# Patient Record
Sex: MALE | Race: WHITE | Employment: FULL TIME | URBAN - METROPOLITAN AREA
[De-identification: names, ages, dates, MRNs, and addresses within clinical notes are randomized per-mention and may not be internally consistent; named-entity substitution may affect disease eponyms.]

---

## 2018-01-07 PROBLEM — E11.10 DKA, TYPE 2, NOT AT GOAL (HCC): Status: ACTIVE | Noted: 2018-01-07

## 2018-01-07 PROBLEM — E11.10 DIABETIC KETOACIDOSIS (HCC): Status: ACTIVE | Noted: 2018-01-07

## 2018-01-11 PROBLEM — M54.2 NECK PAIN: Status: ACTIVE | Noted: 2018-01-11

## 2018-01-12 PROBLEM — E44.0 MODERATE PROTEIN-CALORIE MALNUTRITION (HCC): Status: ACTIVE | Noted: 2018-01-12

## 2018-01-17 PROBLEM — J18.9 PNEUMONIA: Status: ACTIVE | Noted: 2018-01-17

## 2018-01-17 PROBLEM — J18.9 PNEUMONIA DUE TO ORGANISM: Status: ACTIVE | Noted: 2018-01-17

## 2018-11-20 ENCOUNTER — HOSPITAL ENCOUNTER (OUTPATIENT)
Age: 58
Discharge: HOME | End: 2018-11-22
Payer: COMMERCIAL

## 2018-11-20 PROCEDURE — 88305 TISSUE EXAM BY PATHOLOGIST: CPT

## 2020-02-01 RX ORDER — SIMVASTATIN 40 MG
1 TABLET ORAL DAILY
COMMUNITY
Start: 2016-11-23

## 2020-02-01 RX ORDER — HYDROMORPHONE HYDROCHLORIDE 4 MG/1
1 TABLET ORAL 4 TIMES DAILY
COMMUNITY
Start: 2016-11-23

## 2020-02-01 RX ORDER — GLIPIZIDE 10 MG/1
1 TABLET ORAL 2 TIMES DAILY
COMMUNITY
Start: 2016-11-23

## 2020-11-03 PROBLEM — J18.9 PNEUMONIA DUE TO ORGANISM: Status: RESOLVED | Noted: 2018-01-17 | Resolved: 2020-11-03

## 2023-10-04 ENCOUNTER — INITIAL CONSULT (OUTPATIENT)
Age: 63
End: 2023-10-04
Payer: COMMERCIAL

## 2023-10-04 ENCOUNTER — HOSPITAL ENCOUNTER (OUTPATIENT)
Age: 63
Discharge: HOME | End: 2023-10-04
Payer: COMMERCIAL

## 2023-10-04 ENCOUNTER — TELEPHONE (OUTPATIENT)
Age: 63
End: 2023-10-04

## 2023-10-04 ENCOUNTER — PREP FOR PROCEDURE (OUTPATIENT)
Age: 63
End: 2023-10-04

## 2023-10-04 ENCOUNTER — HOSPITAL ENCOUNTER (OUTPATIENT)
Dept: GENERAL RADIOLOGY | Age: 63
Discharge: HOME | End: 2023-10-06
Payer: COMMERCIAL

## 2023-10-04 ENCOUNTER — HOSPITAL ENCOUNTER (OUTPATIENT)
Age: 63
Discharge: HOME | End: 2023-10-06
Payer: COMMERCIAL

## 2023-10-04 VITALS
SYSTOLIC BLOOD PRESSURE: 122 MMHG | HEIGHT: 69 IN | BODY MASS INDEX: 31.4 KG/M2 | TEMPERATURE: 97 F | OXYGEN SATURATION: 100 % | WEIGHT: 212 LBS | DIASTOLIC BLOOD PRESSURE: 74 MMHG | HEART RATE: 69 BPM | RESPIRATION RATE: 16 BRPM

## 2023-10-04 DIAGNOSIS — R19.7 DIARRHEA, UNSPECIFIED TYPE: Primary | ICD-10-CM

## 2023-10-04 DIAGNOSIS — R19.7 DIARRHEA, UNSPECIFIED TYPE: ICD-10-CM

## 2023-10-04 DIAGNOSIS — Z12.11 SCREEN FOR COLON CANCER: ICD-10-CM

## 2023-10-04 LAB — IGA SERPL-MCNC: 249 MG/DL (ref 70–400)

## 2023-10-04 PROCEDURE — 99202 OFFICE O/P NEW SF 15 MIN: CPT | Performed by: NURSE PRACTITIONER

## 2023-10-04 PROCEDURE — 36415 COLL VENOUS BLD VENIPUNCTURE: CPT

## 2023-10-04 PROCEDURE — 83516 IMMUNOASSAY NONANTIBODY: CPT

## 2023-10-04 PROCEDURE — 82784 ASSAY IGA/IGD/IGG/IGM EACH: CPT

## 2023-10-04 PROCEDURE — 74018 RADEX ABDOMEN 1 VIEW: CPT

## 2023-10-04 RX ORDER — MONTELUKAST SODIUM 4 MG/1
1 TABLET, CHEWABLE ORAL 2 TIMES DAILY
Qty: 60 TABLET | Refills: 3 | Status: SHIPPED | OUTPATIENT
Start: 2023-10-04

## 2023-10-04 RX ORDER — TICAGRELOR 90 MG/1
90 TABLET ORAL 2 TIMES DAILY
COMMUNITY
Start: 2023-07-30

## 2023-10-04 RX ORDER — DULAGLUTIDE 4.5 MG/.5ML
4.5 INJECTION, SOLUTION SUBCUTANEOUS
COMMUNITY

## 2023-10-04 RX ORDER — INSULIN DEGLUDEC 200 U/ML
INJECTION, SOLUTION SUBCUTANEOUS
COMMUNITY
Start: 2023-10-02

## 2023-10-04 RX ORDER — ACETAMINOPHEN 160 MG
TABLET,DISINTEGRATING ORAL
COMMUNITY
Start: 2023-09-28

## 2023-10-04 RX ORDER — METHOCARBAMOL 750 MG/1
750 TABLET, FILM COATED ORAL 4 TIMES DAILY PRN
COMMUNITY
Start: 2022-07-26 | End: 2023-10-04

## 2023-10-04 RX ORDER — ROSUVASTATIN CALCIUM 40 MG/1
40 TABLET, COATED ORAL EVERY EVENING
COMMUNITY

## 2023-10-04 RX ORDER — ICOSAPENT ETHYL 1000 MG/1
1 CAPSULE ORAL
COMMUNITY

## 2023-10-04 RX ORDER — ASPIRIN 81 MG/1
TABLET, CHEWABLE ORAL
COMMUNITY
Start: 2022-02-23

## 2023-10-04 RX ORDER — POLYETHYLENE GLYCOL 3350, SODIUM CHLORIDE, POTASSIUM CHLORIDE, SODIUM BICARBONATE, AND SODIUM SULFATE 240; 5.84; 2.98; 6.72; 22.72 G/4L; G/4L; G/4L; G/4L; G/4L
4000 POWDER, FOR SOLUTION ORAL ONCE
Qty: 1 EACH | Refills: 0 | Status: SHIPPED | OUTPATIENT
Start: 2023-10-04 | End: 2023-10-04

## 2023-10-04 NOTE — TELEPHONE ENCOUNTER
Prior Authorization Form:      DEMOGRAPHICS:                     Patient Name:  Gabbie Davis  Patient :  1960            Insurance:  Payor: [facility] / Plan: [place] / Product Type: *No Product type* /   Insurance ID Number:    Payer/Plan Subscr  Sex Relation Sub. Ins. ID Effective Group Num   1. [id number] 1960 Male Self [id number]                                   [address]   2.  [employer] Q 1960 Male Self [id number]                                   [address]         DIAGNOSIS & PROCEDURE:                       Procedure/Operation: Screening Colonoscopy            CPT Code: 60443    Diagnosis:  DIARRHEA, SCREENING COLONOSCOPY    ICD10 Code: R19.7, Z12.11    Location:  [facility]    Surgeon:  Zeferino Christianson INFORMATION:                          Date: 24    Time: 1015AM              Anesthesia:  MAC/TIVA                                                       Status:  Outpatient        Special Comments:         Electronically signed by Paolo Velazquez MA on 10/4/2023 at 2:22 PM

## 2023-10-05 ENCOUNTER — HOSPITAL ENCOUNTER (OUTPATIENT)
Age: 63
Setting detail: SPECIMEN
Discharge: HOME | End: 2023-10-05
Payer: COMMERCIAL

## 2023-10-05 DIAGNOSIS — R19.7 DIARRHEA, UNSPECIFIED TYPE: ICD-10-CM

## 2023-10-05 PROCEDURE — 87449 NOS EACH ORGANISM AG IA: CPT

## 2023-10-05 PROCEDURE — 87324 CLOSTRIDIUM AG IA: CPT

## 2023-10-05 PROCEDURE — 87427 SHIGA-LIKE TOXIN AG IA: CPT

## 2023-10-05 PROCEDURE — 87045 FECES CULTURE AEROBIC BACT: CPT

## 2023-10-05 PROCEDURE — 87046 STOOL CULTR AEROBIC BACT EA: CPT

## 2023-10-06 ENCOUNTER — LAB (OUTPATIENT)
Dept: LAB | Facility: LAB | Age: 63
End: 2023-10-06
Payer: COMMERCIAL

## 2023-10-06 DIAGNOSIS — I25.10 ATHEROSCLEROTIC HEART DISEASE OF NATIVE CORONARY ARTERY WITHOUT ANGINA PECTORIS: Primary | ICD-10-CM

## 2023-10-06 DIAGNOSIS — E78.5 HYPERLIPIDEMIA, UNSPECIFIED: ICD-10-CM

## 2023-10-06 LAB
ALBUMIN SERPL BCP-MCNC: 4.2 G/DL (ref 3.4–5)
ALP SERPL-CCNC: 68 U/L (ref 33–136)
ALT SERPL W P-5'-P-CCNC: 27 U/L (ref 10–52)
ANION GAP SERPL CALC-SCNC: 17 MMOL/L (ref 10–20)
AST SERPL W P-5'-P-CCNC: 20 U/L (ref 9–39)
BILIRUB SERPL-MCNC: 0.5 MG/DL (ref 0–1.2)
BUN SERPL-MCNC: 17 MG/DL (ref 6–23)
C DIFF GDH + TOXINS A+B STL QL IA.RAPID: NEGATIVE
CALCIUM SERPL-MCNC: 10.4 MG/DL (ref 8.6–10.3)
CHLORIDE SERPL-SCNC: 96 MMOL/L (ref 98–107)
CHOLEST SERPL-MCNC: 261 MG/DL (ref 0–199)
CHOLESTEROL/HDL RATIO: 5.6
CO2 SERPL-SCNC: 28 MMOL/L (ref 21–32)
CREAT SERPL-MCNC: 1.03 MG/DL (ref 0.5–1.3)
ERYTHROCYTE [DISTWIDTH] IN BLOOD BY AUTOMATED COUNT: 12.9 % (ref 11.5–14.5)
GFR SERPL CREATININE-BSD FRML MDRD: 83 ML/MIN/1.73M*2
GLUCOSE SERPL-MCNC: 283 MG/DL (ref 74–99)
HCT VFR BLD AUTO: 43 % (ref 41–52)
HDLC SERPL-MCNC: 46.2 MG/DL
HGB BLD-MCNC: 14.2 G/DL (ref 13.5–17.5)
LDLC SERPL CALC-MCNC: 156 MG/DL (ref 140–190)
MCH RBC QN AUTO: 30.9 PG (ref 26–34)
MCHC RBC AUTO-ENTMCNC: 33 G/DL (ref 32–36)
MCV RBC AUTO: 94 FL (ref 80–100)
NON HDL CHOLESTEROL: 215 MG/DL (ref 0–149)
NRBC BLD-RTO: 0 /100 WBCS (ref 0–0)
PLATELET # BLD AUTO: 341 X10*3/UL (ref 150–450)
PMV BLD AUTO: 9.2 FL (ref 7.5–11.5)
POTASSIUM SERPL-SCNC: 5.6 MMOL/L (ref 3.5–5.3)
PROT SERPL-MCNC: 6.7 G/DL (ref 6.4–8.2)
RBC # BLD AUTO: 4.6 X10*6/UL (ref 4.5–5.9)
SODIUM SERPL-SCNC: 135 MMOL/L (ref 136–145)
SPECIMEN DESCRIPTION: NORMAL
TRIGL SERPL-MCNC: 292 MG/DL (ref 0–149)
VLDL: 58 MG/DL (ref 0–40)
WBC # BLD AUTO: 9.5 X10*3/UL (ref 4.4–11.3)

## 2023-10-06 PROCEDURE — 85027 COMPLETE CBC AUTOMATED: CPT

## 2023-10-06 PROCEDURE — 80061 LIPID PANEL: CPT

## 2023-10-06 PROCEDURE — 36415 COLL VENOUS BLD VENIPUNCTURE: CPT

## 2023-10-06 PROCEDURE — 80053 COMPREHEN METABOLIC PANEL: CPT

## 2023-10-08 LAB
MICROORGANISM SPEC CULT: NORMAL
MICROORGANISM SPEC CULT: NORMAL
SPECIMEN DESCRIPTION: NORMAL

## 2023-10-09 LAB — TTG IGA SER IA-ACNC: 0.7 U/ML

## 2023-10-11 PROBLEM — E44.0 MODERATE PROTEIN-CALORIE MALNUTRITION (MULTI): Status: ACTIVE | Noted: 2018-01-12

## 2023-10-11 PROBLEM — E87.6 HYPOKALEMIA: Status: ACTIVE | Noted: 2023-10-11

## 2023-10-11 PROBLEM — R19.7 DIARRHEA: Status: ACTIVE | Noted: 2023-10-04

## 2023-10-11 PROBLEM — I25.5 ISCHEMIC CARDIOMYOPATHY: Status: ACTIVE | Noted: 2023-10-11

## 2023-10-11 PROBLEM — J18.9 PNEUMONIA: Status: ACTIVE | Noted: 2018-01-17

## 2023-10-11 PROBLEM — I25.10 CAD (CORONARY ARTERY DISEASE): Status: ACTIVE | Noted: 2023-10-11

## 2023-10-11 PROBLEM — Z95.1 S/P CABG (CORONARY ARTERY BYPASS GRAFT): Status: ACTIVE | Noted: 2023-10-11

## 2023-10-11 PROBLEM — Z95.5 H/O HEART ARTERY STENT: Status: ACTIVE | Noted: 2023-10-11

## 2023-10-11 PROBLEM — E78.5 HYPERLIPIDEMIA: Status: ACTIVE | Noted: 2023-10-11

## 2023-10-11 PROBLEM — E11.10 DIABETIC KETOACIDOSIS (MULTI): Status: ACTIVE | Noted: 2018-01-07

## 2023-10-11 PROBLEM — E11.10 DKA, TYPE 2, NOT AT GOAL (MULTI): Status: ACTIVE | Noted: 2018-01-07

## 2023-10-11 PROBLEM — C44.519 BASAL CELL CARCINOMA OF SKIN OF OTHER PART OF TRUNK: Status: ACTIVE | Noted: 2020-02-18

## 2023-10-11 PROBLEM — I10 HYPERTENSION: Status: ACTIVE | Noted: 2023-10-11

## 2023-10-11 PROBLEM — E11.9 DIABETES MELLITUS (MULTI): Status: ACTIVE | Noted: 2023-10-11

## 2023-10-11 RX ORDER — PEN NEEDLE, DIABETIC 29 G X1/2"
1 NEEDLE, DISPOSABLE MISCELLANEOUS DAILY
COMMUNITY
Start: 2018-01-16

## 2023-10-11 RX ORDER — HYDROMORPHONE HYDROCHLORIDE 4 MG/1
4 TABLET ORAL EVERY 6 HOURS PRN
COMMUNITY
Start: 2023-07-06 | End: 2023-10-26 | Stop reason: ALTCHOICE

## 2023-10-16 ENCOUNTER — PHARMACY VISIT (OUTPATIENT)
Dept: PHARMACY | Facility: CLINIC | Age: 63
End: 2023-10-16
Payer: COMMERCIAL

## 2023-10-16 DIAGNOSIS — I25.10 CORONARY ARTERY DISEASE INVOLVING NATIVE HEART WITHOUT ANGINA PECTORIS, UNSPECIFIED VESSEL OR LESION TYPE: ICD-10-CM

## 2023-10-16 DIAGNOSIS — E78.49 OTHER HYPERLIPIDEMIA: ICD-10-CM

## 2023-10-16 DIAGNOSIS — E87.5 HYPERKALEMIA: Primary | ICD-10-CM

## 2023-10-16 RX ORDER — METOPROLOL SUCCINATE 100 MG/1
100 TABLET, EXTENDED RELEASE ORAL 2 TIMES DAILY
Qty: 180 TABLET | Refills: 3 | Status: SHIPPED | OUTPATIENT
Start: 2023-10-16

## 2023-10-16 RX ORDER — ALIROCUMAB 75 MG/ML
INJECTION, SOLUTION SUBCUTANEOUS
Qty: 2 ML | Refills: 0 | OUTPATIENT
Start: 2023-10-16 | End: 2023-10-16 | Stop reason: ENTERED-IN-ERROR

## 2023-10-16 NOTE — RESULT ENCOUNTER NOTE
Reviewed blood work results with patient on the phone. Lipid panel not at goal. Stopped Rosuvastatin 3 weeks ago due to diarrhea. Stopped Repatha as well. Reports instructed by PCP. Instructed patient I will send prescription for Repatha to [facility] pharmacy. He will remain off statin for now. He is also taking Colestipol now. Will leave this for now. His potassium was elevated. I instructed him to maintain low potassium diet. Repeat BMP at end of week. Patient agreeable to plan and verbalized understanding

## 2023-10-17 ENCOUNTER — OFFICE VISIT (OUTPATIENT)
Dept: FAMILY MEDICINE CLINIC | Age: 63
End: 2023-10-17
Payer: COMMERCIAL

## 2023-10-17 VITALS
SYSTOLIC BLOOD PRESSURE: 124 MMHG | HEART RATE: 100 BPM | WEIGHT: 224 LBS | DIASTOLIC BLOOD PRESSURE: 84 MMHG | BODY MASS INDEX: 33.18 KG/M2 | OXYGEN SATURATION: 98 % | HEIGHT: 69 IN | RESPIRATION RATE: 18 BRPM

## 2023-10-17 DIAGNOSIS — E78.2 MIXED HYPERLIPIDEMIA: ICD-10-CM

## 2023-10-17 DIAGNOSIS — I10 HYPERTENSION, UNSPECIFIED TYPE: ICD-10-CM

## 2023-10-17 DIAGNOSIS — E11.9 TYPE 2 DIABETES MELLITUS WITHOUT COMPLICATION, WITHOUT LONG-TERM CURRENT USE OF INSULIN (HCC): Primary | ICD-10-CM

## 2023-10-17 DIAGNOSIS — R53.82 CHRONIC FATIGUE: ICD-10-CM

## 2023-10-17 DIAGNOSIS — Z12.5 SCREENING FOR MALIGNANT NEOPLASM OF PROSTATE: ICD-10-CM

## 2023-10-17 DIAGNOSIS — Z12.5 ENCOUNTER FOR SCREENING FOR MALIGNANT NEOPLASM OF PROSTATE: ICD-10-CM

## 2023-10-17 PROCEDURE — 99204 OFFICE O/P NEW MOD 45 MIN: CPT | Performed by: FAMILY MEDICINE

## 2023-10-17 PROCEDURE — 3074F SYST BP LT 130 MM HG: CPT | Performed by: FAMILY MEDICINE

## 2023-10-17 PROCEDURE — 3078F DIAST BP <80 MM HG: CPT | Performed by: FAMILY MEDICINE

## 2023-10-17 RX ORDER — INSULIN LISPRO 100 [IU]/ML
INJECTION, SUSPENSION SUBCUTANEOUS
COMMUNITY

## 2023-10-17 RX ORDER — INSULIN GLARGINE-YFGN 100 [IU]/ML
INJECTION, SOLUTION SUBCUTANEOUS
COMMUNITY

## 2023-10-17 RX ORDER — LOSARTAN POTASSIUM 25 MG/1
25 TABLET ORAL DAILY
COMMUNITY
Start: 2022-12-22

## 2023-10-17 SDOH — ECONOMIC STABILITY: FOOD INSECURITY: WITHIN THE PAST 12 MONTHS, THE FOOD YOU BOUGHT JUST DIDN'T LAST AND YOU DIDN'T HAVE MONEY TO GET MORE.: PATIENT DECLINED

## 2023-10-17 SDOH — ECONOMIC STABILITY: HOUSING INSECURITY
IN THE LAST 12 MONTHS, WAS THERE A TIME WHEN YOU DID NOT HAVE A STEADY PLACE TO SLEEP OR SLEPT IN A SHELTER (INCLUDING NOW)?: PATIENT REFUSED

## 2023-10-17 SDOH — ECONOMIC STABILITY: FOOD INSECURITY: WITHIN THE PAST 12 MONTHS, YOU WORRIED THAT YOUR FOOD WOULD RUN OUT BEFORE YOU GOT MONEY TO BUY MORE.: PATIENT DECLINED

## 2023-10-17 SDOH — ECONOMIC STABILITY: INCOME INSECURITY: HOW HARD IS IT FOR YOU TO PAY FOR THE VERY BASICS LIKE FOOD, HOUSING, MEDICAL CARE, AND HEATING?: PATIENT DECLINED

## 2023-10-17 ASSESSMENT — PATIENT HEALTH QUESTIONNAIRE - PHQ9
SUM OF ALL RESPONSES TO PHQ QUESTIONS 1-9: 0
SUM OF ALL RESPONSES TO PHQ QUESTIONS 1-9: 0
2. FEELING DOWN, DEPRESSED OR HOPELESS: 0
SUM OF ALL RESPONSES TO PHQ QUESTIONS 1-9: 0
1. LITTLE INTEREST OR PLEASURE IN DOING THINGS: 0
SUM OF ALL RESPONSES TO PHQ QUESTIONS 1-9: 0
SUM OF ALL RESPONSES TO PHQ9 QUESTIONS 1 & 2: 0

## 2023-10-20 ENCOUNTER — LAB (OUTPATIENT)
Dept: LAB | Facility: LAB | Age: 63
End: 2023-10-20
Payer: COMMERCIAL

## 2023-10-20 DIAGNOSIS — E87.5 HYPERKALEMIA: ICD-10-CM

## 2023-10-20 LAB
ANION GAP SERPL CALC-SCNC: 12 MMOL/L (ref 10–20)
BUN SERPL-MCNC: 15 MG/DL (ref 6–23)
CALCIUM SERPL-MCNC: 9.3 MG/DL (ref 8.6–10.3)
CHLORIDE SERPL-SCNC: 102 MMOL/L (ref 98–107)
CO2 SERPL-SCNC: 29 MMOL/L (ref 21–32)
CREAT SERPL-MCNC: 0.85 MG/DL (ref 0.5–1.3)
GFR SERPL CREATININE-BSD FRML MDRD: >90 ML/MIN/1.73M*2
GLUCOSE SERPL-MCNC: 263 MG/DL (ref 74–99)
POTASSIUM SERPL-SCNC: 4.6 MMOL/L (ref 3.5–5.3)
SODIUM SERPL-SCNC: 138 MMOL/L (ref 136–145)

## 2023-10-20 PROCEDURE — 80048 BASIC METABOLIC PNL TOTAL CA: CPT

## 2023-10-20 PROCEDURE — 36415 COLL VENOUS BLD VENIPUNCTURE: CPT

## 2023-10-21 RX ORDER — METOPROLOL TARTRATE 100 MG/1
100 TABLET ORAL 2 TIMES DAILY
Qty: 60 TABLET | Refills: 3
Start: 2023-10-21

## 2023-10-21 ASSESSMENT — ENCOUNTER SYMPTOMS
SHORTNESS OF BREATH: 0
EYE DISCHARGE: 0
SORE THROAT: 0
RECTAL PAIN: 0
PHOTOPHOBIA: 0
ABDOMINAL DISTENTION: 0
ABDOMINAL PAIN: 0
RHINORRHEA: 0
CHEST TIGHTNESS: 0
ANAL BLEEDING: 0
TROUBLE SWALLOWING: 0
WHEEZING: 0
EYE ITCHING: 0
COLOR CHANGE: 0
CONSTIPATION: 0
BACK PAIN: 0
SINUS PRESSURE: 0
VOICE CHANGE: 0
VOMITING: 0
APNEA: 0
DIARRHEA: 0
STRIDOR: 0
EYE REDNESS: 0
CHOKING: 0
FACIAL SWELLING: 0
COUGH: 0
EYE PAIN: 0
BLOOD IN STOOL: 0
NAUSEA: 0

## 2023-10-26 ENCOUNTER — PHARMACY VISIT (OUTPATIENT)
Dept: PHARMACY | Facility: CLINIC | Age: 63
End: 2023-10-26
Payer: COMMERCIAL

## 2023-10-26 ENCOUNTER — OFFICE VISIT (OUTPATIENT)
Dept: CARDIOLOGY | Facility: HOSPITAL | Age: 63
End: 2023-10-26
Payer: COMMERCIAL

## 2023-10-26 VITALS
HEART RATE: 76 BPM | WEIGHT: 225 LBS | HEIGHT: 69 IN | BODY MASS INDEX: 33.33 KG/M2 | DIASTOLIC BLOOD PRESSURE: 70 MMHG | OXYGEN SATURATION: 98 % | RESPIRATION RATE: 17 BRPM | SYSTOLIC BLOOD PRESSURE: 126 MMHG

## 2023-10-26 DIAGNOSIS — I25.5 ISCHEMIC CARDIOMYOPATHY: ICD-10-CM

## 2023-10-26 DIAGNOSIS — Z95.1 S/P CABG (CORONARY ARTERY BYPASS GRAFT): ICD-10-CM

## 2023-10-26 DIAGNOSIS — E78.2 MIXED HYPERLIPIDEMIA: ICD-10-CM

## 2023-10-26 DIAGNOSIS — I25.10 CORONARY ARTERY DISEASE INVOLVING NATIVE HEART WITHOUT ANGINA PECTORIS, UNSPECIFIED VESSEL OR LESION TYPE: Primary | ICD-10-CM

## 2023-10-26 DIAGNOSIS — I10 HYPERTENSION, UNSPECIFIED TYPE: ICD-10-CM

## 2023-10-26 PROCEDURE — 99214 OFFICE O/P EST MOD 30 MIN: CPT | Performed by: NURSE PRACTITIONER

## 2023-10-26 PROCEDURE — 3074F SYST BP LT 130 MM HG: CPT | Performed by: NURSE PRACTITIONER

## 2023-10-26 PROCEDURE — 3078F DIAST BP <80 MM HG: CPT | Performed by: NURSE PRACTITIONER

## 2023-10-26 PROCEDURE — 3050F LDL-C >= 130 MG/DL: CPT | Performed by: NURSE PRACTITIONER

## 2023-10-26 RX ORDER — ROSUVASTATIN CALCIUM 40 MG/1
40 TABLET, COATED ORAL DAILY
Qty: 90 TABLET | Refills: 3 | Status: SHIPPED | OUTPATIENT
Start: 2023-10-26 | End: 2024-10-25

## 2023-10-26 ASSESSMENT — ENCOUNTER SYMPTOMS
LOSS OF SENSATION IN FEET: 0
OCCASIONAL FEELINGS OF UNSTEADINESS: 0
DEPRESSION: 0

## 2023-10-26 NOTE — PROGRESS NOTES
Primary Care Physician: Vik Castellanos DO  Date of Visit: 10/26/2023  9:00 AM EDT  Location of visit: [facility]     Chief Complaint:   Chief Complaint   Patient presents with    Cardiomyopathy    Coronary Artery Disease    Hypertension    Hyperlipidemia        HPI / Summary:   Stevenson Rodriguez is a 63 y.o. male presents for followup. Seen in collaboration with Dr. Rangel. He has no particular complaints. He is able to weed wack without chest pain or dyspnea. He walks frequently at work without symptoms. He has not been taking Repatha or Rosuvastatin for the past couple months due to diarrhea. His diarrhea did not resolve after stopping both medications. Denies chest pain, dyspnea, orthopnea, pnd, lightheadedness, dizziness, syncope, palpitations, lower extremity edema, or bleeding issues.                Past Medical History:  No past medical history on file.     Past Surgical History:  Past Surgical History:   Procedure Laterality Date    OTHER SURGICAL HISTORY  02/25/2022    Coronary artery stent placement          Social History:   reports that he has been smoking cigarettes. He has a 22.50 pack-year smoking history. He has quit using smokeless tobacco.  His smokeless tobacco use included chew. He reports current alcohol use of about 5.0 standard drinks of alcohol per week. He reports that he does not currently use drugs after having used the following drugs: Marijuana.     Family History:  family history is not on file.      Allergies:  No Known Allergies    Outpatient Medications:  Current Outpatient Medications   Medication Instructions    aspirin 81 mg, oral, Daily    Brilinta 90 mg tablet 1 tablet, oral, 2 times daily    buPROPion XL (WELLBUTRIN XL) 300 mg, oral, Daily    cholecalciferol (VITAMIN D-3) 50 mcg, oral, Daily    colestipol (Colestid) 1 gram tablet 1 tablet, oral, 2 times daily    dicyclomine (BENTYL) 20 mg, oral, Every 8 hours PRN    evolocumab (REPATHA SURECLICK) 140 mg, subcutaneous,  "Every 14 days    FreeStyle Prashant 2 Sensor kit use as directed    HumaLOG KwikPen Insulin 100 unit/mL injection INJECT SUBCUTANEOUSLY 3 TIMES A DAY WITH MEALS AS PER SLIDING SCALE. MAX OF 40 UNITS PER DAY.    icosapent ethyL (VASCEPA) 1 g, oral    insulin glargine-yfgn 100 unit/mL pen subcutaneous    insulin syringe-needle U-100 0.3 mL 30 gauge x 5/16\" syringe 1 each, Does not apply, Daily    metoprolol succinate XL (TOPROL-XL) 100 mg, oral, 2 times daily    multivitamin with minerals tablet 1 tablet, oral, Daily    nicotine (Nicoderm CQ) 14 mg/24 hr patch transdermal, Daily RT    ondansetron ODT (Zofran-ODT) 4 mg disintegrating tablet DISSOLVE ONE TABLET IN MOUTH THREE TIMES A DAY    Trulicity 4.5 mg, subcutaneous, Weekly       Physical Exam:  Vitals:    10/26/23 0843   BP: 126/70   BP Location: Left arm   Patient Position: Sitting   BP Cuff Size: Adult   Pulse: 76   Resp: 17   SpO2: 98%   Weight: 102 kg (225 lb)   Height: 1.753 m (5' 9\")     Wt Readings from Last 5 Encounters:   10/26/23 102 kg (225 lb)   06/14/23 97.1 kg (214 lb 0.7 oz)   11/09/22 91.6 kg (202 lb)   08/10/22 89.4 kg (197 lb 0.2 oz)   06/29/22 88.9 kg (196 lb 0.7 oz)     Body mass index is 33.23 kg/m².     GENERAL: Alert, cooperative, pleasant, in no acute distress  SKIN: Warm and dry  NECK: Normal JVD, negative HJR  CARDIAC: Regular rate and rhythm with no rubs, murmurs, or gallops  CHEST: Normal respiratory efforts, lungs clear to auscultation bilaterally.  ABDOMEN: Soft, nontender, nondistended  EXTREMITIES: No edema, +2 palpable right RP and DP pulses bilaterally       Last Labs:  Recent Labs     10/06/23  0739 06/14/23  1255 07/30/22  0521   WBC 9.5 11.3 7.6   HGB 14.2 13.8 14.1   HCT 43.0 41.8 42.8    414 263   MCV 94 94 92     Recent Labs     10/20/23  0746 10/06/23  0739 06/14/23  1255    135* 140   K 4.6 5.6* 4.2    96* 102   BUN 15 17 9   CREATININE 0.85 1.03 0.70     CMP -  Lab Results   Component Value Date    CALCIUM " 9.3 10/20/2023    PHOS 3.6 05/16/2022    PROT 6.7 10/06/2023    ALBUMIN 4.2 10/06/2023    AST 20 10/06/2023    ALT 27 10/06/2023    ALKPHOS 68 10/06/2023    BILITOT 0.5 10/06/2023       LIPID PANEL -   Lab Results   Component Value Date    CHOL 261 (H) 10/06/2023    HDL 46.2 10/06/2023    LDLF 190 (H) 07/30/2022    TRIG 292 (H) 10/06/2023       Lab Results   Component Value Date     (H) 06/14/2023    HGBA1C 9.1 (A) 05/02/2022       Last Cardiology Tests:  ECG:  Reviewed EKG from 6/14/23- normal sinus rhythm HR 93    Echo:  Echo Results:   7/29/22  CONCLUSIONS:   1. The left ventricular systolic function is low normal with a 50-55% estimated ejection fraction.   2. Basal and mid inferolateral wall is abnormal.      Cath:   2/21/22  Coronary Lesion Summary:  Vessel         Stenosis    Vessel Segment  LAD          80% stenosis     proximal  1st Diagonal 50% stenosis      ostial  1st Diagonal 40% stenosis        mid  Circumflex   99% stenosis  proximal to mid  OM 2         40% stenosis     proximal  RCA          40% stenosis     proximal  RCA          99% stenosis        mid  RPL          100% stenosis    CONCLUSIONS:   1. Culprit vessel(s): circumflex and right coronary artery.   2. Severe multivessel CAD in a right dominant system.   3. Elevated LVEDP.   4. No aortic stenosis.   5. Successful OCT guided PCI of the proximal and mid RCA with a 3.5 x 38 mm Resolute [place] drug-eluting stent postdilated up to 4.0 mm.   6. Successful PCI of the proximal second right posterior lateral branch with plain old balloon angioplasty.   7. Successful OCT guided PCI of the proximal to mid left circumflex with a 2.75 x 34 mm Resolute [place] drug-eluting stent postdilated up to 3.25 mm.    Stress Test:  Stress Results:  No results found for this or any previous visit from the past 365 days.           Assessment/Plan   Diagnoses and all orders for this visit:  Coronary artery disease involving native heart without angina pectoris,  unspecified vessel or lesion type  -     rivaroxaban (Xarelto) 2.5 mg tablet; Take 1 tablet (2.5 mg) by mouth 2 times a day.  -     rosuvastatin (Crestor) 40 mg tablet; Take 1 tablet (40 mg) by mouth once daily.  -     Lipid panel; Future  Mixed hyperlipidemia  -     Lipid panel; Future  Hypertension, unspecified type  Ischemic cardiomyopathy  S/P CABG (coronary artery bypass graft)  -     rosuvastatin (Crestor) 40 mg tablet; Take 1 tablet (40 mg) by mouth once daily.  -     Lipid panel; Future    In summary Mr. Rodriguez is a pleasant 63 year-old white male with a past medical history significant for coronary artery disease status post PCI to the RCA in the setting of an ST elevation myocardial infarction with subsequent CABG and LIMA to LAD, ischemic cardiomyopathy with recovery of his LV function, chronic diastolic heart failure, hypertension, hyperlipidemia, type II insulin requiring diabetes, and chronic tobacco use. No further chest pain. His blood pressure is controlled. Recent lipid panel is elevated. I have instructed him to restart Rosuvastatin and Repatha as I suspect these medications were not the etiology of his diarrhea since it did not resolve after stopping medication. He will let me know if symptoms get worse after restarting the Rosuvastatin and Repatha. Given his coronary disease with CABG I did recommend low dose Xarelto to replace Brilinta for cardiovascular risk reduction. I have ordered blood work as above. He should continue his current cardiovascular medications. We will see him back in follow-up in 6 months with Dr. Rangel.     Orders:  No orders of the defined types were placed in this encounter.     Followup Appts:  Future Appointments   Date Time Provider Department Center   10/26/2023  9:00 AM GREG Gunderson-CNP [id number]           ____________________________________________________________  MILTON Gunderson  [facility]

## 2023-10-26 NOTE — PATIENT INSTRUCTIONS
Stop Brilinta   Start Xarelto 2.5 mg twice a day (sent to [facility] pharmacy)  You must stay on Aspirin  Restart Rosuvastatin 40 mg daily  Restart Repatha injection every 2 weeks ( sent to [facility] pharmacy)  Check blood work in 3 months fasting lipid panel  Follow up in 6 months with Dr. Rangel  Stop smoking  Please call me if Xarelto is not affordable

## 2023-10-27 ENCOUNTER — TELEPHONE (OUTPATIENT)
Dept: CARDIOLOGY | Facility: HOSPITAL | Age: 63
End: 2023-10-27
Payer: COMMERCIAL

## 2023-10-27 NOTE — TELEPHONE ENCOUNTER
Patient called to let us know that his repatha is going to cost almost $600 and that is too much for him. I asked him if he got any info how much the xarelto will cost and he said he didn't think that one went through to insurance yet.

## 2023-10-30 NOTE — TELEPHONE ENCOUNTER
Patient called office to state Repatha was 600.00. Script was transferred to [facility] now 100.00. Patient states this is affordable. He has not heard any regarding low dose Xarelto. Reached out to pharmacist Vicenta Gan.

## 2023-11-03 PROBLEM — Z12.11 SCREEN FOR COLON CANCER: Status: RESOLVED | Noted: 2023-10-04 | Resolved: 2023-11-03

## 2023-12-01 ENCOUNTER — TELEPHONE (OUTPATIENT)
Dept: CARDIOLOGY | Facility: HOSPITAL | Age: 63
End: 2023-12-01
Payer: COMMERCIAL

## 2023-12-01 NOTE — TELEPHONE ENCOUNTER
Amalia from Dr. Uribe office regarding patient. He told their office that our office no longer wanted him to take Jardiance. Should would like to know why.     Per NP Lacy, we instructed him to consider/discuss taking Jardiance with endo the last two appointments  with him. She thinks it is a good idea.     Notified Dr. Arreola's office.

## 2024-01-16 PROBLEM — Z12.11 SCREEN FOR COLON CANCER: Status: ACTIVE | Noted: 2023-10-04

## 2024-01-16 RX ORDER — SEMAGLUTIDE 1.34 MG/ML
INJECTION, SOLUTION SUBCUTANEOUS WEEKLY
COMMUNITY

## 2024-01-16 NOTE — PROGRESS NOTES
[facility] PRE-ADMISSION TESTING   ENDOSCOPY/ COLONSCOPY INSTRUCTIONS  PAT- Phone Number: [phone]    ENDOSCOPY/ COLONSCOPY INSTRUCTIONS:     [x] Bowel Prep instructions reviewed.   [x] Colonoscopy- The day prior: No solid foods. Clear liquids only.  [x] Nothing by mouth after midnight. Including no gum, candy, mints, or water.   [x] Do not wear makeup, lotions, powders, deodorant.   [x] No tobacco products, illegal drugs, or alcohol within 24 hours of your surgery.  [x] Jewelry or valuables should not be brought to the hospital. All body and/or tongue piercing's must be removed prior to arriving to hospital. No contact lens or hair pins.   [] Urine Pregnancy test will be preformed the day of surgery. A specimen sample may be brought from home.  [x] Arrange transportation with a responsible adult  to and from the hospital. If you do not have a responsible adult  to transport you, you will need to make arrangements with a medical transportation company. Arrange for someone to be with you for the remainder of the day and for 24 hours after your procedure due to having had anesthesia.    -Who will be your  for transportation?_____sister_____________   -Who will be staying with you for 24 hrs after your procedure?__family________________    PARKING INSTRUCTIONS:     [x] ARRIVAL DATE & TIME: 1/19 at 0830  [x] Times are subject to change. We will contact you the business day before surgery if that were to occur.  [x] Enter into the [facility] Entrance. Two people may accompany you. Masks are not required.  [x] Parking Lot \"I\" is where you will park. It is located on the corner of [address] and [address]. The entrance is on [address].   Only one vehicle - per patient, is permitted in parking lot.   Upon entering the parking lot, a voucher ticket will print.    MEDICATION INSTRUCTIONS:    [x] Bring a complete list of your medications, please write

## 2024-01-18 ENCOUNTER — HOSPITAL ENCOUNTER (OUTPATIENT)
Age: 64
Setting detail: OUTPATIENT SURGERY
Discharge: HOME | End: 2024-01-19
Attending: STUDENT IN AN ORGANIZED HEALTH CARE EDUCATION/TRAINING PROGRAM | Admitting: STUDENT IN AN ORGANIZED HEALTH CARE EDUCATION/TRAINING PROGRAM
Payer: COMMERCIAL

## 2024-01-18 DIAGNOSIS — Z12.11 SCREEN FOR COLON CANCER: ICD-10-CM

## 2024-01-18 DIAGNOSIS — Z01.812 PRE-OPERATIVE LABORATORY EXAMINATION: Primary | ICD-10-CM

## 2024-01-18 DIAGNOSIS — R19.7 DIARRHEA: ICD-10-CM

## 2024-01-18 NOTE — PROGRESS NOTES
Informed patient of the following: Surgery scheduled on 1/19 time has been changed to 9 am will need to arrive at 7:45 am.  Patient verbalized understanding.  Patient repeated arrival time

## 2024-01-19 ENCOUNTER — ANESTHESIA EVENT (OUTPATIENT)
Dept: ENDOSCOPY | Age: 64
End: 2024-01-19
Payer: COMMERCIAL

## 2024-01-19 ENCOUNTER — ANESTHESIA (OUTPATIENT)
Dept: ENDOSCOPY | Age: 64
End: 2024-01-19
Payer: COMMERCIAL

## 2024-01-19 VITALS
TEMPERATURE: 98 F | DIASTOLIC BLOOD PRESSURE: 78 MMHG | SYSTOLIC BLOOD PRESSURE: 121 MMHG | BODY MASS INDEX: 34.07 KG/M2 | WEIGHT: 230 LBS | HEART RATE: 99 BPM | OXYGEN SATURATION: 97 % | RESPIRATION RATE: 21 BRPM | HEIGHT: 69 IN

## 2024-01-19 LAB — GLUCOSE BLD-MCNC: 239 MG/DL (ref 74–99)

## 2024-01-19 PROCEDURE — 45380 COLONOSCOPY AND BIOPSY: CPT | Performed by: STUDENT IN AN ORGANIZED HEALTH CARE EDUCATION/TRAINING PROGRAM

## 2024-01-19 PROCEDURE — 3700000000 HC ANESTHESIA ATTENDED CARE: Performed by: STUDENT IN AN ORGANIZED HEALTH CARE EDUCATION/TRAINING PROGRAM

## 2024-01-19 PROCEDURE — 2580000003 HC RX 258

## 2024-01-19 PROCEDURE — 7100000010 HC PHASE II RECOVERY - FIRST 15 MIN: Performed by: STUDENT IN AN ORGANIZED HEALTH CARE EDUCATION/TRAINING PROGRAM

## 2024-01-19 PROCEDURE — 82962 GLUCOSE BLOOD TEST: CPT

## 2024-01-19 PROCEDURE — 6360000002 HC RX W HCPCS

## 2024-01-19 PROCEDURE — 7100000011 HC PHASE II RECOVERY - ADDTL 15 MIN: Performed by: STUDENT IN AN ORGANIZED HEALTH CARE EDUCATION/TRAINING PROGRAM

## 2024-01-19 PROCEDURE — 3609010300 HC COLONOSCOPY W/BIOPSY SINGLE/MULTIPLE: Performed by: STUDENT IN AN ORGANIZED HEALTH CARE EDUCATION/TRAINING PROGRAM

## 2024-01-19 PROCEDURE — 88305 TISSUE EXAM BY PATHOLOGIST: CPT

## 2024-01-19 PROCEDURE — 2709999900 HC NON-CHARGEABLE SUPPLY: Performed by: STUDENT IN AN ORGANIZED HEALTH CARE EDUCATION/TRAINING PROGRAM

## 2024-01-19 PROCEDURE — 3700000001 HC ADD 15 MINUTES (ANESTHESIA): Performed by: STUDENT IN AN ORGANIZED HEALTH CARE EDUCATION/TRAINING PROGRAM

## 2024-01-19 RX ORDER — PROPOFOL 10 MG/ML
INJECTION, EMULSION INTRAVENOUS PRN
Status: DISCONTINUED | OUTPATIENT
Start: 2024-01-19 | End: 2024-01-19 | Stop reason: SDUPTHER

## 2024-01-19 RX ORDER — SODIUM CHLORIDE 0.9 % (FLUSH) 0.9 %
5-40 SYRINGE (ML) INJECTION EVERY 12 HOURS SCHEDULED
Status: DISCONTINUED | OUTPATIENT
Start: 2024-01-19 | End: 2024-01-19 | Stop reason: HOSPADM

## 2024-01-19 RX ORDER — SODIUM CHLORIDE 9 MG/ML
INJECTION, SOLUTION INTRAVENOUS PRN
Status: DISCONTINUED | OUTPATIENT
Start: 2024-01-19 | End: 2024-01-19 | Stop reason: HOSPADM

## 2024-01-19 RX ORDER — ONDANSETRON 2 MG/ML
4 INJECTION INTRAMUSCULAR; INTRAVENOUS EVERY 6 HOURS PRN
Status: DISCONTINUED | OUTPATIENT
Start: 2024-01-19 | End: 2024-01-19 | Stop reason: HOSPADM

## 2024-01-19 RX ORDER — ONDANSETRON 4 MG/1
4 TABLET, ORALLY DISINTEGRATING ORAL EVERY 8 HOURS PRN
Status: DISCONTINUED | OUTPATIENT
Start: 2024-01-19 | End: 2024-01-19 | Stop reason: HOSPADM

## 2024-01-19 RX ORDER — SODIUM CHLORIDE 9 MG/ML
INJECTION, SOLUTION INTRAVENOUS CONTINUOUS PRN
Status: DISCONTINUED | OUTPATIENT
Start: 2024-01-19 | End: 2024-01-19 | Stop reason: SDUPTHER

## 2024-01-19 RX ORDER — SODIUM CHLORIDE 0.9 % (FLUSH) 0.9 %
5-40 SYRINGE (ML) INJECTION PRN
Status: DISCONTINUED | OUTPATIENT
Start: 2024-01-19 | End: 2024-01-19 | Stop reason: HOSPADM

## 2024-01-19 RX ADMIN — PROPOFOL 350 MG: 10 INJECTION, EMULSION INTRAVENOUS at 12:26

## 2024-01-19 RX ADMIN — SODIUM CHLORIDE: 9 INJECTION, SOLUTION INTRAVENOUS at 12:16

## 2024-01-19 ASSESSMENT — PAIN SCALES - GENERAL: PAINLEVEL_OUTOF10: 0

## 2024-01-19 ASSESSMENT — PAIN - FUNCTIONAL ASSESSMENT
PAIN_FUNCTIONAL_ASSESSMENT: NONE - DENIES PAIN
PAIN_FUNCTIONAL_ASSESSMENT: NONE - DENIES PAIN

## 2024-01-19 ASSESSMENT — LIFESTYLE VARIABLES: SMOKING_STATUS: 1

## 2024-01-19 NOTE — H&P
[facility]   Gastroenterology, Hepatology, &  Advanced Endoscopy    Consult       ASSESSMENT AND PLAN:    63y/M presents for colon cancer screening. He is also having diarrhea.    PLAN:  Colonoscopy         HISTORY OF PRESENT ILLNESS:      Mr. Himanshu Bravo is a 63-year-old gentleman that presents today with complaints of diarrhea     Patient admits to being on a narcotic for many years  Had to go through withdrawal a few months ago with a side effect of diarrhea  The diarrhea stopped for about a month and a half and then returned     Starts out as \"sand that progresses to diarrhea\"  Has used pepto bismol with little improvement  Will have a BM after meals; having about 6 BM's a day  Patient denies BRBPR or abdominal pain  There is increased gas  No weight loss  Patient is a smoker and drinks alcohol on weekends     Patient is a diabetic with recent A1C of 10%  Cologuard in 2022 was negative  No weight loss  No colonoscopy of to date    Past Medical History:        Diagnosis Date    Diabetes mellitus (HCC)     H/O cardiovascular stress test 01/15/2018    lexiscan    Hyperlipidemia     Type 2 diabetes mellitus without complication (HCC)      Past Surgical History:        Procedure Laterality Date    CARDIAC CATHETERIZATION      2 stents    CARDIAC SURGERY      minimally invasive    CLAVICLE SURGERY Left      Social History:    TOBACCO:   reports that he has been smoking cigarettes. He started smoking about 52 years ago. He has a 52.0 pack-year smoking history. He has never used smokeless tobacco.  ETOH:   reports no history of alcohol use.  DRUGS:   reports no history of drug use.  Family History:       Problem Relation Age of Onset    Pancreatic Cancer Mother     Melanoma Father     Diabetes Brother        No current facility-administered medications for this encounter.    Current Outpatient Medications:     Semaglutide,0.25 or 0.5MG/DOS, (OZEMPIC, 0.25 OR 0.5 MG/DOSE,) 2 MG/1.5ML SOPN, Inject into the skin once a  week wed, Disp: , Rfl:     empagliflozin (JARDIANCE) 25 MG tablet, Take 1 tablet by mouth daily, Disp: , Rfl:     metoprolol (LOPRESSOR) 100 MG tablet, Take 1 tablet by mouth 2 times daily, Disp: 60 tablet, Rfl: 3    insulin glargine-yfgn (SEMGLEE-YFGN) 100 UNIT/ML injection vial, Inject into the skin, Disp: , Rfl:     insulin lispro protamine & lispro (HUMALOG MIX) (75-25) 100 UNIT per ML SUPN injection pen, Please specify directions, refills and quantity, Disp: , Rfl:     losartan (COZAAR) 25 MG tablet, Take 1 tablet by mouth daily (Patient not taking: Reported on 1/16/2024), Disp: , Rfl:     BRILINTA 90 MG TABS tablet, Take 1 tablet by mouth 2 times daily (Patient not taking: Reported on 1/16/2024), Disp: , Rfl:     TRESIBA FLEXTOUCH 200 UNIT/ML SOPN, INJECT 44 UNITS SUBCUTANEOUSLY AT NIGHT, Disp: , Rfl:     Icosapent Ethyl (VASCEPA) 1 g CAPS capsule, Take 1 g by mouth, Disp: , Rfl:     Continuous Blood Gluc Sensor (FREESTYLE CARA 2 SENSOR) MISC, use as directed, Disp: , Rfl:     aspirin 81 MG chewable tablet, Take by mouth, Disp: , Rfl:     colestipol (COLESTID) 1 g tablet, Take 1 tablet by mouth 2 times daily, Disp: 60 tablet, Rfl: 3    Insulin Syringes, Disposable, U-100 0.3 ML MISC, 1 each by Does not apply route daily, Disp: 100 each, Rfl: 3    Multiple Vitamins-Minerals (THERAPEUTIC MULTIVITAMIN-MINERALS) tablet, Take 1 tablet by mouth daily (Patient not taking: Reported on 1/16/2024), Disp: , Rfl:      Allergies:  Patient has no known allergies.    ROS:  General: Patient denies n/v/f/c or weight loss.  HEENT: Patient denies persistent postnasal drip, scleral icterus, drooling, persistent bleeding from nose/mouth.  Resp: Patient denies SOB, wheezing, productive cough.  Cards: Patient denies CP, palpitations, significant edema  GI: As above.  Derm: Patient denies jaundice/rashes.   Musc: Patient denies diffuse/irregular joint swelling or myalgias.     PHYSICAL EXAM:  Wt 104.3 kg (230 lb)   BMI 33.95 kg/m²

## 2024-01-19 NOTE — ANESTHESIA PRE PROCEDURE
\"COVID19\"    ECHO: 1/9/18   Findings      Left Ventricle   Left ventricular size is grossly normal.   Normal left ventricular wall thickness.   Ejection fraction is measured at 69%.   No evidence of left ventricular mass or thrombus noted.   No regional wall motion abnormalities seen.      Right Ventricle   Normal right ventricular size and function.   No evidence of a thrombus in the right ventricle.      Left Atrium   Normal sized left atrium.   Interatrial septum appears intact.      Right Atrium   Normal right atrium size.      Mitral Valve   Physiologic and/or trace mitral regurgitation is present.   No evidence of mitral valve stenosis.      Tricuspid Valve   Physiologic and/or trace tricuspid regurgitation.      Aortic Valve   Aortic valve opens well.   The aortic valve is trileaflet.   No evidence of aortic valve regurgitation.   No hemodynamically significant aortic stenosis is present.      Pulmonic Valve   Pulmonic valve is structurally normal.      Pericardial Effusion   No evidence of pericardial effusion.      Aorta   The aorta is within normal limits.   Miscellaneous   Regular rhythm.      Conclusions      Summary   Left ventricular size is grossly normal.   Normal left ventricular wall thickness.   Ejection fraction is measured at 69%.   No evidence of left ventricular mass or thrombus noted.   No regional wall motion abnormalities seen.   Normal sized left atrium.   Interatrial septum appears intact.   Physiologic and/or trace mitral regurgitation is present.   No evidence of mitral valve stenosis.   Physiologic and/or trace tricuspid regurgitation.   Regular rhythm.       Anesthesia Evaluation  Patient summary reviewed  Airway: Mallampati: III  TM distance: >3 FB   Neck ROM: full  Mouth opening: < 3 FB   Dental:    (+) poor dentition  Comment: Denies chipped or loose teeth, 1 cracked upper left back tooth    Pulmonary:   (+) pneumonia (5 years ago): resolved,           current

## 2024-01-19 NOTE — BRIEF OP NOTE
Brief Postoperative Note      Patient: Himanshu Bravo  YOB: 1960  MRN: [id number]    Date of Procedure: 1/19/2024    Pre-Op Diagnosis Codes:     * Diarrhea [R19.7]     * Screen for colon cancer [Z12.11]    Post-Op Diagnosis: Normal colon.        Procedure(s):  COLONOSCOPY WITH BIOPSY    Surgeon(s):  Arnold Ross MD    Assistant:  * No surgical staff found *    Anesthesia: Monitor Anesthesia Care    Estimated Blood Loss (mL): less than 50     Complications: None    Specimens:   ID Type Source Tests Collected by Time Destination   A : random colon bx r/o microscopic colitis Tissue Tissue SURGICAL PATHOLOGY Arnold Ross MD 1/19/2024 1241        Implants:  * No implants in log *      Drains: * No LDAs found *    Findings:     Normal colon. No polyps or inflammation.  Random biopsies obtained.       Electronically signed by Arnold Ross MD on 1/19/2024 at 12:56 PM

## 2024-01-19 NOTE — ANESTHESIA POSTPROCEDURE EVALUATION
Department of Anesthesiology  Postprocedure Note    Patient: Himanshu Bravo  MRN: [id number]  YOB: 1960  Date of evaluation: 1/19/2024    Procedure Summary       Date: 01/19/24 Room / Location: [place] [address] / [facility]    Anesthesia Start: 1216 Anesthesia Stop: 1254    Procedure: COLONOSCOPY WITH BIOPSY Diagnosis:       Diarrhea      Screen for colon cancer      (Diarrhea [R19.7])      (Screen for colon cancer [Z12.11])    Surgeons: Arnold Ross MD Responsible Provider: Froy Becerra MD    Anesthesia Type: MAC ASA Status: 3            Anesthesia Type: No value filed.    Angelica Phase I: Angelica Score: 10    Angelica Phase II: Angelica Score: 10    Anesthesia Post Evaluation    Patient location during evaluation: PACU  Patient participation: complete - patient participated  Level of consciousness: awake  Pain score: 3  Airway patency: patent  Nausea & Vomiting: no nausea and no vomiting  Cardiovascular status: blood pressure returned to baseline  Respiratory status: acceptable  Hydration status: euvolemic    No notable events documented.

## 2024-01-19 NOTE — DISCHARGE INSTRUCTIONS
Recommendations:  -The patient will be observed post procedure until all discharge criteria are met.    -Patient has a contact number available for emergencies.  The signs and symptoms of potential delayed complications were discussed with the patient.    -Return to normal activities tomorrow.    -Written discharge instructions were provided to the patient.    -Resume anticoagulation tomorrow.  -Await pathology results.  -Timeframe until next colonoscopy will be discussed in clinic and based on Clinical Guidelines regarding size, number, and pathology of polyps removed as well as adequacy of bowel prep.   -Clinic appointment scheduled 2/2/

## 2024-01-24 LAB — SURGICAL PATHOLOGY REPORT: NORMAL

## 2024-02-02 ENCOUNTER — OFFICE VISIT (OUTPATIENT)
Age: 64
End: 2024-02-02
Payer: COMMERCIAL

## 2024-02-02 VITALS
HEIGHT: 68 IN | TEMPERATURE: 97.1 F | DIASTOLIC BLOOD PRESSURE: 78 MMHG | WEIGHT: 234 LBS | HEART RATE: 100 BPM | SYSTOLIC BLOOD PRESSURE: 116 MMHG | OXYGEN SATURATION: 100 % | RESPIRATION RATE: 18 BRPM | BODY MASS INDEX: 35.46 KG/M2

## 2024-02-02 DIAGNOSIS — R19.7 DIARRHEA, UNSPECIFIED TYPE: Primary | ICD-10-CM

## 2024-02-02 PROCEDURE — 99212 OFFICE O/P EST SF 10 MIN: CPT | Performed by: NURSE PRACTITIONER

## 2024-02-02 NOTE — PROGRESS NOTES
Himanshu Bravo (:  1960) is a 64 y.o. male, here for evaluation of the following chief complaint(s):  Follow-up      SUBJECTIVE/OBJECTIVE:  HPI:    Himanshu is a very pleasant 64 year old gentleman that presents today for follow up on colonoscopy    Colonoscopy 24-    Impression: The entire examined colon is normal. Biopsied. The distal rectum and anal verge are normal on retroflexion view.   -- Diagnosis --   Colon, random biopsy: Multiple fragments of colonic mucosa with no   significant pathologic findings.     Patient continues to have diarrhea intermittently  There are specific food triggers  Patient is taking colestipol that has helped.  He is satisfied with his treatment             ROS:  General: Patient denies n/v/f/c or weight loss.  HEENT: Patient denies persistent postnasal drip, scleral icterus, drooling, persistent bleeding from nose/mouth.  Resp: Patient denies SOB, wheezing, productive cough.  Cards: Patient denies CP, palpitations, significant edema  GI: As above.  Derm: Patient denies jaundice/rashes.   Musc: Patient denies diffuse/irregular joint swelling or myalgias.      Objective   Wt Readings from Last 3 Encounters:   24 106.1 kg (234 lb)   24 104.3 kg (230 lb)   10/17/23 101.6 kg (224 lb)     Temp Readings from Last 3 Encounters:   24 97.1 °F (36.2 °C) (Infrared)   24 98 °F (36.7 °C) (Temporal)   10/04/23 97 °F (36.1 °C) (Temporal)     BP Readings from Last 3 Encounters:   24 116/78   24 121/78   10/17/23 124/84     Pulse Readings from Last 3 Encounters:   24 100   24 99   10/17/23 100        Physical Exam  Constitutional:       Appearance: Normal appearance.   Neurological:      Mental Status: He is alert.         Past Medical History:   Diagnosis Date    Diabetes mellitus (HCC)     H/O cardiovascular stress test 01/15/2018    lexiscan    Hyperlipidemia     Type 2 diabetes mellitus without complication (HCC)       Past Surgical

## 2024-02-15 PROBLEM — Z12.11 SCREEN FOR COLON CANCER: Status: RESOLVED | Noted: 2023-10-04 | Resolved: 2024-02-15

## 2024-07-03 ENCOUNTER — OFFICE VISIT (OUTPATIENT)
Dept: CARDIOLOGY | Facility: HOSPITAL | Age: 64
End: 2024-07-03
Payer: COMMERCIAL

## 2024-07-03 VITALS
DIASTOLIC BLOOD PRESSURE: 80 MMHG | HEIGHT: 68 IN | OXYGEN SATURATION: 97 % | WEIGHT: 237.4 LBS | HEART RATE: 71 BPM | SYSTOLIC BLOOD PRESSURE: 125 MMHG | BODY MASS INDEX: 35.98 KG/M2

## 2024-07-03 DIAGNOSIS — I25.10 CORONARY ARTERY DISEASE INVOLVING NATIVE HEART WITHOUT ANGINA PECTORIS, UNSPECIFIED VESSEL OR LESION TYPE: Primary | ICD-10-CM

## 2024-07-03 DIAGNOSIS — E11.9 TYPE 2 DIABETES MELLITUS WITHOUT COMPLICATION, WITH LONG-TERM CURRENT USE OF INSULIN (MULTI): ICD-10-CM

## 2024-07-03 DIAGNOSIS — I10 PRIMARY HYPERTENSION: ICD-10-CM

## 2024-07-03 DIAGNOSIS — Z95.1 S/P CABG (CORONARY ARTERY BYPASS GRAFT): ICD-10-CM

## 2024-07-03 DIAGNOSIS — I25.5 ISCHEMIC CARDIOMYOPATHY: ICD-10-CM

## 2024-07-03 DIAGNOSIS — E78.2 MIXED HYPERLIPIDEMIA: ICD-10-CM

## 2024-07-03 DIAGNOSIS — Z79.4 TYPE 2 DIABETES MELLITUS WITHOUT COMPLICATION, WITH LONG-TERM CURRENT USE OF INSULIN (MULTI): ICD-10-CM

## 2024-07-03 DIAGNOSIS — Z72.0 TOBACCO USE: ICD-10-CM

## 2024-07-03 LAB
ATRIAL RATE: 72 BPM
P AXIS: 55 DEGREES
P OFFSET: 176 MS
P ONSET: 129 MS
PR INTERVAL: 148 MS
Q ONSET: 203 MS
QRS COUNT: 12 BEATS
QRS DURATION: 110 MS
QT INTERVAL: 436 MS
QTC CALCULATION(BAZETT): 477 MS
QTC FREDERICIA: 463 MS
R AXIS: -53 DEGREES
T AXIS: 27 DEGREES
T OFFSET: 421 MS
VENTRICULAR RATE: 72 BPM

## 2024-07-03 PROCEDURE — 99406 BEHAV CHNG SMOKING 3-10 MIN: CPT | Performed by: INTERNAL MEDICINE

## 2024-07-03 PROCEDURE — 3074F SYST BP LT 130 MM HG: CPT | Performed by: INTERNAL MEDICINE

## 2024-07-03 PROCEDURE — 99214 OFFICE O/P EST MOD 30 MIN: CPT | Performed by: INTERNAL MEDICINE

## 2024-07-03 PROCEDURE — 3079F DIAST BP 80-89 MM HG: CPT | Performed by: INTERNAL MEDICINE

## 2024-07-03 PROCEDURE — 93005 ELECTROCARDIOGRAM TRACING: CPT | Performed by: INTERNAL MEDICINE

## 2024-07-03 ASSESSMENT — ENCOUNTER SYMPTOMS
OCCASIONAL FEELINGS OF UNSTEADINESS: 0
LOSS OF SENSATION IN FEET: 0
DEPRESSION: 0

## 2024-07-03 NOTE — PATIENT INSTRUCTIONS
Stop smoking.  Check CMP, CBC, fasting lipid profile, and A1c.  Lower extremity vascular studies with exercise.  Follow-up in 6 months.

## 2024-07-03 NOTE — PROGRESS NOTES
Primary Care Physician: Vik Castellanos DO  Date of Visit: 07/03/2024  9:00 AM EDT  Location of visit: [facility]     Chief Complaint:   No chief complaint on file.       HPI / Summary:   Stevenson Rodriguez is a 64 y.o. male presents for followup.  He has no cardiac complaints.  He is generally active and takes care of 2 acres of land in addition to walking up and down stairs without chest pain or shortness of breath.  He does note a 2-month history of intermittent bilateral buttock and calf discomfort when walking approximately 10 feet.  He has not had any rest pain.  He continues to smoke 10 cigarettes a day.  He notes occasional ankle edema.  The patient denies chest pain, shortness of breath, palpitations, lightheadedness, syncope, orthopnea, paroxysmal nocturnal dyspnea, or bleeding problems.    He ran out of Repatha 2 months ago.  He has not had any side effects.          History reviewed. No pertinent past medical history.     Past Surgical History:   Procedure Laterality Date    OTHER SURGICAL HISTORY  02/25/2022    Coronary artery stent placement          Social History:   reports that he has been smoking cigarettes. He has a 22.5 pack-year smoking history. He has quit using smokeless tobacco.  His smokeless tobacco use included chew. He reports current alcohol use of about 5.0 standard drinks of alcohol per week. He reports that he does not currently use drugs after having used the following drugs: Marijuana.     Family History:  family history is not on file.      Allergies:  No Known Allergies    Outpatient Medications:  Current Outpatient Medications   Medication Instructions    aspirin 81 mg, oral, Daily    buPROPion XL (WELLBUTRIN XL) 300 mg, oral, Daily    cholecalciferol (VITAMIN D-3) 50 mcg, oral, Daily    colestipol (Colestid) 1 gram tablet 1 tablet, oral, 2 times daily    dicyclomine (BENTYL) 20 mg, oral, Every 8 hours PRN    evolocumab (REPATHA SURECLICK) 140 mg, subcutaneous, Every 14  "days    FreeStyle Prashant 2 Sensor kit use as directed    HumaLOG KwikPen Insulin 100 unit/mL injection INJECT SUBCUTANEOUSLY 3 TIMES A DAY WITH MEALS AS PER SLIDING SCALE. MAX OF 40 UNITS PER DAY.    icosapent ethyL (VASCEPA) 1 g, oral    insulin glargine-yfgn 100 unit/mL pen subcutaneous    insulin syringe-needle U-100 0.3 mL 30 gauge x 5/16\" syringe 1 each, Does not apply, Daily    metoprolol succinate XL (TOPROL-XL) 100 mg, oral, 2 times daily    multivitamin with minerals tablet 1 tablet, oral, Daily    nicotine (Nicoderm CQ) 14 mg/24 hr patch transdermal, Daily RT    ondansetron ODT (Zofran-ODT) 4 mg disintegrating tablet DISSOLVE ONE TABLET IN MOUTH THREE TIMES A DAY    rivaroxaban (XARELTO) 2.5 mg, oral, 2 times daily    rosuvastatin (CRESTOR) 40 mg, oral, Daily    Trulicity 4.5 mg, subcutaneous, Once Weekly       Physical Exam:  Vitals:    07/03/24 0839   BP: 125/80   Pulse: 71   SpO2: 97%   Weight: 108 kg (237 lb 6.4 oz)   Height: 1.727 m (5' 8\")     Wt Readings from Last 5 Encounters:   07/03/24 108 kg (237 lb 6.4 oz)   10/26/23 102 kg (225 lb)   06/14/23 97.1 kg (214 lb 0.7 oz)   11/09/22 91.6 kg (202 lb)   08/10/22 89.4 kg (197 lb 0.2 oz)     Body mass index is 36.1 kg/m².   General: Well-developed well-nourished in no acute distress  HEENT: Normocephalic atraumatic  Neck: Supple, JVP is 10 cm of water but flattens on inspiration negative hepatojugular reflux 2+ carotid pulses without bruit  Pulmonary: Normal respiratory effort, clear to auscultation  Cardiovascular: No right ventricular heave, normal S1 and S2, no murmurs rubs or gallops  Abdomen: Soft nontender nondistended  Extremities: Warm without edema 2+ radial pulses bilaterally 2+ dorsalis pedis pulses bilaterally  Neurologic: Alert and oriented x3  Psychiatric: Normal mood and affect     Last Labs:  CMP:  Recent Labs     10/20/23  0746 10/06/23  0739 06/14/23  1255    135* 140   K 4.6 5.6* 4.2    96* 102   CO2 29 28 30   ANIONGAP 12 " 17 12   BUN 15 17 9   CREATININE 0.85 1.03 0.70   EGFR >90 83  --    GLUCOSE 263* 283* 216*     Recent Labs     10/06/23  0739 06/14/23  1255 07/29/22  1138   ALBUMIN 4.2 3.6 4.0   ALKPHOS 68 62 66   ALT 27 23 12   AST 20 20 12   BILITOT 0.5 0.3 0.6     CBC:  Recent Labs     10/06/23  0739 06/14/23  1255 07/30/22  0521   WBC 9.5 11.3 7.6   HGB 14.2 13.8 14.1   HCT 43.0 41.8 42.8    414 263   MCV 94 94 92     COAG:   Recent Labs     07/29/22  1138 04/01/22  0818   INR 1.0 1.1   DDIMERVTE 424  --      HEME/ENDO:  Recent Labs     05/02/22  1358 04/01/22  0818 02/23/22  0508   HGBA1C 9.1* 9.0* 8.2*      CARDIAC:   Recent Labs     06/14/23  1357 06/14/23  1255 07/29/22  1949 07/29/22  1138 07/29/22  1138 02/23/22  0937   TROPHS 37* 38* 19   < > 20  --    BNP  --  382*  --   --  519* 670*    < > = values in this interval not displayed.     Recent Labs     10/06/23  0739 07/30/22  0521 02/21/22  0939   CHOL 261* 268* 305*   LDLF  --  190* 199*   LDLCALC 156  --   --    HDL 46.2 55.4 68.4   TRIG 292* 113 188*       Last Cardiology Tests:  ECG:  An electrocardiogram performed today that I reviewed shows normal sinus rhythm incomplete right bundle branch block left axis deviation prior inferior infarct prior anterior lateral infarct.    Echo:  Echocardiogram July 29, 2022  CONCLUSIONS:   1. The left ventricular systolic function is low normal with a 50-55% estimated ejection fraction.   2. Basal and mid inferolateral wall is abnormal.    Cath:  2/21/22  Coronary Lesion Summary:  Vessel         Stenosis    Vessel Segment  LAD          80% stenosis     proximal  1st Diagonal 50% stenosis      ostial  1st Diagonal 40% stenosis        mid  Circumflex   99% stenosis  proximal to mid  OM 2         40% stenosis     proximal  RCA          40% stenosis     proximal  RCA          99% stenosis        mid  RPL          100% stenosis     CONCLUSIONS:   1. Culprit vessel(s): circumflex and right coronary artery.   2. Severe  multivessel CAD in a right dominant system.   3. Elevated LVEDP.   4. No aortic stenosis.   5. Successful OCT guided PCI of the proximal and mid RCA with a 3.5 x 38 mm Resolute [place] drug-eluting stent postdilated up to 4.0 mm.   6. Successful PCI of the proximal second right posterior lateral branch with plain old balloon angioplasty.   7. Successful OCT guided PCI of the proximal to mid left circumflex with a 2.75 x 34 mm Resolute [place] drug-eluting stent postdilated up to 3.25 mm.    Stress Test:  Stress Results:  No results found for this or any previous visit from the past 365 days.         Cardiac Imaging:        Assessment/Plan   Diagnoses and all orders for this visit:  Coronary artery disease involving native heart without angina pectoris, unspecified vessel or lesion type  -     ECG 12 lead (Clinic Performed)  -     rivaroxaban (Xarelto) 2.5 mg tablet; Take 1 tablet (2.5 mg) by mouth 2 times a day.  -     Comprehensive Metabolic Panel; Future  -     CBC; Future  Mixed hyperlipidemia  -     Lipid Panel; Future  Primary hypertension  Ischemic cardiomyopathy  S/P CABG (coronary artery bypass graft)  Type 2 diabetes mellitus without complication, with long-term current use of insulin (Multi)  -     Hemoglobin A1C; Future  -     Vascular US ankle brachial index (LITO) with exercise; Future  Tobacco use  -     Vascular US ankle brachial index (LITO) with exercise; Future    In summary Mr. Rodriguez is a pleasant 64 year-old white male with a past medical history significant for coronary artery disease status post PCI to the RCA in the setting of an ST elevation myocardial infarction with subsequent CABG with LIMA to LAD, ischemic cardiomyopathy with recovery of his LV function, postoperative atrial fibrillation, chronic diastolic heart failure, hypertension, hyperlipidemia, type II insulin requiring diabetes, and chronic tobacco use. He has no cardiac complaints.  He does note mild bilateral buttock and calf discomfort  with walking.  I ordered lower extremity vascular studies with exercise.  I strongly encouraged him to stop smoking and spent more than 3 minutes of time counseling to do so.  I ordered labs as indicated below.  He should continue his current cardiovascular medications.  I refilled his Repatha.  We will see him back in follow-up in 6 months.      Orders:  Orders Placed This Encounter   Procedures    Lipid Panel    Comprehensive Metabolic Panel    CBC    Hemoglobin A1C    ECG 12 lead (Clinic Performed)      Followup Appts:  Future Appointments   Date Time Provider Department Center   1/7/2025  1:00 PM [facility] 1 [id number] Rad   1/7/2025  2:00 PM Sabrina Valenzuela, APRN-CNP [id number]           ____________________________________________________________  Claudio Rangel MD  [facility]  [facility]

## 2024-08-27 ENCOUNTER — LAB (OUTPATIENT)
Dept: LAB | Facility: LAB | Age: 64
End: 2024-08-27
Payer: COMMERCIAL

## 2024-08-27 DIAGNOSIS — E11.9 TYPE 2 DIABETES MELLITUS WITHOUT COMPLICATION, WITH LONG-TERM CURRENT USE OF INSULIN (MULTI): ICD-10-CM

## 2024-08-27 DIAGNOSIS — Z79.4 TYPE 2 DIABETES MELLITUS WITHOUT COMPLICATION, WITH LONG-TERM CURRENT USE OF INSULIN (MULTI): ICD-10-CM

## 2024-08-27 DIAGNOSIS — E78.2 MIXED HYPERLIPIDEMIA: ICD-10-CM

## 2024-08-27 DIAGNOSIS — I25.10 CORONARY ARTERY DISEASE INVOLVING NATIVE HEART WITHOUT ANGINA PECTORIS, UNSPECIFIED VESSEL OR LESION TYPE: ICD-10-CM

## 2024-08-27 LAB
ALBUMIN SERPL BCP-MCNC: 4 G/DL (ref 3.4–5)
ALP SERPL-CCNC: 62 U/L (ref 33–136)
ALT SERPL W P-5'-P-CCNC: 13 U/L (ref 10–52)
ANION GAP SERPL CALC-SCNC: 14 MMOL/L (ref 10–20)
AST SERPL W P-5'-P-CCNC: 15 U/L (ref 9–39)
BILIRUB SERPL-MCNC: 0.5 MG/DL (ref 0–1.2)
BUN SERPL-MCNC: 15 MG/DL (ref 6–23)
CALCIUM SERPL-MCNC: 9.5 MG/DL (ref 8.6–10.3)
CHLORIDE SERPL-SCNC: 99 MMOL/L (ref 98–107)
CHOLEST SERPL-MCNC: 240 MG/DL (ref 0–199)
CHOLESTEROL/HDL RATIO: 5.4
CO2 SERPL-SCNC: 29 MMOL/L (ref 21–32)
CREAT SERPL-MCNC: 0.95 MG/DL (ref 0.5–1.3)
EGFRCR SERPLBLD CKD-EPI 2021: 90 ML/MIN/1.73M*2
ERYTHROCYTE [DISTWIDTH] IN BLOOD BY AUTOMATED COUNT: 13.6 % (ref 11.5–14.5)
EST. AVERAGE GLUCOSE BLD GHB EST-MCNC: 197 MG/DL
GLUCOSE SERPL-MCNC: 360 MG/DL (ref 74–99)
HBA1C MFR BLD: 8.5 %
HCT VFR BLD AUTO: 46.3 % (ref 41–52)
HDLC SERPL-MCNC: 44.2 MG/DL
HGB BLD-MCNC: 14.9 G/DL (ref 13.5–17.5)
LDLC SERPL CALC-MCNC: 150 MG/DL
MCH RBC QN AUTO: 31.4 PG (ref 26–34)
MCHC RBC AUTO-ENTMCNC: 32.2 G/DL (ref 32–36)
MCV RBC AUTO: 98 FL (ref 80–100)
NON HDL CHOLESTEROL: 196 MG/DL (ref 0–149)
NRBC BLD-RTO: 0 /100 WBCS (ref 0–0)
PLATELET # BLD AUTO: 249 X10*3/UL (ref 150–450)
POTASSIUM SERPL-SCNC: 4.6 MMOL/L (ref 3.5–5.3)
PROT SERPL-MCNC: 6 G/DL (ref 6.4–8.2)
RBC # BLD AUTO: 4.75 X10*6/UL (ref 4.5–5.9)
SODIUM SERPL-SCNC: 137 MMOL/L (ref 136–145)
TRIGL SERPL-MCNC: 231 MG/DL (ref 0–149)
VLDL: 46 MG/DL (ref 0–40)
WBC # BLD AUTO: 7.5 X10*3/UL (ref 4.4–11.3)

## 2024-08-27 PROCEDURE — 80053 COMPREHEN METABOLIC PANEL: CPT

## 2024-08-27 PROCEDURE — 80061 LIPID PANEL: CPT

## 2024-08-27 PROCEDURE — 85027 COMPLETE CBC AUTOMATED: CPT

## 2024-08-27 PROCEDURE — 83036 HEMOGLOBIN GLYCOSYLATED A1C: CPT

## 2024-08-27 PROCEDURE — 36415 COLL VENOUS BLD VENIPUNCTURE: CPT

## 2024-10-01 ENCOUNTER — OFFICE VISIT (OUTPATIENT)
Dept: FAMILY MEDICINE CLINIC | Age: 64
End: 2024-10-01
Payer: COMMERCIAL

## 2024-10-01 VITALS
OXYGEN SATURATION: 94 % | DIASTOLIC BLOOD PRESSURE: 78 MMHG | HEART RATE: 72 BPM | SYSTOLIC BLOOD PRESSURE: 130 MMHG | BODY MASS INDEX: 36.37 KG/M2 | HEIGHT: 68 IN | WEIGHT: 240 LBS | RESPIRATION RATE: 18 BRPM

## 2024-10-01 DIAGNOSIS — Z79.4 TYPE 2 DIABETES MELLITUS WITH HYPERGLYCEMIA, WITH LONG-TERM CURRENT USE OF INSULIN (HCC): Primary | ICD-10-CM

## 2024-10-01 DIAGNOSIS — J40 BRONCHITIS: ICD-10-CM

## 2024-10-01 DIAGNOSIS — E78.2 MIXED HYPERLIPIDEMIA: ICD-10-CM

## 2024-10-01 DIAGNOSIS — E11.65 TYPE 2 DIABETES MELLITUS WITH HYPERGLYCEMIA, WITH LONG-TERM CURRENT USE OF INSULIN (HCC): Primary | ICD-10-CM

## 2024-10-01 DIAGNOSIS — R53.82 CHRONIC FATIGUE: ICD-10-CM

## 2024-10-01 PROCEDURE — 99214 OFFICE O/P EST MOD 30 MIN: CPT | Performed by: FAMILY MEDICINE

## 2024-10-01 RX ORDER — MONTELUKAST SODIUM 10 MG/1
10 TABLET ORAL DAILY
Qty: 30 TABLET | Refills: 3 | Status: SHIPPED | OUTPATIENT
Start: 2024-10-01

## 2024-10-01 RX ORDER — AZITHROMYCIN 250 MG/1
TABLET, FILM COATED ORAL
Qty: 6 TABLET | Refills: 0 | Status: SHIPPED | OUTPATIENT
Start: 2024-10-01 | End: 2024-10-11

## 2024-10-01 RX ORDER — BENZONATATE 200 MG/1
200 CAPSULE ORAL 3 TIMES DAILY PRN
Qty: 60 CAPSULE | Refills: 0 | Status: SHIPPED | OUTPATIENT
Start: 2024-10-01 | End: 2024-10-21

## 2024-10-01 ASSESSMENT — PATIENT HEALTH QUESTIONNAIRE - PHQ9
SUM OF ALL RESPONSES TO PHQ QUESTIONS 1-9: 0
SUM OF ALL RESPONSES TO PHQ9 QUESTIONS 1 & 2: 0
SUM OF ALL RESPONSES TO PHQ QUESTIONS 1-9: 0
1. LITTLE INTEREST OR PLEASURE IN DOING THINGS: NOT AT ALL
2. FEELING DOWN, DEPRESSED OR HOPELESS: NOT AT ALL
SUM OF ALL RESPONSES TO PHQ QUESTIONS 1-9: 0
2. FEELING DOWN, DEPRESSED OR HOPELESS: NOT AT ALL
1. LITTLE INTEREST OR PLEASURE IN DOING THINGS: NOT AT ALL
SUM OF ALL RESPONSES TO PHQ QUESTIONS 1-9: 0
SUM OF ALL RESPONSES TO PHQ9 QUESTIONS 1 & 2: 0

## 2024-10-01 NOTE — PROGRESS NOTES
Housing Stability     Unstable Housing in the Last Year: Not on file       Family History   Problem Relation Age of Onset    Pancreatic Cancer Mother     Diabetes Mother     Melanoma Father     Cancer Father     Diabetes Brother        Current Outpatient Medications on File Prior to Visit   Medication Sig Dispense Refill    Semaglutide,0.25 or 0.5MG/DOS, (OZEMPIC, 0.25 OR 0.5 MG/DOSE,) 2 MG/1.5ML SOPN Inject into the skin once a week wed      empagliflozin (JARDIANCE) 25 MG tablet Take 1 tablet by mouth daily      metoprolol (LOPRESSOR) 100 MG tablet Take 1 tablet by mouth 2 times daily 60 tablet 3    insulin glargine-yfgn (SEMGLEE-YFGN) 100 UNIT/ML injection vial Inject into the skin      insulin lispro protamine & lispro (HUMALOG MIX) (75-25) 100 UNIT per ML SUPN injection pen Please specify directions, refills and quantity      losartan (COZAAR) 25 MG tablet Take 1 tablet by mouth daily      BRILINTA 90 MG TABS tablet Take 1 tablet by mouth 2 times daily      TRESIBA FLEXTOUCH 200 UNIT/ML SOPN INJECT 44 UNITS SUBCUTANEOUSLY AT NIGHT      Icosapent Ethyl (VASCEPA) 1 g CAPS capsule Take 1 g by mouth      Continuous Blood Gluc Sensor (FREESTYLE CARA 2 SENSOR) MISC use as directed      aspirin 81 MG chewable tablet Take by mouth      colestipol (COLESTID) 1 g tablet Take 1 tablet by mouth 2 times daily 60 tablet 3    Insulin Syringes, Disposable, U-100 0.3 ML MISC 1 each by Does not apply route daily 100 each 3    Multiple Vitamins-Minerals (THERAPEUTIC MULTIVITAMIN-MINERALS) tablet Take 1 tablet by mouth daily       No current facility-administered medications on file prior to visit.       No Known Allergies    I have reviewedher allergies, medications, problem list, medical, social and family history and have updated as needed in the electronic medical record.    Objective:     Physical Exam  Vitals and nursing note reviewed.   Constitutional:       General: He is not in acute distress.     Appearance: He is

## 2024-10-03 ENCOUNTER — TELEPHONE (OUTPATIENT)
Dept: CARDIOLOGY | Facility: HOSPITAL | Age: 64
End: 2024-10-03
Payer: COMMERCIAL

## 2024-10-03 DIAGNOSIS — Z79.4 TYPE 2 DIABETES MELLITUS WITHOUT COMPLICATION, WITH LONG-TERM CURRENT USE OF INSULIN (MULTI): ICD-10-CM

## 2024-10-03 DIAGNOSIS — E11.9 TYPE 2 DIABETES MELLITUS WITHOUT COMPLICATION, WITH LONG-TERM CURRENT USE OF INSULIN (MULTI): ICD-10-CM

## 2024-10-03 DIAGNOSIS — E78.2 MIXED HYPERLIPIDEMIA: ICD-10-CM

## 2024-10-03 DIAGNOSIS — I25.10 CORONARY ARTERY DISEASE INVOLVING NATIVE HEART WITHOUT ANGINA PECTORIS, UNSPECIFIED VESSEL OR LESION TYPE: Primary | ICD-10-CM

## 2024-10-03 ASSESSMENT — ENCOUNTER SYMPTOMS
ABDOMINAL PAIN: 0
WHEEZING: 0
SHORTNESS OF BREATH: 0
TROUBLE SWALLOWING: 0
COLOR CHANGE: 0
EYE DISCHARGE: 0
CHOKING: 0
EYE REDNESS: 0
CHEST TIGHTNESS: 0
DIARRHEA: 0
BLOOD IN STOOL: 0
EYE ITCHING: 0
BACK PAIN: 0
RHINORRHEA: 0
STRIDOR: 0
RECTAL PAIN: 0
SINUS PRESSURE: 0
ABDOMINAL DISTENTION: 0
ANAL BLEEDING: 0
EYE PAIN: 0
PHOTOPHOBIA: 0
COUGH: 0
SORE THROAT: 0
VOICE CHANGE: 0
FACIAL SWELLING: 0
VOMITING: 0
CONSTIPATION: 0
APNEA: 0
NAUSEA: 0

## 2024-10-03 NOTE — TELEPHONE ENCOUNTER
Able to reach pt by phone.  Pt will be starting Repatha injections per previous instructions from physician.  New orders placed this day for repeat Lipid profile in three months and a referral to the  [facility] per Dr. Rangel's request.  Pt verbalizes understanding.  NO other questions or concerns received at this time.

## 2024-10-03 NOTE — TELEPHONE ENCOUNTER
----- Message from Claudio Rangel sent at 9/6/2024  5:52 PM EDT -----  Elevated hemoglobin A1c.  Elevated glucose.  Normal CBC.  Significantly elevated lipids.  Please call the patient to clarify if he is on Repatha.  Refer to [facility] clinic for diabetes.  Follow-up with primary physician.  Recheck lipid profile in 3 months.

## 2024-10-07 ENCOUNTER — TELEPHONE (OUTPATIENT)
Dept: CARDIOLOGY | Facility: HOSPITAL | Age: 64
End: 2024-10-07

## 2024-10-07 DIAGNOSIS — Z79.4 TYPE 2 DIABETES MELLITUS WITHOUT COMPLICATION, WITH LONG-TERM CURRENT USE OF INSULIN (MULTI): Primary | ICD-10-CM

## 2024-10-07 DIAGNOSIS — E11.9 TYPE 2 DIABETES MELLITUS WITHOUT COMPLICATION, WITH LONG-TERM CURRENT USE OF INSULIN (MULTI): Primary | ICD-10-CM

## 2024-10-08 ENCOUNTER — TELEPHONE (OUTPATIENT)
Dept: FAMILY MEDICINE CLINIC | Age: 64
End: 2024-10-08

## 2024-10-08 DIAGNOSIS — M25.512 ACUTE PAIN OF LEFT SHOULDER: ICD-10-CM

## 2024-10-08 DIAGNOSIS — M54.2 NECK PAIN: Primary | ICD-10-CM

## 2024-10-08 NOTE — TELEPHONE ENCOUNTER
Pt called stated he was seen on 10.1.24 for cough.  Pt did take the Rx that were ordered benzonatate, singular, and the Zithromax. Pt now C/O of left shoulder, neck and rib pain. Pt stated it is because he was coughing, so he would like an order to get an Xray of his left shoulder and neck.    Last seen 10/1/2024  Next appt Visit date not found    Requested Prescriptions      No prescriptions requested or ordered in this encounter

## 2024-10-10 DIAGNOSIS — S29.011A INTERCOSTAL MUSCLE STRAIN, INITIAL ENCOUNTER: Primary | ICD-10-CM

## 2024-10-10 RX ORDER — INDOMETHACIN 50 MG/1
50 CAPSULE ORAL NIGHTLY
Qty: 10 CAPSULE | Refills: 0 | Status: SHIPPED | OUTPATIENT
Start: 2024-10-10 | End: 2024-10-20

## 2024-10-25 RX ORDER — CEFDINIR 300 MG/1
300 CAPSULE ORAL 2 TIMES DAILY
Qty: 20 CAPSULE | Refills: 0 | Status: SHIPPED | OUTPATIENT
Start: 2024-10-25 | End: 2024-11-04

## 2024-11-11 ENCOUNTER — HOSPITAL ENCOUNTER (EMERGENCY)
Age: 64
Discharge: ANOTHER HOSPITAL | End: 2024-11-12
Attending: EMERGENCY MEDICINE
Payer: COMMERCIAL

## 2024-11-11 ENCOUNTER — APPOINTMENT (OUTPATIENT)
Dept: CT IMAGING | Age: 64
End: 2024-11-11
Payer: COMMERCIAL

## 2024-11-11 DIAGNOSIS — I74.3: Primary | ICD-10-CM

## 2024-11-11 DIAGNOSIS — N28.0 RENAL INFARCT (HCC): ICD-10-CM

## 2024-11-11 DIAGNOSIS — E87.29 HIGH ANION GAP METABOLIC ACIDOSIS: ICD-10-CM

## 2024-11-11 LAB — GLUCOSE BLD-MCNC: 358 MG/DL (ref 74–99)

## 2024-11-11 PROCEDURE — 82962 GLUCOSE BLOOD TEST: CPT

## 2024-11-11 PROCEDURE — 71275 CT ANGIOGRAPHY CHEST: CPT

## 2024-11-11 PROCEDURE — 6360000002 HC RX W HCPCS

## 2024-11-11 PROCEDURE — 75635 CT ANGIO ABDOMINAL ARTERIES: CPT

## 2024-11-11 PROCEDURE — 96365 THER/PROPH/DIAG IV INF INIT: CPT

## 2024-11-11 PROCEDURE — 99285 EMERGENCY DEPT VISIT HI MDM: CPT

## 2024-11-11 PROCEDURE — 6360000004 HC RX CONTRAST MEDICATION: Performed by: RADIOLOGY

## 2024-11-11 PROCEDURE — 96376 TX/PRO/DX INJ SAME DRUG ADON: CPT

## 2024-11-11 RX ORDER — ONDANSETRON 2 MG/ML
4 INJECTION INTRAMUSCULAR; INTRAVENOUS ONCE
Status: COMPLETED | OUTPATIENT
Start: 2024-11-12 | End: 2024-11-12

## 2024-11-11 RX ORDER — FENTANYL CITRATE 0.05 MG/ML
50 INJECTION, SOLUTION INTRAMUSCULAR; INTRAVENOUS ONCE
Status: COMPLETED | OUTPATIENT
Start: 2024-11-12 | End: 2024-11-12

## 2024-11-11 RX ORDER — IOPAMIDOL 755 MG/ML
200 INJECTION, SOLUTION INTRAVASCULAR
Status: COMPLETED | OUTPATIENT
Start: 2024-11-11 | End: 2024-11-12

## 2024-11-11 ASSESSMENT — PAIN DESCRIPTION - FREQUENCY: FREQUENCY: CONTINUOUS

## 2024-11-11 ASSESSMENT — LIFESTYLE VARIABLES
HOW OFTEN DO YOU HAVE A DRINK CONTAINING ALCOHOL: NEVER
HOW MANY STANDARD DRINKS CONTAINING ALCOHOL DO YOU HAVE ON A TYPICAL DAY: PATIENT DOES NOT DRINK

## 2024-11-11 ASSESSMENT — PAIN DESCRIPTION - PAIN TYPE: TYPE: CHRONIC PAIN

## 2024-11-11 ASSESSMENT — PAIN DESCRIPTION - ORIENTATION: ORIENTATION: RIGHT;LEFT

## 2024-11-11 ASSESSMENT — PAIN SCALES - GENERAL: PAINLEVEL_OUTOF10: 10

## 2024-11-11 ASSESSMENT — PAIN DESCRIPTION - DESCRIPTORS: DESCRIPTORS: ACHING

## 2024-11-11 ASSESSMENT — PAIN - FUNCTIONAL ASSESSMENT: PAIN_FUNCTIONAL_ASSESSMENT: 0-10

## 2024-11-12 ENCOUNTER — ANESTHESIA EVENT (OUTPATIENT)
Dept: OPERATING ROOM | Age: 64
End: 2024-11-12
Payer: COMMERCIAL

## 2024-11-12 ENCOUNTER — APPOINTMENT (OUTPATIENT)
Age: 64
DRG: 239 | End: 2024-11-12
Payer: COMMERCIAL

## 2024-11-12 ENCOUNTER — HOSPITAL ENCOUNTER (INPATIENT)
Age: 64
LOS: 16 days | Discharge: NURSING, REHAB OR LONG-TERM CARE | DRG: 239 | End: 2024-11-28
Attending: EMERGENCY MEDICINE | Admitting: FAMILY MEDICINE
Payer: COMMERCIAL

## 2024-11-12 ENCOUNTER — ANESTHESIA (OUTPATIENT)
Dept: OPERATING ROOM | Age: 64
End: 2024-11-12
Payer: COMMERCIAL

## 2024-11-12 VITALS
TEMPERATURE: 97.9 F | HEIGHT: 68 IN | HEART RATE: 84 BPM | WEIGHT: 240 LBS | OXYGEN SATURATION: 96 % | SYSTOLIC BLOOD PRESSURE: 152 MMHG | DIASTOLIC BLOOD PRESSURE: 77 MMHG | BODY MASS INDEX: 36.37 KG/M2 | RESPIRATION RATE: 24 BRPM

## 2024-11-12 DIAGNOSIS — I70.209 FEMORAL ARTERY OCCLUSION (HCC): Primary | ICD-10-CM

## 2024-11-12 DIAGNOSIS — R20.9 SENSATION OF COLD IN LOWER EXTREMITY: ICD-10-CM

## 2024-11-12 DIAGNOSIS — I48.91 ATRIAL FIBRILLATION, UNSPECIFIED TYPE (HCC): ICD-10-CM

## 2024-11-12 DIAGNOSIS — N17.9 AKI (ACUTE KIDNEY INJURY) (HCC): ICD-10-CM

## 2024-11-12 DIAGNOSIS — M62.82 NON-TRAUMATIC RHABDOMYOLYSIS: ICD-10-CM

## 2024-11-12 DIAGNOSIS — F41.9 ANXIETY: ICD-10-CM

## 2024-11-12 DIAGNOSIS — I99.8 ACUTE LOWER LIMB ISCHEMIA: ICD-10-CM

## 2024-11-12 LAB
ACTIVATED CLOTTING TIME, LOW RANGE: 347 SEC
ACTIVATED CLOTTING TIME, LOW RANGE: 377 SEC
ALBUMIN SERPL-MCNC: 4 G/DL (ref 3.5–5.2)
ALBUMIN SERPL-MCNC: 4.2 G/DL (ref 3.5–5.2)
ALP SERPL-CCNC: 77 U/L (ref 40–129)
ALP SERPL-CCNC: 89 U/L (ref 40–129)
ALT SERPL-CCNC: 14 U/L (ref 0–40)
ALT SERPL-CCNC: 35 U/L (ref 0–40)
ANION GAP SERPL CALCULATED.3IONS-SCNC: 12 MMOL/L (ref 7–16)
ANION GAP SERPL CALCULATED.3IONS-SCNC: 13 MMOL/L (ref 7–16)
ANION GAP SERPL CALCULATED.3IONS-SCNC: 14 MMOL/L (ref 7–16)
ANION GAP SERPL CALCULATED.3IONS-SCNC: 15 MMOL/L (ref 7–16)
ANION GAP SERPL CALCULATED.3IONS-SCNC: 17 MMOL/L (ref 7–16)
ANION GAP SERPL CALCULATED.3IONS-SCNC: 21 MMOL/L (ref 7–16)
AST SERPL-CCNC: 143 U/L (ref 0–39)
AST SERPL-CCNC: 25 U/L (ref 0–39)
B-OH-BUTYR SERPL-MCNC: 3.59 MMOL/L (ref 0.02–0.27)
BASOPHILS # BLD: 0.02 K/UL (ref 0–0.2)
BASOPHILS # BLD: 0.06 K/UL (ref 0–0.2)
BASOPHILS NFR BLD: 0 % (ref 0–2)
BASOPHILS NFR BLD: 0 % (ref 0–2)
BILIRUB SERPL-MCNC: 0.5 MG/DL (ref 0–1.2)
BILIRUB SERPL-MCNC: 0.8 MG/DL (ref 0–1.2)
BNP SERPL-MCNC: 1269 PG/ML (ref 0–450)
BUN BLD-MCNC: 21 MG/DL (ref 6–23)
BUN BLD-MCNC: 24 MG/DL (ref 6–23)
BUN SERPL-MCNC: 22 MG/DL (ref 6–23)
BUN SERPL-MCNC: 23 MG/DL (ref 6–23)
BUN SERPL-MCNC: 24 MG/DL (ref 6–23)
BUN SERPL-MCNC: 25 MG/DL (ref 6–23)
BUN SERPL-MCNC: 27 MG/DL (ref 6–23)
BUN SERPL-MCNC: 31 MG/DL (ref 6–23)
CA-I BLD-SCNC: 1.08 MMOL/L (ref 1.15–1.33)
CA-I BLD-SCNC: 1.14 MMOL/L (ref 1.15–1.33)
CA-I BLD-SCNC: 1.21 MMOL/L (ref 1.15–1.33)
CALCIUM SERPL-MCNC: 10 MG/DL (ref 8.6–10.2)
CALCIUM SERPL-MCNC: 7.1 MG/DL (ref 8.6–10.2)
CALCIUM SERPL-MCNC: 7.2 MG/DL (ref 8.6–10.2)
CALCIUM SERPL-MCNC: 7.3 MG/DL (ref 8.6–10.2)
CALCIUM SERPL-MCNC: 7.9 MG/DL (ref 8.6–10.2)
CALCIUM SERPL-MCNC: 8.8 MG/DL (ref 8.6–10.2)
CHLORIDE BLD-SCNC: 106 MMOL/L (ref 100–108)
CHLORIDE BLD-SCNC: 107 MMOL/L (ref 100–108)
CHLORIDE SERPL-SCNC: 100 MMOL/L (ref 98–107)
CHLORIDE SERPL-SCNC: 101 MMOL/L (ref 98–107)
CHLORIDE SERPL-SCNC: 91 MMOL/L (ref 98–107)
CHLORIDE SERPL-SCNC: 96 MMOL/L (ref 98–107)
CHLORIDE SERPL-SCNC: 98 MMOL/L (ref 98–107)
CHLORIDE SERPL-SCNC: 99 MMOL/L (ref 98–107)
CK SERPL-CCNC: ABNORMAL U/L (ref 20–200)
CK SERPL-CCNC: NORMAL U/L (ref 20–200)
CO2 BLD CALC-SCNC: 17 MMOL/L (ref 22–29)
CO2 BLD CALC-SCNC: 21 MMOL/L (ref 22–29)
CO2 SERPL-SCNC: 20 MMOL/L (ref 22–29)
CO2 SERPL-SCNC: 21 MMOL/L (ref 22–29)
CO2 SERPL-SCNC: 22 MMOL/L (ref 22–29)
CO2 SERPL-SCNC: 23 MMOL/L (ref 22–29)
CO2 SERPL-SCNC: 23 MMOL/L (ref 22–29)
CO2 SERPL-SCNC: 24 MMOL/L (ref 22–29)
CREAT BLD-MCNC: 1.1 MG/DL (ref 0.7–1.2)
CREAT BLD-MCNC: 1.5 MG/DL (ref 0.7–1.2)
CREAT SERPL-MCNC: 1.3 MG/DL (ref 0.7–1.2)
CREAT SERPL-MCNC: 1.4 MG/DL (ref 0.7–1.2)
CREAT SERPL-MCNC: 1.6 MG/DL (ref 0.7–1.2)
CREAT SERPL-MCNC: 2.1 MG/DL (ref 0.7–1.2)
ECHO AO ASC DIAM: 3 CM
ECHO AO ASCENDING AORTA INDEX: 1.36 CM/M2
ECHO AV AREA PEAK VELOCITY: 1.6 CM2
ECHO AV AREA VTI: 1.8 CM2
ECHO AV AREA/BSA PEAK VELOCITY: 0.7 CM2/M2
ECHO AV AREA/BSA VTI: 0.8 CM2/M2
ECHO AV CUSP MM: 1.9 CM
ECHO AV MEAN GRADIENT: 4 MMHG
ECHO AV MEAN VELOCITY: 1 M/S
ECHO AV PEAK GRADIENT: 8 MMHG
ECHO AV PEAK VELOCITY: 1.4 M/S
ECHO AV VELOCITY RATIO: 0.5
ECHO AV VTI: 23.2 CM
ECHO BSA: 2.29 M2
ECHO LA DIAMETER INDEX: 1.95 CM/M2
ECHO LA DIAMETER: 4.3 CM
ECHO LA VOL A-L A2C: 41 ML (ref 18–58)
ECHO LA VOL A-L A4C: 48 ML (ref 18–58)
ECHO LA VOL BP: 44 ML (ref 18–58)
ECHO LA VOL MOD A2C: 39 ML (ref 18–58)
ECHO LA VOL MOD A4C: 44 ML (ref 18–58)
ECHO LA VOL/BSA BIPLANE: 20 ML/M2 (ref 16–34)
ECHO LA VOLUME AREA LENGTH: 48 ML
ECHO LA VOLUME INDEX A-L A2C: 19 ML/M2 (ref 16–34)
ECHO LA VOLUME INDEX A-L A4C: 22 ML/M2 (ref 16–34)
ECHO LA VOLUME INDEX AREA LENGTH: 22 ML/M2 (ref 16–34)
ECHO LA VOLUME INDEX MOD A2C: 18 ML/M2 (ref 16–34)
ECHO LA VOLUME INDEX MOD A4C: 20 ML/M2 (ref 16–34)
ECHO LV EDV A2C: 102 ML
ECHO LV EDV A4C: 88 ML
ECHO LV EDV BP: 97 ML (ref 67–155)
ECHO LV EDV INDEX A4C: 40 ML/M2
ECHO LV EDV INDEX BP: 44 ML/M2
ECHO LV EDV NDEX A2C: 46 ML/M2
ECHO LV EF PHYSICIAN: 55 %
ECHO LV EJECTION FRACTION A2C: 57 %
ECHO LV EJECTION FRACTION A4C: 48 %
ECHO LV EJECTION FRACTION BIPLANE: 53 % (ref 55–100)
ECHO LV ESV A2C: 44 ML
ECHO LV ESV A4C: 46 ML
ECHO LV ESV BP: 45 ML (ref 22–58)
ECHO LV ESV INDEX A2C: 20 ML/M2
ECHO LV ESV INDEX A4C: 21 ML/M2
ECHO LV ESV INDEX BP: 20 ML/M2
ECHO LV FRACTIONAL SHORTENING: 16 % (ref 28–44)
ECHO LV INTERNAL DIMENSION DIASTOLE INDEX: 2.04 CM/M2
ECHO LV INTERNAL DIMENSION DIASTOLIC: 4.5 CM (ref 4.2–5.9)
ECHO LV INTERNAL DIMENSION SYSTOLIC INDEX: 1.72 CM/M2
ECHO LV INTERNAL DIMENSION SYSTOLIC: 3.8 CM
ECHO LV IVSD: 1.2 CM (ref 0.6–1)
ECHO LV IVSS: 1.3 CM
ECHO LV MASS 2D: 164 G (ref 88–224)
ECHO LV MASS INDEX 2D: 74.2 G/M2 (ref 49–115)
ECHO LV POSTERIOR WALL DIASTOLIC: 0.9 CM (ref 0.6–1)
ECHO LV POSTERIOR WALL SYSTOLIC: 1 CM
ECHO LV RELATIVE WALL THICKNESS RATIO: 0.4
ECHO LVOT AREA: 3.1 CM2
ECHO LVOT AV VTI INDEX: 0.57
ECHO LVOT DIAM: 2 CM
ECHO LVOT MEAN GRADIENT: 1 MMHG
ECHO LVOT PEAK GRADIENT: 2 MMHG
ECHO LVOT PEAK VELOCITY: 0.7 M/S
ECHO LVOT STROKE VOLUME INDEX: 18.9 ML/M2
ECHO LVOT SV: 41.8 ML
ECHO LVOT VTI: 13.3 CM
ECHO MV "A" WAVE DURATION: 102.8 MSEC
ECHO MV A VELOCITY: 0.84 M/S
ECHO MV AREA PHT: 4.6 CM2
ECHO MV AREA VTI: 2 CM2
ECHO MV E DECELERATION TIME (DT): 140.1 MS
ECHO MV E VELOCITY: 0.62 M/S
ECHO MV E/A RATIO: 0.74
ECHO MV LVOT VTI INDEX: 1.59
ECHO MV MAX VELOCITY: 1 M/S
ECHO MV MEAN GRADIENT: 2 MMHG
ECHO MV MEAN VELOCITY: 0.7 M/S
ECHO MV PEAK GRADIENT: 4 MMHG
ECHO MV PRESSURE HALF TIME (PHT): 47.9 MS
ECHO MV VTI: 21.2 CM
ECHO PV MAX VELOCITY: 0.9 M/S
ECHO PV MEAN GRADIENT: 2 MMHG
ECHO PV MEAN VELOCITY: 0.6 M/S
ECHO PV PEAK GRADIENT: 3 MMHG
ECHO PV VTI: 14.4 CM
ECHO RV INTERNAL DIMENSION: 3 CM
EGFR, POC: 52 ML/MIN/1.73M2
EGFR, POC: 76 ML/MIN/1.73M2
EOSINOPHIL # BLD: 0 K/UL (ref 0.05–0.5)
EOSINOPHIL # BLD: 0.14 K/UL (ref 0.05–0.5)
EOSINOPHILS RELATIVE PERCENT: 0 % (ref 0–6)
EOSINOPHILS RELATIVE PERCENT: 1 % (ref 0–6)
ERYTHROCYTE [DISTWIDTH] IN BLOOD BY AUTOMATED COUNT: 13.5 % (ref 11.5–15)
ERYTHROCYTE [DISTWIDTH] IN BLOOD BY AUTOMATED COUNT: 13.9 % (ref 11.5–15)
ERYTHROCYTE [DISTWIDTH] IN BLOOD BY AUTOMATED COUNT: 13.9 % (ref 11.5–15)
ERYTHROCYTE [DISTWIDTH] IN BLOOD BY AUTOMATED COUNT: 14 % (ref 11.5–15)
FLUAV RNA RESP QL NAA+PROBE: NOT DETECTED
FLUBV RNA RESP QL NAA+PROBE: NOT DETECTED
GFR, ESTIMATED: 35 ML/MIN/1.73M2
GFR, ESTIMATED: 48 ML/MIN/1.73M2
GFR, ESTIMATED: 58 ML/MIN/1.73M2
GFR, ESTIMATED: 62 ML/MIN/1.73M2
GFR, ESTIMATED: 63 ML/MIN/1.73M2
GFR, ESTIMATED: 64 ML/MIN/1.73M2
GLUCOSE BLD-MCNC: 198 MG/DL (ref 74–99)
GLUCOSE BLD-MCNC: 214 MG/DL (ref 74–99)
GLUCOSE BLD-MCNC: 252 MG/DL (ref 74–99)
GLUCOSE BLD-MCNC: 257 MG/DL (ref 74–99)
GLUCOSE BLD-MCNC: 260 MG/DL (ref 74–99)
GLUCOSE BLD-MCNC: 262 MG/DL (ref 74–99)
GLUCOSE BLD-MCNC: 265 MG/DL (ref 74–99)
GLUCOSE BLD-MCNC: 266 MG/DL (ref 74–99)
GLUCOSE BLD-MCNC: 277 MG/DL (ref 74–99)
GLUCOSE BLD-MCNC: 280 MG/DL (ref 74–99)
GLUCOSE BLD-MCNC: 347 MG/DL (ref 74–99)
GLUCOSE SERPL-MCNC: 188 MG/DL (ref 74–99)
GLUCOSE SERPL-MCNC: 236 MG/DL (ref 74–99)
GLUCOSE SERPL-MCNC: 236 MG/DL (ref 74–99)
GLUCOSE SERPL-MCNC: 277 MG/DL (ref 74–99)
GLUCOSE SERPL-MCNC: 304 MG/DL (ref 74–99)
GLUCOSE SERPL-MCNC: 380 MG/DL (ref 74–99)
HBA1C MFR BLD: 9.4 % (ref 4–5.6)
HCT VFR BLD AUTO: 40.2 % (ref 37–54)
HCT VFR BLD AUTO: 44 % (ref 37–54)
HCT VFR BLD AUTO: 44.6 % (ref 37–54)
HCT VFR BLD AUTO: 45 % (ref 37–54)
HCT VFR BLD AUTO: 51.5 % (ref 37–54)
HCT VFR BLD AUTO: 53.3 % (ref 37–54)
HGB BLD-MCNC: 13.1 G/DL (ref 12.5–16.5)
HGB BLD-MCNC: 14.6 G/DL (ref 12.5–16.5)
HGB BLD-MCNC: 16.8 G/DL (ref 12.5–16.5)
HGB BLD-MCNC: 17.3 G/DL (ref 12.5–16.5)
IMM GRANULOCYTES # BLD AUTO: 0.08 K/UL (ref 0–0.58)
IMM GRANULOCYTES # BLD AUTO: 0.09 K/UL (ref 0–0.58)
IMM GRANULOCYTES NFR BLD: 1 % (ref 0–5)
IMM GRANULOCYTES NFR BLD: 1 % (ref 0–5)
LACTATE BLDV-SCNC: 1.7 MMOL/L (ref 0.5–2.2)
LACTATE BLDV-SCNC: 3.1 MMOL/L (ref 0.5–2.2)
LACTATE BLDV-SCNC: 3.9 MMOL/L (ref 0.5–2.2)
LYMPHOCYTES NFR BLD: 1.2 K/UL (ref 1.5–4)
LYMPHOCYTES NFR BLD: 2.34 K/UL (ref 1.5–4)
LYMPHOCYTES RELATIVE PERCENT: 14 % (ref 20–42)
LYMPHOCYTES RELATIVE PERCENT: 8 % (ref 20–42)
MAGNESIUM SERPL-MCNC: 2.4 MG/DL (ref 1.6–2.6)
MCH RBC QN AUTO: 31 PG (ref 26–35)
MCH RBC QN AUTO: 31.1 PG (ref 26–35)
MCH RBC QN AUTO: 31.5 PG (ref 26–35)
MCH RBC QN AUTO: 31.8 PG (ref 26–35)
MCHC RBC AUTO-ENTMCNC: 32.5 G/DL (ref 32–34.5)
MCHC RBC AUTO-ENTMCNC: 32.6 G/DL (ref 32–34.5)
MCHC RBC AUTO-ENTMCNC: 32.6 G/DL (ref 32–34.5)
MCHC RBC AUTO-ENTMCNC: 32.7 G/DL (ref 32–34.5)
MCV RBC AUTO: 94.9 FL (ref 80–99.9)
MCV RBC AUTO: 95 FL (ref 80–99.9)
MCV RBC AUTO: 96.4 FL (ref 80–99.9)
MCV RBC AUTO: 98 FL (ref 80–99.9)
MONOCYTES NFR BLD: 1.15 K/UL (ref 0.1–0.95)
MONOCYTES NFR BLD: 1.32 K/UL (ref 0.1–0.95)
MONOCYTES NFR BLD: 7 % (ref 2–12)
MONOCYTES NFR BLD: 8 % (ref 2–12)
NEGATIVE BASE EXCESS, ART: 6 MMOL/L
NEGATIVE BASE EXCESS, ART: 9.7 MMOL/L
NEUTROPHILS NFR BLD: 76 % (ref 43–80)
NEUTROPHILS NFR BLD: 85 % (ref 43–80)
NEUTS SEG NFR BLD: 12.58 K/UL (ref 1.8–7.3)
NEUTS SEG NFR BLD: 13.5 K/UL (ref 1.8–7.3)
PARTIAL THROMBOPLASTIN TIME: 175.3 SEC (ref 24.5–35.1)
PARTIAL THROMBOPLASTIN TIME: 27.9 SEC (ref 24.5–35.1)
PARTIAL THROMBOPLASTIN TIME: 39.7 SEC (ref 24.5–35.1)
PARTIAL THROMBOPLASTIN TIME: 42.2 SEC (ref 24.5–35.1)
PH VENOUS: 7.33 (ref 7.35–7.45)
PHOSPHATE SERPL-MCNC: 6.8 MG/DL (ref 2.5–4.5)
PLATELET # BLD AUTO: 215 K/UL (ref 130–450)
PLATELET # BLD AUTO: 243 K/UL (ref 130–450)
PLATELET # BLD AUTO: 283 K/UL (ref 130–450)
PLATELET # BLD AUTO: 292 K/UL (ref 130–450)
PMV BLD AUTO: 10 FL (ref 7–12)
PMV BLD AUTO: 10 FL (ref 7–12)
PMV BLD AUTO: 10.1 FL (ref 7–12)
PMV BLD AUTO: 10.3 FL (ref 7–12)
POC ANION GAP: 11 MMOL/L (ref 7–16)
POC ANION GAP: 14 MMOL/L (ref 7–16)
POC HCO3: 17.2 MMOL/L (ref 22–26)
POC HCO3: 20.8 MMOL/L (ref 22–26)
POC HEMOGLOBIN (CALC): 14.8 G/DL (ref 12.5–15.5)
POC HEMOGLOBIN (CALC): 15.2 G/DL (ref 12.5–15.5)
POC LACTIC ACID: 2.5 MMOL/L (ref 0.5–2.2)
POC LACTIC ACID: 5.3 MMOL/L (ref 0.5–2.2)
POC O2 SATURATION: 96 % (ref 92–98.5)
POC O2 SATURATION: 97.6 % (ref 92–98.5)
POC PCO2: 40.5 MM HG (ref 35–45)
POC PCO2: 44.6 MM HG (ref 35–45)
POC PH: 7.24 (ref 7.35–7.45)
POC PH: 7.28 (ref 7.35–7.45)
POC PO2: 114.1 MM HG (ref 60–80)
POC PO2: 92.4 MM HG (ref 60–80)
POTASSIUM BLD-SCNC: 4.9 MMOL/L (ref 3.5–5)
POTASSIUM BLD-SCNC: 6.2 MMOL/L (ref 3.5–5)
POTASSIUM SERPL-SCNC: 4.3 MMOL/L (ref 3.5–5)
POTASSIUM SERPL-SCNC: 4.9 MMOL/L (ref 3.5–5)
POTASSIUM SERPL-SCNC: 5 MMOL/L (ref 3.5–5)
POTASSIUM SERPL-SCNC: 5 MMOL/L (ref 3.5–5)
POTASSIUM SERPL-SCNC: 5.6 MMOL/L (ref 3.5–5)
POTASSIUM SERPL-SCNC: 7.2 MMOL/L (ref 3.5–5)
PROT SERPL-MCNC: 6.8 G/DL (ref 6.4–8.3)
PROT SERPL-MCNC: 7.3 G/DL (ref 6.4–8.3)
RBC # BLD AUTO: 4.23 M/UL (ref 3.8–5.8)
RBC # BLD AUTO: 4.7 M/UL (ref 3.8–5.8)
RBC # BLD AUTO: 5.34 M/UL (ref 3.8–5.8)
RBC # BLD AUTO: 5.44 M/UL (ref 3.8–5.8)
SARS-COV-2 RNA RESP QL NAA+PROBE: NOT DETECTED
SODIUM BLD-SCNC: 138 MMOL/L (ref 132–146)
SODIUM BLD-SCNC: 138 MMOL/L (ref 132–146)
SODIUM SERPL-SCNC: 132 MMOL/L (ref 132–146)
SODIUM SERPL-SCNC: 134 MMOL/L (ref 132–146)
SODIUM SERPL-SCNC: 135 MMOL/L (ref 132–146)
SODIUM SERPL-SCNC: 136 MMOL/L (ref 132–146)
SODIUM SERPL-SCNC: 136 MMOL/L (ref 132–146)
SODIUM SERPL-SCNC: 137 MMOL/L (ref 132–146)
SOURCE: NORMAL
SPECIMEN DESCRIPTION: NORMAL
TROPONIN I SERPL HS-MCNC: 49 NG/L (ref 0–11)
TROPONIN I SERPL HS-MCNC: 50 NG/L (ref 0–11)
WBC OTHER # BLD: 14.1 K/UL (ref 4.5–11.5)
WBC OTHER # BLD: 16 K/UL (ref 4.5–11.5)
WBC OTHER # BLD: 16.5 K/UL (ref 4.5–11.5)
WBC OTHER # BLD: 16.5 K/UL (ref 4.5–11.5)

## 2024-11-12 PROCEDURE — 85027 COMPLETE CBC AUTOMATED: CPT

## 2024-11-12 PROCEDURE — 0J8N0ZZ DIVISION OF RIGHT LOWER LEG SUBCUTANEOUS TISSUE AND FASCIA, OPEN APPROACH: ICD-10-PCS | Performed by: SURGERY

## 2024-11-12 PROCEDURE — 27590 AMPUTATE LEG AT THIGH: CPT | Performed by: SURGERY

## 2024-11-12 PROCEDURE — 6360000002 HC RX W HCPCS: Performed by: STUDENT IN AN ORGANIZED HEALTH CARE EDUCATION/TRAINING PROGRAM

## 2024-11-12 PROCEDURE — 6360000002 HC RX W HCPCS: Performed by: SURGERY

## 2024-11-12 PROCEDURE — 37799 UNLISTED PX VASCULAR SURGERY: CPT

## 2024-11-12 PROCEDURE — 85025 COMPLETE CBC W/AUTO DIFF WBC: CPT

## 2024-11-12 PROCEDURE — 6360000002 HC RX W HCPCS

## 2024-11-12 PROCEDURE — 82010 KETONE BODYS QUAN: CPT

## 2024-11-12 PROCEDURE — 82330 ASSAY OF CALCIUM: CPT

## 2024-11-12 PROCEDURE — 83605 ASSAY OF LACTIC ACID: CPT

## 2024-11-12 PROCEDURE — 83735 ASSAY OF MAGNESIUM: CPT

## 2024-11-12 PROCEDURE — 6360000004 HC RX CONTRAST MEDICATION: Performed by: RADIOLOGY

## 2024-11-12 PROCEDURE — 6370000000 HC RX 637 (ALT 250 FOR IP): Performed by: SURGERY

## 2024-11-12 PROCEDURE — 86901 BLOOD TYPING SEROLOGIC RH(D): CPT

## 2024-11-12 PROCEDURE — 04CL0ZZ EXTIRPATION OF MATTER FROM LEFT FEMORAL ARTERY, OPEN APPROACH: ICD-10-PCS | Performed by: FAMILY MEDICINE

## 2024-11-12 PROCEDURE — 80053 COMPREHEN METABOLIC PANEL: CPT

## 2024-11-12 PROCEDURE — 82962 GLUCOSE BLOOD TEST: CPT

## 2024-11-12 PROCEDURE — 85730 THROMBOPLASTIN TIME PARTIAL: CPT

## 2024-11-12 PROCEDURE — 04CK0ZZ EXTIRPATION OF MATTER FROM RIGHT FEMORAL ARTERY, OPEN APPROACH: ICD-10-PCS | Performed by: SURGERY

## 2024-11-12 PROCEDURE — 34201 REMOVAL OF ARTERY CLOT: CPT | Performed by: SURGERY

## 2024-11-12 PROCEDURE — 88304 TISSUE EXAM BY PATHOLOGIST: CPT

## 2024-11-12 PROCEDURE — 6370000000 HC RX 637 (ALT 250 FOR IP): Performed by: INTERNAL MEDICINE

## 2024-11-12 PROCEDURE — 0J8P0ZZ DIVISION OF LEFT LOWER LEG SUBCUTANEOUS TISSUE AND FASCIA, OPEN APPROACH: ICD-10-PCS | Performed by: FAMILY MEDICINE

## 2024-11-12 PROCEDURE — P9045 ALBUMIN (HUMAN), 5%, 250 ML: HCPCS | Performed by: NURSE ANESTHETIST, CERTIFIED REGISTERED

## 2024-11-12 PROCEDURE — 82550 ASSAY OF CK (CPK): CPT

## 2024-11-12 PROCEDURE — 93306 TTE W/DOPPLER COMPLETE: CPT

## 2024-11-12 PROCEDURE — 82803 BLOOD GASES ANY COMBINATION: CPT

## 2024-11-12 PROCEDURE — 7100000000 HC PACU RECOVERY - FIRST 15 MIN

## 2024-11-12 PROCEDURE — 6360000002 HC RX W HCPCS: Performed by: EMERGENCY MEDICINE

## 2024-11-12 PROCEDURE — 2580000003 HC RX 258: Performed by: STUDENT IN AN ORGANIZED HEALTH CARE EDUCATION/TRAINING PROGRAM

## 2024-11-12 PROCEDURE — 85014 HEMATOCRIT: CPT

## 2024-11-12 PROCEDURE — 2500000003 HC RX 250 WO HCPCS: Performed by: NURSE ANESTHETIST, CERTIFIED REGISTERED

## 2024-11-12 PROCEDURE — 87636 SARSCOV2 & INF A&B AMP PRB: CPT

## 2024-11-12 PROCEDURE — 2580000003 HC RX 258: Performed by: INTERNAL MEDICINE

## 2024-11-12 PROCEDURE — 7100000001 HC PACU RECOVERY - ADDTL 15 MIN

## 2024-11-12 PROCEDURE — 83880 ASSAY OF NATRIURETIC PEPTIDE: CPT

## 2024-11-12 PROCEDURE — 86900 BLOOD TYPING SEROLOGIC ABO: CPT

## 2024-11-12 PROCEDURE — 2580000003 HC RX 258: Performed by: FAMILY MEDICINE

## 2024-11-12 PROCEDURE — 96376 TX/PRO/DX INJ SAME DRUG ADON: CPT

## 2024-11-12 PROCEDURE — 82800 BLOOD PH: CPT

## 2024-11-12 PROCEDURE — 2580000003 HC RX 258

## 2024-11-12 PROCEDURE — 3600000007 HC SURGERY HYBRID BASE: Performed by: SURGERY

## 2024-11-12 PROCEDURE — 2500000003 HC RX 250 WO HCPCS: Performed by: INTERNAL MEDICINE

## 2024-11-12 PROCEDURE — 84484 ASSAY OF TROPONIN QUANT: CPT

## 2024-11-12 PROCEDURE — 85347 COAGULATION TIME ACTIVATED: CPT

## 2024-11-12 PROCEDURE — 3700000000 HC ANESTHESIA ATTENDED CARE: Performed by: SURGERY

## 2024-11-12 PROCEDURE — 2720000010 HC SURG SUPPLY STERILE: Performed by: SURGERY

## 2024-11-12 PROCEDURE — 2500000003 HC RX 250 WO HCPCS

## 2024-11-12 PROCEDURE — 86850 RBC ANTIBODY SCREEN: CPT

## 2024-11-12 PROCEDURE — 2700000000 HC OXYGEN THERAPY PER DAY

## 2024-11-12 PROCEDURE — 3700000001 HC ADD 15 MINUTES (ANESTHESIA): Performed by: SURGERY

## 2024-11-12 PROCEDURE — 6370000000 HC RX 637 (ALT 250 FOR IP): Performed by: NURSE ANESTHETIST, CERTIFIED REGISTERED

## 2024-11-12 PROCEDURE — 2000000000 HC ICU R&B

## 2024-11-12 PROCEDURE — 0Y6C0Z2 DETACHMENT AT RIGHT UPPER LEG, MID, OPEN APPROACH: ICD-10-PCS | Performed by: SURGERY

## 2024-11-12 PROCEDURE — 96365 THER/PROPH/DIAG IV INF INIT: CPT

## 2024-11-12 PROCEDURE — 96375 TX/PRO/DX INJ NEW DRUG ADDON: CPT

## 2024-11-12 PROCEDURE — 99285 EMERGENCY DEPT VISIT HI MDM: CPT

## 2024-11-12 PROCEDURE — 86923 COMPATIBILITY TEST ELECTRIC: CPT

## 2024-11-12 PROCEDURE — 93306 TTE W/DOPPLER COMPLETE: CPT | Performed by: INTERNAL MEDICINE

## 2024-11-12 PROCEDURE — 80048 BASIC METABOLIC PNL TOTAL CA: CPT

## 2024-11-12 PROCEDURE — 2580000003 HC RX 258: Performed by: NURSE ANESTHETIST, CERTIFIED REGISTERED

## 2024-11-12 PROCEDURE — 3600000017 HC SURGERY HYBRID ADDL 15MIN: Performed by: SURGERY

## 2024-11-12 PROCEDURE — 88307 TISSUE EXAM BY PATHOLOGIST: CPT

## 2024-11-12 PROCEDURE — 83036 HEMOGLOBIN GLYCOSYLATED A1C: CPT

## 2024-11-12 PROCEDURE — 6370000000 HC RX 637 (ALT 250 FOR IP): Performed by: STUDENT IN AN ORGANIZED HEALTH CARE EDUCATION/TRAINING PROGRAM

## 2024-11-12 PROCEDURE — 80047 BASIC METABLC PNL IONIZED CA: CPT

## 2024-11-12 PROCEDURE — 2580000003 HC RX 258: Performed by: SURGERY

## 2024-11-12 PROCEDURE — 27602 DECOMPRESSION OF LOWER LEG: CPT | Performed by: SURGERY

## 2024-11-12 PROCEDURE — 88311 DECALCIFY TISSUE: CPT

## 2024-11-12 PROCEDURE — 6360000002 HC RX W HCPCS: Performed by: NURSE ANESTHETIST, CERTIFIED REGISTERED

## 2024-11-12 PROCEDURE — 6360000002 HC RX W HCPCS: Performed by: INTERNAL MEDICINE

## 2024-11-12 PROCEDURE — 99222 1ST HOSP IP/OBS MODERATE 55: CPT | Performed by: FAMILY MEDICINE

## 2024-11-12 PROCEDURE — 3600000006 HC SURGERY LEVEL 6 BASE: Performed by: SURGERY

## 2024-11-12 PROCEDURE — 84100 ASSAY OF PHOSPHORUS: CPT

## 2024-11-12 PROCEDURE — C1757 CATH, THROMBECTOMY/EMBOLECT: HCPCS | Performed by: SURGERY

## 2024-11-12 PROCEDURE — 3600000016 HC SURGERY LEVEL 6 ADDTL 15MIN: Performed by: SURGERY

## 2024-11-12 PROCEDURE — 2709999900 HC NON-CHARGEABLE SUPPLY: Performed by: SURGERY

## 2024-11-12 RX ORDER — DEXTROSE MONOHYDRATE 25 G/50ML
25 INJECTION, SOLUTION INTRAVENOUS ONCE
Status: COMPLETED | OUTPATIENT
Start: 2024-11-12 | End: 2024-11-12

## 2024-11-12 RX ORDER — INSULIN LISPRO 100 [IU]/ML
0-8 INJECTION, SOLUTION INTRAVENOUS; SUBCUTANEOUS
Status: DISCONTINUED | OUTPATIENT
Start: 2024-11-12 | End: 2024-11-12

## 2024-11-12 RX ORDER — HEPARIN SODIUM 1000 [USP'U]/ML
40 INJECTION, SOLUTION INTRAVENOUS; SUBCUTANEOUS PRN
Status: DISCONTINUED | OUTPATIENT
Start: 2024-11-12 | End: 2024-11-12 | Stop reason: HOSPADM

## 2024-11-12 RX ORDER — ONDANSETRON 2 MG/ML
INJECTION INTRAMUSCULAR; INTRAVENOUS
Status: DISCONTINUED | OUTPATIENT
Start: 2024-11-12 | End: 2024-11-12 | Stop reason: SDUPTHER

## 2024-11-12 RX ORDER — ONDANSETRON 2 MG/ML
4 INJECTION INTRAMUSCULAR; INTRAVENOUS EVERY 6 HOURS PRN
Status: DISCONTINUED | OUTPATIENT
Start: 2024-11-12 | End: 2024-11-28 | Stop reason: HOSPADM

## 2024-11-12 RX ORDER — HYDROCODONE BITARTRATE AND ACETAMINOPHEN 5; 325 MG/1; MG/1
1 TABLET ORAL EVERY 6 HOURS PRN
Status: DISCONTINUED | OUTPATIENT
Start: 2024-11-12 | End: 2024-11-12

## 2024-11-12 RX ORDER — PHENYLEPHRINE HYDROCHLORIDE 10 MG/ML
INJECTION INTRAVENOUS
Status: DISCONTINUED | OUTPATIENT
Start: 2024-11-12 | End: 2024-11-12 | Stop reason: SDUPTHER

## 2024-11-12 RX ORDER — POTASSIUM CHLORIDE 7.45 MG/ML
10 INJECTION INTRAVENOUS PRN
Status: DISCONTINUED | OUTPATIENT
Start: 2024-11-12 | End: 2024-11-20

## 2024-11-12 RX ORDER — MIDAZOLAM HYDROCHLORIDE 1 MG/ML
INJECTION, SOLUTION INTRAMUSCULAR; INTRAVENOUS
Status: DISCONTINUED | OUTPATIENT
Start: 2024-11-12 | End: 2024-11-12 | Stop reason: SDUPTHER

## 2024-11-12 RX ORDER — PROCHLORPERAZINE EDISYLATE 5 MG/ML
5 INJECTION INTRAMUSCULAR; INTRAVENOUS
Status: DISCONTINUED | OUTPATIENT
Start: 2024-11-12 | End: 2024-11-12

## 2024-11-12 RX ORDER — SODIUM CHLORIDE 0.9 % (FLUSH) 0.9 %
5-40 SYRINGE (ML) INJECTION EVERY 12 HOURS SCHEDULED
Status: CANCELLED | OUTPATIENT
Start: 2024-11-12

## 2024-11-12 RX ORDER — HYDROMORPHONE HYDROCHLORIDE 1 MG/ML
0.5 INJECTION, SOLUTION INTRAMUSCULAR; INTRAVENOUS; SUBCUTANEOUS EVERY 5 MIN PRN
Status: DISCONTINUED | OUTPATIENT
Start: 2024-11-12 | End: 2024-11-12

## 2024-11-12 RX ORDER — HEPARIN SODIUM 1000 [USP'U]/ML
80 INJECTION, SOLUTION INTRAVENOUS; SUBCUTANEOUS PRN
Status: DISCONTINUED | OUTPATIENT
Start: 2024-11-12 | End: 2024-11-23

## 2024-11-12 RX ORDER — DEXAMETHASONE SODIUM PHOSPHATE 10 MG/ML
INJECTION, SOLUTION INTRAMUSCULAR; INTRAVENOUS
Status: DISCONTINUED | OUTPATIENT
Start: 2024-11-12 | End: 2024-11-12 | Stop reason: SDUPTHER

## 2024-11-12 RX ORDER — PROPOFOL 10 MG/ML
INJECTION, EMULSION INTRAVENOUS
Status: DISCONTINUED | OUTPATIENT
Start: 2024-11-12 | End: 2024-11-12 | Stop reason: SDUPTHER

## 2024-11-12 RX ORDER — SUCCINYLCHOLINE CHLORIDE 20 MG/ML
INJECTION INTRAMUSCULAR; INTRAVENOUS
Status: DISCONTINUED | OUTPATIENT
Start: 2024-11-12 | End: 2024-11-12 | Stop reason: SDUPTHER

## 2024-11-12 RX ORDER — ROCURONIUM BROMIDE 10 MG/ML
INJECTION, SOLUTION INTRAVENOUS
Status: DISCONTINUED | OUTPATIENT
Start: 2024-11-12 | End: 2024-11-12 | Stop reason: SDUPTHER

## 2024-11-12 RX ORDER — MAGNESIUM SULFATE IN WATER 40 MG/ML
2000 INJECTION, SOLUTION INTRAVENOUS PRN
Status: DISCONTINUED | OUTPATIENT
Start: 2024-11-12 | End: 2024-11-28 | Stop reason: HOSPADM

## 2024-11-12 RX ORDER — INSULIN GLARGINE 100 [IU]/ML
12 INJECTION, SOLUTION SUBCUTANEOUS EVERY MORNING
Status: DISCONTINUED | OUTPATIENT
Start: 2024-11-12 | End: 2024-11-28 | Stop reason: HOSPADM

## 2024-11-12 RX ORDER — HEPARIN SODIUM 10000 [USP'U]/ML
INJECTION, SOLUTION INTRAVENOUS; SUBCUTANEOUS PRN
Status: DISCONTINUED | OUTPATIENT
Start: 2024-11-12 | End: 2024-11-12 | Stop reason: ALTCHOICE

## 2024-11-12 RX ORDER — FENTANYL CITRATE 50 UG/ML
50 INJECTION, SOLUTION INTRAMUSCULAR; INTRAVENOUS EVERY 5 MIN PRN
Status: CANCELLED | OUTPATIENT
Start: 2024-11-12

## 2024-11-12 RX ORDER — SODIUM CHLORIDE, SODIUM LACTATE, POTASSIUM CHLORIDE, CALCIUM CHLORIDE 600; 310; 30; 20 MG/100ML; MG/100ML; MG/100ML; MG/100ML
INJECTION, SOLUTION INTRAVENOUS
Status: DISCONTINUED | OUTPATIENT
Start: 2024-11-12 | End: 2024-11-12 | Stop reason: SDUPTHER

## 2024-11-12 RX ORDER — MAGNESIUM HYDROXIDE 1200 MG/15ML
LIQUID ORAL CONTINUOUS PRN
Status: COMPLETED | OUTPATIENT
Start: 2024-11-12 | End: 2024-11-12

## 2024-11-12 RX ORDER — NALOXONE HYDROCHLORIDE 0.4 MG/ML
INJECTION, SOLUTION INTRAMUSCULAR; INTRAVENOUS; SUBCUTANEOUS PRN
Status: CANCELLED | OUTPATIENT
Start: 2024-11-12

## 2024-11-12 RX ORDER — ACETAMINOPHEN 500 MG
1000 TABLET ORAL EVERY 8 HOURS SCHEDULED
Status: DISCONTINUED | OUTPATIENT
Start: 2024-11-12 | End: 2024-11-28 | Stop reason: HOSPADM

## 2024-11-12 RX ORDER — CALCIUM CHLORIDE 100 MG/ML
INJECTION INTRAVENOUS; INTRAVENTRICULAR
Status: DISCONTINUED | OUTPATIENT
Start: 2024-11-12 | End: 2024-11-12 | Stop reason: SDUPTHER

## 2024-11-12 RX ORDER — LIDOCAINE HYDROCHLORIDE 20 MG/ML
INJECTION, SOLUTION INTRAVENOUS
Status: DISCONTINUED | OUTPATIENT
Start: 2024-11-12 | End: 2024-11-12 | Stop reason: SDUPTHER

## 2024-11-12 RX ORDER — HEPARIN SODIUM 10000 [USP'U]/100ML
5-30 INJECTION, SOLUTION INTRAVENOUS CONTINUOUS
Status: DISCONTINUED | OUTPATIENT
Start: 2024-11-12 | End: 2024-11-21

## 2024-11-12 RX ORDER — ETOMIDATE 2 MG/ML
INJECTION INTRAVENOUS
Status: DISCONTINUED | OUTPATIENT
Start: 2024-11-12 | End: 2024-11-12 | Stop reason: SDUPTHER

## 2024-11-12 RX ORDER — ACETAMINOPHEN 650 MG/1
650 SUPPOSITORY RECTAL EVERY 6 HOURS PRN
Status: DISCONTINUED | OUTPATIENT
Start: 2024-11-12 | End: 2024-11-12

## 2024-11-12 RX ORDER — HEPARIN SODIUM 1000 [USP'U]/ML
80 INJECTION, SOLUTION INTRAVENOUS; SUBCUTANEOUS PRN
Status: DISCONTINUED | OUTPATIENT
Start: 2024-11-12 | End: 2024-11-12 | Stop reason: HOSPADM

## 2024-11-12 RX ORDER — 0.9 % SODIUM CHLORIDE 0.9 %
2000 INTRAVENOUS SOLUTION INTRAVENOUS ONCE
Status: DISCONTINUED | OUTPATIENT
Start: 2024-11-12 | End: 2024-11-12 | Stop reason: HOSPADM

## 2024-11-12 RX ORDER — HEPARIN SODIUM 10000 [USP'U]/100ML
5-30 INJECTION, SOLUTION INTRAVENOUS CONTINUOUS
Status: DISCONTINUED | OUTPATIENT
Start: 2024-11-12 | End: 2024-11-12 | Stop reason: HOSPADM

## 2024-11-12 RX ORDER — SODIUM CHLORIDE 9 MG/ML
INJECTION, SOLUTION INTRAVENOUS
Status: DISCONTINUED | OUTPATIENT
Start: 2024-11-12 | End: 2024-11-12 | Stop reason: SDUPTHER

## 2024-11-12 RX ORDER — SODIUM CHLORIDE 0.9 % (FLUSH) 0.9 %
5-40 SYRINGE (ML) INJECTION EVERY 12 HOURS SCHEDULED
Status: DISCONTINUED | OUTPATIENT
Start: 2024-11-12 | End: 2024-11-28 | Stop reason: HOSPADM

## 2024-11-12 RX ORDER — FENTANYL CITRATE 50 UG/ML
25 INJECTION, SOLUTION INTRAMUSCULAR; INTRAVENOUS EVERY 5 MIN PRN
Status: CANCELLED | OUTPATIENT
Start: 2024-11-12

## 2024-11-12 RX ORDER — SODIUM CHLORIDE 9 MG/ML
INJECTION, SOLUTION INTRAVENOUS PRN
Status: CANCELLED | OUTPATIENT
Start: 2024-11-12

## 2024-11-12 RX ORDER — SODIUM CHLORIDE 0.9 % (FLUSH) 0.9 %
5-40 SYRINGE (ML) INJECTION PRN
Status: DISCONTINUED | OUTPATIENT
Start: 2024-11-12 | End: 2024-11-12

## 2024-11-12 RX ORDER — INSULIN LISPRO 100 [IU]/ML
0-16 INJECTION, SOLUTION INTRAVENOUS; SUBCUTANEOUS EVERY 4 HOURS
Status: DISCONTINUED | OUTPATIENT
Start: 2024-11-12 | End: 2024-11-28 | Stop reason: HOSPADM

## 2024-11-12 RX ORDER — CALCIUM GLUCONATE 20 MG/ML
1000 INJECTION, SOLUTION INTRAVENOUS ONCE
Status: COMPLETED | OUTPATIENT
Start: 2024-11-12 | End: 2024-11-12

## 2024-11-12 RX ORDER — HEPARIN SODIUM 1000 [USP'U]/ML
80 INJECTION, SOLUTION INTRAVENOUS; SUBCUTANEOUS ONCE
Status: COMPLETED | OUTPATIENT
Start: 2024-11-12 | End: 2024-11-12

## 2024-11-12 RX ORDER — SODIUM CHLORIDE 0.9 % (FLUSH) 0.9 %
5-40 SYRINGE (ML) INJECTION PRN
Status: CANCELLED | OUTPATIENT
Start: 2024-11-12

## 2024-11-12 RX ORDER — NALOXONE HYDROCHLORIDE 0.4 MG/ML
INJECTION, SOLUTION INTRAMUSCULAR; INTRAVENOUS; SUBCUTANEOUS PRN
Status: DISCONTINUED | OUTPATIENT
Start: 2024-11-12 | End: 2024-11-12

## 2024-11-12 RX ORDER — ALBUMIN, HUMAN INJ 5% 5 %
SOLUTION INTRAVENOUS
Status: DISCONTINUED | OUTPATIENT
Start: 2024-11-12 | End: 2024-11-12 | Stop reason: SDUPTHER

## 2024-11-12 RX ORDER — ASPIRIN 81 MG/1
81 TABLET, CHEWABLE ORAL DAILY
Status: DISCONTINUED | OUTPATIENT
Start: 2024-11-13 | End: 2024-11-28 | Stop reason: HOSPADM

## 2024-11-12 RX ORDER — FENTANYL CITRATE 50 UG/ML
INJECTION, SOLUTION INTRAMUSCULAR; INTRAVENOUS
Status: DISCONTINUED | OUTPATIENT
Start: 2024-11-12 | End: 2024-11-12 | Stop reason: SDUPTHER

## 2024-11-12 RX ORDER — SODIUM CHLORIDE 0.9 % (FLUSH) 0.9 %
5-40 SYRINGE (ML) INJECTION EVERY 12 HOURS SCHEDULED
Status: DISCONTINUED | OUTPATIENT
Start: 2024-11-12 | End: 2024-11-12

## 2024-11-12 RX ORDER — DEXTROSE MONOHYDRATE 100 MG/ML
INJECTION, SOLUTION INTRAVENOUS CONTINUOUS PRN
Status: DISCONTINUED | OUTPATIENT
Start: 2024-11-12 | End: 2024-11-28 | Stop reason: HOSPADM

## 2024-11-12 RX ORDER — HYDROMORPHONE HYDROCHLORIDE 1 MG/ML
1 INJECTION, SOLUTION INTRAMUSCULAR; INTRAVENOUS; SUBCUTANEOUS ONCE
Status: COMPLETED | OUTPATIENT
Start: 2024-11-12 | End: 2024-11-12

## 2024-11-12 RX ORDER — OXYCODONE HYDROCHLORIDE 10 MG/1
10 TABLET ORAL EVERY 4 HOURS PRN
Status: DISCONTINUED | OUTPATIENT
Start: 2024-11-12 | End: 2024-11-28 | Stop reason: HOSPADM

## 2024-11-12 RX ORDER — CEFAZOLIN SODIUM 1 G/3ML
INJECTION, POWDER, FOR SOLUTION INTRAMUSCULAR; INTRAVENOUS
Status: DISCONTINUED | OUTPATIENT
Start: 2024-11-12 | End: 2024-11-12 | Stop reason: SDUPTHER

## 2024-11-12 RX ORDER — ONDANSETRON 2 MG/ML
4 INJECTION INTRAMUSCULAR; INTRAVENOUS
Status: CANCELLED | OUTPATIENT
Start: 2024-11-12 | End: 2024-11-13

## 2024-11-12 RX ORDER — HYDROMORPHONE HYDROCHLORIDE 1 MG/ML
0.25 INJECTION, SOLUTION INTRAMUSCULAR; INTRAVENOUS; SUBCUTANEOUS EVERY 5 MIN PRN
Status: DISCONTINUED | OUTPATIENT
Start: 2024-11-12 | End: 2024-11-12

## 2024-11-12 RX ORDER — ONDANSETRON 4 MG/1
4 TABLET, ORALLY DISINTEGRATING ORAL EVERY 8 HOURS PRN
Status: DISCONTINUED | OUTPATIENT
Start: 2024-11-12 | End: 2024-11-28 | Stop reason: HOSPADM

## 2024-11-12 RX ORDER — HEPARIN SODIUM 1000 [USP'U]/ML
40 INJECTION, SOLUTION INTRAVENOUS; SUBCUTANEOUS PRN
Status: DISCONTINUED | OUTPATIENT
Start: 2024-11-12 | End: 2024-11-23

## 2024-11-12 RX ORDER — SODIUM CHLORIDE 9 MG/ML
INJECTION, SOLUTION INTRAVENOUS
Status: DISCONTINUED | OUTPATIENT
Start: 2024-11-12 | End: 2024-11-12

## 2024-11-12 RX ORDER — VASOPRESSIN 20 U/ML
INJECTION PARENTERAL
Status: DISCONTINUED | OUTPATIENT
Start: 2024-11-12 | End: 2024-11-12 | Stop reason: SDUPTHER

## 2024-11-12 RX ORDER — SODIUM CHLORIDE 9 MG/ML
INJECTION, SOLUTION INTRAVENOUS CONTINUOUS
Status: DISCONTINUED | OUTPATIENT
Start: 2024-11-12 | End: 2024-11-12

## 2024-11-12 RX ORDER — ACETAMINOPHEN 325 MG/1
650 TABLET ORAL EVERY 6 HOURS PRN
Status: DISCONTINUED | OUTPATIENT
Start: 2024-11-12 | End: 2024-11-12

## 2024-11-12 RX ORDER — POLYETHYLENE GLYCOL 3350 17 G/17G
17 POWDER, FOR SOLUTION ORAL DAILY PRN
Status: DISCONTINUED | OUTPATIENT
Start: 2024-11-12 | End: 2024-11-22

## 2024-11-12 RX ORDER — HYDROCODONE BITARTRATE AND ACETAMINOPHEN 10; 325 MG/1; MG/1
1 TABLET ORAL EVERY 6 HOURS PRN
Status: DISCONTINUED | OUTPATIENT
Start: 2024-11-12 | End: 2024-11-12

## 2024-11-12 RX ORDER — HEPARIN SODIUM 1000 [USP'U]/ML
80 INJECTION, SOLUTION INTRAVENOUS; SUBCUTANEOUS ONCE
Status: DISCONTINUED | OUTPATIENT
Start: 2024-11-12 | End: 2024-11-12

## 2024-11-12 RX ORDER — SODIUM CHLORIDE 0.9 % (FLUSH) 0.9 %
5-40 SYRINGE (ML) INJECTION PRN
Status: DISCONTINUED | OUTPATIENT
Start: 2024-11-12 | End: 2024-11-28 | Stop reason: HOSPADM

## 2024-11-12 RX ORDER — OXYCODONE HYDROCHLORIDE 5 MG/1
5 TABLET ORAL EVERY 4 HOURS PRN
Status: DISCONTINUED | OUTPATIENT
Start: 2024-11-12 | End: 2024-11-28 | Stop reason: HOSPADM

## 2024-11-12 RX ORDER — POTASSIUM CHLORIDE 1500 MG/1
40 TABLET, EXTENDED RELEASE ORAL PRN
Status: DISCONTINUED | OUTPATIENT
Start: 2024-11-12 | End: 2024-11-20

## 2024-11-12 RX ORDER — METOPROLOL TARTRATE 1 MG/ML
INJECTION, SOLUTION INTRAVENOUS
Status: DISCONTINUED | OUTPATIENT
Start: 2024-11-12 | End: 2024-11-12 | Stop reason: SDUPTHER

## 2024-11-12 RX ORDER — SODIUM CHLORIDE 9 MG/ML
INJECTION, SOLUTION INTRAVENOUS PRN
Status: DISCONTINUED | OUTPATIENT
Start: 2024-11-12 | End: 2024-11-28 | Stop reason: HOSPADM

## 2024-11-12 RX ORDER — SODIUM CHLORIDE 9 MG/ML
INJECTION, SOLUTION INTRAVENOUS PRN
Status: DISCONTINUED | OUTPATIENT
Start: 2024-11-12 | End: 2024-11-12

## 2024-11-12 RX ORDER — DEXAMETHASONE SODIUM PHOSPHATE 10 MG/ML
INJECTION INTRAMUSCULAR; INTRAVENOUS
Status: DISCONTINUED | OUTPATIENT
Start: 2024-11-12 | End: 2024-11-12 | Stop reason: SDUPTHER

## 2024-11-12 RX ORDER — HEPARIN SODIUM 1000 [USP'U]/ML
INJECTION, SOLUTION INTRAVENOUS; SUBCUTANEOUS
Status: DISCONTINUED | OUTPATIENT
Start: 2024-11-12 | End: 2024-11-12 | Stop reason: SDUPTHER

## 2024-11-12 RX ADMIN — FENTANYL CITRATE 50 MCG: 50 INJECTION, SOLUTION INTRAMUSCULAR; INTRAVENOUS at 04:39

## 2024-11-12 RX ADMIN — ALBUMIN (HUMAN) 25 G: 2.5 SOLUTION INTRAVENOUS at 04:05

## 2024-11-12 RX ADMIN — VASOPRESSIN 1 UNITS: 20 INJECTION INTRAVENOUS at 04:09

## 2024-11-12 RX ADMIN — INSULIN LISPRO 8 UNITS: 100 INJECTION, SOLUTION INTRAVENOUS; SUBCUTANEOUS at 21:06

## 2024-11-12 RX ADMIN — PHENYLEPHRINE HYDROCHLORIDE 200 MCG: 10 INJECTION, SOLUTION INTRAVENOUS at 16:58

## 2024-11-12 RX ADMIN — ROCURONIUM BROMIDE 30 MG: 10 INJECTION, SOLUTION INTRAVENOUS at 17:26

## 2024-11-12 RX ADMIN — SUGAMMADEX 200 MG: 100 INJECTION, SOLUTION INTRAVENOUS at 17:51

## 2024-11-12 RX ADMIN — SODIUM CHLORIDE: 9 INJECTION, SOLUTION INTRAVENOUS at 03:12

## 2024-11-12 RX ADMIN — SODIUM BICARBONATE 50 MEQ: 84 INJECTION INTRAVENOUS at 04:24

## 2024-11-12 RX ADMIN — IOPAMIDOL 200 ML: 755 INJECTION, SOLUTION INTRAVENOUS at 00:09

## 2024-11-12 RX ADMIN — SODIUM CHLORIDE, POTASSIUM CHLORIDE, SODIUM LACTATE AND CALCIUM CHLORIDE: 600; 310; 30; 20 INJECTION, SOLUTION INTRAVENOUS at 04:20

## 2024-11-12 RX ADMIN — DEXAMETHASONE SODIUM PHOSPHATE 10 MG: 10 INJECTION, SOLUTION INTRAMUSCULAR; INTRAVENOUS at 17:04

## 2024-11-12 RX ADMIN — ROCURONIUM BROMIDE 50 MG: 10 INJECTION, SOLUTION INTRAVENOUS at 16:48

## 2024-11-12 RX ADMIN — ONDANSETRON 4 MG: 2 INJECTION, SOLUTION INTRAMUSCULAR; INTRAVENOUS at 00:10

## 2024-11-12 RX ADMIN — FENTANYL CITRATE 50 MCG: 50 INJECTION, SOLUTION INTRAMUSCULAR; INTRAVENOUS at 06:18

## 2024-11-12 RX ADMIN — SODIUM CHLORIDE: 9 INJECTION, SOLUTION INTRAVENOUS at 03:54

## 2024-11-12 RX ADMIN — SODIUM CHLORIDE: 9 INJECTION, SOLUTION INTRAVENOUS at 07:13

## 2024-11-12 RX ADMIN — ROCURONIUM BROMIDE 10 MG: 10 INJECTION, SOLUTION INTRAVENOUS at 05:08

## 2024-11-12 RX ADMIN — CEFAZOLIN 2 G: 1 INJECTION, POWDER, FOR SOLUTION INTRAMUSCULAR; INTRAVENOUS at 17:00

## 2024-11-12 RX ADMIN — HEPARIN SODIUM 11000 UNITS: 1000 INJECTION INTRAVENOUS; SUBCUTANEOUS at 04:54

## 2024-11-12 RX ADMIN — PROPOFOL 200 MG: 10 INJECTION, EMULSION INTRAVENOUS at 04:01

## 2024-11-12 RX ADMIN — HYDROMORPHONE HYDROCHLORIDE 0.5 MG: 1 INJECTION, SOLUTION INTRAMUSCULAR; INTRAVENOUS; SUBCUTANEOUS at 19:43

## 2024-11-12 RX ADMIN — LIDOCAINE HYDROCHLORIDE 100 MG: 20 INJECTION, SOLUTION INTRAVENOUS at 04:01

## 2024-11-12 RX ADMIN — DEXTROSE MONOHYDRATE 25 G: 25 INJECTION, SOLUTION INTRAVENOUS at 09:53

## 2024-11-12 RX ADMIN — HYDROMORPHONE HYDROCHLORIDE 0.5 MG: 1 INJECTION, SOLUTION INTRAMUSCULAR; INTRAVENOUS; SUBCUTANEOUS at 23:37

## 2024-11-12 RX ADMIN — VASOPRESSIN 1 UNITS: 20 INJECTION INTRAVENOUS at 04:16

## 2024-11-12 RX ADMIN — HEPARIN SODIUM AND DEXTROSE 18 UNITS/KG/HR: 10000; 5 INJECTION INTRAVENOUS at 01:17

## 2024-11-12 RX ADMIN — FENTANYL CITRATE 50 MCG: 50 INJECTION, SOLUTION INTRAMUSCULAR; INTRAVENOUS at 17:09

## 2024-11-12 RX ADMIN — HYDROMORPHONE HYDROCHLORIDE 0.5 MG: 1 INJECTION, SOLUTION INTRAMUSCULAR; INTRAVENOUS; SUBCUTANEOUS at 07:59

## 2024-11-12 RX ADMIN — ONDANSETRON 4 MG: 2 INJECTION INTRAMUSCULAR; INTRAVENOUS at 06:20

## 2024-11-12 RX ADMIN — METOPROLOL TARTRATE 1 MG: 5 INJECTION, SOLUTION INTRAVENOUS at 04:40

## 2024-11-12 RX ADMIN — SODIUM CHLORIDE, SODIUM LACTATE, POTASSIUM CHLORIDE, CALCIUM CHLORIDE: 600; 310; 30; 20 INJECTION, SOLUTION INTRAVENOUS at 04:05

## 2024-11-12 RX ADMIN — SUCCINYLCHOLINE CHLORIDE 120 MG: 20 INJECTION, SOLUTION INTRAMUSCULAR; INTRAVENOUS at 04:01

## 2024-11-12 RX ADMIN — FENTANYL CITRATE 100 MCG: 50 INJECTION, SOLUTION INTRAMUSCULAR; INTRAVENOUS at 04:01

## 2024-11-12 RX ADMIN — CALCIUM GLUCONATE 1000 MG: 20 INJECTION, SOLUTION INTRAVENOUS at 09:57

## 2024-11-12 RX ADMIN — PHENYLEPHRINE HYDROCHLORIDE 50 MCG/MIN: 10 INJECTION INTRAVENOUS at 17:03

## 2024-11-12 RX ADMIN — DEXTROSE MONOHYDRATE 25 G: 25 INJECTION, SOLUTION INTRAVENOUS at 23:37

## 2024-11-12 RX ADMIN — ETOMIDATE 20 MG: 2 INJECTION, SOLUTION INTRAVENOUS at 16:48

## 2024-11-12 RX ADMIN — SODIUM BICARBONATE: 84 INJECTION, SOLUTION INTRAVENOUS at 10:24

## 2024-11-12 RX ADMIN — HEPARIN SODIUM 18 UNITS/KG/HR: 10000 INJECTION, SOLUTION INTRAVENOUS at 23:11

## 2024-11-12 RX ADMIN — INSULIN HUMAN 8 UNITS: 100 INJECTION, SOLUTION PARENTERAL at 09:51

## 2024-11-12 RX ADMIN — CEFAZOLIN 2000 MG: 2 INJECTION, POWDER, FOR SOLUTION INTRAMUSCULAR; INTRAVENOUS at 03:25

## 2024-11-12 RX ADMIN — SODIUM CHLORIDE, POTASSIUM CHLORIDE, SODIUM LACTATE AND CALCIUM CHLORIDE: 600; 310; 30; 20 INJECTION, SOLUTION INTRAVENOUS at 05:21

## 2024-11-12 RX ADMIN — SODIUM CHLORIDE, PRESERVATIVE FREE 10 ML: 5 INJECTION INTRAVENOUS at 10:59

## 2024-11-12 RX ADMIN — INSULIN HUMAN 8 UNITS: 100 INJECTION, SOLUTION PARENTERAL at 23:38

## 2024-11-12 RX ADMIN — OXYCODONE HYDROCHLORIDE 5 MG: 5 TABLET ORAL at 22:14

## 2024-11-12 RX ADMIN — SODIUM CHLORIDE, PRESERVATIVE FREE 10 ML: 5 INJECTION INTRAVENOUS at 21:07

## 2024-11-12 RX ADMIN — HYDROMORPHONE HYDROCHLORIDE 1 MG: 1 INJECTION, SOLUTION INTRAMUSCULAR; INTRAVENOUS; SUBCUTANEOUS at 01:51

## 2024-11-12 RX ADMIN — HEPARIN SODIUM 18 UNITS/KG/HR: 10000 INJECTION, SOLUTION INTRAVENOUS at 11:14

## 2024-11-12 RX ADMIN — PHENYLEPHRINE HYDROCHLORIDE 300 MCG: 10 INJECTION, SOLUTION INTRAVENOUS at 16:56

## 2024-11-12 RX ADMIN — INSULIN HUMAN 3 UNITS: 100 INJECTION, SOLUTION PARENTERAL at 04:26

## 2024-11-12 RX ADMIN — FENTANYL CITRATE 25 MCG: 50 INJECTION, SOLUTION INTRAMUSCULAR; INTRAVENOUS at 18:10

## 2024-11-12 RX ADMIN — HEPARIN SODIUM 8700 UNITS: 1000 INJECTION INTRAVENOUS; SUBCUTANEOUS at 01:13

## 2024-11-12 RX ADMIN — INSULIN LISPRO 8 UNITS: 100 INJECTION, SOLUTION INTRAVENOUS; SUBCUTANEOUS at 08:16

## 2024-11-12 RX ADMIN — PHENYLEPHRINE HYDROCHLORIDE 200 MCG: 10 INJECTION, SOLUTION INTRAVENOUS at 16:50

## 2024-11-12 RX ADMIN — INSULIN LISPRO 4 UNITS: 100 INJECTION, SOLUTION INTRAVENOUS; SUBCUTANEOUS at 12:27

## 2024-11-12 RX ADMIN — SUGAMMADEX 200 MG: 100 INJECTION, SOLUTION INTRAVENOUS at 06:20

## 2024-11-12 RX ADMIN — HYDROMORPHONE HYDROCHLORIDE 0.5 MG: 1 INJECTION, SOLUTION INTRAMUSCULAR; INTRAVENOUS; SUBCUTANEOUS at 10:58

## 2024-11-12 RX ADMIN — FENTANYL CITRATE 50 MCG: 50 INJECTION, SOLUTION INTRAMUSCULAR; INTRAVENOUS at 04:34

## 2024-11-12 RX ADMIN — LIDOCAINE HYDROCHLORIDE 80 MG: 20 INJECTION, SOLUTION INTRAVENOUS at 16:48

## 2024-11-12 RX ADMIN — ROCURONIUM BROMIDE 50 MG: 10 INJECTION, SOLUTION INTRAVENOUS at 04:04

## 2024-11-12 RX ADMIN — VASOPRESSIN 0.5 UNITS: 20 INJECTION INTRAVENOUS at 06:03

## 2024-11-12 RX ADMIN — SODIUM CHLORIDE, POTASSIUM CHLORIDE, SODIUM LACTATE AND CALCIUM CHLORIDE: 600; 310; 30; 20 INJECTION, SOLUTION INTRAVENOUS at 16:40

## 2024-11-12 RX ADMIN — FENTANYL CITRATE 50 MCG: 0.05 INJECTION, SOLUTION INTRAMUSCULAR; INTRAVENOUS at 00:10

## 2024-11-12 RX ADMIN — CALCIUM CHLORIDE INJECTION 0.5 G: 100 INJECTION, SOLUTION INTRAVENOUS at 04:24

## 2024-11-12 RX ADMIN — SODIUM CHLORIDE 5 UNITS/HR: 9 INJECTION, SOLUTION INTRAVENOUS at 04:25

## 2024-11-12 RX ADMIN — INSULIN LISPRO 4 UNITS: 100 INJECTION, SOLUTION INTRAVENOUS; SUBCUTANEOUS at 16:25

## 2024-11-12 RX ADMIN — ROCURONIUM BROMIDE 10 MG: 10 INJECTION, SOLUTION INTRAVENOUS at 05:49

## 2024-11-12 RX ADMIN — HYDROMORPHONE HYDROCHLORIDE 0.5 MG: 1 INJECTION, SOLUTION INTRAMUSCULAR; INTRAVENOUS; SUBCUTANEOUS at 14:08

## 2024-11-12 RX ADMIN — ACETAMINOPHEN 1000 MG: 500 TABLET ORAL at 22:02

## 2024-11-12 RX ADMIN — METOPROLOL TARTRATE 1 MG: 5 INJECTION, SOLUTION INTRAVENOUS at 04:46

## 2024-11-12 RX ADMIN — MIDAZOLAM 2 MG: 1 INJECTION INTRAMUSCULAR; INTRAVENOUS at 03:55

## 2024-11-12 RX ADMIN — DEXAMETHASONE SODIUM PHOSPHATE 10 MG: 10 INJECTION INTRAMUSCULAR; INTRAVENOUS at 16:56

## 2024-11-12 RX ADMIN — MIDAZOLAM 2 MG: 1 INJECTION INTRAMUSCULAR; INTRAVENOUS at 16:45

## 2024-11-12 RX ADMIN — ONDANSETRON HYDROCHLORIDE 4 MG: 2 SOLUTION INTRAMUSCULAR; INTRAVENOUS at 17:46

## 2024-11-12 RX ADMIN — HEPARIN SODIUM 18 UNITS/KG/HR: 10000 INJECTION, SOLUTION INTRAVENOUS at 03:11

## 2024-11-12 RX ADMIN — SODIUM BICARBONATE 50 MEQ: 84 INJECTION INTRAVENOUS at 06:15

## 2024-11-12 RX ADMIN — FENTANYL CITRATE 50 MCG: 50 INJECTION, SOLUTION INTRAMUSCULAR; INTRAVENOUS at 06:23

## 2024-11-12 RX ADMIN — SODIUM BICARBONATE: 84 INJECTION, SOLUTION INTRAVENOUS at 18:48

## 2024-11-12 ASSESSMENT — PAIN DESCRIPTION - DESCRIPTORS
DESCRIPTORS: CRAMPING;BURNING;PRESSURE
DESCRIPTORS: BURNING;CRAMPING
DESCRIPTORS: BURNING;CRAMPING;SORE
DESCRIPTORS: SORE
DESCRIPTORS: SORE
DESCRIPTORS: CRAMPING;BURNING;PRESSURE
DESCRIPTORS: SORE

## 2024-11-12 ASSESSMENT — PAIN DESCRIPTION - ORIENTATION
ORIENTATION: RIGHT;LEFT
ORIENTATION: LEFT;RIGHT
ORIENTATION: RIGHT;LEFT
ORIENTATION: RIGHT
ORIENTATION: RIGHT;LEFT

## 2024-11-12 ASSESSMENT — PAIN DESCRIPTION - LOCATION
LOCATION: LEG

## 2024-11-12 ASSESSMENT — PAIN - FUNCTIONAL ASSESSMENT
PAIN_FUNCTIONAL_ASSESSMENT: ACTIVITIES ARE NOT PREVENTED
PAIN_FUNCTIONAL_ASSESSMENT: PREVENTS OR INTERFERES SOME ACTIVE ACTIVITIES AND ADLS
PAIN_FUNCTIONAL_ASSESSMENT: ACTIVITIES ARE NOT PREVENTED
PAIN_FUNCTIONAL_ASSESSMENT: 0-10
PAIN_FUNCTIONAL_ASSESSMENT: PREVENTS OR INTERFERES SOME ACTIVE ACTIVITIES AND ADLS
PAIN_FUNCTIONAL_ASSESSMENT: ACTIVITIES ARE NOT PREVENTED
PAIN_FUNCTIONAL_ASSESSMENT: PREVENTS OR INTERFERES SOME ACTIVE ACTIVITIES AND ADLS

## 2024-11-12 ASSESSMENT — PAIN SCALES - GENERAL
PAINLEVEL_OUTOF10: 9
PAINLEVEL_OUTOF10: 4
PAINLEVEL_OUTOF10: 9
PAINLEVEL_OUTOF10: 9
PAINLEVEL_OUTOF10: 0
PAINLEVEL_OUTOF10: 10
PAINLEVEL_OUTOF10: 0
PAINLEVEL_OUTOF10: 10
PAINLEVEL_OUTOF10: 0
PAINLEVEL_OUTOF10: 10
PAINLEVEL_OUTOF10: 4

## 2024-11-12 ASSESSMENT — LIFESTYLE VARIABLES
SMOKING_STATUS: 1
SMOKING_STATUS: 1

## 2024-11-12 ASSESSMENT — PAIN DESCRIPTION - PAIN TYPE
TYPE: SURGICAL PAIN
TYPE: ACUTE PAIN
TYPE: SURGICAL PAIN

## 2024-11-12 ASSESSMENT — PAIN DESCRIPTION - FREQUENCY
FREQUENCY: CONTINUOUS
FREQUENCY: CONTINUOUS

## 2024-11-12 ASSESSMENT — PAIN DESCRIPTION - ONSET: ONSET: SUDDEN

## 2024-11-12 NOTE — ED NOTES
Radiology Procedure Waiver   Name: Himanshu Bravo  : 1960  MRN: [id number]    Date:  24    Time: 11:34 PM EST    Benefits of immediately proceeding with Radiology exam(s) without pre-testing outweigh the risks or are not indicated as specified below and therefore the following is/are being waived:    [] Pregnancy test   [] Patients LMP on-time and regular.   [] Patient had Tubal Ligation or has other Contraception Device.   [] Patient  is Menopausal or Premenarcheal.    [] Patient had Full or Partial Hysterectomy.    [] Protocol for Iodine allergy    [] MRI Questionnaire     [x] BUN/Creatinine   [] Patient age w/no hx of renal dysfunction.   [] Patient on Dialysis.   [] Recent Normal Labs.  Electronically signed by Magnus Mccormick DO on 24 at 11:34 PM Jonel Strange DO  24 [id number]

## 2024-11-12 NOTE — PROGRESS NOTES
CVICU Admission Note    Name: Himasnhu Bravo  MRN: [id number]    CC: Postoperative Critical Care Management     Indication for Surgery/Procedure: Bilateral Common Femoral Artery Occlusion     Important/Relevant PMH/PSH: Tobacco abuse, DMII, HTN, HLD, CAD s/p cardiac stents    11/11/2024 CTA abdominal aorta with bilateral runoff with contrast showed 1. Severe atherosclerotic disease with common femoral arterial occlusions,   some reconstitution in the superficial femoral and popliteal locations but   without significant runoff of the lower extremities.   2. 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     Procedure/Surgeries: 11/12/2024 Bilateral femoral endarterectomies, bilateral fasciotomies         Physical Exam:    /87   Pulse 87   Temp 97.7 °F (36.5 °C) (Oral)   Resp 19   Ht 1.727 m (5' 8\")   Wt 108.9 kg (240 lb)   SpO2 98%   BMI 36.49 kg/m²     Recent Labs     11/12/24  0752   WBC 16.0*   RBC 4.70   HGB 14.6   HCT 44.6   MCV 94.9   MCH 31.1   MCHC 32.7   RDW 13.9      MPV 10.3     Recent Labs     11/12/24  0013 11/12/24  0415 11/12/24  0608     --   --    K 4.3  --   --    CL 91*  --   --    CO2 20*  --   --    BUN 25*  --   --    CREATININE 1.3*   < > 1.5*   GLUCOSE 380*  --   --    CALCIUM 10.0  --   --     < > = values in this interval not displayed.     General Appearance: Arrived to ICU in stable condition   Eyes: PERRL  Pulmonary: Diminished bibasilar.  No wheezes, no accessory muscle use noted on 4L NC  Cardiovascular: RRR, no heaves or thrills palpated   Telemetry: SR  Abdomen: Soft, nontender  Extremities: +Bilateral popliteal signals, +LLE AT signal, only slight movement in toes bilaterally, diminished sensation left worse than right.   Neurologic/Psych: Alert and oriented x4, following commands  Skin: cool and dry   Incision: Bilateral groin incisions with dressings intact, fasciotomy sites with dressings intact       Assessment/Plan: Day of Surgery     1.  S/p Bilateral

## 2024-11-12 NOTE — PROGRESS NOTES
K normalized, CK markedly elevated at 32,000. At this point we know the right lower leg is non viable. Given that there are some perfused compartments of the left will plan on right above knee amputation. Risks, benefits, and alternatives including but not limited to bleeding, infection, need for additional surgery discussed with patient and they agree to proceed. Discussed that we also may need to proceed with L AKA if this does not provide enough source control.

## 2024-11-12 NOTE — FLOWSHEET NOTE
11/12/24 0900   Treatment Team Notification   Reason for Communication Critical results   Type of Critical Result Laboratory   Critical Lab Result Type Electrolytes   Treatment Team Role Consulting Provider   Method of Communication Call   Response Waiting for response     K-7.2

## 2024-11-12 NOTE — CONSULTS
later today. He is seen today while in the ICU. He denies current dyspnea, chest pain, or palpitations. He is laying in bed, nearly flat on oxygen via nasal canula. He has very mild discomfort to his left lower extremity. His blood pressure is stable. His potassium level did improve following hyperkalemia protocol. He is nonoliguric.     Past Medical History:   Diagnosis Date    Congenital heart disease     Diabetes mellitus (HCC)     H/O cardiovascular stress test 01/15/2018    lexiscan    Hyperlipidemia     Type 2 diabetes mellitus without complication (HCC)        Past Surgical History:   Procedure Laterality Date    CARDIAC CATHETERIZATION      2 stents    CARDIAC SURGERY      minimally invasive    CLAVICLE SURGERY Left     COLONOSCOPY N/A 01/19/2024    COLONOSCOPY WITH BIOPSY performed by Arnold Ross MD at [facility] ENDOSCOPY    PTCA         Allergies:  Patient has no known allergies.    Current Medications:    heparin (porcine) injection 8,700 Units, PRN  heparin (porcine) injection 4,400 Units, PRN  heparin 25,000 units in dextrose 5% 250 mL (premix) infusion, Continuous  sodium chloride flush 0.9 % injection 5-40 mL, 2 times per day  sodium chloride flush 0.9 % injection 5-40 mL, PRN  0.9 % sodium chloride infusion, PRN  potassium chloride (KLOR-CON M) extended release tablet 40 mEq, PRN   Or  potassium bicarb-citric acid (EFFER-K) effervescent tablet 40 mEq, PRN   Or  potassium chloride 10 mEq/100 mL IVPB (Peripheral Line), PRN  magnesium sulfate 2000 mg in 50 mL IVPB premix, PRN  ondansetron (ZOFRAN-ODT) disintegrating tablet 4 mg, Q8H PRN   Or  ondansetron (ZOFRAN) injection 4 mg, Q6H PRN  polyethylene glycol (GLYCOLAX) packet 17 g, Daily PRN  glucose chewable tablet 16 g, PRN  dextrose bolus 10% 125 mL, PRN   Or  dextrose bolus 10% 250 mL, PRN  glucagon injection 1 mg, PRN  dextrose 10 % infusion, Continuous PRN  insulin glargine (LANTUS) injection vial 12 Units, QAM  oxyCODONE (ROXICODONE) immediate

## 2024-11-12 NOTE — OP NOTE
Operative Note      Patient: Himanshu Bravo  YOB: 1960  MRN: [id number]    Date of Procedure: 11/12/2024    Pre-Op Diagnosis Codes:      * 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]    Post-Op Diagnosis: Bilateral embolic femoral artery occlusion       Procedure(s):  BILATERAL FEMORAL ENDERECTOMY POSS. BILATERAL FASCIOTOMIES    Surgeon(s):  Jasmyne Henriquez MD    Assistant:   Resident: Himanshu Hoff DO    Anesthesia: General    Estimated Blood Loss (mL): 50 mL    Complications: None    Specimens:   ID Type Source Tests Collected by Time Destination   A : lower extremity thrombus Tissue Tissue SURGICAL PATHOLOGY Jasmyne Henriquez MD 11/12/2024 0621        Implants:  * No implants in log *      Drains: * No LDAs found *    Findings:  Infection Present At Time Of Surgery (PATOS) (choose all levels that have infection present):  No infection present  Other Findings: Embolic occlusion of bilateral common femoral arteries, with clot extending bilaterally into profunda and SFA      Indications for procedure:   This is a 64-year-old male, who presented to the emergency department with bilateral lower extremity ischemia.  He reports that symptoms began at approximately 11 PM on 11/11/2024.  He quickly presented to an outside emergency department, and was transferred to [facility] for further evaluation and management.  Immediately upon arrival, the patient was evaluated by the vascular surgery team.  Based on the patient's exam and imaging, concern for bilateral common femoral artery embolic occlusion was expressed to the patient.  Bilateral femoral artery cutdown with embolectomy and possible bilateral lower extremity fasciotomies was discussed with the patient.  The risk of limb loss was also discussed with the patient..  The risks/benefits/alternatives to the procedure were discussed with the patient/responsible party, and they have agreed to undergo the procedure.    Details of  length of the leg.  The saphenous vein was identified and protected.  The fascia of the superficial posterior compartment was longitudinally incised.  The soleus was then taken off of the posterior aspect of the tibia, revealing the deep posterior compartment, which was entirely opened.  A lateral incision was made in the left leg using a #10 scalpel.  The incision was 1 fingerbreadth anterior to the fibula, and spanned the length of the leg.  The septum between the anterior and lateral compartments was identified.  Scissors were used to open the anterior and lateral compartments of the left leg, ensuring that the superficial fibular nerve was not damaged.    In an identical fashion, 4 compartment right leg fasciotomies were then performed. A medial incision was made 1 fingerbreadth posterior to the right tibia using a #10 scalpel.  The incision spanned the length of the leg.  The saphenous vein was identified and protected.  The fascia of the superficial posterior compartment was longitudinally incised.  The soleus was then taken off of the posterior aspect of the tibia, revealing the deep posterior compartment, which was entirely opened.  A lateral incision was then made in the right leg using a #10 scalpel.  The incision was 1 fingerbreadth anterior to the fibula, and spanned the length of the leg.  The septum between the anterior and lateral compartments was identified.  Scissors were used to open the anterior and lateral compartments of the right leg, ensuring that the superficial fibular nerve was not damaged.    Following bilateral embolectomies, the CFA, SFA, and profunda had palpable pulses and strong Doppler signals bilaterally.      The fascia and subcutaneous tissue of the bilateral groin incisions were approximated using multiple layers of 3-0 Vicryl suture.  The skin of the bilateral groin incisions was approximated using running, subcuticular 4-0 Monocryl suture.  The skin of the groin incisions was

## 2024-11-12 NOTE — ED PROVIDER NOTES
HCO3 17.2 (L) 22.0 - 26.0 mmol/L    Negative Base Excess, Art 9.7 mmol/L    POC O2 SAT 97.6 92.0 - 98.5 %   POCT H&H   Result Value Ref Range    POC Hemoglobin (calc) 14.8 12.5 - 15.5 g/dL    POC Hematocrit 44 37.0 - 54.0 %   POCT Glucose   Result Value Ref Range    POC Glucose 277 (H) 74 - 99 mg/dL   POCT Glucose   Result Value Ref Range    POC Glucose 280 (H) 74 - 99 mg/dL   TYPE AND SCREEN   Result Value Ref Range    Blood Bank Sample Expiration 11/15/2024,2359     Arm Band Number [id number]     ABO/Rh A POSITIVE     Antibody Screen NEGATIVE        RADIOLOGY:  Interpreted by Radiologist.  No orders to display             ------------------------- NURSING NOTES AND VITALS REVIEWED ---------------------------   The nursing notes within the ED encounter and vital signs as below have been reviewed by myself.  BP (!) 175/95   Pulse 85   Temp 97.4 °F (36.3 °C) (Oral)   Resp 28   Ht 1.727 m (5' 8\")   Wt 108.9 kg (240 lb)   SpO2 100%   BMI 36.49 kg/m²   Oxygen Saturation Interpretation: Normal    The patient’s available past medical records and past encounters were reviewed.        ------------------------------ ED COURSE/MEDICAL DECISION MAKING----------------------  Medications   heparin (porcine) injection 8,700 Units (has no administration in time range)   heparin (porcine) injection 4,400 Units (has no administration in time range)   heparin 25,000 units in dextrose 5% 250 mL (premix) infusion (18 Units/kg/hr × 108.9 kg IntraVENous New Bag 11/12/24 0311)   sodium chloride flush 0.9 % injection 5-40 mL (has no administration in time range)   sodium chloride flush 0.9 % injection 5-40 mL (has no administration in time range)   0.9 % sodium chloride infusion (has no administration in time range)   potassium chloride (KLOR-CON M) extended release tablet 40 mEq (has no administration in time range)     Or   potassium bicarb-citric acid (EFFER-K) effervescent tablet 40 mEq (has no administration in time range)     Or  pain no difficulty breathing reports he had abdominal pain the day before.  Patient reporting no abdominal pain now.  Patient reporting no fall or injury.  Patient does report weakness to his lower extremities.  Patient currently on heparin from [facility].  Patient was accepted by vascular  CC/HPI Summary, DDx, ED Course, Reassessment, Tests Considered, Patient expectation:        Himanshu Bravo is a 64 y.o. male history of congenital heart disease diabetes history of hyperlipidemia diabetes presenting to the ED for leg pain, beginning hours ago.  The complaint has been persistent, moderate in severity, and worsened by nothing.  Patient was seen at [facility] facility sent here due to bilateral femoral artery occlusion.  Patient reporting no chest pain no difficulty breathing reports he had abdominal pain the day before.  Patient reporting no abdominal pain now.  Patient reporting no fall or injury.  Patient does report weakness to his lower extremities.  Patient currently on heparin from [facility].  Patient was accepted by vascular    Patient awake alert oriented x 3 mild to moderate distress.  Heart exam normal lungs are clear abdomen soft.  Patient able move upper extremities limb range of motion of lower extremities.  Patient's both lower extremities cyanotic and cold to touch.  I am unable to palpate dorsalis or posterior tibial pulse on either leg.  Patient differential includes arterial occlusion as well as DVT as well as compartment syndrome.    I did review labs as well as CTs from [facility].  Patient was continued on heparin here in the emergency department.  Patient will be admitted to internal medicine I did speak to [facility] internal medicine.  Patient will be going directly to OR.  Social Determinants affecting Dx or Tx: Patient does smoke    Chronic Conditions: Patient has history of diabetes congenital heart disease hyperlipidemia    Records Reviewed:     Echo from 1/9/2018 ejection

## 2024-11-12 NOTE — PROGRESS NOTES
Patient arrived with belongings - phone, wallet and clothes.  All listed belongings were sent to [id number]

## 2024-11-12 NOTE — ANESTHESIA PRE PROCEDURE
Department of Anesthesiology  Preprocedure Note       Name:  Himanshu Bravo   Age:  64 y.o.  :  1960                                          MRN:  [id number]         Date:  2024      Surgeon: Surgeon(s):  Jasmyne Henriquez MD    Procedure: Procedure(s):  BILATERAL FEMORAL ENDERECTOMY POSS. BILATERAL FASCIOTOMIES    Medications prior to admission:   Prior to Admission medications    Medication Sig Start Date End Date Taking? Authorizing Provider   indomethacin (INDOCIN) 50 MG capsule Take 1 capsule by mouth at bedtime for 10 days Take with food. 10/10/24 10/20/24  Franky Montana DO   Continuous Glucose Sensor (FREESTYLE CARA 2 SENSOR) MISC Change every 14 days 10/1/24   Franky Montana DO   albuterol-ipratropium (COMBIVENT RESPIMAT)  MCG/ACT AERS inhaler Inhale 1 puff into the lungs every 6 hours 10/1/24   Franky Montana DO   montelukast (SINGULAIR) 10 MG tablet Take 1 tablet by mouth daily 10/1/24   Franky Montana DO   Semaglutide,0.25 or 0.5MG/DOS, (OZEMPIC, 0.25 OR 0.5 MG/DOSE,) 2 MG/1.5ML SOPN Inject into the skin once a week wed    Nate Jama MD   metoprolol (LOPRESSOR) 100 MG tablet Take 1 tablet by mouth 2 times daily 10/21/23   Franky Montana DO   losartan (COZAAR) 25 MG tablet Take 1 tablet by mouth daily 22   Nate Jama MD   BRILINTA 90 MG TABS tablet Take 1 tablet by mouth 2 times daily 23   Nate Jama MD   Icosapent Ethyl (VASCEPA) 1 g CAPS capsule Take 1 g by mouth    Nate Jama MD   aspirin 81 MG chewable tablet Take by mouth 22   Nate Jama MD   colestipol (COLESTID) 1 g tablet Take 1 tablet by mouth 2 times daily 10/4/23   Renée Simpson, APRN - CNP   Insulin Syringes, Disposable, U-100 0.3 ML MISC 1 each by Does not apply route daily 18   Delgado Allen,    Multiple Vitamins-Minerals (THERAPEUTIC MULTIVITAMIN-MINERALS) tablet Take 1 tablet by mouth daily    Provider, MD Nate

## 2024-11-12 NOTE — CONSULTS
Vascular Surgery Consultation Note    Reason for Consult:  bilateral common femoral artery occlusion    HPI:    This is a 64 y.o. male who is admitted to the hospital for treatment of bilateral lower extremity ischemia.  The patient presented to the [facility] emergency department earlier this night for evaluation of bilateral lower extremity weakness, pain, and numbness.  The patient states that his symptoms started around 11 PM.  He attempted to get out of bed and was unable to walk.  Symptoms have been persistent since onset.  He has found nothing to worsen or relieve his symptoms.  He has not experienced similar symptoms in the past.    The patient has long-term history of smoking.  He reports recently cutting back, and now smokes slightly less than 1 pack of cigarettes per day.  He reports occasional, but not daily alcohol use.  The patient denies any prior peripheral vascular interventions.  He reports bilateral calf claudication when walking greater than approximately 30 feet.  This has been occurring for the past 3 to 4 months.    The patient had cardiac catheterization with stents inserted approximately 2 years ago at [facility].  He now takes aspirin, but no other antiplatelet or blood thinning medications.    The patient reports recent GI upset.  He states that he received antibiotics for treatment of pneumonia within the past 2 weeks.  He denies other recent illness or changes to his health      ROS: Negative if blank [], Positive if [x]  General Vascular   [] Fevers [x] Claudication (Blocks)   [] Chills [] Rest Pain   [] Weight Loss [] Tissue Loss   [] Chest Pain [] Clotting Disorder   [] SOB at rest [] Leg Swelling   [] SOB with exertion [] DVT/PE      [x] Nausea    [] Vomiting [] Stroke/TIA   [] Abdominal Pain [] Focal weakness   [] Melena [] Slurred Speech   [] Hematochezia [] Vision Changes   [] Hematuria    [] Dysuria [] Hx of Central Catheters   [] Wears Glasses/Contacts []  the procedure.      Discussed with Dr. Martinez Hoff DO  11/12/24  3:12 AM EST    Patient seen and examined. Discussed bilateral LE emoblectomy, fasciotomies. Discussed risk of limb loss even with reperfusion. /he voiced udnerstanding and agreed to proceed.

## 2024-11-12 NOTE — H&P
Hospital Medicine History & Physical      PCP: Franky Montana DO    Date of Admission: 11/12/2024    Date of Service: Pt seen/examined on 11/12/202 and Admitted to Inpatient with expected LOS greater than two midnights due to medical therapy.      Chief Complaint:  leg pain      History Of Present Illness:    Presented to ER due to concern for leg pain and shortness of breath.  Mention he has been noticing bluish discoloration of both lower extremity worsened over the last 3 days.  He was found to have lactic acidosis on presentation of 3.9.  Leukocytosis of 16,500.  Blood pressure on presentation of 119/75.  CT abdomen with aorta with bilateral runoff with contrast was obtained and showed severe atherosclerotic disease with common femoral arterial occlusions with some reconstitution in the superficial femoral and popliteal location but without significant runoff in the lower extremities, bilateral large renal infarcts.  He was started on heparin by weight.  Patient was seen by vascular surgery and taken straight to the OR.      Past Medical History:          Diagnosis Date    Congenital heart disease     Diabetes mellitus (HCC)     H/O cardiovascular stress test 01/15/2018    lexiscan    Hyperlipidemia     Type 2 diabetes mellitus without complication (HCC)        Past Surgical History:          Procedure Laterality Date    CARDIAC CATHETERIZATION      2 stents    CARDIAC SURGERY      minimally invasive    CLAVICLE SURGERY Left     COLONOSCOPY N/A 01/19/2024    COLONOSCOPY WITH BIOPSY performed by Arnold Ross MD at [facility] ENDOSCOPY    PTCA         Medications Prior to Admission:      Prior to Admission medications    Medication Sig Start Date End Date Taking? Authorizing Provider   indomethacin (INDOCIN) 50 MG capsule Take 1 capsule by mouth at bedtime for 10 days Take with food. 10/10/24 10/20/24  Franky Montana DO   Continuous Glucose Sensor (FREESTYLE CARA 2 SENSOR) MISC Change every 14 days 10/1/24

## 2024-11-12 NOTE — ANESTHESIA POSTPROCEDURE EVALUATION
Department of Anesthesiology  Postprocedure Note    Patient: Himanshu Bravo  MRN: [id number]  YOB: 1960  Date of evaluation: 11/12/2024    Procedure Summary       Date: 11/12/24 Room / Location: [address]    Anesthesia Start: 1640 Anesthesia Stop:     Procedure: RIGHT LEG AMPUTATION ABOVE KNEE (Right) Diagnosis:       Sensation of cold in lower extremity      (Sensation of cold in lower extremity [R20.9])    Surgeons: Jasmyne Henriquez MD Responsible Provider: Dani Carlson MD    Anesthesia Type: General ASA Status: 3            Anesthesia Type: General    Angelica Phase I:      Angelica Phase II:      Anesthesia Post Evaluation    Patient location during evaluation: PACU  Patient participation: complete - patient participated  Level of consciousness: awake and alert  Pain score: 2  Airway patency: patent  Nausea & Vomiting: no nausea and no vomiting  Cardiovascular status: blood pressure returned to baseline  Respiratory status: acceptable  Hydration status: euvolemic  Pain management: adequate    No notable events documented.

## 2024-11-12 NOTE — ED PROVIDER NOTES
given patient's critical condition to be transferred    Extensively reviewed lab and imaging findings with patient/family members/available representative parties and all questions answered at this time to the fullest extent possible.  Patient has remained hemodynamically stable and clinically has had leg pain but has not deteriorated.  Specialist consultations with vascular surgery as well as emergency physician and patient scheduled for emergent transfer to [facility].  Patient has been closely monitored with continuous pulse oximetry and telemetry.  He is transferred by ground transportation to [facility] emergency department.    Differential diagnosis includes but is not limited to: Aortic dissection, aortic occlusion, aortic aneurysmal rupture, acute arterial occlusion, DVT    Please refer to the ED Course as available for additional MDM.  ED Course as of 11/12/24 0213   Mon Nov 11, 2024   2335 EKG: Normal sinus rhythm rhythm at 97 bpm with left axis deviation and QTc prolonged at 518.  No significant ST changes or elevation at this time.  No prior to compare to.  EKG interpreted by myself [RW]   2337 Mild abdominal pain and significant pain in left lower extremity.  Patient states the entire extremity is having paresthesias.  He is able to move the entire leg but is having difficulty wiggling his toes.  Bedside Doppler pulses attempted to be obtained and difficulty obtaining them in bilateral DP and PT regions. [RW]   Tue Nov 12, 2024   0111 Dr. Henriquez with vascular surgery recommends ER to ER transfer for acute b/l fem artery occlusions [RW]   0130 Dr. Mendoza accepts pt for transfer to [facility] [RW]      ED Course User Index  [RW] Magnus Mccormick,         Social Determinants affecting Dx or Tx: Patient has poor medical compliance, fluency, and has no established family physician for follow-up with.    Records Reviewed: EKG from 1/16/2018 reviewed in

## 2024-11-12 NOTE — ANESTHESIA PRE PROCEDURE
--    POCCL 106  --   --    POCBUN 21  --   --    POCHCT 45  --   --     < > = values in this interval not displayed.       Coags:   Lab Results   Component Value Date/Time    PROTIME 12.5 01/08/2018 12:40 PM    INR 1.1 01/08/2018 12:40 PM    APTT 39.7 11/12/2024 10:19 AM    APTT 25.1 01/08/2018 12:40 PM       HCG (If Applicable): No results found for: \"PREGTESTUR\", \"PREGSERUM\", \"HCG\", \"HCGQUANT\"     ABGs: No results found for: \"PHART\", \"PO2ART\", \"NDQ2INQ\", \"DZY1DEM\", \"BEART\", \"V2XEXUIS\"     Type & Screen (If Applicable):  Lab Results   Component Value Date    ABORH A POSITIVE 11/12/2024    LABANTI NEGATIVE 11/12/2024       Drug/Infectious Status (If Applicable):  No results found for: \"HIV\", \"HEPCAB\"    COVID-19 Screening (If Applicable):   Lab Results   Component Value Date/Time    COVID19 Not Detected 11/12/2024 12:13 AM           Anesthesia Evaluation    Airway: Mallampati: III  TM distance: <3 FB   Neck ROM: full  Mouth opening: > = 3 FB  C-spine cleared Dental:    (+) poor dentition  Comment: Pt denies any loose teeth    Pulmonary:   (+) pneumonia (treated for pneumonia x 2 weeks ago):  COPD:          current smoker (1 ppd)                           Cardiovascular:  Exercise tolerance: good (>4 METS)  (+) hypertension:, CABG/stent (stents x2 years ago):, hyperlipidemia      ECG reviewed  Rhythm: regular  Rate: normal  Echocardiogram reviewed               ROS comment: ECHO:Summary   Left ventricular size is grossly normal.   Normal left ventricular wall thickness.   Ejection fraction is measured at 69%.   No evidence of left ventricular mass or thrombus noted.   No regional wall motion abnormalities seen.   Normal sized left atrium.   Interatrial septum appears intact.   Physiologic and/or trace mitral regurgitation is present.   No evidence of mitral valve stenosis.   Physiologic and/or trace tricuspid regurgitation.   Regular rhythm.       Neuro/Psych:               GI/Hepatic/Renal:   (+) liver disease:,

## 2024-11-12 NOTE — OP NOTE
Operative Note      Patient: Himanshu Bravo  YOB: 1960  MRN: [id number]    Date of Procedure: 11/12/2024    Preop: Rigth lower extremity ischemia, necrotic muscle, rhabdomyolysis    Post-Op Diagnosis: Same       Procedure(s):  RIGHT LEG AMPUTATION ABOVE KNEE    Surgeon(s):  Jasmyne Henriquez MD Kakkasseril, Pratheek S, MD    Assistant:   * No surgical staff found *    Anesthesia: General    Estimated Blood Loss (mL): less than 100     Complications: None    Specimens:   ID Type Source Tests Collected by Time Destination   A : right leg  above the knee amputation Tissue Tissue SURGICAL PATHOLOGY Jasmyne Henriquez MD 11/12/2024 1731        Implants:  * No implants in log *      Drains:   Urinary Catheter 11/12/24 2 Way;Mckeon-Temperature (Active)   Catheter Indications Need for fluid volume management of the critically ill patient in a critical care setting 11/12/24 1600   Site Assessment No urethral drainage 11/12/24 1600   Urine Color Other (Comment);Tea 11/12/24 1600   Urine Appearance Clear 11/12/24 1600   Collection Container Standard 11/12/24 1600   Securement Method Securing device (Describe) 11/12/24 1600   Catheter Care  Perineal wipes 11/12/24 0800   Catheter Best Practices  Drainage tube clipped to bed;Catheter secured to thigh;Tamper seal intact;Bag below bladder;Bag not on floor;Lack of dependent loop in tubing;Drainage bag less than half full 11/12/24 1600   Status Draining;Patent 11/12/24 1600   Output (mL) 100 mL 11/12/24 1600       Findings:  Infection Present At Time Of Surgery (PATOS) (choose all levels that have infection present):  No infection present  Other Findings:     Detailed Description of Procedure:     The patient was identified and the procedure was confirmed. The right leg was prepped and draped in the usual sterile fashion.  A fishmouth-type incision was made in the mid thigh.  Dissection continued through the subcutaneous tissue and the muscle fascia.  Dissection

## 2024-11-12 NOTE — FLOWSHEET NOTE
1805 Returned from surgery via bed on monitor with CRNA x2.Drowsy,arousable by name.Vs established.Moving BUE,slightly wiggles toes on left.Dressing dry and intact to right AKA.Instructed on bedrest,call light within reach.

## 2024-11-12 NOTE — PROGRESS NOTES
Spiritual Health History and Assessment/Progress Note  [facility] Kathie Roth    (P) Initial Encounter, Spiritual/Emotional Needs,  ,  ,      Name: Himanshu Bravo MRN: [id number]    Age: 64 y.o.     Sex: male   Language: English   Mu-ism: Hindu   Femoral artery occlusion (HCC)     Date: 11/12/2024                           Spiritual Assessment began in [facility]        Referral/Consult From: (P) Rounding   Encounter Overview/Reason: (P) Initial Encounter, Spiritual/Emotional Needs  Service Provided For: (P) Patient and family together    Jenny, Belief, Meaning:   Patient identifies as spiritual  Family/Friends identify as spiritual      Importance and Influence:  Patient has spiritual/personal beliefs that influence decisions regarding their health  Family/Friends have spiritual/personal beliefs that influence decisions regarding the patient's health    Community:  Patient is connected with a spiritual community  Family/Friends are connected with a spiritual community:    Assessment and Plan of Care:     Patient Interventions include: Affirmed coping skills/support systems  Family/Friends Interventions include: Affirmed coping skills/support systems    Patient Plan of Care: No spiritual needs identified for follow-up  Family/Friends Plan of Care: No spiritual needs identified for follow-up    Electronically signed by Chaplain ETTA on 11/12/2024 at 2:30 PM

## 2024-11-12 NOTE — PROGRESS NOTES
4 Eyes Skin Assessment     NAME:  Himanshu Bravo  YOB: 1960  MEDICAL RECORD NUMBER:  [id number]    The patient is being assessed for  Admission    I agree that at least one RN has performed a thorough Head to Toe Skin Assessment on the patient. ALL assessment sites listed below have been assessed.      Areas assessed by both nurses:    Head, Face, Ears, Shoulders, Back, Chest, Arms, Elbows, Hands, Sacrum. Buttock, Coccyx, Ischium, Legs. Feet and Heels, Under Medical Devices , and Other          Does the Patient have a Wound? No noted wound(s)       Aramis Prevention initiated by RN: No  Wound Care Orders initiated by RN: No    Pressure Injury (Stage 3,4, Unstageable, DTI, NWPT, and Complex wounds) if present, place Wound referral order by RN under : No    New Ostomies, if present place, Ostomy referral order under : No     Nurse 1 eSignature: Electronically signed by Tracie Block RN on 11/12/24 at 12:24 PM EST    **SHARE this note so that the co-signing nurse can place an eSignature**    Nurse 2 eSignature: Electronically signed by Kaye Wright RN on 11/12/24 at 12:31 PM EST

## 2024-11-12 NOTE — PROGRESS NOTES
S/p bilateral embolectomies with good back bleeding but minimal tibial flow. Fasciotomies revealed non viable muscle. Discussed current situation with patient and his brother. At this point he will require an above knee amputation on the right but the question is timing. Pain in the lower leg is relatively minimal but has an elevated CK and potassium. On the left the posterior compartments are nonviable but anterior compartment was viable with an associated AT signal. Will continue to monitor closely.

## 2024-11-13 ENCOUNTER — APPOINTMENT (OUTPATIENT)
Dept: GENERAL RADIOLOGY | Age: 64
DRG: 239 | End: 2024-11-13
Payer: COMMERCIAL

## 2024-11-13 PROBLEM — M62.82 NON-TRAUMATIC RHABDOMYOLYSIS: Status: ACTIVE | Noted: 2024-11-13

## 2024-11-13 PROBLEM — I99.8 ACUTE LOWER LIMB ISCHEMIA: Status: ACTIVE | Noted: 2024-11-13

## 2024-11-13 PROBLEM — N17.9 AKI (ACUTE KIDNEY INJURY) (HCC): Status: ACTIVE | Noted: 2024-11-13

## 2024-11-13 LAB
ALBUMIN SERPL-MCNC: 3.2 G/DL (ref 3.5–5.2)
ALP SERPL-CCNC: 65 U/L (ref 40–129)
ALT SERPL-CCNC: 142 U/L (ref 0–40)
ANION GAP SERPL CALCULATED.3IONS-SCNC: 11 MMOL/L (ref 7–16)
ANION GAP SERPL CALCULATED.3IONS-SCNC: 12 MMOL/L (ref 7–16)
ANION GAP SERPL CALCULATED.3IONS-SCNC: 14 MMOL/L (ref 7–16)
ANION GAP SERPL CALCULATED.3IONS-SCNC: 15 MMOL/L (ref 7–16)
AST SERPL-CCNC: 970 U/L (ref 0–39)
BASOPHILS # BLD: 0.01 K/UL (ref 0–0.2)
BASOPHILS # BLD: 0.02 K/UL (ref 0–0.2)
BASOPHILS NFR BLD: 0 % (ref 0–2)
BASOPHILS NFR BLD: 0 % (ref 0–2)
BILIRUB SERPL-MCNC: 0.4 MG/DL (ref 0–1.2)
BUN SERPL-MCNC: 35 MG/DL (ref 6–23)
BUN SERPL-MCNC: 37 MG/DL (ref 6–23)
BUN SERPL-MCNC: 39 MG/DL (ref 6–23)
BUN SERPL-MCNC: 42 MG/DL (ref 6–23)
BUN SERPL-MCNC: 46 MG/DL (ref 6–23)
BUN SERPL-MCNC: 47 MG/DL (ref 6–23)
CA-I BLD-SCNC: 0.96 MMOL/L (ref 1.15–1.33)
CA-I BLD-SCNC: 0.97 MMOL/L (ref 1.15–1.33)
CALCIUM SERPL-MCNC: 6.7 MG/DL (ref 8.6–10.2)
CALCIUM SERPL-MCNC: 7 MG/DL (ref 8.6–10.2)
CALCIUM SERPL-MCNC: 7 MG/DL (ref 8.6–10.2)
CALCIUM SERPL-MCNC: 7.1 MG/DL (ref 8.6–10.2)
CALCIUM SERPL-MCNC: 7.3 MG/DL (ref 8.6–10.2)
CALCIUM SERPL-MCNC: 7.4 MG/DL (ref 8.6–10.2)
CHLORIDE SERPL-SCNC: 94 MMOL/L (ref 98–107)
CHLORIDE SERPL-SCNC: 95 MMOL/L (ref 98–107)
CHLORIDE SERPL-SCNC: 95 MMOL/L (ref 98–107)
CHLORIDE SERPL-SCNC: 97 MMOL/L (ref 98–107)
CHLORIDE SERPL-SCNC: 97 MMOL/L (ref 98–107)
CHLORIDE SERPL-SCNC: 98 MMOL/L (ref 98–107)
CK SERPL-CCNC: NORMAL U/L (ref 20–200)
CO2 SERPL-SCNC: 25 MMOL/L (ref 22–29)
CO2 SERPL-SCNC: 27 MMOL/L (ref 22–29)
CREAT SERPL-MCNC: 2.5 MG/DL (ref 0.7–1.2)
CREAT SERPL-MCNC: 3 MG/DL (ref 0.7–1.2)
CREAT SERPL-MCNC: 3.2 MG/DL (ref 0.7–1.2)
CREAT SERPL-MCNC: 3.6 MG/DL (ref 0.7–1.2)
CREAT SERPL-MCNC: 4.3 MG/DL (ref 0.7–1.2)
CREAT SERPL-MCNC: 4.5 MG/DL (ref 0.7–1.2)
EOSINOPHIL # BLD: 0 K/UL (ref 0.05–0.5)
EOSINOPHIL # BLD: 0.01 K/UL (ref 0.05–0.5)
EOSINOPHILS RELATIVE PERCENT: 0 % (ref 0–6)
EOSINOPHILS RELATIVE PERCENT: 0 % (ref 0–6)
ERYTHROCYTE [DISTWIDTH] IN BLOOD BY AUTOMATED COUNT: 13.9 % (ref 11.5–15)
ERYTHROCYTE [DISTWIDTH] IN BLOOD BY AUTOMATED COUNT: 14 % (ref 11.5–15)
GFR, ESTIMATED: 14 ML/MIN/1.73M2
GFR, ESTIMATED: 15 ML/MIN/1.73M2
GFR, ESTIMATED: 18 ML/MIN/1.73M2
GFR, ESTIMATED: 21 ML/MIN/1.73M2
GFR, ESTIMATED: 23 ML/MIN/1.73M2
GFR, ESTIMATED: 28 ML/MIN/1.73M2
GLUCOSE BLD-MCNC: 144 MG/DL (ref 74–99)
GLUCOSE BLD-MCNC: 148 MG/DL (ref 74–99)
GLUCOSE BLD-MCNC: 168 MG/DL (ref 74–99)
GLUCOSE BLD-MCNC: 199 MG/DL (ref 74–99)
GLUCOSE BLD-MCNC: 200 MG/DL (ref 74–99)
GLUCOSE BLD-MCNC: 202 MG/DL (ref 74–99)
GLUCOSE BLD-MCNC: 260 MG/DL (ref 74–99)
GLUCOSE BLD-MCNC: 282 MG/DL (ref 74–99)
GLUCOSE SERPL-MCNC: 150 MG/DL (ref 74–99)
GLUCOSE SERPL-MCNC: 168 MG/DL (ref 74–99)
GLUCOSE SERPL-MCNC: 192 MG/DL (ref 74–99)
GLUCOSE SERPL-MCNC: 206 MG/DL (ref 74–99)
GLUCOSE SERPL-MCNC: 243 MG/DL (ref 74–99)
GLUCOSE SERPL-MCNC: 287 MG/DL (ref 74–99)
HCT VFR BLD AUTO: 32.9 % (ref 37–54)
HCT VFR BLD AUTO: 35.9 % (ref 37–54)
HGB BLD-MCNC: 10.7 G/DL (ref 12.5–16.5)
HGB BLD-MCNC: 11.9 G/DL (ref 12.5–16.5)
IMM GRANULOCYTES # BLD AUTO: 0.08 K/UL (ref 0–0.58)
IMM GRANULOCYTES # BLD AUTO: 0.11 K/UL (ref 0–0.58)
IMM GRANULOCYTES NFR BLD: 1 % (ref 0–5)
IMM GRANULOCYTES NFR BLD: 1 % (ref 0–5)
LYMPHOCYTES NFR BLD: 0.68 K/UL (ref 1.5–4)
LYMPHOCYTES NFR BLD: 0.94 K/UL (ref 1.5–4)
LYMPHOCYTES RELATIVE PERCENT: 5 % (ref 20–42)
LYMPHOCYTES RELATIVE PERCENT: 7 % (ref 20–42)
MAGNESIUM SERPL-MCNC: 2.2 MG/DL (ref 1.6–2.6)
MAGNESIUM SERPL-MCNC: 2.4 MG/DL (ref 1.6–2.6)
MCH RBC QN AUTO: 31.1 PG (ref 26–35)
MCH RBC QN AUTO: 31.4 PG (ref 26–35)
MCHC RBC AUTO-ENTMCNC: 32.5 G/DL (ref 32–34.5)
MCHC RBC AUTO-ENTMCNC: 33.1 G/DL (ref 32–34.5)
MCV RBC AUTO: 94.7 FL (ref 80–99.9)
MCV RBC AUTO: 95.6 FL (ref 80–99.9)
MONOCYTES NFR BLD: 1.14 K/UL (ref 0.1–0.95)
MONOCYTES NFR BLD: 1.21 K/UL (ref 0.1–0.95)
MONOCYTES NFR BLD: 8 % (ref 2–12)
MONOCYTES NFR BLD: 9 % (ref 2–12)
NEUTROPHILS NFR BLD: 83 % (ref 43–80)
NEUTROPHILS NFR BLD: 87 % (ref 43–80)
NEUTS SEG NFR BLD: 10.87 K/UL (ref 1.8–7.3)
NEUTS SEG NFR BLD: 12.81 K/UL (ref 1.8–7.3)
PARTIAL THROMBOPLASTIN TIME: 104.9 SEC (ref 24.5–35.1)
PARTIAL THROMBOPLASTIN TIME: 58 SEC (ref 24.5–35.1)
PARTIAL THROMBOPLASTIN TIME: 72 SEC (ref 24.5–35.1)
PARTIAL THROMBOPLASTIN TIME: 93.4 SEC (ref 24.5–35.1)
PHOSPHATE SERPL-MCNC: 5.6 MG/DL (ref 2.5–4.5)
PHOSPHATE SERPL-MCNC: 5.8 MG/DL (ref 2.5–4.5)
PHOSPHATE SERPL-MCNC: 6.8 MG/DL (ref 2.5–4.5)
PLATELET # BLD AUTO: 182 K/UL (ref 130–450)
PLATELET # BLD AUTO: 188 K/UL (ref 130–450)
PMV BLD AUTO: 10.1 FL (ref 7–12)
PMV BLD AUTO: 10.5 FL (ref 7–12)
POTASSIUM SERPL-SCNC: 4.2 MMOL/L (ref 3.5–5)
POTASSIUM SERPL-SCNC: 4.3 MMOL/L (ref 3.5–5)
POTASSIUM SERPL-SCNC: 4.3 MMOL/L (ref 3.5–5)
POTASSIUM SERPL-SCNC: 4.4 MMOL/L (ref 3.5–5)
POTASSIUM SERPL-SCNC: 4.8 MMOL/L (ref 3.5–5)
POTASSIUM SERPL-SCNC: 4.8 MMOL/L (ref 3.5–5)
PROT SERPL-MCNC: 5.7 G/DL (ref 6.4–8.3)
RBC # BLD AUTO: 3.44 M/UL (ref 3.8–5.8)
RBC # BLD AUTO: 3.79 M/UL (ref 3.8–5.8)
SODIUM SERPL-SCNC: 133 MMOL/L (ref 132–146)
SODIUM SERPL-SCNC: 135 MMOL/L (ref 132–146)
SODIUM SERPL-SCNC: 135 MMOL/L (ref 132–146)
SODIUM SERPL-SCNC: 136 MMOL/L (ref 132–146)
SODIUM SERPL-SCNC: 136 MMOL/L (ref 132–146)
SODIUM SERPL-SCNC: 137 MMOL/L (ref 132–146)
WBC OTHER # BLD: 13.2 K/UL (ref 4.5–11.5)
WBC OTHER # BLD: 14.7 K/UL (ref 4.5–11.5)

## 2024-11-13 PROCEDURE — 82330 ASSAY OF CALCIUM: CPT

## 2024-11-13 PROCEDURE — 2580000003 HC RX 258: Performed by: STUDENT IN AN ORGANIZED HEALTH CARE EDUCATION/TRAINING PROGRAM

## 2024-11-13 PROCEDURE — 6370000000 HC RX 637 (ALT 250 FOR IP): Performed by: SURGERY

## 2024-11-13 PROCEDURE — 83735 ASSAY OF MAGNESIUM: CPT

## 2024-11-13 PROCEDURE — 84100 ASSAY OF PHOSPHORUS: CPT

## 2024-11-13 PROCEDURE — 05HM33Z INSERTION OF INFUSION DEVICE INTO RIGHT INTERNAL JUGULAR VEIN, PERCUTANEOUS APPROACH: ICD-10-PCS | Performed by: SURGERY

## 2024-11-13 PROCEDURE — 2000000000 HC ICU R&B

## 2024-11-13 PROCEDURE — 80053 COMPREHEN METABOLIC PANEL: CPT

## 2024-11-13 PROCEDURE — 71045 X-RAY EXAM CHEST 1 VIEW: CPT

## 2024-11-13 PROCEDURE — 86317 IMMUNOASSAY INFECTIOUS AGENT: CPT

## 2024-11-13 PROCEDURE — 80074 ACUTE HEPATITIS PANEL: CPT

## 2024-11-13 PROCEDURE — 99232 SBSQ HOSP IP/OBS MODERATE 35: CPT

## 2024-11-13 PROCEDURE — 85730 THROMBOPLASTIN TIME PARTIAL: CPT

## 2024-11-13 PROCEDURE — 85025 COMPLETE CBC W/AUTO DIFF WBC: CPT

## 2024-11-13 PROCEDURE — 6360000002 HC RX W HCPCS: Performed by: SURGERY

## 2024-11-13 PROCEDURE — 80048 BASIC METABOLIC PNL TOTAL CA: CPT

## 2024-11-13 PROCEDURE — 2500000003 HC RX 250 WO HCPCS: Performed by: INTERNAL MEDICINE

## 2024-11-13 PROCEDURE — 37799 UNLISTED PX VASCULAR SURGERY: CPT

## 2024-11-13 PROCEDURE — 82550 ASSAY OF CK (CPK): CPT

## 2024-11-13 PROCEDURE — 36556 INSERT NON-TUNNEL CV CATH: CPT

## 2024-11-13 PROCEDURE — 82962 GLUCOSE BLOOD TEST: CPT

## 2024-11-13 PROCEDURE — 2700000000 HC OXYGEN THERAPY PER DAY

## 2024-11-13 PROCEDURE — 2580000003 HC RX 258: Performed by: INTERNAL MEDICINE

## 2024-11-13 PROCEDURE — 6360000002 HC RX W HCPCS: Performed by: STUDENT IN AN ORGANIZED HEALTH CARE EDUCATION/TRAINING PROGRAM

## 2024-11-13 PROCEDURE — 2580000003 HC RX 258: Performed by: SURGERY

## 2024-11-13 RX ADMIN — HYDROMORPHONE HYDROCHLORIDE 0.5 MG: 1 INJECTION, SOLUTION INTRAMUSCULAR; INTRAVENOUS; SUBCUTANEOUS at 12:01

## 2024-11-13 RX ADMIN — ACETAMINOPHEN 1000 MG: 500 TABLET ORAL at 05:47

## 2024-11-13 RX ADMIN — INSULIN LISPRO 4 UNITS: 100 INJECTION, SOLUTION INTRAVENOUS; SUBCUTANEOUS at 08:07

## 2024-11-13 RX ADMIN — SODIUM BICARBONATE: 84 INJECTION, SOLUTION INTRAVENOUS at 17:54

## 2024-11-13 RX ADMIN — ASPIRIN 81 MG CHEWABLE TABLET 81 MG: 81 TABLET CHEWABLE at 08:25

## 2024-11-13 RX ADMIN — SODIUM CHLORIDE, PRESERVATIVE FREE 10 ML: 5 INJECTION INTRAVENOUS at 08:08

## 2024-11-13 RX ADMIN — SODIUM BICARBONATE: 84 INJECTION, SOLUTION INTRAVENOUS at 00:40

## 2024-11-13 RX ADMIN — OXYCODONE HYDROCHLORIDE 10 MG: 10 TABLET ORAL at 20:28

## 2024-11-13 RX ADMIN — HYDROMORPHONE HYDROCHLORIDE 0.5 MG: 1 INJECTION, SOLUTION INTRAMUSCULAR; INTRAVENOUS; SUBCUTANEOUS at 02:29

## 2024-11-13 RX ADMIN — HYDROMORPHONE HYDROCHLORIDE 0.5 MG: 1 INJECTION, SOLUTION INTRAMUSCULAR; INTRAVENOUS; SUBCUTANEOUS at 05:34

## 2024-11-13 RX ADMIN — SODIUM BICARBONATE: 84 INJECTION, SOLUTION INTRAVENOUS at 11:59

## 2024-11-13 RX ADMIN — INSULIN LISPRO 8 UNITS: 100 INJECTION, SOLUTION INTRAVENOUS; SUBCUTANEOUS at 04:41

## 2024-11-13 RX ADMIN — INSULIN LISPRO 8 UNITS: 100 INJECTION, SOLUTION INTRAVENOUS; SUBCUTANEOUS at 01:05

## 2024-11-13 RX ADMIN — OXYCODONE HYDROCHLORIDE 10 MG: 10 TABLET ORAL at 08:15

## 2024-11-13 RX ADMIN — ACETAMINOPHEN 1000 MG: 500 TABLET ORAL at 13:34

## 2024-11-13 RX ADMIN — INSULIN LISPRO 4 UNITS: 100 INJECTION, SOLUTION INTRAVENOUS; SUBCUTANEOUS at 20:36

## 2024-11-13 RX ADMIN — HEPARIN SODIUM 14 UNITS/KG/HR: 10000 INJECTION, SOLUTION INTRAVENOUS at 14:10

## 2024-11-13 RX ADMIN — OXYCODONE HYDROCHLORIDE 10 MG: 10 TABLET ORAL at 15:45

## 2024-11-13 RX ADMIN — SODIUM CHLORIDE, PRESERVATIVE FREE 10 ML: 5 INJECTION INTRAVENOUS at 20:29

## 2024-11-13 RX ADMIN — ACETAMINOPHEN 1000 MG: 500 TABLET ORAL at 20:29

## 2024-11-13 RX ADMIN — SODIUM BICARBONATE: 84 INJECTION, SOLUTION INTRAVENOUS at 06:23

## 2024-11-13 RX ADMIN — CALCIUM GLUCONATE 3000 MG: 98 INJECTION, SOLUTION INTRAVENOUS at 08:08

## 2024-11-13 ASSESSMENT — PAIN DESCRIPTION - ORIENTATION
ORIENTATION: RIGHT;LEFT
ORIENTATION: LEFT;RIGHT
ORIENTATION: RIGHT;LEFT

## 2024-11-13 ASSESSMENT — PAIN SCALES - GENERAL
PAINLEVEL_OUTOF10: 6
PAINLEVEL_OUTOF10: 7
PAINLEVEL_OUTOF10: 7
PAINLEVEL_OUTOF10: 3
PAINLEVEL_OUTOF10: 9
PAINLEVEL_OUTOF10: 4
PAINLEVEL_OUTOF10: 2
PAINLEVEL_OUTOF10: 10
PAINLEVEL_OUTOF10: 0
PAINLEVEL_OUTOF10: 4
PAINLEVEL_OUTOF10: 10
PAINLEVEL_OUTOF10: 0
PAINLEVEL_OUTOF10: 4
PAINLEVEL_OUTOF10: 0

## 2024-11-13 ASSESSMENT — PAIN DESCRIPTION - DESCRIPTORS
DESCRIPTORS: ACHING;HEAVINESS;SORE
DESCRIPTORS: SORE
DESCRIPTORS: BURNING;THROBBING;CRAMPING
DESCRIPTORS: BURNING;CRAMPING;THROBBING
DESCRIPTORS: SORE

## 2024-11-13 ASSESSMENT — PAIN DESCRIPTION - LOCATION
LOCATION: LEG

## 2024-11-13 ASSESSMENT — PAIN DESCRIPTION - PAIN TYPE
TYPE: SURGICAL PAIN
TYPE: ACUTE PAIN;SURGICAL PAIN

## 2024-11-13 ASSESSMENT — PAIN - FUNCTIONAL ASSESSMENT
PAIN_FUNCTIONAL_ASSESSMENT: PREVENTS OR INTERFERES WITH ALL ACTIVE AND SOME PASSIVE ACTIVITIES
PAIN_FUNCTIONAL_ASSESSMENT: PREVENTS OR INTERFERES SOME ACTIVE ACTIVITIES AND ADLS
PAIN_FUNCTIONAL_ASSESSMENT: ACTIVITIES ARE NOT PREVENTED
PAIN_FUNCTIONAL_ASSESSMENT: PREVENTS OR INTERFERES SOME ACTIVE ACTIVITIES AND ADLS
PAIN_FUNCTIONAL_ASSESSMENT: PREVENTS OR INTERFERES SOME ACTIVE ACTIVITIES AND ADLS
PAIN_FUNCTIONAL_ASSESSMENT: ACTIVITIES ARE NOT PREVENTED

## 2024-11-13 ASSESSMENT — PAIN DESCRIPTION - ONSET: ONSET: ON-GOING

## 2024-11-13 ASSESSMENT — PAIN DESCRIPTION - FREQUENCY: FREQUENCY: CONTINUOUS

## 2024-11-13 NOTE — PROGRESS NOTES
Vascular Surgery Progress Note    Pt is being seen in f/u today regarding bilateral femoral embolectomies     Subjective  Pt s/e.  Patient in some pain but states the pain medication is helping. Asking for something to eat. Discussed with him that he is still fairly critical at this point. CK continues to trend upward.    Current Medications:    heparin (PORCINE) Infusion 16 Units/kg/hr (11/13/24 0115)    sodium chloride      dextrose      sodium bicarbonate 75 mEq in sodium chloride 0.45 % 1,000 mL infusion 200 mL/hr at 11/13/24 0623      heparin (porcine), heparin (porcine), sodium chloride flush, sodium chloride, potassium chloride **OR** potassium alternative oral replacement **OR** potassium chloride, magnesium sulfate, ondansetron **OR** ondansetron, polyethylene glycol, glucose, dextrose bolus **OR** dextrose bolus, glucagon (rDNA), dextrose, oxyCODONE **OR** oxyCODONE, HYDROmorphone    sodium chloride flush  5-40 mL IntraVENous 2 times per day    insulin glargine  12 Units SubCUTAneous QAM    aspirin  81 mg Oral Daily    acetaminophen  1,000 mg Oral 3 times per day    insulin lispro  0-16 Units SubCUTAneous Q4H        PHYSICAL EXAM:    /60   Pulse 89   Temp 98.3 °F (36.8 °C) (Oral)   Resp 18   Ht 1.727 m (5' 8\")   Wt 108.9 kg (240 lb)   SpO2 96%   BMI 36.49 kg/m²     Intake/Output Summary (Last 24 hours) at 11/13/2024 0645  Last data filed at 11/13/2024 0600  Gross per 24 hour   Intake 3096.69 ml   Output 1480 ml   Net 1616.69 ml          Gen: awake, alert and oriented x3, no apparent distress  CVS: RRR  Resp: No increased work of breathing, on 3L NC  Abd: Soft, non-tender, non-distended  R LE: AKA wrapped in ace wrap, no saturation or strikethrough, appropriately tender to gentle palpation.   L LE: Wrapped in abd, kerlix. Strong biphasic DP/PT, warm.     LABS:    Lab Results   Component Value Date    WBC 14.7 (H) 11/13/2024    HGB 11.9 (L) 11/13/2024    HCT 35.9 (L) 11/13/2024

## 2024-11-13 NOTE — PROGRESS NOTES
Patient received the Sacrament of the Anointing of the Sick by Father Danny Galicia (11/12/2024).    If additional support is requested or needed please reach out to Spiritual Health ([phone]).    Chap. Russ Iverson MDIV, BCC

## 2024-11-13 NOTE — PROGRESS NOTES
Consult received. Will discuss with Dr. Quiles to see if patient is appropriate for ARU. Therapies pending.

## 2024-11-13 NOTE — PROGRESS NOTES
CVICU Progress Note    Name: Himanshu Bravo  MRN: [id number]    CC: Postoperative Critical Care Management      Indication for Surgery/Procedure: Bilateral Common Femoral Artery Occlusion      Important/Relevant PMH/PSH: Tobacco abuse, DMII, HTN, HLD, CAD s/p cardiac stents     11/11/2024 CTA abdominal aorta with bilateral runoff with contrast showed 1. Severe atherosclerotic disease with common femoral arterial occlusions,   some reconstitution in the superficial femoral and popliteal locations but   without significant runoff of the lower extremities.   2. 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      Procedure/Surgeries: 11/12/2024 Bilateral femoral endarterectomies, bilateral fasciotomies     11/12/2024 Right leg amputation above knee       Intake/Output Summary (Last 24 hours) at 11/13/2024 1028  Last data filed at 11/13/2024 1000  Gross per 24 hour   Intake 4654.04 ml   Output 1015 ml   Net 3639.04 ml       Recent Labs     11/12/24  0752 11/12/24  1833 11/13/24  0527   WBC 16.0* 14.1* 14.7*   HGB 14.6 13.1 11.9*   HCT 44.6 40.2 35.9*    215 188      Lab Results   Component Value Date/Time     11/13/2024 05:27 AM    K 4.8 11/13/2024 05:27 AM    K 3.9 01/09/2018 03:15 AM    CL 98 11/13/2024 05:27 AM    CO2 27 11/13/2024 05:27 AM    BUN 37 11/13/2024 05:27 AM    CREATININE 3.0 11/13/2024 05:27 AM    GLUCOSE 243 11/13/2024 05:27 AM    GLUCOSE 216 06/14/2023 12:55 PM    CALCIUM 7.4 11/13/2024 05:27 AM    MG 2.4 11/13/2024 05:27 AM    PHOS 5.6 11/13/2024 05:27 AM         Physical Exam:    /60   Pulse 90   Temp 98.5 °F (36.9 °C) (Oral)   Resp 17   Ht 1.727 m (5' 8\")   Wt 108.9 kg (240 lb)   SpO2 95%   BMI 36.49 kg/m²       General: Awake, alert. On heparin gtt, bicarb infusion   Eyes: PERRL, anicteric   Pulmonary: Diminished bibasilar. No wheezes, no accessory muscle use noted   Cardiovascular:  RRR, no heaves or thrills on palpation  Tele: SR 80s  Abdomen: Soft, nontender, + BS

## 2024-11-13 NOTE — CARE COORDINATION
11/13 Care Coordination: Pt is admitted to the hospital for treatment of bilateral lower extremity ischemia.  The patient presented to the [facility] emergency department earlier this night for evaluation of bilateral lower extremity weakness, pain, and numbness. Was Transferred here for Vascular Surgery. Bilateral femoral endarterectomies, bilateral fasciotomies admit to [facility] post op. Went back for Rt AKA. With Current status unclear on discharge needs. On IV Heparin and Bicarb gtt's. Renal also consult. CM did talk with Pt. PTA lives alone. His wife passed away 1yr ago. Discussed Rehab options. Pt wants to stay here at our ARU if able. His sister works for [employer]. Will obtain order for PM&R     CM/SW will continue to follow for discharge planning.   Swapnil WYMAN,RN--BC  [phone]

## 2024-11-13 NOTE — PLAN OF CARE
Problem: Chronic Conditions and Co-morbidities  Goal: Patient's chronic conditions and co-morbidity symptoms are monitored and maintained or improved  11/12/2024 2256 by Margot Winslow RN  Outcome: Progressing  Flowsheets (Taken 11/12/2024 2000)  Care Plan - Patient's Chronic Conditions and Co-Morbidity Symptoms are Monitored and Maintained or Improved: Monitor and assess patient's chronic conditions and comorbid symptoms for stability, deterioration, or improvement  11/12/2024 1017 by Tracie Block RN  Outcome: Progressing     Problem: Discharge Planning  Goal: Discharge to home or other facility with appropriate resources  11/12/2024 2256 by Margot Winslow RN  Outcome: Progressing  Flowsheets (Taken 11/12/2024 2000)  Discharge to home or other facility with appropriate resources: Identify barriers to discharge with patient and caregiver  11/12/2024 1017 by Tracie Block RN  Outcome: Progressing     Problem: Pain  Goal: Verbalizes/displays adequate comfort level or baseline comfort level  11/12/2024 2256 by Margot Winslow RN  Outcome: Progressing  11/12/2024 1017 by Tracie Block RN  Outcome: Progressing     Problem: Safety - Adult  Goal: Free from fall injury  11/12/2024 2256 by Margot Winslow RN  Outcome: Progressing  11/12/2024 1017 by Tracie Block RN  Outcome: Progressing     Problem: Skin/Tissue Integrity  Goal: Absence of new skin breakdown  Description: 1.  Monitor for areas of redness and/or skin breakdown  2.  Assess vascular access sites hourly  3.  Every 4-6 hours minimum:  Change oxygen saturation probe site  4.  Every 4-6 hours:  If on nasal continuous positive airway pressure, respiratory therapy assess nares and determine need for appliance change or resting period.  11/12/2024 2256 by Margot Winslow RN  Outcome: Progressing  11/12/2024 1017 by Tracie Block RN  Outcome: Progressing

## 2024-11-13 NOTE — CARE COORDINATION
Case Management Assessment  Initial Evaluation    Date/Time of Evaluation: 11/13/2024 11:24 AM  Assessment Completed by: Swapnil Camacho RN    If patient is discharged prior to next notation, then this note serves as note for discharge by case management.    Patient Name: Himanshu Clay                   YOB: 1960  Diagnosis: 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]  Atrial fibrillation, unspecified type (HCC) [I48.91]                   Date / Time: 11/12/2024  2:39 AM    Patient Admission Status: Inpatient   Readmission Risk (Low < 19, Mod (19-27), High > 27): Readmission Risk Score: 16.9    Current PCP: Franky Montana, DO  PCP verified by CM? Yes    Chart Reviewed: Yes      History Provided by: Patient  Patient Orientation: Alert and Oriented    Patient Cognition: Alert    Hospitalization in the last 30 days (Readmission):  No    If yes, Readmission Assessment in CM Navigator will be completed.    Advance Directives:      Code Status: Full Code   Patient's Primary Decision Maker is: Legal Next of Kin    Primary Decision Maker: Schwab,Lisa - Brother/Sister - [phone]    Secondary Decision Maker: jordan clay - Brother/Sister - [phone]    Discharge Planning:    Patient lives with: Alone Type of Home: House  Primary Care Giver: Self  Patient Support Systems include: Family Members, Children   Current Financial resources:    Current community resources:    Current services prior to admission: None            Current DME:              Type of Home Care services:  OT, PT, Safety Alert    ADLS  Prior functional level: Independent in ADLs/IADLs  Current functional level: Assistance with the following:, Mobility, Other (see comment) (New Rt AKA.)    PT AM-PAC:   /24  OT AM-PAC:   /24    Family can provide assistance at DC: Yes  Would you like Case Management to discuss the discharge plan with any other family members/significant others, and if so, who? No  Plans to Return to Present Housing: Unknown at

## 2024-11-13 NOTE — PROGRESS NOTES
[facility]  MD Bryce Milligan, MD Supriya Loaiza, CNP   Natalia Rey, CODY  Progress Note    11/13/2024    SUBJECTIVE:   11/13:  Seen in [facility].  Awake and alert.  S/P right above knee amputation yesterday. Complains of pain at right AKA site.  Oxygen on per NC.  Denies chest pain, palpitation or SOB.  Denies nausea, vomiting or abd pain.  Continues on heparin drip and bicarb IV.  He is oligoanuric.     PROBLEM LIST:    Principal Problem:    Femoral artery occlusion (HCC)  Active Problems:    Acute lower limb ischemia    ANTIONE (acute kidney injury) (HCC)    Non-traumatic rhabdomyolysis  Resolved Problems:    * No resolved hospital problems. *         DIET:    Diet NPO Exceptions are: Sips of Water with Meds     MEDS (scheduled):    sodium chloride flush  5-40 mL IntraVENous 2 times per day    insulin glargine  12 Units SubCUTAneous QAM    aspirin  81 mg Oral Daily    acetaminophen  1,000 mg Oral 3 times per day    insulin lispro  0-16 Units SubCUTAneous Q4H       MEDS (infusions):   heparin (PORCINE) Infusion 14 Units/kg/hr (11/13/24 0745)    sodium chloride      dextrose      sodium bicarbonate 75 mEq in sodium chloride 0.45 % 1,000 mL infusion 200 mL/hr at 11/13/24 1159       MEDS (prn):  heparin (porcine), heparin (porcine), sodium chloride flush, sodium chloride, potassium chloride **OR** potassium alternative oral replacement **OR** potassium chloride, magnesium sulfate, ondansetron **OR** ondansetron, polyethylene glycol, glucose, dextrose bolus **OR** dextrose bolus, glucagon (rDNA), dextrose, oxyCODONE **OR** oxyCODONE, HYDROmorphone    PHYSICAL EXAM:     Patient Vitals for the past 24 hrs:   Temp Temp src Pulse Resp SpO2   11/13/24 1201 -- -- 85 17 96 %   11/13/24 1200 98 °F (36.7 °C) Oral 84 14 97 %   11/13/24 1100 -- -- 86 20 97 %   11/13/24 1000 -- -- 90 17 94 %   11/13/24 0900 -- -- 92 18 95 %   11/13/24 0845 -- -- -- 17 --   11/13/24 0815 -- --  11.9*   HCT 44.6 40.2 35.9*   MCV 94.9 95.0 94.7    215 188     Recent Labs     11/12/24  0013 11/12/24  0415 11/12/24  0752 11/12/24  1019 11/13/24  0154 11/13/24  0527 11/13/24  0952     --  134   < > 133 137 136   K 4.3  --  7.2*   < > 4.8 4.8 4.2   CL 91*  --  96*   < > 95* 98 97*   CO2 20*  --  21*   < > 27 27 25   MG  --   --  2.4  --   --  2.4  --    PHOS  --   --  6.8*  --   --  5.6* 5.8*   BUN 25*  --  22   < > 35* 37* 39*   CREATININE 1.3*   < > 1.3*   < > 2.5* 3.0* 3.2*   ALT 14  --  35  --   --  142*  --    AST 25  --  143*  --   --  970*  --    BILITOT 0.8  --  0.5  --   --  0.4  --    ALKPHOS 89  --  77  --   --  65  --     < > = values in this interval not displayed.       No results found for: \"LABPROT\"    ASSESSMENT :  -Acute kidney injury nonoliguric in the setting of bilateral renal infarct and bilateral common femoral artery occlusions along with severe rhabdomyolysis  -Bilateral femoral artery occlusions status post bilateral fasciotomy  -For above-knee amputation on the right side  -Hyperkalemia  -Mild lactic acidosis  -Extensive atherosclerotic disease and the patient was actively smoking  History of coronary artery disease status post bypass 2 years ago    Creatinine trending up  2.1-->2.5->3.0-->3.2 mg/dl  Oligoanuric  BP stable  CK remains elevated 98,820  Right above knee amputation yesterday    PLAN:  -Continue volume replacement utilizing isotonic IV fluid in the form of 75 mEq sodium bicarb and half-normal saline at 150 cc an hour  -BMP at 1300- if creatinine continues to rise, will have temporary line placed for initiation of hemodialysis  -Continue to trend  CK q 4 hours  -Avoid nephrotoxins as able  -Monitor labs  -Monitor I & O  -Continue critical care support  -Vascular surgery on board          Electronically signed by NUZHAT MADRIGAL CNP on 11/13/2024 at 1:06 PM

## 2024-11-13 NOTE — ACP (ADVANCE CARE PLANNING)
Advance Care Planning   Healthcare Decision Maker:    Primary Decision Maker: Schwab,Lisa - Brother/Sister - [phone]    Secondary Decision Maker: jordan clay - Brother/Sister - [phone]    Click here to complete Healthcare Decision Makers including selection of the Healthcare Decision Maker Relationship (ie \"Primary\").

## 2024-11-13 NOTE — PROCEDURES
PROCEDURE NOTE  Date: 11/13/2024   Name: Himanshu Bravo  YOB: 1960    Hemodialysis Catheter Placement    Date/Time: 11/13/2024 5:46 PM    Performed by: Linda Gómez DO  Authorized by: Linda Gómez DO  Consent: Written consent obtained.  Consent given by: patient  Patient identity confirmed: verbally with patient and hospital-assigned identification number  Time out: Immediately prior to procedure a \"time out\" was called to verify the correct patient, procedure, equipment, support staff and site/side marked as required.  Indications: dialysis.  Anesthesia: local infiltration    Anesthesia:  Local Anesthetic: lidocaine 1% without epinephrine    Sedation:  Patient sedated: no    Preparation: skin prepped with ChloraPrep  Skin prep agent dried: skin prep agent completely dried prior to procedure  Sterile barriers: all five maximum sterile barriers used - cap, mask, sterile gown, sterile gloves, and large sterile sheet  Hand hygiene: hand hygiene performed prior to central venous catheter insertion  Location details: right internal jugular  Patient position: flat  Catheter type: double lumen  Catheter size: 7 Fr  Pre-procedure: landmarks identified  Ultrasound guidance: yes  Sterile ultrasound techniques: sterile gel and sterile probe covers were used  Number of attempts: 1  Successful placement: yes  Post-procedure: line sutured and dressing applied  Assessment: blood return through all ports and free fluid flow  Patient tolerance: patient tolerated the procedure well with no immediate complications  Comments: CXR ordered immediately after procedure. Dr. Hoover was available for the procedure.

## 2024-11-13 NOTE — PROGRESS NOTES
[facility] Hospitalist Progress Note    Admitting Date and Time: 11/12/2024  2:39 AM  Admit Dx: 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]  Atrial fibrillation, unspecified type (HCC) [I48.91]    Synopsis:  Presented to ER due to concern for leg pain and shortness of breath.  Mention he has been noticing bluish discoloration of both lower extremity worsened over the last 3 days.  He was found to have lactic acidosis on presentation of 3.9.  Leukocytosis of 16,500.  Blood pressure on presentation of 119/75.  CT abdomen with aorta with bilateral runoff with contrast was obtained and showed severe atherosclerotic disease with common femoral arterial occlusions with some reconstitution in the superficial femoral and popliteal location but without significant runoff in the lower extremities, bilateral large renal infarcts.  He was started on heparin by weight.  Patient was seen by vascular surgery and taken straight to the OR.  Right AKA was done    Subjective:  Patient is being followed for 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]  Atrial fibrillation, unspecified type (HCC) [I48.91]     Patient was seen at the bedside.  Right AKA was done today.  He is complaining of weak and tired but denies any fever, chills, rigors, nausea or vomiting.  He is currently on heparin and bicarb drip    ROS: denies fever, chills, cp, sob, n/v, HA unless stated above.      sodium chloride flush  5-40 mL IntraVENous 2 times per day    insulin glargine  12 Units SubCUTAneous QAM    aspirin  81 mg Oral Daily    acetaminophen  1,000 mg Oral 3 times per day    insulin lispro  0-16 Units SubCUTAneous Q4H     heparin (porcine), 80 Units/kg, PRN  heparin (porcine), 40 Units/kg, PRN  sodium chloride flush, 5-40 mL, PRN  sodium chloride, , PRN  potassium chloride, 40 mEq, PRN   Or  potassium alternative oral replacement, 40 mEq, PRN   Or  potassium chloride, 10 mEq, PRN  magnesium sulfate, 2,000 mg, PRN  ondansetron, 4 mg, Q8H PRN    occlusion  Acute limb ischemia  Bilateral renal infarct  Lactic acidosis  ANTIONE  Diabetes mellitus  Rhabdomyolysis  Hyperkalemia, resolved    -Manage as per surgical ICU  -CT abdomenStatus post bilateral femoral embolectomy with fasciotomies on 11/12 followed by right AKA  -On heparin drip  -Vascular surgery on board  -Pain medication as needed  -Trend CPK level  -Nephrology consulted, on bicarb drip  -Creatinine is trending up, urine output decreasing  -Closely monitor creatinine and CPK level    NOTE: This report was transcribed using voice recognition software. Every effort was made to ensure accuracy; however, inadvertent computerized transcription errors may be present.  Electronically signed by KAYODE ROLAND MD on 11/13/2024 at 12:49 PM

## 2024-11-13 NOTE — PLAN OF CARE
Problem: Chronic Conditions and Co-morbidities  Goal: Patient's chronic conditions and co-morbidity symptoms are monitored and maintained or improved  11/13/2024 0924 by Tracie Block RN  Outcome: Progressing  11/12/2024 2256 by Margot Winslow RN  Outcome: Progressing  Flowsheets (Taken 11/12/2024 2000)  Care Plan - Patient's Chronic Conditions and Co-Morbidity Symptoms are Monitored and Maintained or Improved: Monitor and assess patient's chronic conditions and comorbid symptoms for stability, deterioration, or improvement     Problem: Pain  Goal: Verbalizes/displays adequate comfort level or baseline comfort level  11/13/2024 0924 by Tracie Block RN  Outcome: Progressing  11/12/2024 2256 by Margot Winslow RN  Outcome: Progressing     Problem: Safety - Adult  Goal: Free from fall injury  11/13/2024 0924 by Tracie Block RN  Outcome: Progressing  11/12/2024 2256 by Margot Winslow RN  Outcome: Progressing     Problem: Skin/Tissue Integrity  Goal: Absence of new skin breakdown  Description: 1.  Monitor for areas of redness and/or skin breakdown  2.  Assess vascular access sites hourly  3.  Every 4-6 hours minimum:  Change oxygen saturation probe site  4.  Every 4-6 hours:  If on nasal continuous positive airway pressure, respiratory therapy assess nares and determine need for appliance change or resting period.  11/13/2024 0924 by Tracie Block RN  Outcome: Progressing  11/12/2024 2256 by Margot Winslow RN  Outcome: Progressing

## 2024-11-14 LAB
ALBUMIN SERPL-MCNC: 2.8 G/DL (ref 3.5–5.2)
ALP SERPL-CCNC: 64 U/L (ref 40–129)
ALT SERPL-CCNC: 75 U/L (ref 0–40)
ANION GAP SERPL CALCULATED.3IONS-SCNC: 11 MMOL/L (ref 7–16)
ANION GAP SERPL CALCULATED.3IONS-SCNC: 11 MMOL/L (ref 7–16)
ANION GAP SERPL CALCULATED.3IONS-SCNC: 12 MMOL/L (ref 7–16)
ANION GAP SERPL CALCULATED.3IONS-SCNC: 15 MMOL/L (ref 7–16)
ANION GAP SERPL CALCULATED.3IONS-SCNC: 17 MMOL/L (ref 7–16)
AST SERPL-CCNC: 969 U/L (ref 0–39)
BASOPHILS # BLD: 0.01 K/UL (ref 0–0.2)
BASOPHILS NFR BLD: 0 % (ref 0–2)
BILIRUB SERPL-MCNC: 0.4 MG/DL (ref 0–1.2)
BUN SERPL-MCNC: 27 MG/DL (ref 6–23)
BUN SERPL-MCNC: 30 MG/DL (ref 6–23)
BUN SERPL-MCNC: 45 MG/DL (ref 6–23)
BUN SERPL-MCNC: 52 MG/DL (ref 6–23)
BUN SERPL-MCNC: 55 MG/DL (ref 6–23)
CA-I BLD-SCNC: 0.93 MMOL/L (ref 1.15–1.33)
CALCIUM SERPL-MCNC: 5.6 MG/DL (ref 8.6–10.2)
CALCIUM SERPL-MCNC: 6.5 MG/DL (ref 8.6–10.2)
CALCIUM SERPL-MCNC: 6.7 MG/DL (ref 8.6–10.2)
CALCIUM SERPL-MCNC: 6.8 MG/DL (ref 8.6–10.2)
CALCIUM SERPL-MCNC: 7.4 MG/DL (ref 8.6–10.2)
CHLORIDE SERPL-SCNC: 90 MMOL/L (ref 98–107)
CHLORIDE SERPL-SCNC: 91 MMOL/L (ref 98–107)
CHLORIDE SERPL-SCNC: 97 MMOL/L (ref 98–107)
CHLORIDE SERPL-SCNC: 98 MMOL/L (ref 98–107)
CHLORIDE SERPL-SCNC: 99 MMOL/L (ref 98–107)
CK SERPL-CCNC: NORMAL U/L (ref 20–200)
CK SERPL-CCNC: NORMAL U/L (ref 20–200)
CO2 SERPL-SCNC: 23 MMOL/L (ref 22–29)
CO2 SERPL-SCNC: 26 MMOL/L (ref 22–29)
CO2 SERPL-SCNC: 26 MMOL/L (ref 22–29)
CO2 SERPL-SCNC: 27 MMOL/L (ref 22–29)
CO2 SERPL-SCNC: 27 MMOL/L (ref 22–29)
CREAT SERPL-MCNC: 3.6 MG/DL (ref 0.7–1.2)
CREAT SERPL-MCNC: 4 MG/DL (ref 0.7–1.2)
CREAT SERPL-MCNC: 4.1 MG/DL (ref 0.7–1.2)
CREAT SERPL-MCNC: 5.2 MG/DL (ref 0.7–1.2)
CREAT SERPL-MCNC: 5.6 MG/DL (ref 0.7–1.2)
EOSINOPHIL # BLD: 0.04 K/UL (ref 0.05–0.5)
EOSINOPHILS RELATIVE PERCENT: 0 % (ref 0–6)
ERYTHROCYTE [DISTWIDTH] IN BLOOD BY AUTOMATED COUNT: 13.9 % (ref 11.5–15)
GFR, ESTIMATED: 11 ML/MIN/1.73M2
GFR, ESTIMATED: 12 ML/MIN/1.73M2
GFR, ESTIMATED: 16 ML/MIN/1.73M2
GFR, ESTIMATED: 16 ML/MIN/1.73M2
GFR, ESTIMATED: 18 ML/MIN/1.73M2
GLUCOSE BLD-MCNC: 139 MG/DL (ref 74–99)
GLUCOSE BLD-MCNC: 160 MG/DL (ref 74–99)
GLUCOSE BLD-MCNC: 162 MG/DL (ref 74–99)
GLUCOSE BLD-MCNC: 163 MG/DL (ref 74–99)
GLUCOSE BLD-MCNC: 205 MG/DL (ref 74–99)
GLUCOSE BLD-MCNC: 223 MG/DL (ref 74–99)
GLUCOSE BLD-MCNC: 230 MG/DL (ref 74–99)
GLUCOSE SERPL-MCNC: 155 MG/DL (ref 74–99)
GLUCOSE SERPL-MCNC: 157 MG/DL (ref 74–99)
GLUCOSE SERPL-MCNC: 204 MG/DL (ref 74–99)
GLUCOSE SERPL-MCNC: 214 MG/DL (ref 74–99)
GLUCOSE SERPL-MCNC: 225 MG/DL (ref 74–99)
HAV IGM SERPL QL IA: NONREACTIVE
HBV CORE IGM SERPL QL IA: NONREACTIVE
HBV SURFACE AB SERPL IA-ACNC: <3.1 MIU/ML (ref 0–9.99)
HBV SURFACE AG SERPL QL IA: NONREACTIVE
HCT VFR BLD AUTO: 30 % (ref 37–54)
HCV AB SERPL QL IA: NONREACTIVE
HGB BLD-MCNC: 9.9 G/DL (ref 12.5–16.5)
IMM GRANULOCYTES # BLD AUTO: 0.07 K/UL (ref 0–0.58)
IMM GRANULOCYTES NFR BLD: 1 % (ref 0–5)
LYMPHOCYTES NFR BLD: 1.03 K/UL (ref 1.5–4)
LYMPHOCYTES RELATIVE PERCENT: 9 % (ref 20–42)
MAGNESIUM SERPL-MCNC: 2.3 MG/DL (ref 1.6–2.6)
MCH RBC QN AUTO: 31.4 PG (ref 26–35)
MCHC RBC AUTO-ENTMCNC: 33 G/DL (ref 32–34.5)
MCV RBC AUTO: 95.2 FL (ref 80–99.9)
MONOCYTES NFR BLD: 0.96 K/UL (ref 0.1–0.95)
MONOCYTES NFR BLD: 8 % (ref 2–12)
NEUTROPHILS NFR BLD: 83 % (ref 43–80)
NEUTS SEG NFR BLD: 10.06 K/UL (ref 1.8–7.3)
PARTIAL THROMBOPLASTIN TIME: 55.2 SEC (ref 24.5–35.1)
PHOSPHATE SERPL-MCNC: 7.2 MG/DL (ref 2.5–4.5)
PLATELET # BLD AUTO: 160 K/UL (ref 130–450)
PMV BLD AUTO: 10.1 FL (ref 7–12)
POTASSIUM SERPL-SCNC: 3.8 MMOL/L (ref 3.5–5)
POTASSIUM SERPL-SCNC: 4.2 MMOL/L (ref 3.5–5)
POTASSIUM SERPL-SCNC: 4.2 MMOL/L (ref 3.5–5)
POTASSIUM SERPL-SCNC: 4.6 MMOL/L (ref 3.5–5)
POTASSIUM SERPL-SCNC: 5.1 MMOL/L (ref 3.5–5)
PROT SERPL-MCNC: 5.1 G/DL (ref 6.4–8.3)
RBC # BLD AUTO: 3.15 M/UL (ref 3.8–5.8)
SODIUM SERPL-SCNC: 133 MMOL/L (ref 132–146)
SODIUM SERPL-SCNC: 133 MMOL/L (ref 132–146)
SODIUM SERPL-SCNC: 134 MMOL/L (ref 132–146)
SODIUM SERPL-SCNC: 134 MMOL/L (ref 132–146)
SODIUM SERPL-SCNC: 136 MMOL/L (ref 132–146)
SURGICAL PATHOLOGY REPORT: NORMAL
WBC OTHER # BLD: 12.2 K/UL (ref 4.5–11.5)

## 2024-11-14 PROCEDURE — 2500000003 HC RX 250 WO HCPCS: Performed by: INTERNAL MEDICINE

## 2024-11-14 PROCEDURE — 5A1D70Z PERFORMANCE OF URINARY FILTRATION, INTERMITTENT, LESS THAN 6 HOURS PER DAY: ICD-10-PCS | Performed by: FAMILY MEDICINE

## 2024-11-14 PROCEDURE — 36415 COLL VENOUS BLD VENIPUNCTURE: CPT

## 2024-11-14 PROCEDURE — 85025 COMPLETE CBC W/AUTO DIFF WBC: CPT

## 2024-11-14 PROCEDURE — 82550 ASSAY OF CK (CPK): CPT

## 2024-11-14 PROCEDURE — 6370000000 HC RX 637 (ALT 250 FOR IP): Performed by: SURGERY

## 2024-11-14 PROCEDURE — 2580000003 HC RX 258

## 2024-11-14 PROCEDURE — 83735 ASSAY OF MAGNESIUM: CPT

## 2024-11-14 PROCEDURE — 6360000002 HC RX W HCPCS: Performed by: SURGERY

## 2024-11-14 PROCEDURE — 99233 SBSQ HOSP IP/OBS HIGH 50: CPT | Performed by: INTERNAL MEDICINE

## 2024-11-14 PROCEDURE — 2700000000 HC OXYGEN THERAPY PER DAY

## 2024-11-14 PROCEDURE — 82962 GLUCOSE BLOOD TEST: CPT

## 2024-11-14 PROCEDURE — 85730 THROMBOPLASTIN TIME PARTIAL: CPT

## 2024-11-14 PROCEDURE — 2000000000 HC ICU R&B

## 2024-11-14 PROCEDURE — 6360000002 HC RX W HCPCS

## 2024-11-14 PROCEDURE — 2580000003 HC RX 258: Performed by: INTERNAL MEDICINE

## 2024-11-14 PROCEDURE — 80048 BASIC METABOLIC PNL TOTAL CA: CPT

## 2024-11-14 PROCEDURE — 82330 ASSAY OF CALCIUM: CPT

## 2024-11-14 PROCEDURE — 80053 COMPREHEN METABOLIC PANEL: CPT

## 2024-11-14 PROCEDURE — 90935 HEMODIALYSIS ONE EVALUATION: CPT

## 2024-11-14 PROCEDURE — 37799 UNLISTED PX VASCULAR SURGERY: CPT

## 2024-11-14 PROCEDURE — 2580000003 HC RX 258: Performed by: SURGERY

## 2024-11-14 PROCEDURE — 84100 ASSAY OF PHOSPHORUS: CPT

## 2024-11-14 RX ORDER — SODIUM CHLORIDE 9 MG/ML
INJECTION, SOLUTION INTRAVENOUS CONTINUOUS
Status: DISCONTINUED | OUTPATIENT
Start: 2024-11-14 | End: 2024-11-17

## 2024-11-14 RX ADMIN — OXYCODONE HYDROCHLORIDE 10 MG: 10 TABLET ORAL at 12:37

## 2024-11-14 RX ADMIN — ACETAMINOPHEN 1000 MG: 500 TABLET ORAL at 22:11

## 2024-11-14 RX ADMIN — HYDROMORPHONE HYDROCHLORIDE 0.5 MG: 1 INJECTION, SOLUTION INTRAMUSCULAR; INTRAVENOUS; SUBCUTANEOUS at 14:06

## 2024-11-14 RX ADMIN — SODIUM CHLORIDE: 9 INJECTION, SOLUTION INTRAVENOUS at 15:35

## 2024-11-14 RX ADMIN — SODIUM CHLORIDE, PRESERVATIVE FREE 10 ML: 5 INJECTION INTRAVENOUS at 08:14

## 2024-11-14 RX ADMIN — HEPARIN SODIUM 14 UNITS/KG/HR: 10000 INJECTION, SOLUTION INTRAVENOUS at 21:12

## 2024-11-14 RX ADMIN — HEPARIN SODIUM 13.96 UNITS/KG/HR: 10000 INJECTION, SOLUTION INTRAVENOUS at 06:17

## 2024-11-14 RX ADMIN — INSULIN LISPRO 4 UNITS: 100 INJECTION, SOLUTION INTRAVENOUS; SUBCUTANEOUS at 20:00

## 2024-11-14 RX ADMIN — INSULIN LISPRO 4 UNITS: 100 INJECTION, SOLUTION INTRAVENOUS; SUBCUTANEOUS at 08:13

## 2024-11-14 RX ADMIN — OXYCODONE HYDROCHLORIDE 5 MG: 5 TABLET ORAL at 06:15

## 2024-11-14 RX ADMIN — SODIUM CHLORIDE, PRESERVATIVE FREE 10 ML: 5 INJECTION INTRAVENOUS at 20:01

## 2024-11-14 RX ADMIN — SODIUM BICARBONATE: 84 INJECTION, SOLUTION INTRAVENOUS at 07:25

## 2024-11-14 RX ADMIN — OXYCODONE HYDROCHLORIDE 10 MG: 10 TABLET ORAL at 23:29

## 2024-11-14 RX ADMIN — CALCIUM GLUCONATE 2000 MG: 98 INJECTION, SOLUTION INTRAVENOUS at 14:23

## 2024-11-14 RX ADMIN — HYDROMORPHONE HYDROCHLORIDE 0.5 MG: 1 INJECTION, SOLUTION INTRAMUSCULAR; INTRAVENOUS; SUBCUTANEOUS at 21:09

## 2024-11-14 RX ADMIN — ACETAMINOPHEN 1000 MG: 500 TABLET ORAL at 06:15

## 2024-11-14 RX ADMIN — ACETAMINOPHEN 1000 MG: 500 TABLET ORAL at 15:30

## 2024-11-14 RX ADMIN — INSULIN GLARGINE 12 UNITS: 100 INJECTION, SOLUTION SUBCUTANEOUS at 08:13

## 2024-11-14 RX ADMIN — OXYCODONE HYDROCHLORIDE 10 MG: 10 TABLET ORAL at 19:00

## 2024-11-14 RX ADMIN — SODIUM CHLORIDE: 9 INJECTION, SOLUTION INTRAVENOUS at 22:14

## 2024-11-14 RX ADMIN — ASPIRIN 81 MG CHEWABLE TABLET 81 MG: 81 TABLET CHEWABLE at 08:13

## 2024-11-14 RX ADMIN — INSULIN LISPRO 4 UNITS: 100 INJECTION, SOLUTION INTRAVENOUS; SUBCUTANEOUS at 04:19

## 2024-11-14 ASSESSMENT — PAIN SCALES - GENERAL
PAINLEVEL_OUTOF10: 0
PAINLEVEL_OUTOF10: 4
PAINLEVEL_OUTOF10: 4
PAINLEVEL_OUTOF10: 7
PAINLEVEL_OUTOF10: 5
PAINLEVEL_OUTOF10: 9
PAINLEVEL_OUTOF10: 10
PAINLEVEL_OUTOF10: 5
PAINLEVEL_OUTOF10: 5
PAINLEVEL_OUTOF10: 0
PAINLEVEL_OUTOF10: 5
PAINLEVEL_OUTOF10: 8
PAINLEVEL_OUTOF10: 7
PAINLEVEL_OUTOF10: 5

## 2024-11-14 ASSESSMENT — PAIN DESCRIPTION - DESCRIPTORS
DESCRIPTORS: ACHING;DISCOMFORT
DESCRIPTORS: ACHING;DISCOMFORT;SORE

## 2024-11-14 ASSESSMENT — PAIN DESCRIPTION - LOCATION
LOCATION: LEG

## 2024-11-14 ASSESSMENT — PAIN DESCRIPTION - ORIENTATION
ORIENTATION: RIGHT;LEFT

## 2024-11-14 ASSESSMENT — PAIN DESCRIPTION - ONSET: ONSET: ON-GOING

## 2024-11-14 ASSESSMENT — PAIN - FUNCTIONAL ASSESSMENT
PAIN_FUNCTIONAL_ASSESSMENT: PREVENTS OR INTERFERES SOME ACTIVE ACTIVITIES AND ADLS

## 2024-11-14 ASSESSMENT — PAIN DESCRIPTION - FREQUENCY: FREQUENCY: CONTINUOUS

## 2024-11-14 NOTE — PROGRESS NOTES
Vascular Surgery Progress Note    Pt is being seen in f/u today regarding bilateral femoral embolectomies     Subjective  Pt s/e.  Temp HD cath placed yesterday in anticipation of starting dialysis. Cr 5.2 from 4.1. CK plateaued, now 68198. Pain controlled. Dressings changed at bedside.     Current Medications:    heparin (PORCINE) Infusion 13.958 Units/kg/hr (11/14/24 0617)    sodium chloride      dextrose      sodium bicarbonate 75 mEq in sodium chloride 0.45 % 1,000 mL infusion 150 mL/hr at 11/14/24 0617      heparin (porcine), heparin (porcine), sodium chloride flush, sodium chloride, potassium chloride **OR** potassium alternative oral replacement **OR** potassium chloride, magnesium sulfate, ondansetron **OR** ondansetron, polyethylene glycol, glucose, dextrose bolus **OR** dextrose bolus, glucagon (rDNA), dextrose, oxyCODONE **OR** oxyCODONE, HYDROmorphone    sodium chloride flush  5-40 mL IntraVENous 2 times per day    insulin glargine  12 Units SubCUTAneous QAM    aspirin  81 mg Oral Daily    acetaminophen  1,000 mg Oral 3 times per day    insulin lispro  0-16 Units SubCUTAneous Q4H        PHYSICAL EXAM:    BP (!) 148/74   Pulse (!) 106   Temp 98.3 °F (36.8 °C) (Temporal)   Resp 15   Ht 1.727 m (5' 8\")   Wt 108.9 kg (240 lb)   SpO2 96%   BMI 36.49 kg/m²     Intake/Output Summary (Last 24 hours) at 11/14/2024 0710  Last data filed at 11/14/2024 0700  Gross per 24 hour   Intake 4750.78 ml   Output 270 ml   Net 4480.78 ml          Gen: awake, alert and oriented x3, no apparent distress  CVS: RRR  Resp: No increased work of breathing, on 3L NC  Abd: Soft, non-tender, non-distended  R LE: AKA dressing taken down    L LE: Dressing changed, biphasic DP/PT            LABS:    Lab Results   Component Value Date    WBC 12.2 (H) 11/14/2024    HGB 9.9 (L) 11/14/2024    HCT 30.0 (L) 11/14/2024     11/14/2024    PROTIME 12.5 (H) 01/08/2018    INR 1.1 01/08/2018    APTT 55.2 (H) 11/14/2024    K 4.2

## 2024-11-14 NOTE — PLAN OF CARE
Problem: Chronic Conditions and Co-morbidities  Goal: Patient's chronic conditions and co-morbidity symptoms are monitored and maintained or improved  Outcome: Progressing     Problem: Discharge Planning  Goal: Discharge to home or other facility with appropriate resources  Outcome: Progressing     Problem: Pain  Goal: Verbalizes/displays adequate comfort level or baseline comfort level  Outcome: Progressing     Problem: Safety - Adult  Goal: Free from fall injury  Outcome: Progressing     Problem: Skin/Tissue Integrity  Goal: Absence of new skin breakdown  Outcome: Progressing     Problem: ABCDS Injury Assessment  Goal: Absence of physical injury  Outcome: Progressing     Problem: Spiritual Struggle  Goal: Verbalizes spiritual struggle  Outcome: Progressing     Problem: Emotional Distress  Goal: Verbalization of thoughts and feelings  Outcome: Progressing  Goal: Reduce evidence of anxiety/worry/anger  Outcome: Progressing  Goal: Identifies/restores coping resources/skills  Outcome: Progressing     Problem: Life Adjustment  Goal: Verbalization of feelings regarding change/loss  Outcome: Progressing  Goal: Finding meaning/purpose in the midst of illness/suffering  Outcome: Progressing  Goal: Identifies/restores coping resources/skills  Outcome: Progressing  Goal: Growing sense of peace/hope  Outcome: Progressing     Problem: Support with Decision-Making  Goal: Verbalization of thoughts/feelings regarding decision  Outcome: Progressing  Goal: Identifies/restores coping resources/skills  Outcome: Progressing     Problem: Unresolved Conflict/Relationships  Goal: Explore resources for additional follow up, post discharge  Outcome: Progressing

## 2024-11-14 NOTE — CONSULTS
[facility]              [address] [place] [address]                              CONSULTATION      PATIENT NAME: SAIMA BRENNAN                : 1960  MED REC NO: [id number]                        ROOM: [id number]  ACCOUNT NO: [id number]                       ADMIT DATE: 2024  PROVIDER: Charles Quiles MD    REHAB CONSULT    CONSULT DATE: 2024      PATIENT IDENTIFICATION:  Age 64, sex male.    CHIEF COMPLAINT:  Right above-knee amputation with multiple complications.    HISTORY OF PRESENT ILLNESS:  The patient was admitted to [facility] on 2024.  He had sudden onset of ischemia in both legs.  He was found to have extensive vascular occlusion to the femorals as well as occlusion to the kidneys with renal infarcts.  He had severe rhabdomyolysis.  He ended up having to have an above-knee amputation on the right on 2024.  He tolerated the surgery.  He has had ongoing issues with the rhabdomyolysis as well as renal failure, which looks like it may require the initiation of dialysis.  He had a vascular access catheter put in place for that.  He is still in the ICU.  Because of the amputation, I was asked to see him for further rehab.  We do not have therapy evaluations yet, but I am sure he is going to be at a [id number] level of all of his functioning at the moment.    PAST MEDICAL HISTORY:    1. Coronary artery disease.    2. Congenital heart disease.  3. Type 2 diabetes mellitus.  4. Nicotine addiction.    ALLERGIES:  NONE KNOWN.      CURRENT MEDICATIONS:  Tylenol, aspirin, short and long-acting insulin, and IV Dilaudid right now for pain.    SOCIAL HISTORY:  Wife is recently .    REVIEW OF SYSTEMS:  HEENT:  Negative for prior HEENT problems.  PULMONARY:  Negative for prior pulmonary problems.  CARDIAC:  Positive for congenital heart disease, coronary artery disease, and stents.  GI:  Negative.  :  Positive for

## 2024-11-14 NOTE — PROGRESS NOTES
[facility]  MD Bryce Milligan, MD Supriya Loaiza, CNP   Natalia Rey, CODY  Progress Note    11/14/2024    SUBJECTIVE:   11/13:  Seen in [facility].  Awake and alert.  S/P right above knee amputation yesterday. Complains of pain at right AKA site.  Oxygen on per NC.  Denies chest pain, palpitation or SOB.  Denies nausea, vomiting or abd pain.  Continues on heparin drip and bicarb IV.  He is oligoanuric.     11/14: Seen in the [facility] while on dialysis. Tolerating therapy without issues. His blood pressure is stable. Heart rate is mildly elevated. He is in good spirits and reports that he is having less pain today. His appetite is fair, he did eat some cereal for dinner. Urine output is stable.     PROBLEM LIST:    Principal Problem:    Femoral artery occlusion (HCC)  Active Problems:    Acute lower limb ischemia    ANTIONE (acute kidney injury) ([facility])    Non-traumatic rhabdomyolysis  Resolved Problems:    * No resolved hospital problems. *         DIET:    ADULT DIET; Regular; Low Fat/Low Chol/High Fiber/VIKASH; Low Potassium (Less than 3000 mg/day)     MEDS (scheduled):    sodium chloride flush  5-40 mL IntraVENous 2 times per day    insulin glargine  12 Units SubCUTAneous QAM    aspirin  81 mg Oral Daily    acetaminophen  1,000 mg Oral 3 times per day    insulin lispro  0-16 Units SubCUTAneous Q4H       MEDS (infusions):   sodium chloride 150 mL/hr at 11/14/24 1535    heparin (PORCINE) Infusion 13.958 Units/kg/hr (11/14/24 0617)    sodium chloride      dextrose         MEDS (prn):  heparin (porcine), heparin (porcine), sodium chloride flush, sodium chloride, potassium chloride **OR** potassium alternative oral replacement **OR** potassium chloride, magnesium sulfate, ondansetron **OR** ondansetron, polyethylene glycol, glucose, dextrose bolus **OR** dextrose bolus, glucagon (rDNA), dextrose, oxyCODONE **OR** oxyCODONE, HYDROmorphone    PHYSICAL EXAM:     Patient Vitals  for the past 24 hrs:   BP Temp Temp src Pulse Resp SpO2 Weight   11/14/24 1700 (!) 150/103 -- -- (!) 113 19 95 % --   11/14/24 1630 128/65 -- -- (!) 113 22 92 % --   11/14/24 1600 (!) 133/125 99.1 °F (37.3 °C) Oral (!) 116 18 (!) 89 % --   11/14/24 1530 (!) 141/72 -- -- (!) 115 15 90 % --   11/14/24 1500 137/83 -- -- (!) 114 17 97 % --   11/14/24 1436 -- -- -- -- 20 -- --   11/14/24 1430 118/71 -- -- (!) 112 16 97 % --   11/14/24 1415 (!) 156/86 98 °F (36.7 °C) -- (!) 109 20 99 % 112.5 kg (248 lb 0.3 oz)   11/14/24 1406 -- -- -- -- 18 -- --   11/14/24 1400 (!) 129/100 -- -- (!) 108 19 100 % --   11/14/24 1330 (!) 132/99 -- -- (!) 105 16 (!) 85 % --   11/14/24 1307 -- -- -- -- 20 -- --   11/14/24 1300 135/69 -- -- 99 20 (!) 80 % --   11/14/24 1237 -- -- -- -- 22 -- --   11/14/24 1200 (!) 151/76 98 °F (36.7 °C) Temporal 100 (!) 33 97 % --   11/14/24 1130 (!) 116/99 -- -- 100 17 99 % --   11/14/24 1100 130/63 -- -- (!) 104 18 96 % --   11/14/24 1030 (!) 91/59 -- -- 97 21 96 % --   11/14/24 1000 99/63 -- -- (!) 113 22 96 % --   11/14/24 0930 129/68 -- -- (!) 103 15 96 % --   11/14/24 0900 124/72 -- -- (!) 104 21 97 % --   11/14/24 0800 (!) 147/71 97.8 °F (36.6 °C) Temporal 99 17 97 % --   11/14/24 0700 (!) 148/74 -- -- (!) 106 15 96 % --   11/14/24 0600 (!) 145/80 -- -- (!) 102 22 96 % --   11/14/24 0500 (!) 146/72 -- -- (!) 101 15 94 % --   11/14/24 0400 (!) 135/94 98.3 °F (36.8 °C) Temporal (!) 103 16 95 % --   11/14/24 0300 (!) 150/76 -- -- (!) 103 14 97 % --   11/14/24 0200 (!) 143/95 -- -- (!) 102 15 96 % --   11/14/24 0100 127/75 -- -- 100 15 (!) 88 % --   11/14/24 0005 -- -- -- 97 15 96 % --   11/14/24 0000 (!) 142/71 98.1 °F (36.7 °C) Temporal 95 16 94 % --   11/13/24 2300 116/68 -- -- 96 17 (!) 89 % --   11/13/24 2200 121/63 -- -- (!) 102 17 90 % --   11/13/24 2100 -- -- -- 97 17 97 % --   11/13/24 2058 -- -- -- -- 17 -- --   11/13/24 2028 -- -- -- 99 18 96 % --   11/13/24 1900 -- -- -- 98 24 97 % --   11/13/24

## 2024-11-14 NOTE — FLOWSHEET NOTE
11/14/24 1415   Vital Signs   BP (!) 156/86   Temp 98 °F (36.7 °C)   Pulse (!) 109   Respirations 20   SpO2 99 %   Weight - Scale 112.5 kg (248 lb 0.3 oz)   Percent Weight Change 3.34   Post-Hemodialysis Assessment   Post-Treatment Procedures Blood returned;Catheter capped, clamped and heparinized x 2 ports   Machine Disinfection Process Acid/Vinegar Clean;Heat Disinfect;Exterior Machine Disinfection   Rinseback Volume (ml) 300 ml   Blood Volume Processed (Liters) 58 L   Dialyzer Clearance Lightly streaked   Duration of Treatment (minutes) 240 minutes   Hemodialysis Intake (ml) 600 ml   Hemodialysis Output (ml) 2600 ml   NET Removed (ml) 2000   Tolerated Treatment Good   Patient Response to Treatment tolerated 1st hd  well, 2000cc removed   Bilateral Breath Sounds Clear;Diminished   Edema Generalized   Edema Generalized Trace   Time Off 1415   Patient Disposition Remain in ICU/ED   Observations & Evaluations   Level of Consciousness 0   Oriented X 3   Heart Rhythm Irregular   Respiratory Quality/Effort Unlabored   Skin Color Pale   Skin Condition/Temp Warm   Appetite Fair   Comments Tolerated well 2L fluid removal

## 2024-11-14 NOTE — PROGRESS NOTES
[facility] Hospitalist Progress Note    Admitting Date and Time: 11/12/2024  2:39 AM  Admit Dx: 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]  Atrial fibrillation, unspecified type (HCC) [I48.91]    Synopsis:  Presented to ER due to concern for leg pain and shortness of breath.  Mention he has been noticing bluish discoloration of both lower extremity worsened over the last 3 days.  He was found to have lactic acidosis on presentation of 3.9.  Leukocytosis of 16,500.  Blood pressure on presentation of 119/75.  CT abdomen with aorta with bilateral runoff with contrast was obtained and showed severe atherosclerotic disease with common femoral arterial occlusions with some reconstitution in the superficial femoral and popliteal location but without significant runoff in the lower extremities, bilateral large renal infarcts.  He was started on heparin by weight.  Patient was seen by vascular surgery and taken straight to the OR.  Right AKA was done    Subjective:  Patient is being followed for 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]  Atrial fibrillation, unspecified type (HCC) [I48.91]     Discussed with RN at bedside.  Tolerating post hemodialysis well.  Patient denies any acute complaints.    ROS: denies fever, chills, cp, sob, n/v, HA unless stated above.      calcium gluconate  2,000 mg IntraVENous Once    sodium chloride flush  5-40 mL IntraVENous 2 times per day    insulin glargine  12 Units SubCUTAneous QAM    aspirin  81 mg Oral Daily    acetaminophen  1,000 mg Oral 3 times per day    insulin lispro  0-16 Units SubCUTAneous Q4H     heparin (porcine), 80 Units/kg, PRN  heparin (porcine), 40 Units/kg, PRN  sodium chloride flush, 5-40 mL, PRN  sodium chloride, , PRN  potassium chloride, 40 mEq, PRN   Or  potassium alternative oral replacement, 40 mEq, PRN   Or  potassium chloride, 10 mEq, PRN  magnesium sulfate, 2,000 mg, PRN  ondansetron, 4 mg, Q8H PRN   Or  ondansetron, 4 mg, Q6H PRN  polyethylene glycol, 17  problems. 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  Acute limb ischemia  Bilateral renal infarct  Lactic acidosis  ANTIONE with severe rhabdomyolysis  Diabetes mellitus  Hyperkalemia, resolved    -Manage as per surgical ICU  -CT abdomenStatus post bilateral femoral embolectomy with fasciotomies on 11/12 followed by right AKA  -On heparin drip  -Vascular surgery on board  -Pain medication as needed  -Trend CPK level  -Nephrology consulted, on bicarb drip, temporary HD catheter was placed 11/13 with plans to initiate hemodialysis today with progressive acidemia and oliguria.  CK significantly elevated.  -Creatinine is trending up, urine output decreasing  -Closely monitor creatinine and CPK level    NOTE: This report was transcribed using voice recognition software. Every effort was made to ensure accuracy; however, inadvertent computerized transcription errors may be present.  Electronically signed by Rani Dorsey MD on 11/14/2024 at 11:55 AM

## 2024-11-14 NOTE — PLAN OF CARE
Problem: Chronic Conditions and Co-morbidities  Goal: Patient's chronic conditions and co-morbidity symptoms are monitored and maintained or improved  11/14/2024 1049 by Frommelt, Lauren, RN  Outcome: Progressing     Problem: Pain  Goal: Verbalizes/displays adequate comfort level or baseline comfort level  11/14/2024 1049 by Frommelt, Lauren, RN  Outcome: Progressing     Problem: Safety - Adult  Goal: Free from fall injury  11/14/2024 1049 by Frommelt, Lauren, RN  Outcome: Progressing     Problem: Skin/Tissue Integrity  Goal: Absence of new skin breakdown  Description: 1.  Monitor for areas of redness and/or skin breakdown  2.  Assess vascular access sites hourly  3.  Every 4-6 hours minimum:  Change oxygen saturation probe site  4.  Every 4-6 hours:  If on nasal continuous positive airway pressure, respiratory therapy assess nares and determine need for appliance change or resting period.  11/14/2024 1049 by Frommelt, Lauren, RN  Outcome: Progressing     Problem: Emotional Distress  Goal: Identifies/restores coping resources/skills  11/14/2024 1049 by Frommelt, Lauren, RN  Outcome: Progressing     Problem: Life Adjustment  Goal: Verbalization of feelings regarding change/loss  11/14/2024 1049 by Frommelt, Lauren, RN  Outcome: Progressing     Problem: Skin/Tissue Integrity - Adult  Goal: Incisions, wounds, or drain sites healing without S/S of infection  Outcome: Progressing     Problem: Musculoskeletal - Adult  Goal: Return ADL status to a safe level of function  Outcome: Progressing     Problem: Metabolic/Fluid and Electrolytes - Adult  Goal: Electrolytes maintained within normal limits  Outcome: Progressing     Problem: Hematologic - Adult  Goal: Maintains hematologic stability  Outcome: Progressing

## 2024-11-14 NOTE — PROGRESS NOTES
[facility] Progress Note    Name: Himanshu Bravo  MRN: [id number]    CC: Postoperative Critical Care Management      Indication for Surgery/Procedure: Bilateral Common Femoral Artery Occlusion      Important/Relevant PMH/PSH: Tobacco abuse, DMII, HTN, HLD, CAD s/p cardiac stents     11/11/2024 CTA abdominal aorta with bilateral runoff with contrast showed 1. Severe atherosclerotic disease with common femoral arterial occlusions,   some reconstitution in the superficial femoral and popliteal locations but   without significant runoff of the lower extremities.   2. 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      Procedure/Surgeries: 11/12/2024 Bilateral femoral endarterectomies, bilateral fasciotomies     11/12/2024 Right leg amputation above knee       Intake/Output Summary (Last 24 hours) at 11/14/2024 0951  Last data filed at 11/14/2024 0800  Gross per 24 hour   Intake 4750.78 ml   Output 270 ml   Net 4480.78 ml       Recent Labs     11/13/24  0527 11/13/24  1803 11/14/24  0403   WBC 14.7* 13.2* 12.2*   HGB 11.9* 10.7* 9.9*   HCT 35.9* 32.9* 30.0*    182 160      Lab Results   Component Value Date/Time     11/14/2024 09:03 AM    K 4.6 11/14/2024 09:03 AM    K 3.9 01/09/2018 03:15 AM    CL 91 11/14/2024 09:03 AM    CO2 27 11/14/2024 09:03 AM    BUN 55 11/14/2024 09:03 AM    CREATININE 5.6 11/14/2024 09:03 AM    GLUCOSE 214 11/14/2024 09:03 AM    GLUCOSE 216 06/14/2023 12:55 PM    CALCIUM 6.5 11/14/2024 09:03 AM    MG 2.3 11/14/2024 04:03 AM    PHOS 7.2 11/14/2024 04:03 AM         Physical Exam:    BP (!) 147/71   Pulse 99   Temp 97.8 °F (36.6 °C) (Temporal)   Resp 17   Ht 1.727 m (5' 8\")   Wt 108.9 kg (240 lb)   SpO2 97%   BMI 36.49 kg/m²       General: Awake, alert. On heparin gtt, bicarb infusion   Eyes: PERRL, anicteric   Pulmonary: Diminished bibasilar. No wheezes, no accessory muscle use noted   Cardiovascular:  RRR, no heaves or thrills on palpation  Tele: SR/ST   Abdomen: Soft, nontender, + BS

## 2024-11-15 LAB
ALBUMIN SERPL-MCNC: 2.8 G/DL (ref 3.5–5.2)
ALP SERPL-CCNC: 84 U/L (ref 40–129)
ALT SERPL-CCNC: 49 U/L (ref 0–40)
ANION GAP SERPL CALCULATED.3IONS-SCNC: 10 MMOL/L (ref 7–16)
ANION GAP SERPL CALCULATED.3IONS-SCNC: 8 MMOL/L (ref 7–16)
ANION GAP SERPL CALCULATED.3IONS-SCNC: 8 MMOL/L (ref 7–16)
ANION GAP SERPL CALCULATED.3IONS-SCNC: NORMAL MMOL/L (ref 9–17)
AST SERPL-CCNC: 918 U/L (ref 0–39)
BASOPHILS # BLD: 0.02 K/UL (ref 0–0.2)
BASOPHILS # BLD: 0.03 K/UL (ref 0–0.2)
BASOPHILS NFR BLD: 0 % (ref 0–2)
BASOPHILS NFR BLD: 0 % (ref 0–2)
BILIRUB SERPL-MCNC: 0.6 MG/DL (ref 0–1.2)
BUN SERPL-MCNC: 26 MG/DL (ref 6–23)
BUN SERPL-MCNC: 29 MG/DL (ref 6–23)
BUN SERPL-MCNC: 36 MG/DL (ref 6–23)
BUN SERPL-MCNC: NORMAL MG/DL (ref 8–23)
BUN/CREAT SERPL: NORMAL (ref 9–20)
CA-I BLD-SCNC: 1.04 MMOL/L (ref 1.15–1.33)
CA-I BLD-SCNC: 1.11 MMOL/L (ref 1.15–1.33)
CA-I BLD-SCNC: 1.16 MMOL/L (ref 1.15–1.33)
CALCIUM SERPL-MCNC: 7.3 MG/DL (ref 8.6–10.2)
CALCIUM SERPL-MCNC: 8 MG/DL (ref 8.6–10.2)
CALCIUM SERPL-MCNC: 8.5 MG/DL (ref 8.6–10.2)
CALCIUM SERPL-MCNC: NORMAL MG/DL (ref 8.6–10.4)
CHLORIDE SERPL-SCNC: 100 MMOL/L (ref 98–107)
CHLORIDE SERPL-SCNC: 97 MMOL/L (ref 98–107)
CHLORIDE SERPL-SCNC: 97 MMOL/L (ref 98–107)
CHLORIDE SERPL-SCNC: NORMAL MMOL/L (ref 98–107)
CK SERPL-CCNC: NORMAL U/L (ref 20–200)
CK SERPL-CCNC: NORMAL U/L (ref 20–200)
CO2 SERPL-SCNC: 29 MMOL/L (ref 22–29)
CO2 SERPL-SCNC: 30 MMOL/L (ref 22–29)
CO2 SERPL-SCNC: 31 MMOL/L (ref 22–29)
CO2 SERPL-SCNC: NORMAL MMOL/L (ref 20–31)
CREAT SERPL-MCNC: 3.8 MG/DL (ref 0.7–1.2)
CREAT SERPL-MCNC: 4.2 MG/DL (ref 0.7–1.2)
CREAT SERPL-MCNC: 5.1 MG/DL (ref 0.7–1.2)
CREAT SERPL-MCNC: NORMAL MG/DL (ref 0.7–1.2)
EOSINOPHIL # BLD: 0.15 K/UL (ref 0.05–0.5)
EOSINOPHIL # BLD: 0.17 K/UL (ref 0.05–0.5)
EOSINOPHILS RELATIVE PERCENT: 2 % (ref 0–6)
EOSINOPHILS RELATIVE PERCENT: 2 % (ref 0–6)
ERYTHROCYTE [DISTWIDTH] IN BLOOD BY AUTOMATED COUNT: 14 % (ref 11.5–15)
ERYTHROCYTE [DISTWIDTH] IN BLOOD BY AUTOMATED COUNT: 14 % (ref 11.5–15)
GFR, ESTIMATED: 12 ML/MIN/1.73M2
GFR, ESTIMATED: 15 ML/MIN/1.73M2
GFR, ESTIMATED: 17 ML/MIN/1.73M2
GFR, ESTIMATED: NORMAL ML/MIN/1.73M2
GLUCOSE BLD-MCNC: 166 MG/DL (ref 74–99)
GLUCOSE BLD-MCNC: 177 MG/DL (ref 74–99)
GLUCOSE BLD-MCNC: 194 MG/DL (ref 74–99)
GLUCOSE BLD-MCNC: 204 MG/DL (ref 74–99)
GLUCOSE BLD-MCNC: 290 MG/DL (ref 74–99)
GLUCOSE SERPL-MCNC: 175 MG/DL (ref 74–99)
GLUCOSE SERPL-MCNC: 175 MG/DL (ref 74–99)
GLUCOSE SERPL-MCNC: 193 MG/DL (ref 74–99)
GLUCOSE SERPL-MCNC: NORMAL MG/DL (ref 70–99)
HCT VFR BLD AUTO: 28.8 % (ref 37–54)
HCT VFR BLD AUTO: 29.7 % (ref 37–54)
HGB BLD-MCNC: 9.2 G/DL (ref 12.5–16.5)
HGB BLD-MCNC: 9.3 G/DL (ref 12.5–16.5)
IMM GRANULOCYTES # BLD AUTO: 0.04 K/UL (ref 0–0.58)
IMM GRANULOCYTES # BLD AUTO: 0.05 K/UL (ref 0–0.58)
IMM GRANULOCYTES NFR BLD: 0 % (ref 0–5)
IMM GRANULOCYTES NFR BLD: 1 % (ref 0–5)
LYMPHOCYTES NFR BLD: 0.69 K/UL (ref 1.5–4)
LYMPHOCYTES NFR BLD: 0.92 K/UL (ref 1.5–4)
LYMPHOCYTES RELATIVE PERCENT: 6 % (ref 20–42)
LYMPHOCYTES RELATIVE PERCENT: 9 % (ref 20–42)
MAGNESIUM SERPL-MCNC: 2.1 MG/DL (ref 1.6–2.6)
MCH RBC QN AUTO: 31 PG (ref 26–35)
MCH RBC QN AUTO: 31.4 PG (ref 26–35)
MCHC RBC AUTO-ENTMCNC: 31.3 G/DL (ref 32–34.5)
MCHC RBC AUTO-ENTMCNC: 31.9 G/DL (ref 32–34.5)
MCV RBC AUTO: 98.3 FL (ref 80–99.9)
MCV RBC AUTO: 99 FL (ref 80–99.9)
MONOCYTES NFR BLD: 1.04 K/UL (ref 0.1–0.95)
MONOCYTES NFR BLD: 1.17 K/UL (ref 0.1–0.95)
MONOCYTES NFR BLD: 10 % (ref 2–12)
MONOCYTES NFR BLD: 11 % (ref 2–12)
NEUTROPHILS NFR BLD: 79 % (ref 43–80)
NEUTROPHILS NFR BLD: 81 % (ref 43–80)
NEUTS SEG NFR BLD: 8.09 K/UL (ref 1.8–7.3)
NEUTS SEG NFR BLD: 8.91 K/UL (ref 1.8–7.3)
PARTIAL THROMBOPLASTIN TIME: 57.2 SEC (ref 24.5–35.1)
PHOSPHATE SERPL-MCNC: 5.7 MG/DL (ref 2.5–4.5)
PLATELET # BLD AUTO: 172 K/UL (ref 130–450)
PLATELET # BLD AUTO: 219 K/UL (ref 130–450)
PMV BLD AUTO: 10.1 FL (ref 7–12)
PMV BLD AUTO: 9.9 FL (ref 7–12)
POTASSIUM SERPL-SCNC: 4.3 MMOL/L (ref 3.5–5)
POTASSIUM SERPL-SCNC: 4.4 MMOL/L (ref 3.5–5)
POTASSIUM SERPL-SCNC: 4.4 MMOL/L (ref 3.5–5)
POTASSIUM SERPL-SCNC: NORMAL MMOL/L (ref 3.7–5.3)
PROT SERPL-MCNC: 5.3 G/DL (ref 6.4–8.3)
RBC # BLD AUTO: 2.93 M/UL (ref 3.8–5.8)
RBC # BLD AUTO: 3 M/UL (ref 3.8–5.8)
SODIUM SERPL-SCNC: 134 MMOL/L (ref 132–146)
SODIUM SERPL-SCNC: 137 MMOL/L (ref 132–146)
SODIUM SERPL-SCNC: 139 MMOL/L (ref 132–146)
SODIUM SERPL-SCNC: NORMAL MMOL/L (ref 135–144)
WBC OTHER # BLD: 10.3 K/UL (ref 4.5–11.5)
WBC OTHER # BLD: 11 K/UL (ref 4.5–11.5)

## 2024-11-15 PROCEDURE — 6370000000 HC RX 637 (ALT 250 FOR IP): Performed by: SURGERY

## 2024-11-15 PROCEDURE — 85730 THROMBOPLASTIN TIME PARTIAL: CPT

## 2024-11-15 PROCEDURE — 82962 GLUCOSE BLOOD TEST: CPT

## 2024-11-15 PROCEDURE — 2000000000 HC ICU R&B

## 2024-11-15 PROCEDURE — 2500000003 HC RX 250 WO HCPCS

## 2024-11-15 PROCEDURE — 90935 HEMODIALYSIS ONE EVALUATION: CPT

## 2024-11-15 PROCEDURE — 2700000000 HC OXYGEN THERAPY PER DAY

## 2024-11-15 PROCEDURE — 6360000002 HC RX W HCPCS

## 2024-11-15 PROCEDURE — 2580000003 HC RX 258: Performed by: INTERNAL MEDICINE

## 2024-11-15 PROCEDURE — 82550 ASSAY OF CK (CPK): CPT

## 2024-11-15 PROCEDURE — 2580000003 HC RX 258: Performed by: STUDENT IN AN ORGANIZED HEALTH CARE EDUCATION/TRAINING PROGRAM

## 2024-11-15 PROCEDURE — 83605 ASSAY OF LACTIC ACID: CPT

## 2024-11-15 PROCEDURE — 99232 SBSQ HOSP IP/OBS MODERATE 35: CPT | Performed by: INTERNAL MEDICINE

## 2024-11-15 PROCEDURE — 2580000003 HC RX 258: Performed by: SURGERY

## 2024-11-15 PROCEDURE — 2500000003 HC RX 250 WO HCPCS: Performed by: NURSE PRACTITIONER

## 2024-11-15 PROCEDURE — 6360000002 HC RX W HCPCS: Performed by: SURGERY

## 2024-11-15 PROCEDURE — 84484 ASSAY OF TROPONIN QUANT: CPT

## 2024-11-15 PROCEDURE — 6360000002 HC RX W HCPCS: Performed by: STUDENT IN AN ORGANIZED HEALTH CARE EDUCATION/TRAINING PROGRAM

## 2024-11-15 PROCEDURE — 84100 ASSAY OF PHOSPHORUS: CPT

## 2024-11-15 PROCEDURE — 83735 ASSAY OF MAGNESIUM: CPT

## 2024-11-15 PROCEDURE — 82330 ASSAY OF CALCIUM: CPT

## 2024-11-15 PROCEDURE — 85025 COMPLETE CBC W/AUTO DIFF WBC: CPT

## 2024-11-15 PROCEDURE — 36415 COLL VENOUS BLD VENIPUNCTURE: CPT

## 2024-11-15 PROCEDURE — 6370000000 HC RX 637 (ALT 250 FOR IP): Performed by: NURSE PRACTITIONER

## 2024-11-15 PROCEDURE — 80048 BASIC METABOLIC PNL TOTAL CA: CPT

## 2024-11-15 PROCEDURE — 80053 COMPREHEN METABOLIC PANEL: CPT

## 2024-11-15 RX ORDER — METOPROLOL TARTRATE 1 MG/ML
2.5 INJECTION, SOLUTION INTRAVENOUS ONCE
Status: COMPLETED | OUTPATIENT
Start: 2024-11-15 | End: 2024-11-15

## 2024-11-15 RX ORDER — METOPROLOL TARTRATE 1 MG/ML
INJECTION, SOLUTION INTRAVENOUS
Status: COMPLETED
Start: 2024-11-15 | End: 2024-11-15

## 2024-11-15 RX ORDER — MECOBALAMIN 5000 MCG
10 TABLET,DISINTEGRATING ORAL NIGHTLY
Status: DISCONTINUED | OUTPATIENT
Start: 2024-11-15 | End: 2024-11-28 | Stop reason: HOSPADM

## 2024-11-15 RX ORDER — MIDAZOLAM HYDROCHLORIDE 2 MG/2ML
1 INJECTION, SOLUTION INTRAMUSCULAR; INTRAVENOUS ONCE
Status: COMPLETED | OUTPATIENT
Start: 2024-11-15 | End: 2024-11-15

## 2024-11-15 RX ORDER — LORAZEPAM 2 MG/ML
1 INJECTION INTRAMUSCULAR ONCE
Status: COMPLETED | OUTPATIENT
Start: 2024-11-16 | End: 2024-11-16

## 2024-11-15 RX ORDER — METOPROLOL TARTRATE 25 MG/1
25 TABLET, FILM COATED ORAL 2 TIMES DAILY
Status: DISCONTINUED | OUTPATIENT
Start: 2024-11-15 | End: 2024-11-18

## 2024-11-15 RX ADMIN — METOPROLOL TARTRATE 2.5 MG: 1 INJECTION, SOLUTION INTRAVENOUS at 09:58

## 2024-11-15 RX ADMIN — INSULIN GLARGINE 12 UNITS: 100 INJECTION, SOLUTION SUBCUTANEOUS at 08:47

## 2024-11-15 RX ADMIN — METOPROLOL TARTRATE 2.5 MG: 1 INJECTION, SOLUTION INTRAVENOUS at 08:28

## 2024-11-15 RX ADMIN — INSULIN LISPRO 4 UNITS: 100 INJECTION, SOLUTION INTRAVENOUS; SUBCUTANEOUS at 19:43

## 2024-11-15 RX ADMIN — CALCIUM GLUCONATE 2000 MG: 98 INJECTION, SOLUTION INTRAVENOUS at 09:28

## 2024-11-15 RX ADMIN — METOPROLOL TARTRATE 25 MG: 25 TABLET, FILM COATED ORAL at 19:43

## 2024-11-15 RX ADMIN — HYDROMORPHONE HYDROCHLORIDE 0.5 MG: 1 INJECTION, SOLUTION INTRAMUSCULAR; INTRAVENOUS; SUBCUTANEOUS at 02:45

## 2024-11-15 RX ADMIN — ACETAMINOPHEN 1000 MG: 500 TABLET ORAL at 14:26

## 2024-11-15 RX ADMIN — ACETAMINOPHEN 1000 MG: 500 TABLET ORAL at 05:39

## 2024-11-15 RX ADMIN — INSULIN LISPRO 8 UNITS: 100 INJECTION, SOLUTION INTRAVENOUS; SUBCUTANEOUS at 16:29

## 2024-11-15 RX ADMIN — SODIUM CHLORIDE: 9 INJECTION, SOLUTION INTRAVENOUS at 04:31

## 2024-11-15 RX ADMIN — SODIUM CHLORIDE, PRESERVATIVE FREE 10 ML: 5 INJECTION INTRAVENOUS at 08:44

## 2024-11-15 RX ADMIN — OXYCODONE HYDROCHLORIDE 10 MG: 10 TABLET ORAL at 19:43

## 2024-11-15 RX ADMIN — OXYCODONE HYDROCHLORIDE 10 MG: 10 TABLET ORAL at 04:09

## 2024-11-15 RX ADMIN — HEPARIN SODIUM 14 UNITS/KG/HR: 10000 INJECTION, SOLUTION INTRAVENOUS at 15:17

## 2024-11-15 RX ADMIN — CALCIUM GLUCONATE 2000 MG: 98 INJECTION, SOLUTION INTRAVENOUS at 17:26

## 2024-11-15 RX ADMIN — METOPROLOL TARTRATE 25 MG: 25 TABLET, FILM COATED ORAL at 12:37

## 2024-11-15 RX ADMIN — ASPIRIN 81 MG CHEWABLE TABLET 81 MG: 81 TABLET CHEWABLE at 08:47

## 2024-11-15 RX ADMIN — HYDROMORPHONE HYDROCHLORIDE 0.5 MG: 1 INJECTION, SOLUTION INTRAMUSCULAR; INTRAVENOUS; SUBCUTANEOUS at 21:21

## 2024-11-15 RX ADMIN — INSULIN LISPRO 4 UNITS: 100 INJECTION, SOLUTION INTRAVENOUS; SUBCUTANEOUS at 03:20

## 2024-11-15 RX ADMIN — MIDAZOLAM 1 MG: 1 INJECTION INTRAMUSCULAR; INTRAVENOUS at 23:20

## 2024-11-15 RX ADMIN — HYDROMORPHONE HYDROCHLORIDE 0.5 MG: 1 INJECTION, SOLUTION INTRAMUSCULAR; INTRAVENOUS; SUBCUTANEOUS at 08:36

## 2024-11-15 RX ADMIN — OXYCODONE HYDROCHLORIDE 10 MG: 10 TABLET ORAL at 13:03

## 2024-11-15 RX ADMIN — SODIUM CHLORIDE, PRESERVATIVE FREE 10 ML: 5 INJECTION INTRAVENOUS at 19:46

## 2024-11-15 ASSESSMENT — PAIN SCALES - GENERAL
PAINLEVEL_OUTOF10: 9
PAINLEVEL_OUTOF10: 3
PAINLEVEL_OUTOF10: 8
PAINLEVEL_OUTOF10: 10
PAINLEVEL_OUTOF10: 8
PAINLEVEL_OUTOF10: 6
PAINLEVEL_OUTOF10: 6
PAINLEVEL_OUTOF10: 5
PAINLEVEL_OUTOF10: 5
PAINLEVEL_OUTOF10: 6
PAINLEVEL_OUTOF10: 4
PAINLEVEL_OUTOF10: 8
PAINLEVEL_OUTOF10: 8
PAINLEVEL_OUTOF10: 6
PAINLEVEL_OUTOF10: 4
PAINLEVEL_OUTOF10: 9

## 2024-11-15 ASSESSMENT — PAIN DESCRIPTION - PAIN TYPE
TYPE: SURGICAL PAIN;OTHER (COMMENT)
TYPE: SURGICAL PAIN;ACUTE PAIN

## 2024-11-15 ASSESSMENT — PAIN DESCRIPTION - LOCATION
LOCATION: LEG

## 2024-11-15 ASSESSMENT — PAIN DESCRIPTION - ORIENTATION
ORIENTATION: RIGHT
ORIENTATION: RIGHT
ORIENTATION: LEFT
ORIENTATION: RIGHT

## 2024-11-15 ASSESSMENT — PAIN DESCRIPTION - FREQUENCY
FREQUENCY: INTERMITTENT
FREQUENCY: INTERMITTENT

## 2024-11-15 ASSESSMENT — PAIN DESCRIPTION - DESCRIPTORS
DESCRIPTORS: CRAMPING
DESCRIPTORS: CRAMPING;DISCOMFORT;ACHING
DESCRIPTORS: CRAMPING;DISCOMFORT;ACHING
DESCRIPTORS: ACHING;DISCOMFORT

## 2024-11-15 ASSESSMENT — PAIN - FUNCTIONAL ASSESSMENT
PAIN_FUNCTIONAL_ASSESSMENT: PREVENTS OR INTERFERES SOME ACTIVE ACTIVITIES AND ADLS
PAIN_FUNCTIONAL_ASSESSMENT: ACTIVITIES ARE NOT PREVENTED
PAIN_FUNCTIONAL_ASSESSMENT: ACTIVITIES ARE NOT PREVENTED

## 2024-11-15 ASSESSMENT — PAIN DESCRIPTION - ONSET: ONSET: ON-GOING

## 2024-11-15 NOTE — PROGRESS NOTES
[facility] Progress Note    Name: Himanshu Bravo  MRN: [id number]    CC: Postoperative Critical Care Management      Indication for Surgery/Procedure: Bilateral Common Femoral Artery Occlusion      Important/Relevant PMH/PSH: Tobacco abuse, DMII, HTN, HLD, CAD s/p cardiac stents, s/p CABG, postop atrial fibrillation, chronic diastolic heart failure     11/11/2024 CTA abdominal aorta with bilateral runoff with contrast showed 1. Severe atherosclerotic disease with common femoral arterial occlusions,   some reconstitution in the superficial femoral and popliteal locations but   without significant runoff of the lower extremities.   2. 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      Procedure/Surgeries: 11/12/2024 Bilateral femoral endarterectomies, bilateral fasciotomies     11/12/2024 Right leg amputation above knee       Intake/Output Summary (Last 24 hours) at 11/15/2024 0833  Last data filed at 11/15/2024 0700  Gross per 24 hour   Intake 4622.98 ml   Output 2795 ml   Net 1827.98 ml       Recent Labs     11/13/24  1803 11/14/24  0403 11/15/24  0308   WBC 13.2* 12.2* 10.3   HGB 10.7* 9.9* 9.2*   HCT 32.9* 30.0* 28.8*    160 172      Lab Results   Component Value Date/Time     11/15/2024 03:09 AM    K 4.4 11/15/2024 03:09 AM    K 3.9 01/09/2018 03:15 AM    CL 97 11/15/2024 03:09 AM    CO2 30 11/15/2024 03:09 AM    BUN 36 11/15/2024 03:09 AM    CREATININE 5.1 11/15/2024 03:09 AM    GLUCOSE 193 11/15/2024 03:09 AM    GLUCOSE 216 06/14/2023 12:55 PM    CALCIUM 7.3 11/15/2024 03:09 AM    MG 2.1 11/15/2024 03:09 AM    PHOS 5.7 11/15/2024 03:09 AM         Physical Exam:    BP (!) 145/78   Pulse (!) 118   Temp 98.5 °F (36.9 °C)   Resp 15   Ht 1.727 m (5' 8\")   Wt 104.2 kg (229 lb 11.5 oz)   SpO2 94%   BMI 34.93 kg/m²       General: Awake, alert. No complaints today. Feeling better. Remains on heparin gtt, dialysis session starting   Eyes: PERRL, anicteric   Pulmonary: Diminished bibasilar. No wheezes, no  accessory muscle use noted on 2L NC  Cardiovascular:  RRR, no heaves or thrills on palpation  Tele: ST   Abdomen: Soft, nontender, + BS   Extremities: S/p Rt AKA, LLE +DP/PT signals +motor improved  Neurologic/Psych: A&Ox3, LAL to command   Skin: Warm and dry  Incisions: Bilateral groin incisions dressings intact, LLE fasciotomy dressings intact, RLE AKA       Assessment/Plan: POD #3    1.  S/p Bilateral femoral endarterectomies, bilateral fasciotomies   S/p Rt AKA 2/2 RLE ischemia, necrotic muscle, rhabdomyolysis   - Frequent neurovascular checks  - Continue Heparin gtt   - Mckeon catheter to GD  - Tolerating Diet   - Bedrest  - prn pain medication     2. ANTIONE, Rhabdomyolysis   - Bilateral renal infarct, nephrology following.   - Hyperkalemia 11/12 resolved with bicarb gtt and k cocktail.   -11/13 Temporary HD cath placed  - Second dialysis session today, Cks improving,  only with 245cc/24hrs of urine      Dispo: [facility]ICU     Electronically signed by NUZHAT Middleton - CNP on 11/15/2024 at 8:33 AM

## 2024-11-15 NOTE — PROGRESS NOTES
Critical care notified of patient having increased crapping in right thigh despite pain meds. Pulses checked. Resident at bedside. No further orders at this time.

## 2024-11-15 NOTE — FLOWSHEET NOTE
11/15/24 1210   Vital Signs   /72   Temp 98.4 °F (36.9 °C)   Pulse (!) 113   Respirations 19   SpO2 91 %   Weight - Scale 101.8 kg (224 lb 6.9 oz)   Weight Method Estimated   Percent Weight Change -2.3   Post-Hemodialysis Assessment   Post-Treatment Procedures Blood returned;Catheter capped, clamped and heparinized x 2 ports   Machine Disinfection Process Acid/Vinegar Clean;Verified Absence of Bleach in HCO3 Jug;Heat Disinfect;Machine Absence of Bleach Machine;Bleach;Exterior Machine Disinfection;Bicarb Jug Disinfection   Rinseback Volume (ml) 300 ml   Blood Volume Processed (Liters) 61.1 L   Dialyzer Clearance Lightly streaked   Duration of Treatment (minutes) 240 minutes   Hemodialysis Intake (ml) 300 ml   Hemodialysis Output (ml) 3100 ml   NET Removed (ml) 2800   Tolerated Treatment Good   Patient Response to Treatment Net -2800mL off with treatment.  No issues.  Rinsed back closed both HD ports with heparin.  Dressing changed.  Left in Care of bedside nurseAniya.   Bilateral Breath Sounds Diminished;Clear   Edema Generalized   Time Off 1154   Patient Disposition Remain in ICU/ED   Observations & Evaluations   Level of Consciousness 0   Oriented X 2   Heart Rhythm Irregular   Respiratory Quality/Effort Unlabored   O2 Device Nasal cannula   Skin Condition/Temp Warm   Abdomen Inspection Rounded;Soft   Bowel Sounds (All Quadrants) Audible

## 2024-11-15 NOTE — PROGRESS NOTES
[facility] Hospitalist Progress Note    Admitting Date and Time: 11/12/2024  2:39 AM  Admit Dx: 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]  Atrial fibrillation, unspecified type (HCC) [I48.91]    Synopsis:  Presented to ER due to concern for leg pain and shortness of breath.  Mention he has been noticing bluish discoloration of both lower extremity worsened over the last 3 days.  He was found to have lactic acidosis on presentation of 3.9.  Leukocytosis of 16,500.  Blood pressure on presentation of 119/75.  CT abdomen with aorta with bilateral runoff with contrast was obtained and showed severe atherosclerotic disease with common femoral arterial occlusions with some reconstitution in the superficial femoral and popliteal location but without significant runoff in the lower extremities, bilateral large renal infarcts.  He was started on heparin by weight.  Patient was seen by vascular surgery and taken straight to the OR.  Right AKA was done    Subjective:  Patient is being followed for 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]  Atrial fibrillation, unspecified type (HCC) [I48.91]     Tolerated another dialysis well today.  Small urine output.    ROS: denies fever, chills, cp, sob, n/v, HA unless stated above.      metoprolol tartrate  25 mg Oral BID    sodium chloride flush  5-40 mL IntraVENous 2 times per day    insulin glargine  12 Units SubCUTAneous QAM    aspirin  81 mg Oral Daily    acetaminophen  1,000 mg Oral 3 times per day    insulin lispro  0-16 Units SubCUTAneous Q4H     heparin (porcine), 80 Units/kg, PRN  heparin (porcine), 40 Units/kg, PRN  sodium chloride flush, 5-40 mL, PRN  sodium chloride, , PRN  potassium chloride, 40 mEq, PRN   Or  potassium alternative oral replacement, 40 mEq, PRN   Or  potassium chloride, 10 mEq, PRN  magnesium sulfate, 2,000 mg, PRN  ondansetron, 4 mg, Q8H PRN   Or  ondansetron, 4 mg, Q6H PRN  polyethylene glycol, 17 g, Daily PRN  glucose, 4 tablet, PRN  dextrose  rhabdomyolysis  Resolved Problems:    * No resolved hospital problems. 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  Acute limb ischemia  Bilateral renal infarct  Lactic acidosis  ANTIONE with severe rhabdomyolysis  Diabetes mellitus  Hyperkalemia, resolved    -Manage as per surgical ICU  -CT abdomenStatus post bilateral femoral embolectomy with fasciotomies on 11/12 followed by right AKA  -On heparin drip  -Vascular surgery on board  -Pain medication as needed  -Trend CPK level, slowly downtrending but remains significantly elevated.  -Nephrology consulted, was on bicarb drip, temporary HD catheter was placed, now on daily HD as per nephrology, tolerating HD well.  -Closely monitor creatinine and CPK level    NOTE: This report was transcribed using voice recognition software. Every effort was made to ensure accuracy; however, inadvertent computerized transcription errors may be present.  Electronically signed by Rani Dorsey MD on 11/15/2024 at 2:28 PM

## 2024-11-15 NOTE — PROGRESS NOTES
Vascular Surgery Progress Note    Pt is being seen in f/u today regarding bilateral femoral embolectomies     Subjective  Pt s/e.  HD yesterday, tolerated well. States pain is actually well controlled. Happy about having sensation and movement in his LLE. CK trending down.     Current Medications:    sodium chloride 150 mL/hr at 11/15/24 0431    heparin (PORCINE) Infusion 14 Units/kg/hr (11/15/24 0400)    sodium chloride      dextrose        heparin (porcine), heparin (porcine), sodium chloride flush, sodium chloride, potassium chloride **OR** potassium alternative oral replacement **OR** potassium chloride, magnesium sulfate, ondansetron **OR** ondansetron, polyethylene glycol, glucose, dextrose bolus **OR** dextrose bolus, glucagon (rDNA), dextrose, oxyCODONE **OR** oxyCODONE, HYDROmorphone    sodium chloride flush  5-40 mL IntraVENous 2 times per day    insulin glargine  12 Units SubCUTAneous QAM    aspirin  81 mg Oral Daily    acetaminophen  1,000 mg Oral 3 times per day    insulin lispro  0-16 Units SubCUTAneous Q4H        PHYSICAL EXAM:    BP (!) 140/66   Pulse (!) 118   Temp 98.5 °F (36.9 °C) (Temporal)   Resp 15   Ht 1.727 m (5' 8\")   Wt 112.5 kg (248 lb 0.3 oz)   SpO2 94%   BMI 37.71 kg/m²     Intake/Output Summary (Last 24 hours) at 11/15/2024 0713  Last data filed at 11/15/2024 0700  Gross per 24 hour   Intake 4622.98 ml   Output 2845 ml   Net 1777.98 ml          Gen: awake, alert and oriented x3, no apparent distress  CVS: RRR  Resp: No increased work of breathing, on 3L NC  Abd: Soft, non-tender, non-distended  R LE: AKA dressing taken down, minimal SS drainage from middle aspect of incision    L LE: Biphasic DP/PT, dressing with mild saturation superior aspect    LABS:    Lab Results   Component Value Date    WBC 10.3 11/15/2024    HGB 9.2 (L) 11/15/2024    HCT 28.8 (L) 11/15/2024     11/15/2024    PROTIME 12.5 (H) 01/08/2018    INR 1.1 01/08/2018    APTT 57.2 (H) 11/15/2024    K 4.4

## 2024-11-15 NOTE — PROGRESS NOTES
[facility]  MD Bryce Milligan MD Ali Hassan, MD Lisa Kniska, CNP   Natalia Rey, CODY Harris, DEMETRIUS  Progress Note    11/15/2024    SUBJECTIVE:   11/13:  Seen in [facility].  Awake and alert.  S/P right above knee amputation yesterday. Complains of pain at right AKA site.  Oxygen on per NC.  Denies chest pain, palpitation or SOB.  Denies nausea, vomiting or abd pain.  Continues on heparin drip and bicarb IV.  He is oligoanuric.     11/14: Seen in the CVIC while on dialysis. Tolerating therapy without issues. His blood pressure is stable. Heart rate is mildly elevated. He is in good spirits and reports that he is having less pain today. His appetite is fair, he did eat some cereal for dinner. Urine output is stable.     11/15: Seen in the ICU. No acute distress or complaints. Received metoprolol earlier for tachycardia. Tolerated HD today with 2.8 liters of fluid removal. He is still making urine, though urine output has decreased. He is on oxygen via nasal canula. Denies dyspnea. PO intake is stable.     PROBLEM LIST:    Principal Problem:    Femoral artery occlusion (HCC)  Active Problems:    Acute lower limb ischemia    ANTIONE (acute kidney injury) (HCC)    Non-traumatic rhabdomyolysis  Resolved Problems:    * No resolved hospital problems. *         DIET:    ADULT DIET; Regular; Low Fat/Low Chol/High Fiber/VIKASH; Low Potassium (Less than 3000 mg/day)     MEDS (scheduled):    metoprolol tartrate  25 mg Oral BID    sodium chloride flush  5-40 mL IntraVENous 2 times per day    insulin glargine  12 Units SubCUTAneous QAM    aspirin  81 mg Oral Daily    acetaminophen  1,000 mg Oral 3 times per day    insulin lispro  0-16 Units SubCUTAneous Q4H       MEDS (infusions):   sodium chloride 75 mL/hr at 11/15/24 1214    heparin (PORCINE) Infusion 14 Units/kg/hr (11/15/24 0400)    sodium chloride      dextrose         MEDS (prn):  heparin (porcine), heparin (porcine), sodium

## 2024-11-15 NOTE — PLAN OF CARE
Problem: Chronic Conditions and Co-morbidities  Goal: Patient's chronic conditions and co-morbidity symptoms are monitored and maintained or improved  11/14/2024 2057 by Erinn Mera RN  Outcome: Progressing     Problem: Pain  Goal: Verbalizes/displays adequate comfort level or baseline comfort level  11/14/2024 2057 by Erinn Mera RN  Outcome: Progressing     Problem: Skin/Tissue Integrity  Goal: Absence of new skin breakdown  Description: 1.  Monitor for areas of redness and/or skin breakdown  2.  Assess vascular access sites hourly  3.  Every 4-6 hours minimum:  Change oxygen saturation probe site  4.  Every 4-6 hours:  If on nasal continuous positive airway pressure, respiratory therapy assess nares and determine need for appliance change or resting period.  11/14/2024 2057 by Erinn Mera RN  Outcome: Progressing     Problem: Skin/Tissue Integrity - Adult  Goal: Skin integrity remains intact  Outcome: Progressing     Problem: Skin/Tissue Integrity - Adult  Goal: Incisions, wounds, or drain sites healing without S/S of infection  11/14/2024 2057 by Erinn Mera RN  Outcome: Progressing     Problem: Skin/Tissue Integrity - Adult  Goal: Oral mucous membranes remain intact  Outcome: Progressing

## 2024-11-15 NOTE — CARE COORDINATION
11/15 Care Coordination: Pt remains in [facility]. S/p Bilateral femoral endarterectomies, bilateral fasciotomies   S/p Rt AKA 2/2 RLE ischemia, necrotic muscle, rhabdomyolysis. Continue Heparin gtt  11/13 Temporary HD cath placed. Second dialysis session today, Cks improving.With Current status unclear on discharge needs.  PM&R following. CM/SW will continue to follow for discharge planning.   Swapnil WYMAN,RN--BC  [phone]

## 2024-11-15 NOTE — PLAN OF CARE
Problem: Chronic Conditions and Co-morbidities  Goal: Patient's chronic conditions and co-morbidity symptoms are monitored and maintained or improved  11/15/2024 0750 by Aniya Graham, RN  Outcome: Progressing     Problem: Discharge Planning  Goal: Discharge to home or other facility with appropriate resources  Outcome: Progressing     Problem: Pain  Goal: Verbalizes/displays adequate comfort level or baseline comfort level  11/15/2024 0750 by Aniya Graham, RN  Outcome: Progressing     Problem: Safety - Adult  Goal: Free from fall injury  Outcome: Progressing     Problem: Skin/Tissue Integrity  Goal: Absence of new skin breakdown  Description: 1.  Monitor for areas of redness and/or skin breakdown  2.  Assess vascular access sites hourly  3.  Every 4-6 hours minimum:  Change oxygen saturation probe site  4.  Every 4-6 hours:  If on nasal continuous positive airway pressure, respiratory therapy assess nares and determine need for appliance change or resting period.  11/15/2024 0750 by Aniya Graham, RN  Outcome: Progressing

## 2024-11-15 NOTE — PROGRESS NOTES
Attempted to see patient at bedside. He was sleeping. Dialysis at bedside. Will see at a later time. Therapies pending when stable.

## 2024-11-15 NOTE — PROGRESS NOTES
Critical lab values sent to Dr. Garcia - including last CK, potassium, creatinine, Bun, and ionized calcium.

## 2024-11-16 LAB
ABO/RH: NORMAL
ALBUMIN SERPL-MCNC: 2.8 G/DL (ref 3.5–5.2)
ALBUMIN SERPL-MCNC: 2.9 G/DL (ref 3.5–5.2)
ALP SERPL-CCNC: 108 U/L (ref 40–129)
ALP SERPL-CCNC: 114 U/L (ref 40–129)
ALT SERPL-CCNC: 41 U/L (ref 0–40)
ALT SERPL-CCNC: 44 U/L (ref 0–40)
ANION GAP SERPL CALCULATED.3IONS-SCNC: 12 MMOL/L (ref 7–16)
ANION GAP SERPL CALCULATED.3IONS-SCNC: 12 MMOL/L (ref 7–16)
ANION GAP SERPL CALCULATED.3IONS-SCNC: 13 MMOL/L (ref 7–16)
ANION GAP SERPL CALCULATED.3IONS-SCNC: 13 MMOL/L (ref 7–16)
ANION GAP SERPL CALCULATED.3IONS-SCNC: 5 MMOL/L (ref 7–16)
ANION GAP SERPL CALCULATED.3IONS-SCNC: 8 MMOL/L (ref 7–16)
ANTIBODY SCREEN: NEGATIVE
ARM BAND NUMBER: NORMAL
AST SERPL-CCNC: 767 U/L (ref 0–39)
AST SERPL-CCNC: 785 U/L (ref 0–39)
B.E.: -1.2 MMOL/L (ref -3–3)
BASOPHILS # BLD: 0.02 K/UL (ref 0–0.2)
BASOPHILS NFR BLD: 0 % (ref 0–2)
BILIRUB SERPL-MCNC: 0.5 MG/DL (ref 0–1.2)
BILIRUB SERPL-MCNC: 0.5 MG/DL (ref 0–1.2)
BLOOD BANK DISPENSE STATUS: NORMAL
BLOOD BANK DISPENSE STATUS: NORMAL
BLOOD BANK SAMPLE EXPIRATION: NORMAL
BPU ID: NORMAL
BPU ID: NORMAL
BUN SERPL-MCNC: 25 MG/DL (ref 6–23)
BUN SERPL-MCNC: 30 MG/DL (ref 6–23)
BUN SERPL-MCNC: 30 MG/DL (ref 6–23)
BUN SERPL-MCNC: 32 MG/DL (ref 6–23)
BUN SERPL-MCNC: 32 MG/DL (ref 6–23)
BUN SERPL-MCNC: 38 MG/DL (ref 6–23)
CA-I BLD-SCNC: 1.15 MMOL/L (ref 1.15–1.33)
CA-I BLD-SCNC: 1.17 MMOL/L (ref 1.15–1.33)
CALCIUM SERPL-MCNC: 7.9 MG/DL (ref 8.6–10.2)
CALCIUM SERPL-MCNC: 8 MG/DL (ref 8.6–10.2)
CALCIUM SERPL-MCNC: 8.1 MG/DL (ref 8.6–10.2)
CALCIUM SERPL-MCNC: 8.4 MG/DL (ref 8.6–10.2)
CHLORIDE SERPL-SCNC: 100 MMOL/L (ref 98–107)
CHLORIDE SERPL-SCNC: 98 MMOL/L (ref 98–107)
CHLORIDE SERPL-SCNC: 99 MMOL/L (ref 98–107)
CK SERPL-CCNC: ABNORMAL U/L (ref 20–200)
CK SERPL-CCNC: NORMAL U/L (ref 20–200)
CO2 SERPL-SCNC: 23 MMOL/L (ref 22–29)
CO2 SERPL-SCNC: 24 MMOL/L (ref 22–29)
CO2 SERPL-SCNC: 25 MMOL/L (ref 22–29)
CO2 SERPL-SCNC: 26 MMOL/L (ref 22–29)
CO2 SERPL-SCNC: 27 MMOL/L (ref 22–29)
CO2 SERPL-SCNC: 30 MMOL/L (ref 22–29)
COHB: 0.7 % (ref 0–1.5)
COMPONENT: NORMAL
COMPONENT: NORMAL
CREAT SERPL-MCNC: 4 MG/DL (ref 0.7–1.2)
CREAT SERPL-MCNC: 4.4 MG/DL (ref 0.7–1.2)
CREAT SERPL-MCNC: 4.6 MG/DL (ref 0.7–1.2)
CREAT SERPL-MCNC: 4.8 MG/DL (ref 0.7–1.2)
CREAT SERPL-MCNC: 4.8 MG/DL (ref 0.7–1.2)
CREAT SERPL-MCNC: 5.5 MG/DL (ref 0.7–1.2)
CRITICAL: ABNORMAL
CROSSMATCH RESULT: NORMAL
CROSSMATCH RESULT: NORMAL
DATE ANALYZED: ABNORMAL
DATE OF COLLECTION: ABNORMAL
EOSINOPHIL # BLD: 0.19 K/UL (ref 0.05–0.5)
EOSINOPHILS RELATIVE PERCENT: 2 % (ref 0–6)
ERYTHROCYTE [DISTWIDTH] IN BLOOD BY AUTOMATED COUNT: 13.8 % (ref 11.5–15)
GFR, ESTIMATED: 11 ML/MIN/1.73M2
GFR, ESTIMATED: 13 ML/MIN/1.73M2
GFR, ESTIMATED: 13 ML/MIN/1.73M2
GFR, ESTIMATED: 14 ML/MIN/1.73M2
GFR, ESTIMATED: 14 ML/MIN/1.73M2
GFR, ESTIMATED: 16 ML/MIN/1.73M2
GLUCOSE BLD-MCNC: 125 MG/DL (ref 74–99)
GLUCOSE BLD-MCNC: 152 MG/DL (ref 74–99)
GLUCOSE BLD-MCNC: 167 MG/DL (ref 74–99)
GLUCOSE BLD-MCNC: 178 MG/DL (ref 74–99)
GLUCOSE BLD-MCNC: 220 MG/DL (ref 74–99)
GLUCOSE BLD-MCNC: 299 MG/DL (ref 74–99)
GLUCOSE BLD-MCNC: 310 MG/DL (ref 74–99)
GLUCOSE SERPL-MCNC: 130 MG/DL (ref 74–99)
GLUCOSE SERPL-MCNC: 151 MG/DL (ref 74–99)
GLUCOSE SERPL-MCNC: 174 MG/DL (ref 74–99)
GLUCOSE SERPL-MCNC: 175 MG/DL (ref 74–99)
GLUCOSE SERPL-MCNC: 261 MG/DL (ref 74–99)
GLUCOSE SERPL-MCNC: 325 MG/DL (ref 74–99)
HCO3: 23.1 MMOL/L (ref 22–26)
HCT VFR BLD AUTO: 28.8 % (ref 37–54)
HGB BLD-MCNC: 8.9 G/DL (ref 12.5–16.5)
HHB: 2 % (ref 0–5)
IMM GRANULOCYTES # BLD AUTO: 0.07 K/UL (ref 0–0.58)
IMM GRANULOCYTES NFR BLD: 1 % (ref 0–5)
LAB: ABNORMAL
LACTATE BLDV-SCNC: 1.6 MMOL/L (ref 0.5–2.2)
LACTATE BLDV-SCNC: 2.5 MMOL/L (ref 0.5–2.2)
LYMPHOCYTES NFR BLD: 0.88 K/UL (ref 1.5–4)
LYMPHOCYTES RELATIVE PERCENT: 7 % (ref 20–42)
Lab: 0
MAGNESIUM SERPL-MCNC: 2.1 MG/DL (ref 1.6–2.6)
MCH RBC QN AUTO: 30.8 PG (ref 26–35)
MCHC RBC AUTO-ENTMCNC: 30.9 G/DL (ref 32–34.5)
MCV RBC AUTO: 99.7 FL (ref 80–99.9)
METHB: 0.3 % (ref 0–1.5)
MODE: ABNORMAL
MONOCYTES NFR BLD: 1.16 K/UL (ref 0.1–0.95)
MONOCYTES NFR BLD: 10 % (ref 2–12)
NEUTROPHILS NFR BLD: 81 % (ref 43–80)
NEUTS SEG NFR BLD: 9.88 K/UL (ref 1.8–7.3)
O2 SATURATION: 98 % (ref 92–98.5)
O2HB: 97 % (ref 94–97)
OPERATOR ID: 2962
PARTIAL THROMBOPLASTIN TIME: 42.9 SEC (ref 24.5–35.1)
PARTIAL THROMBOPLASTIN TIME: 44.1 SEC (ref 24.5–35.1)
PARTIAL THROMBOPLASTIN TIME: 59.1 SEC (ref 24.5–35.1)
PARTIAL THROMBOPLASTIN TIME: 67.9 SEC (ref 24.5–35.1)
PATIENT TEMP: 37 C
PCO2: 37.2 MMHG (ref 35–45)
PH BLOOD GAS: 7.41 (ref 7.35–7.45)
PHOSPHATE SERPL-MCNC: 4.3 MG/DL (ref 2.5–4.5)
PHOSPHATE SERPL-MCNC: 4.8 MG/DL (ref 2.5–4.5)
PLATELET # BLD AUTO: 225 K/UL (ref 130–450)
PMV BLD AUTO: 10.4 FL (ref 7–12)
PO2: 104.2 MMHG (ref 75–100)
POTASSIUM SERPL-SCNC: 4.2 MMOL/L (ref 3.5–5)
POTASSIUM SERPL-SCNC: 4.2 MMOL/L (ref 3.5–5)
POTASSIUM SERPL-SCNC: 4.3 MMOL/L (ref 3.5–5)
POTASSIUM SERPL-SCNC: 4.4 MMOL/L (ref 3.5–5)
POTASSIUM SERPL-SCNC: 4.5 MMOL/L (ref 3.5–5)
POTASSIUM SERPL-SCNC: 4.6 MMOL/L (ref 3.5–5)
PROT SERPL-MCNC: 5.6 G/DL (ref 6.4–8.3)
PROT SERPL-MCNC: 5.9 G/DL (ref 6.4–8.3)
RBC # BLD AUTO: 2.89 M/UL (ref 3.8–5.8)
SODIUM SERPL-SCNC: 132 MMOL/L (ref 132–146)
SODIUM SERPL-SCNC: 134 MMOL/L (ref 132–146)
SODIUM SERPL-SCNC: 135 MMOL/L (ref 132–146)
SODIUM SERPL-SCNC: 136 MMOL/L (ref 132–146)
SODIUM SERPL-SCNC: 136 MMOL/L (ref 132–146)
SODIUM SERPL-SCNC: 139 MMOL/L (ref 132–146)
SOURCE, BLOOD GAS: ABNORMAL
THB: 10.4 G/DL (ref 11.5–16.5)
TIME ANALYZED: 16
TRANSFUSION STATUS: NORMAL
TRANSFUSION STATUS: NORMAL
TROPONIN I SERPL HS-MCNC: 166 NG/L (ref 0–11)
UNIT DIVISION: 0
UNIT DIVISION: 0
WBC OTHER # BLD: 12.2 K/UL (ref 4.5–11.5)

## 2024-11-16 PROCEDURE — 6370000000 HC RX 637 (ALT 250 FOR IP)

## 2024-11-16 PROCEDURE — 83735 ASSAY OF MAGNESIUM: CPT

## 2024-11-16 PROCEDURE — 84100 ASSAY OF PHOSPHORUS: CPT

## 2024-11-16 PROCEDURE — 90935 HEMODIALYSIS ONE EVALUATION: CPT

## 2024-11-16 PROCEDURE — 2000000000 HC ICU R&B

## 2024-11-16 PROCEDURE — 2700000000 HC OXYGEN THERAPY PER DAY

## 2024-11-16 PROCEDURE — 99233 SBSQ HOSP IP/OBS HIGH 50: CPT | Performed by: INTERNAL MEDICINE

## 2024-11-16 PROCEDURE — 6360000002 HC RX W HCPCS: Performed by: SURGERY

## 2024-11-16 PROCEDURE — 82550 ASSAY OF CK (CPK): CPT

## 2024-11-16 PROCEDURE — 2500000003 HC RX 250 WO HCPCS

## 2024-11-16 PROCEDURE — 82805 BLOOD GASES W/O2 SATURATION: CPT

## 2024-11-16 PROCEDURE — 6370000000 HC RX 637 (ALT 250 FOR IP): Performed by: STUDENT IN AN ORGANIZED HEALTH CARE EDUCATION/TRAINING PROGRAM

## 2024-11-16 PROCEDURE — 82962 GLUCOSE BLOOD TEST: CPT

## 2024-11-16 PROCEDURE — 2580000003 HC RX 258: Performed by: INTERNAL MEDICINE

## 2024-11-16 PROCEDURE — 93005 ELECTROCARDIOGRAM TRACING: CPT | Performed by: INTERNAL MEDICINE

## 2024-11-16 PROCEDURE — 93005 ELECTROCARDIOGRAM TRACING: CPT

## 2024-11-16 PROCEDURE — 82330 ASSAY OF CALCIUM: CPT

## 2024-11-16 PROCEDURE — 6360000002 HC RX W HCPCS

## 2024-11-16 PROCEDURE — 6370000000 HC RX 637 (ALT 250 FOR IP): Performed by: SURGERY

## 2024-11-16 PROCEDURE — 6370000000 HC RX 637 (ALT 250 FOR IP): Performed by: NURSE PRACTITIONER

## 2024-11-16 PROCEDURE — 2580000003 HC RX 258

## 2024-11-16 PROCEDURE — 83605 ASSAY OF LACTIC ACID: CPT

## 2024-11-16 PROCEDURE — 6360000002 HC RX W HCPCS: Performed by: STUDENT IN AN ORGANIZED HEALTH CARE EDUCATION/TRAINING PROGRAM

## 2024-11-16 PROCEDURE — 99291 CRITICAL CARE FIRST HOUR: CPT | Performed by: INTERNAL MEDICINE

## 2024-11-16 PROCEDURE — 80053 COMPREHEN METABOLIC PANEL: CPT

## 2024-11-16 PROCEDURE — 80048 BASIC METABOLIC PNL TOTAL CA: CPT

## 2024-11-16 PROCEDURE — 85025 COMPLETE CBC W/AUTO DIFF WBC: CPT

## 2024-11-16 PROCEDURE — 2580000003 HC RX 258: Performed by: SURGERY

## 2024-11-16 PROCEDURE — 2500000003 HC RX 250 WO HCPCS: Performed by: INTERNAL MEDICINE

## 2024-11-16 PROCEDURE — 36415 COLL VENOUS BLD VENIPUNCTURE: CPT

## 2024-11-16 PROCEDURE — 85730 THROMBOPLASTIN TIME PARTIAL: CPT

## 2024-11-16 RX ORDER — FOLIC ACID 1 MG/1
1 TABLET ORAL DAILY
Status: DISCONTINUED | OUTPATIENT
Start: 2024-11-16 | End: 2024-11-28 | Stop reason: HOSPADM

## 2024-11-16 RX ORDER — LANOLIN ALCOHOL/MO/W.PET/CERES
100 CREAM (GRAM) TOPICAL DAILY
Status: DISCONTINUED | OUTPATIENT
Start: 2024-11-16 | End: 2024-11-28 | Stop reason: HOSPADM

## 2024-11-16 RX ORDER — METOPROLOL TARTRATE 1 MG/ML
5 INJECTION, SOLUTION INTRAVENOUS ONCE
Status: COMPLETED | OUTPATIENT
Start: 2024-11-16 | End: 2024-11-16

## 2024-11-16 RX ORDER — METOPROLOL TARTRATE 1 MG/ML
2.5 INJECTION, SOLUTION INTRAVENOUS ONCE
Status: COMPLETED | OUTPATIENT
Start: 2024-11-16 | End: 2024-11-16

## 2024-11-16 RX ORDER — PHENOBARBITAL SODIUM 65 MG/ML
65 INJECTION, SOLUTION INTRAMUSCULAR; INTRAVENOUS ONCE
Status: COMPLETED | OUTPATIENT
Start: 2024-11-16 | End: 2024-11-16

## 2024-11-16 RX ORDER — PHENOBARBITAL SODIUM 65 MG/ML
130 INJECTION, SOLUTION INTRAMUSCULAR; INTRAVENOUS EVERY 6 HOURS
Status: DISCONTINUED | OUTPATIENT
Start: 2024-11-16 | End: 2024-11-23

## 2024-11-16 RX ORDER — PHENOBARBITAL SODIUM 65 MG/ML
65 INJECTION, SOLUTION INTRAMUSCULAR; INTRAVENOUS EVERY 6 HOURS
Status: DISCONTINUED | OUTPATIENT
Start: 2024-11-16 | End: 2024-11-16

## 2024-11-16 RX ADMIN — Medication 10 MG: at 19:46

## 2024-11-16 RX ADMIN — METOPROLOL TARTRATE 25 MG: 25 TABLET, FILM COATED ORAL at 19:46

## 2024-11-16 RX ADMIN — SODIUM CHLORIDE, PRESERVATIVE FREE 10 ML: 5 INJECTION INTRAVENOUS at 19:48

## 2024-11-16 RX ADMIN — HYDROMORPHONE HYDROCHLORIDE 0.5 MG: 1 INJECTION, SOLUTION INTRAMUSCULAR; INTRAVENOUS; SUBCUTANEOUS at 03:02

## 2024-11-16 RX ADMIN — Medication 1 MG: at 13:01

## 2024-11-16 RX ADMIN — HEPARIN SODIUM 4400 UNITS: 1000 INJECTION INTRAVENOUS; SUBCUTANEOUS at 15:42

## 2024-11-16 RX ADMIN — HYDROMORPHONE HYDROCHLORIDE 0.5 MG: 1 INJECTION, SOLUTION INTRAMUSCULAR; INTRAVENOUS; SUBCUTANEOUS at 10:07

## 2024-11-16 RX ADMIN — DEXMEDETOMIDINE 0.2 MCG/KG/HR: 200 INJECTION, SOLUTION INTRAVENOUS at 21:05

## 2024-11-16 RX ADMIN — SODIUM CHLORIDE, PRESERVATIVE FREE 10 ML: 5 INJECTION INTRAVENOUS at 08:44

## 2024-11-16 RX ADMIN — PHENOBARBITAL SODIUM 65 MG: 65 INJECTION, SOLUTION INTRAMUSCULAR; INTRAVENOUS at 05:35

## 2024-11-16 RX ADMIN — HEPARIN SODIUM 16 UNITS/KG/HR: 10000 INJECTION, SOLUTION INTRAVENOUS at 08:27

## 2024-11-16 RX ADMIN — ASPIRIN 81 MG CHEWABLE TABLET 81 MG: 81 TABLET CHEWABLE at 08:41

## 2024-11-16 RX ADMIN — METOPROLOL TARTRATE 2.5 MG: 1 INJECTION, SOLUTION INTRAVENOUS at 08:29

## 2024-11-16 RX ADMIN — PHENOBARBITAL SODIUM 65 MG: 65 INJECTION INTRAMUSCULAR at 12:37

## 2024-11-16 RX ADMIN — PHENOBARBITAL SODIUM 65 MG: 65 INJECTION, SOLUTION INTRAMUSCULAR; INTRAVENOUS at 10:43

## 2024-11-16 RX ADMIN — HEPARIN SODIUM 18 UNITS/KG/HR: 10000 INJECTION, SOLUTION INTRAVENOUS at 23:50

## 2024-11-16 RX ADMIN — HEPARIN SODIUM 4400 UNITS: 1000 INJECTION INTRAVENOUS; SUBCUTANEOUS at 04:49

## 2024-11-16 RX ADMIN — PHENOBARBITAL SODIUM 130 MG: 65 INJECTION INTRAMUSCULAR; INTRAVENOUS at 15:47

## 2024-11-16 RX ADMIN — INSULIN LISPRO 8 UNITS: 100 INJECTION, SOLUTION INTRAVENOUS; SUBCUTANEOUS at 11:13

## 2024-11-16 RX ADMIN — INSULIN GLARGINE 12 UNITS: 100 INJECTION, SOLUTION SUBCUTANEOUS at 08:41

## 2024-11-16 RX ADMIN — INSULIN LISPRO 12 UNITS: 100 INJECTION, SOLUTION INTRAVENOUS; SUBCUTANEOUS at 23:07

## 2024-11-16 RX ADMIN — ACETAMINOPHEN 1000 MG: 500 TABLET ORAL at 13:01

## 2024-11-16 RX ADMIN — INSULIN LISPRO 4 UNITS: 100 INJECTION, SOLUTION INTRAVENOUS; SUBCUTANEOUS at 08:40

## 2024-11-16 RX ADMIN — PHENOBARBITAL SODIUM 130 MG: 65 INJECTION INTRAMUSCULAR; INTRAVENOUS at 20:46

## 2024-11-16 RX ADMIN — LORAZEPAM 1 MG: 2 INJECTION INTRAMUSCULAR; INTRAVENOUS at 00:14

## 2024-11-16 RX ADMIN — OXYCODONE HYDROCHLORIDE 10 MG: 10 TABLET ORAL at 08:41

## 2024-11-16 RX ADMIN — OXYCODONE HYDROCHLORIDE 10 MG: 10 TABLET ORAL at 18:46

## 2024-11-16 RX ADMIN — METOPROLOL TARTRATE 25 MG: 25 TABLET, FILM COATED ORAL at 08:41

## 2024-11-16 RX ADMIN — SODIUM CHLORIDE: 9 INJECTION, SOLUTION INTRAVENOUS at 11:49

## 2024-11-16 RX ADMIN — METOPROLOL TARTRATE 5 MG: 1 INJECTION, SOLUTION INTRAVENOUS at 11:25

## 2024-11-16 RX ADMIN — Medication 100 MG: at 13:01

## 2024-11-16 ASSESSMENT — PAIN DESCRIPTION - FREQUENCY
FREQUENCY: INTERMITTENT
FREQUENCY: CONTINUOUS

## 2024-11-16 ASSESSMENT — PAIN DESCRIPTION - LOCATION
LOCATION: LEG
LOCATION: LEG
LOCATION: INCISION
LOCATION: LEG

## 2024-11-16 ASSESSMENT — PAIN DESCRIPTION - ONSET: ONSET: ON-GOING

## 2024-11-16 ASSESSMENT — PAIN SCALES - GENERAL
PAINLEVEL_OUTOF10: 5
PAINLEVEL_OUTOF10: 4
PAINLEVEL_OUTOF10: 0
PAINLEVEL_OUTOF10: 6
PAINLEVEL_OUTOF10: 5
PAINLEVEL_OUTOF10: 5
PAINLEVEL_OUTOF10: 0
PAINLEVEL_OUTOF10: 0
PAINLEVEL_OUTOF10: 7
PAINLEVEL_OUTOF10: 10
PAINLEVEL_OUTOF10: 8
PAINLEVEL_OUTOF10: 5
PAINLEVEL_OUTOF10: 6
PAINLEVEL_OUTOF10: 0

## 2024-11-16 ASSESSMENT — PAIN DESCRIPTION - DESCRIPTORS
DESCRIPTORS: ACHING
DESCRIPTORS: ACHING;CRAMPING;DISCOMFORT
DESCRIPTORS: ACHING
DESCRIPTORS: ACHING;DISCOMFORT

## 2024-11-16 ASSESSMENT — PAIN DESCRIPTION - ORIENTATION
ORIENTATION: RIGHT;LEFT
ORIENTATION: RIGHT
ORIENTATION: RIGHT;LEFT
ORIENTATION: LEFT;RIGHT

## 2024-11-16 ASSESSMENT — PAIN - FUNCTIONAL ASSESSMENT
PAIN_FUNCTIONAL_ASSESSMENT: PREVENTS OR INTERFERES SOME ACTIVE ACTIVITIES AND ADLS
PAIN_FUNCTIONAL_ASSESSMENT: PREVENTS OR INTERFERES SOME ACTIVE ACTIVITIES AND ADLS

## 2024-11-16 ASSESSMENT — PAIN DESCRIPTION - PAIN TYPE
TYPE: ACUTE PAIN;SURGICAL PAIN;CHRONIC PAIN
TYPE: SURGICAL PAIN;NEUROPATHIC PAIN

## 2024-11-16 NOTE — PROGRESS NOTES
Vascular Surgery Progress Note    Pt is being seen in f/u today regarding bilateral femoral embolectomies     Subjective  Patient confused, hallucinating, paranoid overnight. Through the night became increasingly tachycardic, intermittently hypertensive. Diaphoretic intermittently.    Current Medications:    sodium chloride 75 mL/hr at 11/15/24 1851    heparin (PORCINE) Infusion 14 Units/kg/hr (11/15/24 1851)    sodium chloride      dextrose        heparin (porcine), heparin (porcine), sodium chloride flush, sodium chloride, potassium chloride **OR** potassium alternative oral replacement **OR** potassium chloride, magnesium sulfate, ondansetron **OR** ondansetron, polyethylene glycol, glucose, dextrose bolus **OR** dextrose bolus, glucagon (rDNA), dextrose, oxyCODONE **OR** oxyCODONE, HYDROmorphone    metoprolol tartrate  25 mg Oral BID    melatonin  10 mg Oral Nightly    sodium chloride flush  5-40 mL IntraVENous 2 times per day    insulin glargine  12 Units SubCUTAneous QAM    aspirin  81 mg Oral Daily    acetaminophen  1,000 mg Oral 3 times per day    insulin lispro  0-16 Units SubCUTAneous Q4H        PHYSICAL EXAM:    BP (!) 121/109   Pulse (!) 111   Temp 98.4 °F (36.9 °C) (Temporal)   Resp 19   Ht 1.727 m (5' 8\")   Wt 101.8 kg (224 lb 6.9 oz)   SpO2 96%   BMI 34.12 kg/m²     Intake/Output Summary (Last 24 hours) at 11/16/2024 0254  Last data filed at 11/16/2024 0200  Gross per 24 hour   Intake 3634.06 ml   Output 3350 ml   Net 284.06 ml          Gen: awake, alert and oriented x3, no apparent distress  CVS: RRR  Resp: No increased work of breathing, on 3L NC  Abd: Soft, non-tender, non-distended  R LE: AKA dressing taken down, minimal SS drainage from middle aspect of incision  L LE: Biphasic DP/PT, dressing with mild saturation superior aspect, dressing taken down and bilateral fasciotomy wounds pink and viable appearing w some serosanguineous drainage noted    LABS:    Lab Results   Component Value

## 2024-11-16 NOTE — PROGRESS NOTES
Patient is fully confused, difficult to redirect. Dr. Malave is aware of patient status. Patient is delusional and is becoming suspicious of nurses and sitter.

## 2024-11-16 NOTE — ANESTHESIA POSTPROCEDURE EVALUATION
Department of Anesthesiology  Postprocedure Note    Patient: Himanshu Bravo  MRN: [id number]  YOB: 1960  Date of evaluation: 11/16/2024    Procedure Summary       Date: 11/12/24 Room / Location: [address]    Anesthesia Start: 0354 Anesthesia Stop: 0702    Procedure: BILATERAL embolectomy, with bilateral 2 compartment fasciotomy (Bilateral) Diagnosis:       Femoral artery occlusion (HCC)      (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])    Surgeons: Jasmyne Henriquez MD Responsible Provider: Carolyn Bonner MD    Anesthesia Type: General ASA Status: 3 - Emergent            Anesthesia Type: General    Angelica Phase I: Angelica Score: 9    Angelica Phase II:      Anesthesia Post Evaluation    Patient location during evaluation: PACU  Patient participation: complete - patient participated  Level of consciousness: awake  Airway patency: patent  Nausea & Vomiting: no nausea and no vomiting  Cardiovascular status: hemodynamically stable  Respiratory status: acceptable  Hydration status: euvolemic  Pain management: adequate        No notable events documented.

## 2024-11-16 NOTE — PROGRESS NOTES
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  Mention he has been noticing bluish discoloration of both lower extremity worsened over the last 3 days.  He was found to have lactic acidosis on presentation of 3.9.  Leukocytosis of 16,500.  Blood pressure on presentation of 119/75.  CT abdomen with aorta with bilateral runoff with contrast was obtained and showed severe atherosclerotic disease with common femoral arterial occlusions with some reconstitution in the superficial femoral and popliteal location but without significant runoff in the lower extremities, bilateral large renal infarcts.  He was started on heparin by weight.  Patient was seen by vascular surgery and taken straight to the OR.  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  Remains tachycardic today, heart rate into 140s, sinus tach.  He denies any chest pain.    ROS: denies fever, chills, cp, sob, n/v, HA unless stated above.      PHENobarbital  65 mg IntraVENous Q6H    metoprolol tartrate  25 mg Oral BID    melatonin  10 mg Oral Nightly    sodium chloride flush  5-40 mL IntraVENous 2 times per day    insulin glargine  12 Units SubCUTAneous QAM    aspirin  81 mg Oral Daily    acetaminophen  1,000 mg Oral 3 times per day    insulin lispro  0-16 Units SubCUTAneous Q4H     heparin (porcine), 80 Units/kg, PRN  heparin (porcine), 40 Units/kg, PRN  sodium chloride flush, 5-40 mL, PRN  sodium chloride, , PRN  potassium chloride, 40 mEq, PRN   Or  potassium alternative oral replacement, 40 mEq, PRN   Or  potassium chloride, 10 mEq,

## 2024-11-16 NOTE — PLAN OF CARE
Problem: Chronic Conditions and Co-morbidities  Goal: Patient's chronic conditions and co-morbidity symptoms are monitored and maintained or improved  Outcome: Progressing     Problem: Discharge Planning  Goal: Discharge to home or other facility with appropriate resources  Outcome: Progressing     Problem: Pain  Goal: Verbalizes/displays adequate comfort level or baseline comfort level  Outcome: Progressing     Problem: Safety - Adult  Goal: Free from fall injury  Outcome: Progressing     Problem: Skin/Tissue Integrity  Goal: Absence of new skin breakdown  Description: 1.  Monitor for areas of redness and/or skin breakdown  2.  Assess vascular access sites hourly  3.  Every 4-6 hours minimum:  Change oxygen saturation probe site  4.  Every 4-6 hours:  If on nasal continuous positive airway pressure, respiratory therapy assess nares and determine need for appliance change or resting period.  Outcome: Progressing     Problem: ABCDS Injury Assessment  Goal: Absence of physical injury  Outcome: Progressing     Problem: Safety - Medical Restraint  Goal: Remains free of injury from restraints (Restraint for Interference with Medical Device)  Description: INTERVENTIONS:  1. Determine that other, less restrictive measures have been tried or would not be effective before applying the restraint  2. Evaluate the patient's condition at the time of restraint application  3. Inform patient/family regarding the reason for restraint  4. Q2H: Monitor safety, psychosocial status, comfort, nutrition and hydration  11/16/2024 1040 by Aniya Graham RN  Outcome: Completed

## 2024-11-16 NOTE — PROGRESS NOTES
[facility]  MD Bryce Milligan MD Ali Hassan, MD Lisa Kniska, CNP   Natalia Rey, CODY Harris, DEMETRIUS  Progress Note    11/16/2024    SUBJECTIVE:   11/13:  Seen in [facility].  Awake and alert.  S/P right above knee amputation yesterday. Complains of pain at right AKA site.  Oxygen on per NC.  Denies chest pain, palpitation or SOB.  Denies nausea, vomiting or abd pain.  Continues on heparin drip and bicarb IV.  He is oligoanuric.     11/14: Seen in the [facility] while on dialysis. Tolerating therapy without issues. His blood pressure is stable. Heart rate is mildly elevated. He is in good spirits and reports that he is having less pain today. His appetite is fair, he did eat some cereal for dinner. Urine output is stable.     11/15: Seen in the ICU. No acute distress or complaints. Received metoprolol earlier for tachycardia. Tolerated HD today with 2.8 liters of fluid removal. He is still making urine, though urine output has decreased. He is on oxygen via nasal canula. Denies dyspnea. PO intake is stable.     11/16: Undergoing with withdrawal of alcohol symptoms.  Obtunded.  Awake agitated aggressive at times though symptoms have improved status post sedation    PROBLEM LIST:    Principal Problem:    Femoral artery occlusion (HCC)  Active Problems:    Acute lower limb ischemia    ANTIONE (acute kidney injury) (HCC)    Non-traumatic rhabdomyolysis  Resolved Problems:    * No resolved hospital problems. *         DIET:    ADULT DIET; Regular; Low Fat/Low Chol/High Fiber/VIKASH; Low Potassium (Less than 3000 mg/day)     MEDS (scheduled):    PHENobarbital  130 mg IntraVENous Q6H    thiamine  100 mg Oral Daily    folic acid  1 mg Oral Daily    metoprolol tartrate  25 mg Oral BID    melatonin  10 mg Oral Nightly    sodium chloride flush  5-40 mL IntraVENous 2 times per day    insulin glargine  12 Units SubCUTAneous QAM    aspirin  81 mg Oral Daily    acetaminophen  1,000 mg Oral 3

## 2024-11-16 NOTE — PROGRESS NOTES
11/15/2024: 2145 - RN notified Dr. Malave that patient is confused and not able to be oriented at this time. Asking for something for sleep or to calm him. Orders placed in chart for 10 mg of melatonin orally.     2200 - RN at bedside, patient is refusing melatonin at this time, saying that he are trying to hurt him and keeping him against his will. Patient pulling at lines and tubes. RN attempted to educate on where he is and the importance of lines and tubes. Patient on phone attempting to call friends but unable to reach anyone - patient yelling that RN tampered with phone and broke its ability to make calls and take pictures.     2205 - Critical care notified that patient is more agitated and is non-redirectable. Orders placed in chart for 1 mg of versed IV.     2230 - Patient is very agitated  and is refusing all medications orally or IV.     Sitter ordered for patient safety.     2315 - Dr Malave at bedside to assess patient. Patient believes that he is in an airplane  and continues to report that it is not professional to have planes flying overhead. Versed 1 mg given. Labs and ABG drawn.     2330 - Patient continuing to become more agitated and pulling at lines and tubes. Bilateral wrist restraints applied for patient safety.     Sitter continued per Dr. Malave until patient and situation are stable and can be re-assessed.     0400 -RN spoke with critical care resident about patient having increasing diaphoresis, delusions, and hallucinations. While patient being changed, he mentioned drinking crown royal and beer. Conveyed to resident. No new orders at this time.     0530 - Critical care resident at bedside to change leg dressing, due to current condition/mentation, 65 mg of phenobarbital ordered every 6 hours IV.

## 2024-11-16 NOTE — CONSULTS
11/16/2024 03:00 AM    MCH 30.8 11/16/2024 03:00 AM    MCHC 30.9 11/16/2024 03:00 AM    RDW 13.8 11/16/2024 03:00 AM     11/16/2024 03:00 AM    MPV 10.4 11/16/2024 03:00 AM     BMP: @BRIEFLAB(N  A,K,CL,CO2,BUN,LABALBU,CREATININE,CALCIUM,GFRAA,LABGLOM,AGLUCOSE)@  Magnesium:    Lab Results   Component Value Date/Time    MG 2.1 11/16/2024 03:01 AM     Cardiac Enzymes:   Lab Results   Component Value Date    CKTOTAL 19,289 (H) 11/16/2024    CKTOTAL 29,730 11/16/2024    CKTOTAL CK TOTAL 39853 11/15/2024    CKMB <1.0 01/11/2018    TROPONINI <0.01 01/16/2018    TROPONINI <0.01 01/11/2018      PT/INR:    Lab Results   Component Value Date/Time    PROTIME 12.5 01/08/2018 12:40 PM    INR 1.1 01/08/2018 12:40 PM     TSH:    Lab Results   Component Value Date/Time    TSH 0.541 04/08/2017 01:00 PM       Pertinent cardiac studies:    -TTE 11/12/2024:    Left Ventricle: Normal left ventricular systolic function with EF 55%. Left ventricle size is normal. Normal wall thickness. Mild Inferior hypokinesis.  Normal diastolic function.    Right Ventricle: Normal systolic function. TAPSE is normal.    Left Atrium: Left atrium is borderline dilated. No thrombus.    Pericardium: No pericardial effusion.    No intra cardiac mass.    Priro study done Jan 2018.  -ECG 11/16/2024:  Atrial flutter with variable AV block  -Telemetry personally reviewed with atrial fibrillation/flutter          Impression/Plan:    64-year-old male past medical history of CAD (STEMI in 2/2022 s/p PCI to RCA and LCx and Prober to RPL B2, residual ostial LAD disease s/p CABG x 1 with LIMA to LAD in 5/2022), transient postop A-fib, COPD, ICM with HFrEF (EF 35-40% on TTE in 2022), HTN, HLD, smoking, PAD who presented to the hospital with claudication and leg pain and was found to have bilateral embolic femoral artery occlusion along with bilateral renal infarcts.  He was taken emergently to the operating room for bilateral femoral endarterectomy with fasciotomy  showing nonviable muscle s/p right AKA on 11/12/2024.  TTE at that time showed an EF of 55% with some inferior wall hypokinesis.    Newly diagnosed atrial flutter/fib:  Likely represents the etiology of his thrombotic events  Patient needs to be anticoagulated when okay from surgery standpoint  Continue metoprolol 25 mg twice daily, can increase dose if BP tolerates for rate control  CAD:  STEMI in 2/2022 s/p PCI to RCA and LCx and Prober to RPL B2, residual ostial LAD disease s/p CABG x 1 with LIMA to LAD in 5/2022  Continue aspirin 81 mg daily  Consider statin if no contraindication  Metoprolol as above        Chuck De Luna MD  Interventional Cardiology/ Structural heart disease    I spent a total of 41 minutes of critical care time on the date of the service which included preparing to see the patient, face-to-face patient care, completing clinical documentation, obtaining and/or reviewing separately obtained history, performing a medically appropriate examination, counseling and educating the patient/family/caregiver, and ordering medications, tests, or procedures.

## 2024-11-16 NOTE — PLAN OF CARE
Problem: Safety - Medical Restraint  Goal: Remains free of injury from restraints (Restraint for Interference with Medical Device)  Description: INTERVENTIONS:  1. Determine that other, less restrictive measures have been tried or would not be effective before applying the restraint  2. Evaluate the patient's condition at the time of restraint application  3. Inform patient/family regarding the reason for restraint  4. Q2H: Monitor safety, psychosocial status, comfort, nutrition and hydration  Outcome: Progressing  Flowsheets  Taken 11/16/2024 0034  Remains free of injury from restraints (restraint for interference with medical device):   Determine that other, less restrictive measures have been tried or would not be effective before applying the restraint   Evaluate the patient's condition at the time of restraint application   Every 2 hours: Monitor safety, psychosocial status, comfort, nutrition and hydration

## 2024-11-16 NOTE — PROGRESS NOTES
Spoke w pt's sister Supriya, reports finding multiple cans/bottles of beer and liqueur in his house, believes he has been drinking heavily since wife has passed.

## 2024-11-17 LAB
ALBUMIN SERPL-MCNC: 2.6 G/DL (ref 3.5–5.2)
ALP SERPL-CCNC: 107 U/L (ref 40–129)
ALT SERPL-CCNC: 33 U/L (ref 0–40)
ANION GAP SERPL CALCULATED.3IONS-SCNC: 10 MMOL/L (ref 7–16)
ANION GAP SERPL CALCULATED.3IONS-SCNC: 10 MMOL/L (ref 7–16)
ANION GAP SERPL CALCULATED.3IONS-SCNC: 9 MMOL/L (ref 7–16)
ANION GAP SERPL CALCULATED.3IONS-SCNC: 9 MMOL/L (ref 7–16)
AST SERPL-CCNC: 353 U/L (ref 0–39)
BASOPHILS # BLD: 0.02 K/UL (ref 0–0.2)
BASOPHILS NFR BLD: 0 % (ref 0–2)
BILIRUB SERPL-MCNC: 0.3 MG/DL (ref 0–1.2)
BUN SERPL-MCNC: 28 MG/DL (ref 6–23)
BUN SERPL-MCNC: 32 MG/DL (ref 6–23)
BUN SERPL-MCNC: 35 MG/DL (ref 6–23)
BUN SERPL-MCNC: 38 MG/DL (ref 6–23)
CA-I BLD-SCNC: 1.14 MMOL/L (ref 1.15–1.33)
CALCIUM SERPL-MCNC: 8 MG/DL (ref 8.6–10.2)
CALCIUM SERPL-MCNC: 8.2 MG/DL (ref 8.6–10.2)
CALCIUM SERPL-MCNC: 8.3 MG/DL (ref 8.6–10.2)
CALCIUM SERPL-MCNC: 8.5 MG/DL (ref 8.6–10.2)
CHLORIDE SERPL-SCNC: 100 MMOL/L (ref 98–107)
CHLORIDE SERPL-SCNC: 95 MMOL/L (ref 98–107)
CHLORIDE SERPL-SCNC: 95 MMOL/L (ref 98–107)
CHLORIDE SERPL-SCNC: 96 MMOL/L (ref 98–107)
CK SERPL-CCNC: 8902 U/L (ref 20–200)
CO2 SERPL-SCNC: 26 MMOL/L (ref 22–29)
CO2 SERPL-SCNC: 27 MMOL/L (ref 22–29)
CREAT SERPL-MCNC: 4.5 MG/DL (ref 0.7–1.2)
CREAT SERPL-MCNC: 5.2 MG/DL (ref 0.7–1.2)
CREAT SERPL-MCNC: 5.8 MG/DL (ref 0.7–1.2)
CREAT SERPL-MCNC: 6.3 MG/DL (ref 0.7–1.2)
EOSINOPHIL # BLD: 0.25 K/UL (ref 0.05–0.5)
EOSINOPHILS RELATIVE PERCENT: 3 % (ref 0–6)
ERYTHROCYTE [DISTWIDTH] IN BLOOD BY AUTOMATED COUNT: 13.7 % (ref 11.5–15)
GFR, ESTIMATED: 10 ML/MIN/1.73M2
GFR, ESTIMATED: 12 ML/MIN/1.73M2
GFR, ESTIMATED: 14 ML/MIN/1.73M2
GFR, ESTIMATED: 9 ML/MIN/1.73M2
GLUCOSE BLD-MCNC: 148 MG/DL (ref 74–99)
GLUCOSE BLD-MCNC: 160 MG/DL (ref 74–99)
GLUCOSE BLD-MCNC: 207 MG/DL (ref 74–99)
GLUCOSE BLD-MCNC: 264 MG/DL (ref 74–99)
GLUCOSE BLD-MCNC: 295 MG/DL (ref 74–99)
GLUCOSE SERPL-MCNC: 147 MG/DL (ref 74–99)
GLUCOSE SERPL-MCNC: 231 MG/DL (ref 74–99)
GLUCOSE SERPL-MCNC: 266 MG/DL (ref 74–99)
GLUCOSE SERPL-MCNC: 275 MG/DL (ref 74–99)
HCT VFR BLD AUTO: 26.9 % (ref 37–54)
HGB BLD-MCNC: 8.4 G/DL (ref 12.5–16.5)
IMM GRANULOCYTES # BLD AUTO: 0.04 K/UL (ref 0–0.58)
IMM GRANULOCYTES NFR BLD: 0 % (ref 0–5)
LYMPHOCYTES NFR BLD: 0.88 K/UL (ref 1.5–4)
LYMPHOCYTES RELATIVE PERCENT: 9 % (ref 20–42)
MAGNESIUM SERPL-MCNC: 2.1 MG/DL (ref 1.6–2.6)
MCH RBC QN AUTO: 31 PG (ref 26–35)
MCHC RBC AUTO-ENTMCNC: 31.2 G/DL (ref 32–34.5)
MCV RBC AUTO: 99.3 FL (ref 80–99.9)
MONOCYTES NFR BLD: 1.2 K/UL (ref 0.1–0.95)
MONOCYTES NFR BLD: 12 % (ref 2–12)
NEUTROPHILS NFR BLD: 75 % (ref 43–80)
NEUTS SEG NFR BLD: 7.32 K/UL (ref 1.8–7.3)
PARTIAL THROMBOPLASTIN TIME: 53.9 SEC (ref 24.5–35.1)
PHOSPHATE SERPL-MCNC: 4.2 MG/DL (ref 2.5–4.5)
PLATELET # BLD AUTO: 237 K/UL (ref 130–450)
PMV BLD AUTO: 10.3 FL (ref 7–12)
POTASSIUM SERPL-SCNC: 4.2 MMOL/L (ref 3.5–5)
POTASSIUM SERPL-SCNC: 4.2 MMOL/L (ref 3.5–5)
POTASSIUM SERPL-SCNC: 4.3 MMOL/L (ref 3.5–5)
POTASSIUM SERPL-SCNC: 4.7 MMOL/L (ref 3.5–5)
PROT SERPL-MCNC: 5.3 G/DL (ref 6.4–8.3)
RBC # BLD AUTO: 2.71 M/UL (ref 3.8–5.8)
SODIUM SERPL-SCNC: 131 MMOL/L (ref 132–146)
SODIUM SERPL-SCNC: 131 MMOL/L (ref 132–146)
SODIUM SERPL-SCNC: 132 MMOL/L (ref 132–146)
SODIUM SERPL-SCNC: 135 MMOL/L (ref 132–146)
WBC OTHER # BLD: 9.7 K/UL (ref 4.5–11.5)

## 2024-11-17 PROCEDURE — 6370000000 HC RX 637 (ALT 250 FOR IP)

## 2024-11-17 PROCEDURE — 6370000000 HC RX 637 (ALT 250 FOR IP): Performed by: SURGERY

## 2024-11-17 PROCEDURE — 82330 ASSAY OF CALCIUM: CPT

## 2024-11-17 PROCEDURE — 2580000003 HC RX 258: Performed by: SURGERY

## 2024-11-17 PROCEDURE — 84100 ASSAY OF PHOSPHORUS: CPT

## 2024-11-17 PROCEDURE — 6360000002 HC RX W HCPCS: Performed by: SURGERY

## 2024-11-17 PROCEDURE — 6370000000 HC RX 637 (ALT 250 FOR IP): Performed by: NURSE PRACTITIONER

## 2024-11-17 PROCEDURE — 80053 COMPREHEN METABOLIC PANEL: CPT

## 2024-11-17 PROCEDURE — 82550 ASSAY OF CK (CPK): CPT

## 2024-11-17 PROCEDURE — 6370000000 HC RX 637 (ALT 250 FOR IP): Performed by: STUDENT IN AN ORGANIZED HEALTH CARE EDUCATION/TRAINING PROGRAM

## 2024-11-17 PROCEDURE — 2000000000 HC ICU R&B

## 2024-11-17 PROCEDURE — 85025 COMPLETE CBC W/AUTO DIFF WBC: CPT

## 2024-11-17 PROCEDURE — 36415 COLL VENOUS BLD VENIPUNCTURE: CPT

## 2024-11-17 PROCEDURE — 6360000002 HC RX W HCPCS: Performed by: STUDENT IN AN ORGANIZED HEALTH CARE EDUCATION/TRAINING PROGRAM

## 2024-11-17 PROCEDURE — 2700000000 HC OXYGEN THERAPY PER DAY

## 2024-11-17 PROCEDURE — 2580000003 HC RX 258: Performed by: INTERNAL MEDICINE

## 2024-11-17 PROCEDURE — 99291 CRITICAL CARE FIRST HOUR: CPT | Performed by: INTERNAL MEDICINE

## 2024-11-17 PROCEDURE — 85730 THROMBOPLASTIN TIME PARTIAL: CPT

## 2024-11-17 PROCEDURE — 99233 SBSQ HOSP IP/OBS HIGH 50: CPT | Performed by: INTERNAL MEDICINE

## 2024-11-17 PROCEDURE — 82962 GLUCOSE BLOOD TEST: CPT

## 2024-11-17 PROCEDURE — 80048 BASIC METABOLIC PNL TOTAL CA: CPT

## 2024-11-17 PROCEDURE — 83735 ASSAY OF MAGNESIUM: CPT

## 2024-11-17 RX ADMIN — SODIUM CHLORIDE, PRESERVATIVE FREE 10 ML: 5 INJECTION INTRAVENOUS at 19:55

## 2024-11-17 RX ADMIN — SODIUM CHLORIDE: 9 INJECTION, SOLUTION INTRAVENOUS at 01:11

## 2024-11-17 RX ADMIN — INSULIN LISPRO 8 UNITS: 100 INJECTION, SOLUTION INTRAVENOUS; SUBCUTANEOUS at 15:23

## 2024-11-17 RX ADMIN — Medication 100 MG: at 08:19

## 2024-11-17 RX ADMIN — PHENOBARBITAL SODIUM 130 MG: 65 INJECTION INTRAMUSCULAR; INTRAVENOUS at 20:29

## 2024-11-17 RX ADMIN — HEPARIN SODIUM 18 UNITS/KG/HR: 10000 INJECTION, SOLUTION INTRAVENOUS at 14:43

## 2024-11-17 RX ADMIN — INSULIN LISPRO 8 UNITS: 100 INJECTION, SOLUTION INTRAVENOUS; SUBCUTANEOUS at 12:03

## 2024-11-17 RX ADMIN — ASPIRIN 81 MG CHEWABLE TABLET 81 MG: 81 TABLET CHEWABLE at 08:19

## 2024-11-17 RX ADMIN — HYDROMORPHONE HYDROCHLORIDE 0.5 MG: 1 INJECTION, SOLUTION INTRAMUSCULAR; INTRAVENOUS; SUBCUTANEOUS at 17:24

## 2024-11-17 RX ADMIN — OXYCODONE HYDROCHLORIDE 10 MG: 10 TABLET ORAL at 10:52

## 2024-11-17 RX ADMIN — PHENOBARBITAL SODIUM 130 MG: 65 INJECTION INTRAMUSCULAR; INTRAVENOUS at 05:25

## 2024-11-17 RX ADMIN — HYDROMORPHONE HYDROCHLORIDE 0.5 MG: 1 INJECTION, SOLUTION INTRAMUSCULAR; INTRAVENOUS; SUBCUTANEOUS at 06:33

## 2024-11-17 RX ADMIN — PHENOBARBITAL SODIUM 130 MG: 65 INJECTION INTRAMUSCULAR; INTRAVENOUS at 15:23

## 2024-11-17 RX ADMIN — Medication 1 MG: at 08:19

## 2024-11-17 RX ADMIN — HYDROMORPHONE HYDROCHLORIDE 0.5 MG: 1 INJECTION, SOLUTION INTRAMUSCULAR; INTRAVENOUS; SUBCUTANEOUS at 20:50

## 2024-11-17 RX ADMIN — METOPROLOL TARTRATE 25 MG: 25 TABLET, FILM COATED ORAL at 08:18

## 2024-11-17 RX ADMIN — PHENOBARBITAL SODIUM 130 MG: 65 INJECTION INTRAMUSCULAR; INTRAVENOUS at 10:09

## 2024-11-17 RX ADMIN — OXYCODONE HYDROCHLORIDE 10 MG: 10 TABLET ORAL at 19:55

## 2024-11-17 RX ADMIN — SODIUM CHLORIDE, PRESERVATIVE FREE 10 ML: 5 INJECTION INTRAVENOUS at 08:10

## 2024-11-17 RX ADMIN — INSULIN GLARGINE 12 UNITS: 100 INJECTION, SOLUTION SUBCUTANEOUS at 08:19

## 2024-11-17 RX ADMIN — ACETAMINOPHEN 1000 MG: 500 TABLET ORAL at 13:28

## 2024-11-17 ASSESSMENT — PAIN SCALES - GENERAL
PAINLEVEL_OUTOF10: 5
PAINLEVEL_OUTOF10: 7
PAINLEVEL_OUTOF10: 9
PAINLEVEL_OUTOF10: 8
PAINLEVEL_OUTOF10: 8
PAINLEVEL_OUTOF10: 0
PAINLEVEL_OUTOF10: 7
PAINLEVEL_OUTOF10: 8
PAINLEVEL_OUTOF10: 0

## 2024-11-17 ASSESSMENT — PAIN DESCRIPTION - ORIENTATION
ORIENTATION: RIGHT;LEFT
ORIENTATION: LEFT
ORIENTATION: RIGHT;LEFT
ORIENTATION: RIGHT;LEFT

## 2024-11-17 ASSESSMENT — PAIN DESCRIPTION - LOCATION
LOCATION: INCISION;LEG
LOCATION: LEG;INCISION
LOCATION: LEG
LOCATION: LEG

## 2024-11-17 ASSESSMENT — PAIN DESCRIPTION - DESCRIPTORS
DESCRIPTORS: ACHING
DESCRIPTORS: ACHING;DISCOMFORT;SORE
DESCRIPTORS: ACHING
DESCRIPTORS: ACHING;DISCOMFORT;SORE

## 2024-11-17 ASSESSMENT — PAIN - FUNCTIONAL ASSESSMENT
PAIN_FUNCTIONAL_ASSESSMENT: PREVENTS OR INTERFERES SOME ACTIVE ACTIVITIES AND ADLS
PAIN_FUNCTIONAL_ASSESSMENT: ACTIVITIES ARE NOT PREVENTED

## 2024-11-17 NOTE — PROGRESS NOTES
Vascular Surgery Progress Note    Pt is being seen in f/u today regarding bilateral femoral embolectomies     Subjective  Patient very tired. Required phenobarb high dose secondary to withdrawal, sitter and started on Precedex overnight for agitation and continued hallucinations. CK continues to downtrend.     Current Medications:    dexmedeTOMIDine (PRECEDEX) 1,000 mcg in sodium chloride 0.9 % 250 mL infusion 0.2 mcg/kg/hr (11/16/24 2230)    sodium chloride 75 mL/hr at 11/17/24 0111    heparin (PORCINE) Infusion 18 Units/kg/hr (11/16/24 2350)    sodium chloride      dextrose        heparin (porcine), heparin (porcine), sodium chloride flush, sodium chloride, potassium chloride **OR** potassium alternative oral replacement **OR** potassium chloride, magnesium sulfate, ondansetron **OR** ondansetron, polyethylene glycol, glucose, dextrose bolus **OR** dextrose bolus, glucagon (rDNA), dextrose, oxyCODONE **OR** oxyCODONE, HYDROmorphone    PHENobarbital  130 mg IntraVENous Q6H    thiamine  100 mg Oral Daily    folic acid  1 mg Oral Daily    metoprolol tartrate  25 mg Oral BID    melatonin  10 mg Oral Nightly    sodium chloride flush  5-40 mL IntraVENous 2 times per day    insulin glargine  12 Units SubCUTAneous QAM    aspirin  81 mg Oral Daily    acetaminophen  1,000 mg Oral 3 times per day    insulin lispro  0-16 Units SubCUTAneous Q4H        PHYSICAL EXAM:    /62   Pulse 94   Temp 98.5 °F (36.9 °C) (Temporal)   Resp 14   Ht 1.727 m (5' 8\")   Wt 101.8 kg (224 lb 6.9 oz)   SpO2 97%   BMI 34.12 kg/m²     Intake/Output Summary (Last 24 hours) at 11/17/2024 0545  Last data filed at 11/17/2024 0200  Gross per 24 hour   Intake 3052.11 ml   Output 1470 ml   Net 1582.11 ml          Gen: awake, confused, no apparent distress  CVS: RRR  Resp: No increased work of breathing, on 2L NC  Abd: Soft, non-tender, non-distended  R LE: AKA,  small hematoma to medial aspect of incision, remains soft.   L LE: Biphasic DP/PT,

## 2024-11-17 NOTE — PROGRESS NOTES
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  Mention he has been noticing bluish discoloration of both lower extremity worsened over the last 3 days.  He was found to have lactic acidosis on presentation of 3.9.  Leukocytosis of 16,500.  Blood pressure on presentation of 119/75.  CT abdomen with aorta with bilateral runoff with contrast was obtained and showed severe atherosclerotic disease with common femoral arterial occlusions with some reconstitution in the superficial femoral and popliteal location but without significant runoff in the lower extremities, bilateral large renal infarcts.  He was started on heparin by weight.  Patient was seen by vascular surgery and taken straight to the OR.  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   On precedex drip, sitting up and eating with help of sitter, family at bedside.    ROS: denies fever, chills, cp, sob, n/v, HA unless stated above.      PHENobarbital  130 mg IntraVENous Q6H    thiamine  100 mg Oral Daily    folic acid  1 mg Oral Daily    metoprolol tartrate  25 mg Oral BID    melatonin  10 mg Oral Nightly    sodium chloride flush  5-40 mL IntraVENous 2 times per day    insulin glargine  12 Units SubCUTAneous QAM    aspirin  81 mg Oral Daily    acetaminophen  1,000 mg Oral 3 times per day    insulin lispro  0-16 Units SubCUTAneous Q4H     heparin (porcine), 80 Units/kg, PRN  heparin (porcine), 40 Units/kg, PRN  sodium chloride flush, 5-40 mL, PRN  sodium chloride, , PRN  potassium chloride, 40 mEq, PRN   Or  potassium alternative oral replacement, 40 mEq, PRN

## 2024-11-17 NOTE — PROGRESS NOTES
[facility]  MD Bryce Milligan MD Ali Hassan, MD Lisa Kniska, CNP   Natalia Rey, CODY Harris, DEMETRIUS  Progress Note    11/17/2024    SUBJECTIVE:   11/13:  Seen in [facility].  Awake and alert.  S/P right above knee amputation yesterday. Complains of pain at right AKA site.  Oxygen on per NC.  Denies chest pain, palpitation or SOB.  Denies nausea, vomiting or abd pain.  Continues on heparin drip and bicarb IV.  He is oligoanuric.     11/14: Seen in the [facility] while on dialysis. Tolerating therapy without issues. His blood pressure is stable. Heart rate is mildly elevated. He is in good spirits and reports that he is having less pain today. His appetite is fair, he did eat some cereal for dinner. Urine output is stable.     11/15: Seen in the ICU. No acute distress or complaints. Received metoprolol earlier for tachycardia. Tolerated HD today with 2.8 liters of fluid removal. He is still making urine, though urine output has decreased. He is on oxygen via nasal canula. Denies dyspnea. PO intake is stable.     11/16: Undergoing with withdrawal of alcohol symptoms.  Obtunded.  Awake agitated aggressive at times though symptoms have improved status post sedation    11/17: Feeling better today.  Had a \"rough night\" notable for worsening agitation and aggression.  Now on Precedex drip with control of symptoms.  Enjoying his lunch.  Denies complaint.  Family at bedside.  BP stable.    PROBLEM LIST:    Principal Problem:    Femoral artery occlusion (HCC)  Active Problems:    Acute lower limb ischemia    ANTIONE (acute kidney injury) (HCC)    Non-traumatic rhabdomyolysis  Resolved Problems:    * No resolved hospital problems. *         DIET:    ADULT DIET; Regular; Low Fat/Low Chol/High Fiber/VIKASH; Low Potassium (Less than 3000 mg/day)     MEDS (scheduled):    PHENobarbital  130 mg IntraVENous Q6H    thiamine  100 mg Oral Daily    folic acid  1 mg Oral Daily    metoprolol tartrate   Please advise if you will call her or if I should read to her the message from yesterday     Also advise on dose for the Ritalin   signed by Bryce Goldsmith MD on 11/17/2024 at 1:50 PM

## 2024-11-17 NOTE — PLAN OF CARE
Problem: Chronic Conditions and Co-morbidities  Goal: Patient's chronic conditions and co-morbidity symptoms are monitored and maintained or improved  Outcome: Progressing     Problem: Discharge Planning  Goal: Discharge to home or other facility with appropriate resources  Outcome: Progressing     Problem: Pain  Goal: Verbalizes/displays adequate comfort level or baseline comfort level  Outcome: Progressing     Problem: Safety - Adult  Goal: Free from fall injury  Outcome: Progressing     Problem: Skin/Tissue Integrity  Goal: Absence of new skin breakdown  Description: 1.  Monitor for areas of redness and/or skin breakdown  2.  Assess vascular access sites hourly  3.  Every 4-6 hours minimum:  Change oxygen saturation probe site  4.  Every 4-6 hours:  If on nasal continuous positive airway pressure, respiratory therapy assess nares and determine need for appliance change or resting period.  Outcome: Progressing     Problem: Safety - Medical Restraint  Goal: Remains free of injury from restraints (Restraint for Interference with Medical Device)  Description: INTERVENTIONS:  1. Determine that other, less restrictive measures have been tried or would not be effective before applying the restraint  2. Evaluate the patient's condition at the time of restraint application  3. Inform patient/family regarding the reason for restraint  4. Q2H: Monitor safety, psychosocial status, comfort, nutrition and hydration  11/17/2024 0920 by Aniya Graham, RN  Outcome: Completed

## 2024-11-17 NOTE — PROGRESS NOTES
11/16/2024: 2015 - Dr Gómez with critical care notified of patient ST with HR in the 140-150 but having just given lopressor 25 mg orally, no orders at this time.     2030 - Patient becoming increasing agitated and breaking cords and wires attached like pulse oximeter and BP cuff.  Attempting to pull at lines and tubes. Dr. Gómez notified and at bedside. Verbal orders given to give phenobarbital 130 mg IV a little early and to start precedex drip.     2245 - patient starting to swing on staff and pull at lines. For patient safety, patient placed in two point restraints. Critical care resident notified.

## 2024-11-17 NOTE — FLOWSHEET NOTE
11/16/24 1815   Vital Signs   BP 92/73   Temp 99.6 °F (37.6 °C)   Pulse 98   Respirations 17   Post-Hemodialysis Assessment   Post-Treatment Procedures Blood returned;Catheter capped, clamped and heparinized x 2 ports   Machine Disinfection Process Acid/Vinegar Clean;Heat Disinfect   Blood Volume Processed (Liters) 60 L   Dialyzer Clearance Lightly streaked   Duration of Treatment (minutes) 240 minutes   Hemodialysis Intake (ml) 300 ml   Hemodialysis Output (ml) 1300 ml   NET Removed (ml) 1000   Tolerated Treatment Good   Patient Response to Treatment tolerated tx well vss removed 1L of fluid without difficulty. rij temp cath continues with suboptimal flows. lines were reversed for better flow however still had some ap alarms with movement. cath seems positional. hd cath dressing changed. no signs of infection.   Bilateral Breath Sounds Clear   Edema Generalized   Time Off 1815   Patient Disposition Remain in ICU/ED   Observations & Evaluations   Level of Consciousness 0   Heart Rhythm Regular   Respiratory Quality/Effort Unlabored   O2 Device Nasal cannula   Skin Condition/Temp Dry;Warm

## 2024-11-17 NOTE — PLAN OF CARE
Problem: Safety - Medical Restraint  Goal: Remains free of injury from restraints (Restraint for Interference with Medical Device)  Description: INTERVENTIONS:  1. Determine that other, less restrictive measures have been tried or would not be effective before applying the restraint  2. Evaluate the patient's condition at the time of restraint application  3. Inform patient/family regarding the reason for restraint  4. Q2H: Monitor safety, psychosocial status, comfort, nutrition and hydration  11/16/2024 2315 by Erinn Mera, RN  Outcome: Progressing

## 2024-11-17 NOTE — PROGRESS NOTES
1100 - Patient PTT lab result 67.9. This is first therapeutic for heparin drip. Will recheck in 6 hours.     0600 - Upon recheck pf PTT at 0500 - PTT therapeutic at 53.9. next PTT lab draw set for tomorrow am due to having two therapeutic PTT.

## 2024-11-18 LAB
ALBUMIN SERPL-MCNC: 2.6 G/DL (ref 3.5–5.2)
ALP SERPL-CCNC: 109 U/L (ref 40–129)
ALT SERPL-CCNC: 27 U/L (ref 0–40)
ANION GAP SERPL CALCULATED.3IONS-SCNC: 10 MMOL/L (ref 7–16)
ANION GAP SERPL CALCULATED.3IONS-SCNC: 10 MMOL/L (ref 7–16)
AST SERPL-CCNC: 176 U/L (ref 0–39)
BASOPHILS # BLD: 0.03 K/UL (ref 0–0.2)
BASOPHILS NFR BLD: 0 % (ref 0–2)
BILIRUB SERPL-MCNC: 0.2 MG/DL (ref 0–1.2)
BUN SERPL-MCNC: 40 MG/DL (ref 6–23)
BUN SERPL-MCNC: 44 MG/DL (ref 6–23)
CA-I BLD-SCNC: 1.16 MMOL/L (ref 1.15–1.33)
CALCIUM SERPL-MCNC: 8 MG/DL (ref 8.6–10.2)
CALCIUM SERPL-MCNC: 8.6 MG/DL (ref 8.6–10.2)
CHLORIDE SERPL-SCNC: 97 MMOL/L (ref 98–107)
CHLORIDE SERPL-SCNC: 99 MMOL/L (ref 98–107)
CK SERPL-CCNC: 2924 U/L (ref 20–200)
CO2 SERPL-SCNC: 26 MMOL/L (ref 22–29)
CO2 SERPL-SCNC: 27 MMOL/L (ref 22–29)
CREAT SERPL-MCNC: 7 MG/DL (ref 0.7–1.2)
CREAT SERPL-MCNC: 7.4 MG/DL (ref 0.7–1.2)
EOSINOPHIL # BLD: 0.29 K/UL (ref 0.05–0.5)
EOSINOPHILS RELATIVE PERCENT: 3 % (ref 0–6)
ERYTHROCYTE [DISTWIDTH] IN BLOOD BY AUTOMATED COUNT: 13.8 % (ref 11.5–15)
GFR, ESTIMATED: 8 ML/MIN/1.73M2
GFR, ESTIMATED: 8 ML/MIN/1.73M2
GLUCOSE BLD-MCNC: 147 MG/DL (ref 74–99)
GLUCOSE BLD-MCNC: 166 MG/DL (ref 74–99)
GLUCOSE BLD-MCNC: 192 MG/DL (ref 74–99)
GLUCOSE BLD-MCNC: 193 MG/DL (ref 74–99)
GLUCOSE BLD-MCNC: 241 MG/DL (ref 74–99)
GLUCOSE BLD-MCNC: 250 MG/DL (ref 74–99)
GLUCOSE SERPL-MCNC: 184 MG/DL (ref 74–99)
GLUCOSE SERPL-MCNC: 204 MG/DL (ref 74–99)
HCT VFR BLD AUTO: 27.2 % (ref 37–54)
HGB BLD-MCNC: 8.3 G/DL (ref 12.5–16.5)
IMM GRANULOCYTES # BLD AUTO: 0.07 K/UL (ref 0–0.58)
IMM GRANULOCYTES NFR BLD: 1 % (ref 0–5)
LYMPHOCYTES NFR BLD: 1.34 K/UL (ref 1.5–4)
LYMPHOCYTES RELATIVE PERCENT: 14 % (ref 20–42)
MAGNESIUM SERPL-MCNC: 2.1 MG/DL (ref 1.6–2.6)
MCH RBC QN AUTO: 30.7 PG (ref 26–35)
MCHC RBC AUTO-ENTMCNC: 30.5 G/DL (ref 32–34.5)
MCV RBC AUTO: 100.7 FL (ref 80–99.9)
MONOCYTES NFR BLD: 1.23 K/UL (ref 0.1–0.95)
MONOCYTES NFR BLD: 13 % (ref 2–12)
NEUTROPHILS NFR BLD: 70 % (ref 43–80)
NEUTS SEG NFR BLD: 6.72 K/UL (ref 1.8–7.3)
PARTIAL THROMBOPLASTIN TIME: 66.1 SEC (ref 24.5–35.1)
PHOSPHATE SERPL-MCNC: 4.5 MG/DL (ref 2.5–4.5)
PLATELET # BLD AUTO: 304 K/UL (ref 130–450)
PMV BLD AUTO: 10.2 FL (ref 7–12)
POTASSIUM SERPL-SCNC: 4.3 MMOL/L (ref 3.5–5)
POTASSIUM SERPL-SCNC: 4.6 MMOL/L (ref 3.5–5)
PROT SERPL-MCNC: 5.2 G/DL (ref 6.4–8.3)
RBC # BLD AUTO: 2.7 M/UL (ref 3.8–5.8)
SODIUM SERPL-SCNC: 134 MMOL/L (ref 132–146)
SODIUM SERPL-SCNC: 135 MMOL/L (ref 132–146)
SURGICAL PATHOLOGY REPORT: NORMAL
WBC OTHER # BLD: 9.7 K/UL (ref 4.5–11.5)

## 2024-11-18 PROCEDURE — 90935 HEMODIALYSIS ONE EVALUATION: CPT

## 2024-11-18 PROCEDURE — 83735 ASSAY OF MAGNESIUM: CPT

## 2024-11-18 PROCEDURE — 6360000002 HC RX W HCPCS: Performed by: SURGERY

## 2024-11-18 PROCEDURE — 2700000000 HC OXYGEN THERAPY PER DAY

## 2024-11-18 PROCEDURE — 80053 COMPREHEN METABOLIC PANEL: CPT

## 2024-11-18 PROCEDURE — 80048 BASIC METABOLIC PNL TOTAL CA: CPT

## 2024-11-18 PROCEDURE — 2000000000 HC ICU R&B

## 2024-11-18 PROCEDURE — 85730 THROMBOPLASTIN TIME PARTIAL: CPT

## 2024-11-18 PROCEDURE — 82550 ASSAY OF CK (CPK): CPT

## 2024-11-18 PROCEDURE — 85025 COMPLETE CBC W/AUTO DIFF WBC: CPT

## 2024-11-18 PROCEDURE — 82962 GLUCOSE BLOOD TEST: CPT

## 2024-11-18 PROCEDURE — 82330 ASSAY OF CALCIUM: CPT

## 2024-11-18 PROCEDURE — 84100 ASSAY OF PHOSPHORUS: CPT

## 2024-11-18 PROCEDURE — 6370000000 HC RX 637 (ALT 250 FOR IP): Performed by: STUDENT IN AN ORGANIZED HEALTH CARE EDUCATION/TRAINING PROGRAM

## 2024-11-18 PROCEDURE — 6370000000 HC RX 637 (ALT 250 FOR IP): Performed by: NURSE PRACTITIONER

## 2024-11-18 PROCEDURE — 99232 SBSQ HOSP IP/OBS MODERATE 35: CPT | Performed by: INTERNAL MEDICINE

## 2024-11-18 PROCEDURE — 36415 COLL VENOUS BLD VENIPUNCTURE: CPT

## 2024-11-18 PROCEDURE — 2580000003 HC RX 258: Performed by: SURGERY

## 2024-11-18 PROCEDURE — 6360000002 HC RX W HCPCS: Performed by: STUDENT IN AN ORGANIZED HEALTH CARE EDUCATION/TRAINING PROGRAM

## 2024-11-18 PROCEDURE — 6370000000 HC RX 637 (ALT 250 FOR IP): Performed by: SURGERY

## 2024-11-18 RX ORDER — METOPROLOL TARTRATE 50 MG
50 TABLET ORAL 2 TIMES DAILY
Status: DISCONTINUED | OUTPATIENT
Start: 2024-11-18 | End: 2024-11-28 | Stop reason: HOSPADM

## 2024-11-18 RX ORDER — METOPROLOL TARTRATE 25 MG/1
25 TABLET, FILM COATED ORAL ONCE
Status: COMPLETED | OUTPATIENT
Start: 2024-11-18 | End: 2024-11-18

## 2024-11-18 RX ADMIN — OXYCODONE HYDROCHLORIDE 5 MG: 5 TABLET ORAL at 15:24

## 2024-11-18 RX ADMIN — INSULIN LISPRO 4 UNITS: 100 INJECTION, SOLUTION INTRAVENOUS; SUBCUTANEOUS at 23:00

## 2024-11-18 RX ADMIN — HYDROMORPHONE HYDROCHLORIDE 0.5 MG: 1 INJECTION, SOLUTION INTRAMUSCULAR; INTRAVENOUS; SUBCUTANEOUS at 00:22

## 2024-11-18 RX ADMIN — Medication 100 MG: at 08:07

## 2024-11-18 RX ADMIN — HYDROMORPHONE HYDROCHLORIDE 0.5 MG: 1 INJECTION, SOLUTION INTRAMUSCULAR; INTRAVENOUS; SUBCUTANEOUS at 04:11

## 2024-11-18 RX ADMIN — METOPROLOL TARTRATE 50 MG: 50 TABLET, FILM COATED ORAL at 20:10

## 2024-11-18 RX ADMIN — HEPARIN SODIUM 18 UNITS/KG/HR: 10000 INJECTION, SOLUTION INTRAVENOUS at 16:19

## 2024-11-18 RX ADMIN — INSULIN LISPRO 4 UNITS: 100 INJECTION, SOLUTION INTRAVENOUS; SUBCUTANEOUS at 20:15

## 2024-11-18 RX ADMIN — INSULIN LISPRO 4 UNITS: 100 INJECTION, SOLUTION INTRAVENOUS; SUBCUTANEOUS at 04:36

## 2024-11-18 RX ADMIN — Medication 1 MG: at 08:08

## 2024-11-18 RX ADMIN — PHENOBARBITAL SODIUM 130 MG: 65 INJECTION INTRAMUSCULAR; INTRAVENOUS at 20:10

## 2024-11-18 RX ADMIN — OXYCODONE HYDROCHLORIDE 10 MG: 10 TABLET ORAL at 20:51

## 2024-11-18 RX ADMIN — PHENOBARBITAL SODIUM 130 MG: 65 INJECTION INTRAMUSCULAR; INTRAVENOUS at 16:16

## 2024-11-18 RX ADMIN — PHENOBARBITAL SODIUM 130 MG: 65 INJECTION INTRAMUSCULAR; INTRAVENOUS at 04:11

## 2024-11-18 RX ADMIN — HYDROMORPHONE HYDROCHLORIDE 0.5 MG: 1 INJECTION, SOLUTION INTRAMUSCULAR; INTRAVENOUS; SUBCUTANEOUS at 22:55

## 2024-11-18 RX ADMIN — HEPARIN SODIUM 18 UNITS/KG/HR: 10000 INJECTION, SOLUTION INTRAVENOUS at 03:47

## 2024-11-18 RX ADMIN — INSULIN GLARGINE 12 UNITS: 100 INJECTION, SOLUTION SUBCUTANEOUS at 08:23

## 2024-11-18 RX ADMIN — SODIUM CHLORIDE, PRESERVATIVE FREE 10 ML: 5 INJECTION INTRAVENOUS at 08:07

## 2024-11-18 RX ADMIN — INSULIN LISPRO 8 UNITS: 100 INJECTION, SOLUTION INTRAVENOUS; SUBCUTANEOUS at 00:33

## 2024-11-18 RX ADMIN — ACETAMINOPHEN 1000 MG: 500 TABLET ORAL at 15:25

## 2024-11-18 RX ADMIN — INSULIN LISPRO 4 UNITS: 100 INJECTION, SOLUTION INTRAVENOUS; SUBCUTANEOUS at 12:01

## 2024-11-18 RX ADMIN — METOPROLOL TARTRATE 25 MG: 25 TABLET, FILM COATED ORAL at 08:07

## 2024-11-18 RX ADMIN — ACETAMINOPHEN 1000 MG: 500 TABLET ORAL at 20:10

## 2024-11-18 RX ADMIN — ASPIRIN 81 MG CHEWABLE TABLET 81 MG: 81 TABLET CHEWABLE at 08:07

## 2024-11-18 RX ADMIN — METOPROLOL TARTRATE 25 MG: 25 TABLET, FILM COATED ORAL at 09:52

## 2024-11-18 RX ADMIN — SODIUM CHLORIDE, PRESERVATIVE FREE 10 ML: 5 INJECTION INTRAVENOUS at 20:11

## 2024-11-18 RX ADMIN — PHENOBARBITAL SODIUM 130 MG: 65 INJECTION INTRAMUSCULAR; INTRAVENOUS at 10:09

## 2024-11-18 ASSESSMENT — PAIN - FUNCTIONAL ASSESSMENT
PAIN_FUNCTIONAL_ASSESSMENT: PREVENTS OR INTERFERES SOME ACTIVE ACTIVITIES AND ADLS

## 2024-11-18 ASSESSMENT — PAIN SCALES - GENERAL
PAINLEVEL_OUTOF10: 7
PAINLEVEL_OUTOF10: 5
PAINLEVEL_OUTOF10: 8
PAINLEVEL_OUTOF10: 5
PAINLEVEL_OUTOF10: 2
PAINLEVEL_OUTOF10: 8
PAINLEVEL_OUTOF10: 0
PAINLEVEL_OUTOF10: 4
PAINLEVEL_OUTOF10: 10

## 2024-11-18 ASSESSMENT — PAIN DESCRIPTION - ORIENTATION
ORIENTATION: RIGHT
ORIENTATION: LEFT
ORIENTATION: LEFT;RIGHT

## 2024-11-18 ASSESSMENT — PAIN DESCRIPTION - LOCATION
LOCATION: LEG

## 2024-11-18 ASSESSMENT — PAIN DESCRIPTION - DESCRIPTORS
DESCRIPTORS: ACHING;DISCOMFORT;SORE
DESCRIPTORS: ACHING;DISCOMFORT;SORE
DESCRIPTORS: SORE
DESCRIPTORS: ACHING;DISCOMFORT;SORE
DESCRIPTORS: ACHING;DISCOMFORT;SORE

## 2024-11-18 ASSESSMENT — PAIN DESCRIPTION - PAIN TYPE: TYPE: SURGICAL PAIN

## 2024-11-18 NOTE — PLAN OF CARE
Problem: Chronic Conditions and Co-morbidities  Goal: Patient's chronic conditions and co-morbidity symptoms are monitored and maintained or improved  11/17/2024 2044 by Kevin Juan, RN  Outcome: Progressing  Flowsheets (Taken 11/17/2024 2044)  Care Plan - Patient's Chronic Conditions and Co-Morbidity Symptoms are Monitored and Maintained or Improved:   Monitor and assess patient's chronic conditions and comorbid symptoms for stability, deterioration, or improvement   Collaborate with multidisciplinary team to address chronic and comorbid conditions and prevent exacerbation or deterioration   Update acute care plan with appropriate goals if chronic or comorbid symptoms are exacerbated and prevent overall improvement and discharge  11/17/2024 0920 by Aniya Graham, RN  Outcome: Progressing     Problem: Discharge Planning  Goal: Discharge to home or other facility with appropriate resources  11/17/2024 2044 by Kevin Juan, RN  Outcome: Progressing  Flowsheets (Taken 11/17/2024 2044)  Discharge to home or other facility with appropriate resources:   Identify barriers to discharge with patient and caregiver   Arrange for needed discharge resources and transportation as appropriate   Identify discharge learning needs (meds, wound care, etc)   Arrange for interpreters to assist at discharge as needed  11/17/2024 0920 by Aniya Graham, RN  Outcome: Progressing     Problem: Pain  Goal: Verbalizes/displays adequate comfort level or baseline comfort level  11/17/2024 2044 by Kevin Juan, RN  Outcome: Progressing  Flowsheets (Taken 11/17/2024 2044)  Verbalizes/displays adequate comfort level or baseline comfort level:   Encourage patient to monitor pain and request assistance   Implement non-pharmacological measures as appropriate and evaluate response   Assess pain using appropriate pain scale   Administer analgesics based on type and severity of pain and evaluate response   Consider cultural and social influences

## 2024-11-18 NOTE — PROGRESS NOTES
noted on 2L NC  Cardiovascular:  RRR, no heaves or thrills on palpation  Tele: ST   Abdomen: Soft, nontender, + BS   Extremities: S/p Rt AKA, LLE +DP/PT signals +motor   Neurologic/Psych: A&Ox3, LAL to command   Skin: Warm and dry  Incisions: Bilateral groin incisions dressings intact, LLE fasciotomy dressings intact, RLE AKA +ecchymosis, staples intact        Assessment/Plan: POD #6    1.  S/p Bilateral femoral endarterectomies, bilateral fasciotomies   S/p Rt AKA 2/2 RLE ischemia, necrotic muscle, rhabdomyolysis   - Frequent neurovascular checks  - Continue Heparin gtt   - Mckeon catheter to GD  - Tolerating Diet   - prn pain medication   - Still at high risk for Left AKA    2. ANTIONE, Rhabdomyolysis   - Bilateral renal infarct, nephrology following.   - Hyperkalemia 11/12 resolved with bicarb gtt and k cocktail.   -11/13 Temporary HD cath placed  -11/14 Dialysis initiated   -IHD per nephrology, Cks improving    3. Alcohol withdrawal   -improved with phenobarbital, precedex off.     4. Atrial fibrillation/flutter  -Back in SR, cardiology following, continue metoprolol 25mg BID, heparin gtt--. NOAC prior to discharge        Dispo: [facility]     Electronically signed by NUZHAT Middleton - CNP on 11/18/2024 at 8:36 AM

## 2024-11-18 NOTE — PROGRESS NOTES
[facility]  MD Bryce Milligan MD Ali Hassan, MD Lisa Kniska, CNP   Natalia Rey, CODY Harris, DEMETRIUS  Progress Note    11/18/2024    SUBJECTIVE:   11/13:  Seen in [facility].  Awake and alert.  S/P right above knee amputation yesterday. Complains of pain at right AKA site.  Oxygen on per NC.  Denies chest pain, palpitation or SOB.  Denies nausea, vomiting or abd pain.  Continues on heparin drip and bicarb IV.  He is oligoanuric.     11/14: Seen in the [facility] while on dialysis. Tolerating therapy without issues. His blood pressure is stable. Heart rate is mildly elevated. He is in good spirits and reports that he is having less pain today. His appetite is fair, he did eat some cereal for dinner. Urine output is stable.     11/15: Seen in the ICU. No acute distress or complaints. Received metoprolol earlier for tachycardia. Tolerated HD today with 2.8 liters of fluid removal. He is still making urine, though urine output has decreased. He is on oxygen via nasal canula. Denies dyspnea. PO intake is stable.     11/16: Undergoing with withdrawal of alcohol symptoms.  Obtunded.  Awake agitated aggressive at times though symptoms have improved status post sedation    11/17: Feeling better today.  Had a \"rough night\" notable for worsening agitation and aggression.  Now on Precedex drip with control of symptoms.  Enjoying his lunch.  Denies complaint.  Family at bedside.  BP stable.    11/18: Seen in the [facility]. Mentation has improved. Sitter is present at the bedside. He is still tachycardic. Denies any acute complaints. Denies dyspnea. His blood pressure is stable. Complains of some discomfort to his RLE. Minimal urine output.     PROBLEM LIST:    Principal Problem:    Femoral artery occlusion (HCC)  Active Problems:    Acute lower limb ischemia    ANTIONE (acute kidney injury) (HCC)    Non-traumatic rhabdomyolysis  Resolved Problems:    * No resolved hospital problems. *

## 2024-11-18 NOTE — PROGRESS NOTES
[facility] Hospitalist Progress Note    Admitting Date and Time: 11/12/2024  2:39 AM  Admit Dx: 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]  Atrial fibrillation, unspecified type (HCC) [I48.91]    Synopsis:  Presented to ER due to concern for leg pain and shortness of breath.  Mention he has been noticing bluish discoloration of both lower extremity worsened over the last 3 days.  He was found to have lactic acidosis on presentation of 3.9.  Leukocytosis of 16,500.  Blood pressure on presentation of 119/75.  CT abdomen with aorta with bilateral runoff with contrast was obtained and showed severe atherosclerotic disease with common femoral arterial occlusions with some reconstitution in the superficial femoral and popliteal location but without significant runoff in the lower extremities, bilateral large renal infarcts.  He was started on heparin by weight.  Patient was seen by vascular surgery and taken straight to the OR.  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   On precedex drip, sitting up and eating with help of sitter, family at bedside.    ROS: denies fever, chills, cp, sob, n/v, HA unless stated above.      metoprolol tartrate  50 mg Oral BID    PHENobarbital  130 mg IntraVENous Q6H    thiamine  100 mg Oral Daily    folic acid  1 mg Oral Daily    melatonin  10 mg Oral Nightly    sodium chloride flush  5-40 mL IntraVENous 2 times per day    insulin glargine  12 Units SubCUTAneous QAM    aspirin  81 mg Oral Daily    acetaminophen  1,000 mg Oral 3 times per day    insulin lispro  0-16 Units SubCUTAneous Q4H     heparin (porcine), 80 Units/kg, PRN  heparin (porcine), 40 Units/kg, PRN  sodium chloride flush, 5-40 mL, PRN  sodium chloride, , PRN  potassium chloride, 40 mEq, PRN   Or  potassium alternative oral replacement, 40 mEq, PRN    Or  potassium chloride, 10 mEq, PRN  magnesium sulfate, 2,000 mg, PRN  ondansetron, 4 mg, Q8H PRN   Or  ondansetron, 4 mg, Q6H PRN  polyethylene glycol, 17 g, Daily PRN  glucose, 4 tablet, PRN  dextrose bolus, 125 mL, PRN   Or  dextrose bolus, 250 mL, PRN  glucagon (rDNA), 1 mg, PRN  dextrose, , Continuous PRN  oxyCODONE, 5 mg, Q4H PRN   Or  oxyCODONE, 10 mg, Q4H PRN  HYDROmorphone, 0.5 mg, Q3H PRN         Objective:    /83   Pulse (!) 143   Temp 97.9 °F (36.6 °C) (Oral)   Resp 25   Ht 1.727 m (5' 8\")   Wt 101.8 kg (224 lb 6.9 oz)   SpO2 91%   BMI 34.12 kg/m²     General Appearance: alert and oriented to person, place and time and in no acute distress  Skin: warm and dry  Head: normocephalic and atraumatic  Eyes: pupils equal, round, and reactive to light, extraocular eye movements intact, conjunctivae normal  Neck: neck supple and non tender without mass   Pulmonary/Chest: Bilateral decreased with some, no wheezes, rales or rhonchi, normal air movement, no respiratory distress  Cardiovascular: normal rate, normal S1 and S2 and no carotid bruits  Abdomen: soft, non-tender, non-distended, normal bowel sounds, no masses or organomegaly  Extremities: Right lower extremity AKA with dressing in placed, left lower extremity fasciotomy dressing placed  Neurologic: no cranial nerve deficit and speech normal        Recent Labs     11/17/24 2024 11/18/24  0305 11/18/24  0859   * 135 134   K 4.2 4.6 4.3   CL 95* 99 97*   CO2 26 26 27   BUN 38* 40* 44*   CREATININE 6.3* 7.0* 7.4*   GLUCOSE 231* 184* 204*   CALCIUM 8.5* 8.0* 8.6       Recent Labs     11/16/24  0300 11/17/24  0414 11/18/24  0305   WBC 12.2* 9.7 9.7   RBC 2.89* 2.71* 2.70*   HGB 8.9* 8.4* 8.3*   HCT 28.8* 26.9* 27.2*   MCV 99.7 99.3 100.7*   MCH 30.8 31.0 30.7   MCHC 30.9* 31.2* 30.5*   RDW 13.8 13.7 13.8    237 304   MPV 10.4 10.3 10.2       Radiology: Reviewed    Assessment:    Principal Problem:    Femoral artery occlusion

## 2024-11-18 NOTE — PROGRESS NOTES
medical history of CAD (STEMI in 2/2022 s/p PCI to RCA and LCx and Prober to RPL B2, residual ostial LAD disease s/p CABG x 1 with LIMA to LAD in 5/2022), transient postop A-fib, COPD, ICM with HFrEF (EF 35-40% on TTE in 2022), HTN, HLD, smoking, PAD who presented to the hospital with claudication and leg pain and was found to have bilateral embolic femoral artery occlusion along with bilateral renal infarcts.  He was taken emergently to the operating room for bilateral femoral endarterectomy with fasciotomy showing nonviable muscle s/p right AKA on 11/12/2024.  TTE at that time showed an EF of 55% with some inferior wall hypokinesis.     Newly diagnosed atrial flutter/fib:  Likely represents the etiology of his thrombotic events  Continue heparin drip, can transition to a NOAC once okay from surgery standpoint  Continue metoprolol 25 mg twice daily, can increase dose if BP tolerates for rate control  Sinus tachycardia:  Likely in the setting of withdrawing either his heart rate improved upon sedation.  Another component could be his most recent surgery, and his recent anemia  CAD:  STEMI in 2/2022 s/p PCI to RCA and LCx and Prober to RPL B2, residual ostial LAD disease s/p CABG x 1 with LIMA to LAD in 5/2022  Asymptomatic  Continue aspirin 81 mg daily  Consider statin if no contraindication  Metoprolol as above     Cardiology service to sign off, please call back with questions or concerns. Thank you for allowing us to participate in patient's care.         I spent a total of 41 minutes of critical care time on the date of the service which included preparing to see the patient, face-to-face patient care, completing clinical documentation, obtaining and/or reviewing separately obtained history, performing a medically appropriate examination, counseling and educating the patient/family/caregiver, and ordering medications, tests, or procedures.       Chuck De Luna MD  Interventional Cardiology/ Structural heart  disease

## 2024-11-18 NOTE — FLOWSHEET NOTE
11/18/24 1755   Vital Signs   /71   Pulse 100   Respirations 17   Post-Hemodialysis Assessment   Post-Treatment Procedures Blood returned;Catheter capped, clamped and heparinized x 2 ports   Machine Disinfection Process Acid/Vinegar Clean;Heat Disinfect;Exterior Machine Disinfection   Rinseback Volume (ml) 300 ml   Blood Volume Processed (Liters) 56.8 L   Dialyzer Clearance Lightly streaked   Duration of Treatment (minutes) 240 minutes   Hemodialysis Intake (ml) 300 ml   Hemodialysis Output (ml) 2300 ml   NET Removed (ml) 2000   Tolerated Treatment Good   Patient Response to Treatment HD tolerated as ordered. Suboptimal flows from right IJ temp line. Dr Goldsmith notified. Order placed for TDC placement.   Bilateral Breath Sounds Clear   Edema Generalized   Edema Generalized +1   Time Off 1740   Patient Disposition Remain in ICU/ED   Observations & Evaluations   Level of Consciousness 0   Respiratory Quality/Effort Unlabored

## 2024-11-18 NOTE — PROGRESS NOTES
Discussed with  at bedside, cardiology does not need notified of afib RVR/ tachycardia as long as BP is stable, since patient is anticoagulated.

## 2024-11-18 NOTE — PROGRESS NOTES
Patient combative, refusing hands on care, refusing to participate in portions off assessment, agitated, pulling at lines and NIBP cuff,  distraction , therapeutic communication and pain control ineffective for reducing agitation. See medication titrations.

## 2024-11-18 NOTE — PROGRESS NOTES
Vascular Surgery Progress Note    Pt is being seen in f/u today regarding bilateral femoral embolectomies     Subjective  Patient had a better night compared to prior. Precedex off. Sitter at bedside. Tolerated dressing changes. Intermittently refusing meds and care but seems better this morning.     Current Medications:    dexmedeTOMIDine (PRECEDEX) 1,000 mcg in sodium chloride 0.9 % 250 mL infusion Stopped (11/18/24 0001)    heparin (PORCINE) Infusion 18 Units/kg/hr (11/18/24 0347)    sodium chloride      dextrose        heparin (porcine), heparin (porcine), sodium chloride flush, sodium chloride, potassium chloride **OR** potassium alternative oral replacement **OR** potassium chloride, magnesium sulfate, ondansetron **OR** ondansetron, polyethylene glycol, glucose, dextrose bolus **OR** dextrose bolus, glucagon (rDNA), dextrose, oxyCODONE **OR** oxyCODONE, HYDROmorphone    PHENobarbital  130 mg IntraVENous Q6H    thiamine  100 mg Oral Daily    folic acid  1 mg Oral Daily    metoprolol tartrate  25 mg Oral BID    melatonin  10 mg Oral Nightly    sodium chloride flush  5-40 mL IntraVENous 2 times per day    insulin glargine  12 Units SubCUTAneous QAM    aspirin  81 mg Oral Daily    acetaminophen  1,000 mg Oral 3 times per day    insulin lispro  0-16 Units SubCUTAneous Q4H        PHYSICAL EXAM:    /88   Pulse (!) 112   Temp 99.1 °F (37.3 °C) (Bladder)   Resp 19   Ht 1.727 m (5' 8\")   Wt 101.8 kg (224 lb 6.9 oz)   SpO2 95%   BMI 34.12 kg/m²     Intake/Output Summary (Last 24 hours) at 11/18/2024 0720  Last data filed at 11/18/2024 0600  Gross per 24 hour   Intake 1033.42 ml   Output 75 ml   Net 958.42 ml          Gen: awake, confused, no apparent distress  CVS: RRR  Resp: No increased work of breathing, on 2L NC  Abd: Soft, non-tender, non-distended  R LE: AKA, hematoma to medial inferior aspect of incision, remains soft, incision intact   L LE: Monophasic DP, dressing with serous saturation, dressing  taken down and bilateral fasciotomy wounds pink and viable appearing w some serosanguineous drainage noted    LABS:    Lab Results   Component Value Date    WBC 9.7 11/18/2024    HGB 8.3 (L) 11/18/2024    HCT 27.2 (L) 11/18/2024     11/18/2024    PROTIME 12.5 (H) 01/08/2018    INR 1.1 01/08/2018    APTT 66.1 (H) 11/18/2024    K 4.6 11/18/2024    BUN 40 (H) 11/18/2024    CREATININE 7.0 (H) 11/18/2024       A/P  64 y.o. 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     - continue to monitor neurovascular exam  - continue heparin drip  - continue daily LLE dressing changes per vascular resident  - RLE open to air, small hematoma present, monitor incision closely  - prn pain control   - CK discontinued  - monitor UOP and Cr; will defer to Nephro for need for BMP q6h  - continue regular diet as tolerated  - appreciate nephrology recommendations, continue HD  - Cardiology consulted; continued Metoprolol 25mg BID, can increase if needed, continue ASA  - will add Statin unless otherwise contraindicated  - remains high risk for LLE amputation, however no urgent need at the moment  - continue high dose Phenobarb per protocol for alcohol withdrawal    Discussed with Dr. Jey Gómez, DO    Pt seen and examined  R stump some blistering  L DP biphasic, PT weakly biphasic   Sensation and foot movement    Continue dressing changes  Cotinue hd  Monitor hgb    Siobhan Khanna MD

## 2024-11-18 NOTE — CARE COORDINATION
11/18 Care Coordination: Pt remains in [facility], S/p Bilateral femoral endarterectomies, bilateral fasciotomies   S/p Rt AKA 2/2 RLE ischemia, necrotic muscle, rhabdomyolysis. Continue Heparin gtt.  Still at high risk for Left AKA per Vascular.Cont with Temp HD. Plan for HD today. Discharge plan ARU/PM&R following. With Current status unclear on discharge needs. Will need to follow up with Renal if HD will be Temp.   CM/SW will continue to follow for discharge planning.   Swapnil WYMAN,RN-CV-BC  [phone]     11/18 Care Coordination: CM met with pt's brother and Sister. Discussed Rehab options. Was fine with ARU here. If would need a CHARLES and cont's on HD wants [facility] [place]. Ruslan placed to Adolfo at [facility], await her return call. [facility] will follow will need to check if HD will be covered at [facility], Dx ANTIONE. CM/SW will continue to follow for discharge planning.   wSapnil WYMAN,RN-KURT-BC  [phone]

## 2024-11-19 LAB
ALBUMIN SERPL-MCNC: 2.8 G/DL (ref 3.5–5.2)
ALP SERPL-CCNC: 128 U/L (ref 40–129)
ALT SERPL-CCNC: 27 U/L (ref 0–40)
ANION GAP SERPL CALCULATED.3IONS-SCNC: 8 MMOL/L (ref 7–16)
AST SERPL-CCNC: 112 U/L (ref 0–39)
BASOPHILS # BLD: 0.1 K/UL (ref 0–0.2)
BASOPHILS NFR BLD: 1 % (ref 0–2)
BILIRUB SERPL-MCNC: 0.2 MG/DL (ref 0–1.2)
BUN SERPL-MCNC: 36 MG/DL (ref 6–23)
CA-I BLD-SCNC: 1.18 MMOL/L (ref 1.15–1.33)
CALCIUM SERPL-MCNC: 8.6 MG/DL (ref 8.6–10.2)
CHLORIDE SERPL-SCNC: 99 MMOL/L (ref 98–107)
CHOLEST SERPL-MCNC: 179 MG/DL
CO2 SERPL-SCNC: 28 MMOL/L (ref 22–29)
CREAT SERPL-MCNC: 6.7 MG/DL (ref 0.7–1.2)
EKG ATRIAL RATE: 148 BPM
EKG ATRIAL RATE: 359 BPM
EKG P AXIS: -72 DEGREES
EKG P-R INTERVAL: 152 MS
EKG Q-T INTERVAL: 304 MS
EKG Q-T INTERVAL: 570 MS
EKG QRS DURATION: 108 MS
EKG QRS DURATION: 116 MS
EKG QTC CALCULATION (BAZETT): 477 MS
EKG QTC CALCULATION (BAZETT): 723 MS
EKG R AXIS: -36 DEGREES
EKG R AXIS: -54 DEGREES
EKG T AXIS: 140 DEGREES
EKG T AXIS: 20 DEGREES
EKG VENTRICULAR RATE: 148 BPM
EKG VENTRICULAR RATE: 97 BPM
EOSINOPHIL # BLD: 0.1 K/UL (ref 0.05–0.5)
EOSINOPHILS RELATIVE PERCENT: 1 % (ref 0–6)
ERYTHROCYTE [DISTWIDTH] IN BLOOD BY AUTOMATED COUNT: 13.6 % (ref 11.5–15)
GFR, ESTIMATED: 9 ML/MIN/1.73M2
GLUCOSE BLD-MCNC: 135 MG/DL (ref 74–99)
GLUCOSE BLD-MCNC: 142 MG/DL (ref 74–99)
GLUCOSE BLD-MCNC: 146 MG/DL (ref 74–99)
GLUCOSE BLD-MCNC: 229 MG/DL (ref 74–99)
GLUCOSE BLD-MCNC: 283 MG/DL (ref 74–99)
GLUCOSE SERPL-MCNC: 122 MG/DL (ref 74–99)
HCT VFR BLD AUTO: 27.2 % (ref 37–54)
HDLC SERPL-MCNC: 34 MG/DL
HGB BLD-MCNC: 8.6 G/DL (ref 12.5–16.5)
LDLC SERPL CALC-MCNC: 105 MG/DL
LYMPHOCYTES NFR BLD: 1.45 K/UL (ref 1.5–4)
LYMPHOCYTES RELATIVE PERCENT: 13 % (ref 20–42)
MAGNESIUM SERPL-MCNC: 2.1 MG/DL (ref 1.6–2.6)
MCH RBC QN AUTO: 31 PG (ref 26–35)
MCHC RBC AUTO-ENTMCNC: 31.6 G/DL (ref 32–34.5)
MCV RBC AUTO: 98.2 FL (ref 80–99.9)
METAMYELOCYTES ABSOLUTE COUNT: 0.1 K/UL (ref 0–0.12)
METAMYELOCYTES: 1 % (ref 0–1)
MONOCYTES NFR BLD: 19 % (ref 2–12)
MONOCYTES NFR BLD: 2.18 K/UL (ref 0.1–0.95)
MYELOCYTES ABSOLUTE COUNT: 0.1 K/UL
MYELOCYTES: 1 %
NEUTROPHILS NFR BLD: 65 % (ref 43–80)
NEUTS SEG NFR BLD: 7.56 K/UL (ref 1.8–7.3)
PARTIAL THROMBOPLASTIN TIME: 66.3 SEC (ref 24.5–35.1)
PHENOBARBITAL DATE LAST DOSE: ABNORMAL
PHENOBARBITAL DOSE AMOUNT: ABNORMAL
PHENOBARBITAL TIME LAST DOSE: ABNORMAL
PHENOBARBITAL: 11.4 UG/ML (ref 15–40)
PHOSPHATE SERPL-MCNC: 4.3 MG/DL (ref 2.5–4.5)
PLATELET # BLD AUTO: 345 K/UL (ref 130–450)
PMV BLD AUTO: 10.1 FL (ref 7–12)
POTASSIUM SERPL-SCNC: 4.3 MMOL/L (ref 3.5–5)
PROT SERPL-MCNC: 5.6 G/DL (ref 6.4–8.3)
RBC # BLD AUTO: 2.77 M/UL (ref 3.8–5.8)
RBC # BLD: ABNORMAL 10*6/UL
SODIUM SERPL-SCNC: 135 MMOL/L (ref 132–146)
TRIGL SERPL-MCNC: 199 MG/DL
VLDLC SERPL CALC-MCNC: 40 MG/DL
WBC OTHER # BLD: 11.6 K/UL (ref 4.5–11.5)

## 2024-11-19 PROCEDURE — 2000000000 HC ICU R&B

## 2024-11-19 PROCEDURE — 80053 COMPREHEN METABOLIC PANEL: CPT

## 2024-11-19 PROCEDURE — 99232 SBSQ HOSP IP/OBS MODERATE 35: CPT | Performed by: INTERNAL MEDICINE

## 2024-11-19 PROCEDURE — 84100 ASSAY OF PHOSPHORUS: CPT

## 2024-11-19 PROCEDURE — 80184 ASSAY OF PHENOBARBITAL: CPT

## 2024-11-19 PROCEDURE — 80061 LIPID PANEL: CPT

## 2024-11-19 PROCEDURE — 90935 HEMODIALYSIS ONE EVALUATION: CPT

## 2024-11-19 PROCEDURE — 82947 ASSAY GLUCOSE BLOOD QUANT: CPT

## 2024-11-19 PROCEDURE — 2580000003 HC RX 258: Performed by: SURGERY

## 2024-11-19 PROCEDURE — 85730 THROMBOPLASTIN TIME PARTIAL: CPT

## 2024-11-19 PROCEDURE — 6370000000 HC RX 637 (ALT 250 FOR IP): Performed by: SURGERY

## 2024-11-19 PROCEDURE — 83735 ASSAY OF MAGNESIUM: CPT

## 2024-11-19 PROCEDURE — 82330 ASSAY OF CALCIUM: CPT

## 2024-11-19 PROCEDURE — 6370000000 HC RX 637 (ALT 250 FOR IP): Performed by: STUDENT IN AN ORGANIZED HEALTH CARE EDUCATION/TRAINING PROGRAM

## 2024-11-19 PROCEDURE — 6360000002 HC RX W HCPCS

## 2024-11-19 PROCEDURE — 6360000002 HC RX W HCPCS: Performed by: SURGERY

## 2024-11-19 PROCEDURE — 36415 COLL VENOUS BLD VENIPUNCTURE: CPT

## 2024-11-19 PROCEDURE — 6360000002 HC RX W HCPCS: Performed by: STUDENT IN AN ORGANIZED HEALTH CARE EDUCATION/TRAINING PROGRAM

## 2024-11-19 PROCEDURE — 6370000000 HC RX 637 (ALT 250 FOR IP)

## 2024-11-19 PROCEDURE — 6370000000 HC RX 637 (ALT 250 FOR IP): Performed by: NURSE PRACTITIONER

## 2024-11-19 PROCEDURE — 2700000000 HC OXYGEN THERAPY PER DAY

## 2024-11-19 PROCEDURE — 85025 COMPLETE CBC W/AUTO DIFF WBC: CPT

## 2024-11-19 RX ORDER — BISACODYL 10 MG
10 SUPPOSITORY, RECTAL RECTAL DAILY
Status: DISCONTINUED | OUTPATIENT
Start: 2024-11-19 | End: 2024-11-28 | Stop reason: HOSPADM

## 2024-11-19 RX ORDER — POLYETHYLENE GLYCOL 3350 17 G/17G
17 POWDER, FOR SOLUTION ORAL DAILY
Status: DISCONTINUED | OUTPATIENT
Start: 2024-11-19 | End: 2024-11-28 | Stop reason: HOSPADM

## 2024-11-19 RX ORDER — DOCUSATE SODIUM 100 MG/1
100 CAPSULE, LIQUID FILLED ORAL 2 TIMES DAILY
Status: DISCONTINUED | OUTPATIENT
Start: 2024-11-19 | End: 2024-11-28 | Stop reason: HOSPADM

## 2024-11-19 RX ORDER — ATORVASTATIN CALCIUM 10 MG/1
10 TABLET, FILM COATED ORAL NIGHTLY
Status: DISCONTINUED | OUTPATIENT
Start: 2024-11-19 | End: 2024-11-28 | Stop reason: HOSPADM

## 2024-11-19 RX ADMIN — OXYCODONE HYDROCHLORIDE 10 MG: 10 TABLET ORAL at 05:43

## 2024-11-19 RX ADMIN — HYDROMORPHONE HYDROCHLORIDE 0.5 MG: 1 INJECTION, SOLUTION INTRAMUSCULAR; INTRAVENOUS; SUBCUTANEOUS at 11:00

## 2024-11-19 RX ADMIN — Medication 100 MG: at 11:01

## 2024-11-19 RX ADMIN — ATORVASTATIN CALCIUM 10 MG: 20 TABLET, FILM COATED ORAL at 20:17

## 2024-11-19 RX ADMIN — INSULIN LISPRO 8 UNITS: 100 INJECTION, SOLUTION INTRAVENOUS; SUBCUTANEOUS at 20:50

## 2024-11-19 RX ADMIN — POLYETHYLENE GLYCOL 3350 17 G: 17 POWDER, FOR SOLUTION ORAL at 13:40

## 2024-11-19 RX ADMIN — SODIUM CHLORIDE, PRESERVATIVE FREE 10 ML: 5 INJECTION INTRAVENOUS at 09:00

## 2024-11-19 RX ADMIN — HEPARIN SODIUM 18 UNITS/KG/HR: 10000 INJECTION, SOLUTION INTRAVENOUS at 06:44

## 2024-11-19 RX ADMIN — PHENOBARBITAL SODIUM 130 MG: 65 INJECTION INTRAMUSCULAR; INTRAVENOUS at 21:52

## 2024-11-19 RX ADMIN — SODIUM CHLORIDE, PRESERVATIVE FREE 10 ML: 5 INJECTION INTRAVENOUS at 20:16

## 2024-11-19 RX ADMIN — PHENOBARBITAL SODIUM 130 MG: 65 INJECTION INTRAMUSCULAR; INTRAVENOUS at 11:01

## 2024-11-19 RX ADMIN — METOPROLOL TARTRATE 50 MG: 50 TABLET, FILM COATED ORAL at 20:17

## 2024-11-19 RX ADMIN — OXYCODONE HYDROCHLORIDE 10 MG: 10 TABLET ORAL at 16:41

## 2024-11-19 RX ADMIN — ACETAMINOPHEN 1000 MG: 500 TABLET ORAL at 15:54

## 2024-11-19 RX ADMIN — Medication 10 MG: at 20:17

## 2024-11-19 RX ADMIN — METOPROLOL TARTRATE 50 MG: 50 TABLET, FILM COATED ORAL at 11:01

## 2024-11-19 RX ADMIN — HYDROMORPHONE HYDROCHLORIDE 0.5 MG: 1 INJECTION, SOLUTION INTRAMUSCULAR; INTRAVENOUS; SUBCUTANEOUS at 01:12

## 2024-11-19 RX ADMIN — ASPIRIN 81 MG CHEWABLE TABLET 81 MG: 81 TABLET CHEWABLE at 11:01

## 2024-11-19 RX ADMIN — INSULIN GLARGINE 12 UNITS: 100 INJECTION, SOLUTION SUBCUTANEOUS at 11:02

## 2024-11-19 RX ADMIN — DOCUSATE SODIUM 100 MG: 100 CAPSULE, LIQUID FILLED ORAL at 20:17

## 2024-11-19 RX ADMIN — INSULIN LISPRO 4 UNITS: 100 INJECTION, SOLUTION INTRAVENOUS; SUBCUTANEOUS at 16:41

## 2024-11-19 RX ADMIN — PHENOBARBITAL SODIUM 130 MG: 65 INJECTION INTRAMUSCULAR; INTRAVENOUS at 03:29

## 2024-11-19 RX ADMIN — Medication 1 MG: at 11:01

## 2024-11-19 RX ADMIN — DOCUSATE SODIUM 100 MG: 100 CAPSULE, LIQUID FILLED ORAL at 13:40

## 2024-11-19 RX ADMIN — ACETAMINOPHEN 1000 MG: 500 TABLET ORAL at 21:52

## 2024-11-19 RX ADMIN — HEPARIN SODIUM 18 UNITS/KG/HR: 10000 INJECTION, SOLUTION INTRAVENOUS at 19:40

## 2024-11-19 RX ADMIN — BISACODYL 10 MG: 10 SUPPOSITORY RECTAL at 15:57

## 2024-11-19 RX ADMIN — PHENOBARBITAL SODIUM 130 MG: 65 INJECTION INTRAMUSCULAR; INTRAVENOUS at 15:55

## 2024-11-19 ASSESSMENT — PAIN DESCRIPTION - ORIENTATION
ORIENTATION: LEFT
ORIENTATION: RIGHT
ORIENTATION: LEFT
ORIENTATION: RIGHT
ORIENTATION: LEFT

## 2024-11-19 ASSESSMENT — PAIN DESCRIPTION - LOCATION
LOCATION: LEG

## 2024-11-19 ASSESSMENT — PAIN SCALES - GENERAL
PAINLEVEL_OUTOF10: 8
PAINLEVEL_OUTOF10: 7
PAINLEVEL_OUTOF10: 5
PAINLEVEL_OUTOF10: 0
PAINLEVEL_OUTOF10: 7

## 2024-11-19 ASSESSMENT — PAIN DESCRIPTION - DESCRIPTORS
DESCRIPTORS: ACHING;BURNING;CRAMPING
DESCRIPTORS: ACHING;DISCOMFORT;SORE
DESCRIPTORS: ACHING;DISCOMFORT;SORE
DESCRIPTORS: BURNING;CRAMPING;ACHING
DESCRIPTORS: BURNING;CRAMPING;THROBBING

## 2024-11-19 ASSESSMENT — PAIN - FUNCTIONAL ASSESSMENT
PAIN_FUNCTIONAL_ASSESSMENT: ACTIVITIES ARE NOT PREVENTED
PAIN_FUNCTIONAL_ASSESSMENT: PREVENTS OR INTERFERES WITH MANY ACTIVE NOT PASSIVE ACTIVITIES
PAIN_FUNCTIONAL_ASSESSMENT: PREVENTS OR INTERFERES SOME ACTIVE ACTIVITIES AND ADLS

## 2024-11-19 NOTE — FLOWSHEET NOTE
11/19/24 1203   Vital Signs   /87   Temp 98.8 °F (37.1 °C)   Pulse (!) 108   Respirations 16   Weight - Scale 99.8 kg (220 lb 0.3 oz)   Weight Method Bed scale   Percent Weight Change -1.96   Pain Assessment   Pain Assessment None - Denies Pain   Post-Hemodialysis Assessment   Post-Treatment Procedures Blood returned;Catheter capped, clamped and heparinized x 2 ports   Machine Disinfection Process Machine Absence of Bleach Machine   Blood Volume Processed (Liters) 58.9 L   Dialyzer Clearance Lightly streaked   Duration of Treatment (minutes) 240 minutes   Hemodialysis Intake (ml) 300 ml   Hemodialysis Output (ml) 2300 ml   NET Removed (ml) 2000   Tolerated Treatment Good   Patient Response to Treatment Catheter positional, only able to maintain 250BFR   Bilateral Breath Sounds Diminished   Edema Right upper extremity;Left upper extremity;Right lower extremity;Left lower extremity   RUE Edema Trace   LUE Edema Trace   RLE Edema Trace   LLE Edema Trace   Time Off 1148   Patient Disposition Remain in ICU/ED   Observations & Evaluations   Level of Consciousness 0   Heart Rhythm Regular   Respiratory Quality/Effort Unlabored   O2 Device Nasal cannula   Skin Color Pink   Skin Condition/Temp Dry;Warm   Abdomen Inspection Soft   Bowel Sounds (All Quadrants) Active

## 2024-11-19 NOTE — PROGRESS NOTES
Vascular Surgery Progress Note    Pt is being seen in f/u today regarding bilateral femoral embolectomies     Subjective  Remains off of Precedex, doing better. Continues with sitter at bedside.     Current Medications:    dexmedeTOMIDine (PRECEDEX) 1,000 mcg in sodium chloride 0.9 % 250 mL infusion Stopped (11/18/24 0001)    heparin (PORCINE) Infusion 18 Units/kg/hr (11/19/24 0644)    sodium chloride      dextrose        heparin (porcine), heparin (porcine), sodium chloride flush, sodium chloride, potassium chloride **OR** potassium alternative oral replacement **OR** potassium chloride, magnesium sulfate, ondansetron **OR** ondansetron, polyethylene glycol, glucose, dextrose bolus **OR** dextrose bolus, glucagon (rDNA), dextrose, oxyCODONE **OR** oxyCODONE, HYDROmorphone    atorvastatin  10 mg Oral Nightly    metoprolol tartrate  50 mg Oral BID    PHENobarbital  130 mg IntraVENous Q6H    thiamine  100 mg Oral Daily    folic acid  1 mg Oral Daily    melatonin  10 mg Oral Nightly    sodium chloride flush  5-40 mL IntraVENous 2 times per day    insulin glargine  12 Units SubCUTAneous QAM    aspirin  81 mg Oral Daily    acetaminophen  1,000 mg Oral 3 times per day    insulin lispro  0-16 Units SubCUTAneous Q4H        PHYSICAL EXAM:    BP (!) 126/100   Pulse (!) 103   Temp 99 °F (37.2 °C) (Bladder)   Resp 22   Ht 1.727 m (5' 8\")   Wt 101.8 kg (224 lb 6.9 oz)   SpO2 99%   BMI 34.12 kg/m²     Intake/Output Summary (Last 24 hours) at 11/19/2024 0936  Last data filed at 11/19/2024 0600  Gross per 24 hour   Intake 1226.19 ml   Output 2350 ml   Net -1123.81 ml          Gen: awake, confused, no apparent distress  CVS: RRR  Resp: No increased work of breathing, on 2L NC  Abd: Soft, non-tender, non-distended  R LE: AKA, hematoma to medial inferior aspect of incision, remains soft, incision intact   L LE: Monophasic DP/PT, dressing with serous saturation, dressing taken down and bilateral fasciotomy wounds pink and viable

## 2024-11-19 NOTE — BRIEF OP NOTE
Brief Postoperative Note      Patient: Himanshu Bravo  YOB: 1960  MRN: [id number]    Date of Procedure: 11/12/2024    Pre-Op Diagnosis Codes:      * Sensation of cold in lower extremity [R20.9]    Post-Op Diagnosis:  Right IJV tunneld HD catheter placement       Procedure(s):  RIGHT LEG AMPUTATION ABOVE KNEE    Surgeon(s):  Jasmyne Henriquez MD Kakkasseril, Pratheek S, MD    Assistant:  * No surgical staff found *    Anesthesia: General    Estimated Blood Loss (mL): Minimal    Complications: None    Specimens:   ID Type Source Tests Collected by Time Destination   A : right leg  above the knee amputation Tissue Tissue SURGICAL PATHOLOGY Jasmyne Henriquez MD 11/12/2024 1731        Implants:  * No implants in log *      Drains:   Urinary Catheter 11/12/24 2 Way;[place]-Temperature (Active)   Catheter Indications Need for fluid volume management of the critically ill patient in a critical care setting 11/19/24 1300   Site Assessment No urethral drainage 11/19/24 1300   Urine Color Yellow 11/19/24 1300   Urine Appearance Clear 11/19/24 1300   Collection Container Standard 11/19/24 1300   Securement Method Securing device (Describe) 11/19/24 1300   Catheter Care  Soap and water 11/19/24 0400   Catheter Best Practices  Drainage tube clipped to bed;Catheter secured to thigh;Tamper seal intact;Bag below bladder;Bag not on floor;Lack of dependent loop in tubing;Drainage bag less than half full 11/19/24 1300   Status Draining;Patent 11/19/24 1300   Manual Irrigation Volume Input (mL) 60 mL 11/13/24 1100   Output (mL) 0 mL 11/19/24 1300       Findings:  Infection Present At Time Of Surgery (PATOS) (choose all levels that have infection present):  No infection present  Other Findings: none    Electronically signed by Stephania Valle MD on 11/19/2024 at 3:12 PM

## 2024-11-19 NOTE — PLAN OF CARE
Problem: Chronic Conditions and Co-morbidities  Goal: Patient's chronic conditions and co-morbidity symptoms are monitored and maintained or improved  11/19/2024 0843 by Margot Winslow RN  Outcome: Progressing  11/18/2024 1955 by Kevin Juan RN  Outcome: Progressing  Flowsheets (Taken 11/18/2024 1940)  Care Plan - Patient's Chronic Conditions and Co-Morbidity Symptoms are Monitored and Maintained or Improved:   Monitor and assess patient's chronic conditions and comorbid symptoms for stability, deterioration, or improvement   Collaborate with multidisciplinary team to address chronic and comorbid conditions and prevent exacerbation or deterioration   Update acute care plan with appropriate goals if chronic or comorbid symptoms are exacerbated and prevent overall improvement and discharge  11/18/2024 1939 by Kevin Juan RN  Outcome: Progressing  Flowsheets (Taken 11/18/2024 1939)  Care Plan - Patient's Chronic Conditions and Co-Morbidity Symptoms are Monitored and Maintained or Improved:   Monitor and assess patient's chronic conditions and comorbid symptoms for stability, deterioration, or improvement   Collaborate with multidisciplinary team to address chronic and comorbid conditions and prevent exacerbation or deterioration   Update acute care plan with appropriate goals if chronic or comorbid symptoms are exacerbated and prevent overall improvement and discharge     Problem: Discharge Planning  Goal: Discharge to home or other facility with appropriate resources  Outcome: Progressing     Problem: Pain  Goal: Verbalizes/displays adequate comfort level or baseline comfort level  11/19/2024 0843 by Margot Winslow RN  Outcome: Progressing  11/18/2024 1955 by Kevin Juan RN  Outcome: Progressing  Flowsheets (Taken 11/18/2024 1940)  Verbalizes/displays adequate comfort level or baseline comfort level:   Encourage patient to monitor pain and request assistance   Assess pain using appropriate pain

## 2024-11-19 NOTE — PROGRESS NOTES
Comprehensive Nutrition Assessment    Type and Reason for Visit:  Initial, LOS    Nutrition Recommendations/Plan:   Continue current diet  Start ONS to aid in wound healing and promote oral intake  Will monitor     Malnutrition Assessment:  Malnutrition Status:  At risk for malnutrition (11/19/24 1429)    Context:  Acute Illness     Findings of the 6 clinical characteristics of malnutrition:  Energy Intake:  Mild decrease in energy intake  Weight Loss:  Unable to assess (d/t lack of wt hx per EMR)     Body Fat Loss:  Unable to assess     Muscle Mass Loss:  Unable to assess    Fluid Accumulation:  No fluid accumulation     Strength:  Not Performed    Nutrition Assessment:    pt adm d/t  B/L embolic femoral artery occlusions s/p B/L femoral embolectomies w/ fasciotomies 11/12 now s/p subsequent R-AKA 11/12; AFIB noted; worsening renal status noted, pt s/p temp HD line 11/13 w/ initiation of HD 11/14 - continues on dialysis and anticipating TDC placement 11/20; PMhx of DM, HLD, ETOH abuse, congenital heart disease; will start ONS to optimize wound healing and promote oral intake; will monitor.    Nutrition Related Findings:    R-AKA; +I/O; A&Ox4 (oriented/disoriented at times) ; poor dentition; active BS; +1 edema Wound Type: Multiple, Surgical Incision       Current Nutrition Intake & Therapies:    Average Meal Intake: 51-75%, 26-50% (sporadic intakes noted this adm)  Average Supplements Intake: None Ordered  ADULT DIET; Regular; Low Fat/Low Chol/High Fiber/VIKASH; Low Potassium (Less than 3000 mg/day)  Diet NPO Exceptions are: Sips of Water with Meds  ADULT ORAL NUTRITION SUPPLEMENT; Lunch, Breakfast; Wound Healing Oral Supplement  ADULT ORAL NUTRITION SUPPLEMENT; Lunch, Dinner; Renal Oral Supplement    Anthropometric Measures:  Height: 172.7 cm (5' 7.99\")  Ideal Body Weight (IBW): 154 lbs (70 kg)       Current Body Weight: 99.8 kg (220 lb 0.3 oz) (11/19-BS; s/p R-AKA/ dry wt), 142.9 % IBW. Weight Source: Bed

## 2024-11-19 NOTE — PLAN OF CARE
Problem: Chronic Conditions and Co-morbidities  Goal: Patient's chronic conditions and co-morbidity symptoms are monitored and maintained or improved  11/19/2024 1329 by Rosas Jo RN  Outcome: Progressing     Problem: Discharge Planning  Goal: Discharge to home or other facility with appropriate resources  11/19/2024 1329 by Rosas Jo RN  Outcome: Progressing     Problem: Pain  Goal: Verbalizes/displays adequate comfort level or baseline comfort level  11/19/2024 1329 by Rosas Jo RN  Outcome: Progressing     Problem: Safety - Adult  Goal: Free from fall injury  11/19/2024 1329 by Rosas Jo RN  Outcome: Progressing     Problem: Skin/Tissue Integrity  Goal: Absence of new skin breakdown  Description: 1.  Monitor for areas of redness and/or skin breakdown  2.  Assess vascular access sites hourly  3.  Every 4-6 hours minimum:  Change oxygen saturation probe site  4.  Every 4-6 hours:  If on nasal continuous positive airway pressure, respiratory therapy assess nares and determine need for appliance change or resting period.  11/19/2024 1329 by Rosas Jo RN  Outcome: Progressing     Problem: ABCDS Injury Assessment  Goal: Absence of physical injury  11/19/2024 1329 by Rosas Jo RN  Outcome: Progressing     Problem: Spiritual Struggle  Goal: Verbalizes spiritual struggle  Outcome: Progressing     Problem: Emotional Distress  Goal: Verbalization of thoughts and feelings  Outcome: Progressing     Problem: Emotional Distress  Goal: Reduce evidence of anxiety/worry/anger  Outcome: Progressing     Problem: Life Adjustment  Goal: Verbalization of feelings regarding change/loss  Outcome: Progressing     Problem: Life Adjustment  Goal: Finding meaning/purpose in the midst of illness/suffering  Outcome: Progressing     Problem: Life Adjustment  Goal: Identifies/restores coping resources/skills  Outcome: Progressing     Problem: Life Adjustment  Goal: Growing sense of

## 2024-11-19 NOTE — PROCEDURES
PROCEDURE NOTE  Date: 11/19/2024   Name: Himanshu Bravo  YOB: 1960    Procedures: Tunneled HD    Spoke with bedside RN, patient's heparin gtt will need to be held for 6 hours prior to placement. Please stop drip at 0400 on 11-. Patient will also need to be NPO and have a INR.

## 2024-11-19 NOTE — PLAN OF CARE
Problem: Chronic Conditions and Co-morbidities  Goal: Patient's chronic conditions and co-morbidity symptoms are monitored and maintained or improved  11/18/2024 1939 by Kevin Juan RN  Outcome: Progressing  Flowsheets (Taken 11/18/2024 1939)  Care Plan - Patient's Chronic Conditions and Co-Morbidity Symptoms are Monitored and Maintained or Improved:   Monitor and assess patient's chronic conditions and comorbid symptoms for stability, deterioration, or improvement   Collaborate with multidisciplinary team to address chronic and comorbid conditions and prevent exacerbation or deterioration   Update acute care plan with appropriate goals if chronic or comorbid symptoms are exacerbated and prevent overall improvement and discharge  11/18/2024 1623 by Tracie Block RN  Outcome: Progressing     Problem: Discharge Planning  Goal: Discharge to home or other facility with appropriate resources  11/18/2024 1623 by Tracie Block RN  Outcome: Progressing     Problem: Pain  Goal: Verbalizes/displays adequate comfort level or baseline comfort level  11/18/2024 1939 by Kevin Juan RN  Outcome: Progressing  Flowsheets (Taken 11/18/2024 1939)  Verbalizes/displays adequate comfort level or baseline comfort level:   Assess pain using appropriate pain scale   Encourage patient to monitor pain and request assistance   Administer analgesics based on type and severity of pain and evaluate response   Implement non-pharmacological measures as appropriate and evaluate response   Consider cultural and social influences on pain and pain management  11/18/2024 1623 by Tracie Block RN  Outcome: Progressing     Problem: Safety - Adult  Goal: Free from fall injury  11/18/2024 1623 by Tracie Block RN  Outcome: Progressing     Problem: Skin/Tissue Integrity  Goal: Absence of new skin breakdown  Description: 1.  Monitor for areas of redness and/or skin breakdown  2.  Assess vascular access sites

## 2024-11-19 NOTE — PROGRESS NOTES
[facility]  MD Bryce Milligan MD Ali Hassan, MD Lisa Kniska, CNP   Natalia Rey, CODY Harris, DEMETRIUS  Progress Note    11/19/2024    SUBJECTIVE:   11/13:  Seen in [facility].  Awake and alert.  S/P right above knee amputation yesterday. Complains of pain at right AKA site.  Oxygen on per NC.  Denies chest pain, palpitation or SOB.  Denies nausea, vomiting or abd pain.  Continues on heparin drip and bicarb IV.  He is oligoanuric.     11/14: Seen in the [facility] while on dialysis. Tolerating therapy without issues. His blood pressure is stable. Heart rate is mildly elevated. He is in good spirits and reports that he is having less pain today. His appetite is fair, he did eat some cereal for dinner. Urine output is stable.     11/15: Seen in the ICU. No acute distress or complaints. Received metoprolol earlier for tachycardia. Tolerated HD today with 2.8 liters of fluid removal. He is still making urine, though urine output has decreased. He is on oxygen via nasal canula. Denies dyspnea. PO intake is stable.     11/16: Undergoing with withdrawal of alcohol symptoms.  Obtunded.  Awake agitated aggressive at times though symptoms have improved status post sedation    11/17: Feeling better today.  Had a \"rough night\" notable for worsening agitation and aggression.  Now on Precedex drip with control of symptoms.  Enjoying his lunch.  Denies complaint.  Family at bedside.  BP stable.    11/18: Seen in the [facility]. Mentation has improved. Sitter is present at the bedside. He is still tachycardic. Denies any acute complaints. Denies dyspnea. His blood pressure is stable. Complains of some discomfort to his RLE. Minimal urine output.     11/19: Seen today while on dialysis. Tolerating treatment well. Line will only tolerate BFR of 250. Plan is for tunneled line placement in IR tomorrow. Blood pressure is stable. Heart rate has improved. Urine output remains low. He denies any

## 2024-11-19 NOTE — PROGRESS NOTES
CV[facility] Progress Note    Name: Himanshu Bravo  MRN: [id number]    CC: Postoperative Critical Care Management      Indication for Surgery/Procedure: Bilateral Common Femoral Artery Occlusion      Important/Relevant PMH/PSH: Tobacco abuse, DMII, HTN, HLD, CAD s/p cardiac stents, s/p CABG, postop atrial fibrillation, chronic diastolic heart failure     11/11/2024 CTA abdominal aorta with bilateral runoff with contrast showed 1. Severe atherosclerotic disease with common femoral arterial occlusions,   some reconstitution in the superficial femoral and popliteal locations but   without significant runoff of the lower extremities.   2. 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      Procedure/Surgeries: 11/12/2024 Bilateral femoral endarterectomies, bilateral fasciotomies     11/12/2024 Right leg amputation above knee       Intake/Output Summary (Last 24 hours) at 11/19/2024 1150  Last data filed at 11/19/2024 0900  Gross per 24 hour   Intake 1466.19 ml   Output 2340 ml   Net -873.81 ml       Recent Labs     11/17/24  0414 11/18/24  0305 11/19/24  0257   WBC 9.7 9.7 11.6*   HGB 8.4* 8.3* 8.6*   HCT 26.9* 27.2* 27.2*    304 345      Lab Results   Component Value Date/Time     11/19/2024 02:57 AM    K 4.3 11/19/2024 02:57 AM    K 3.9 01/09/2018 03:15 AM    CL 99 11/19/2024 02:57 AM    CO2 28 11/19/2024 02:57 AM    BUN 36 11/19/2024 02:57 AM    CREATININE 6.7 11/19/2024 02:57 AM    GLUCOSE 122 11/19/2024 02:57 AM    GLUCOSE 216 06/14/2023 12:55 PM    CALCIUM 8.6 11/19/2024 02:57 AM    MG 2.1 11/19/2024 02:57 AM    PHOS 4.3 11/19/2024 02:57 AM         Physical Exam:    /70   Pulse (!) 107   Temp 99.9 °F (37.7 °C) (Bladder)   Resp 15   Ht 1.727 m (5' 8\")   Wt 101.8 kg (224 lb 6.9 oz)   SpO2 97%   BMI 34.12 kg/m²       General: Awake, alert. No complaints. Remains on heparin gtt. Sitter at bedside  Eyes: PERRL, anicteric   Pulmonary: Diminished bibasilar. No wheezes, no accessory muscle use noted on 2L

## 2024-11-19 NOTE — PROGRESS NOTES
[facility] Hospitalist Progress Note    Admitting Date and Time: 11/12/2024  2:39 AM  Admit Dx: 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]  Atrial fibrillation, unspecified type (HCC) [I48.91]    Synopsis:  Presented to ER due to concern for leg pain and shortness of breath.  Mention he has been noticing bluish discoloration of both lower extremity worsened over the last 3 days.  He was found to have lactic acidosis on presentation of 3.9.  Leukocytosis of 16,500.  Blood pressure on presentation of 119/75.  CT abdomen with aorta with bilateral runoff with contrast was obtained and showed severe atherosclerotic disease with common femoral arterial occlusions with some reconstitution in the superficial femoral and popliteal location but without significant runoff in the lower extremities, bilateral large renal infarcts.  He was started on heparin by weight.  Patient was seen by vascular surgery and taken straight to the OR.  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   Tolerated HD well  Plan for TDC placement tomorrow.  Sitter in place    ROS: denies fever, chills, cp, sob, n/v, HA unless stated above.      atorvastatin  10 mg Oral Nightly    docusate sodium  100 mg Oral BID    polyethylene glycol  17 g Oral Daily    bisacodyl  10 mg Rectal Daily    metoprolol tartrate  50 mg Oral BID    PHENobarbital  130 mg IntraVENous Q6H    thiamine  100 mg Oral Daily    folic acid  1 mg Oral Daily    melatonin  10 mg Oral Nightly    sodium chloride flush  5-40 mL IntraVENous 2 times per day    insulin glargine  12 Units SubCUTAneous QAM    aspirin  81 mg Oral Daily    acetaminophen  1,000 mg Oral 3 times per day    insulin lispro  0-16 Units SubCUTAneous Q4H     heparin (porcine), 80 Units/kg, PRN  heparin (porcine), 40 Units/kg, PRN  sodium chloride flush,

## 2024-11-20 ENCOUNTER — APPOINTMENT (OUTPATIENT)
Dept: INTERVENTIONAL RADIOLOGY/VASCULAR | Age: 64
DRG: 239 | End: 2024-11-20
Payer: COMMERCIAL

## 2024-11-20 LAB
ALBUMIN SERPL-MCNC: 2.5 G/DL (ref 3.5–5.2)
ALP SERPL-CCNC: 123 U/L (ref 40–129)
ALT SERPL-CCNC: 23 U/L (ref 0–40)
ANION GAP SERPL CALCULATED.3IONS-SCNC: 13 MMOL/L (ref 7–16)
AST SERPL-CCNC: 83 U/L (ref 0–39)
BASOPHILS # BLD: 0.05 K/UL (ref 0–0.2)
BASOPHILS NFR BLD: 0 % (ref 0–2)
BILIRUB SERPL-MCNC: 0.2 MG/DL (ref 0–1.2)
BUN SERPL-MCNC: 37 MG/DL (ref 6–23)
CA-I BLD-SCNC: 1.17 MMOL/L (ref 1.15–1.33)
CALCIUM SERPL-MCNC: 8.4 MG/DL (ref 8.6–10.2)
CHLORIDE SERPL-SCNC: 98 MMOL/L (ref 98–107)
CK SERPL-CCNC: 828 U/L (ref 20–200)
CO2 SERPL-SCNC: 26 MMOL/L (ref 22–29)
CREAT SERPL-MCNC: 6 MG/DL (ref 0.7–1.2)
EOSINOPHIL # BLD: 0.29 K/UL (ref 0.05–0.5)
EOSINOPHILS RELATIVE PERCENT: 2 % (ref 0–6)
ERYTHROCYTE [DISTWIDTH] IN BLOOD BY AUTOMATED COUNT: 13.9 % (ref 11.5–15)
GFR, ESTIMATED: 10 ML/MIN/1.73M2
GLUCOSE BLD-MCNC: 132 MG/DL (ref 74–99)
GLUCOSE BLD-MCNC: 173 MG/DL (ref 74–99)
GLUCOSE BLD-MCNC: 198 MG/DL (ref 74–99)
GLUCOSE BLD-MCNC: 371 MG/DL (ref 74–99)
GLUCOSE SERPL-MCNC: 170 MG/DL (ref 74–99)
HCT VFR BLD AUTO: 26.9 % (ref 37–54)
HGB BLD-MCNC: 8.5 G/DL (ref 12.5–16.5)
IMM GRANULOCYTES # BLD AUTO: 0.19 K/UL (ref 0–0.58)
IMM GRANULOCYTES NFR BLD: 1 % (ref 0–5)
INR PPP: 1.1
LYMPHOCYTES NFR BLD: 1.52 K/UL (ref 1.5–4)
LYMPHOCYTES RELATIVE PERCENT: 11 % (ref 20–42)
MAGNESIUM SERPL-MCNC: 2.2 MG/DL (ref 1.6–2.6)
MCH RBC QN AUTO: 31.1 PG (ref 26–35)
MCHC RBC AUTO-ENTMCNC: 31.6 G/DL (ref 32–34.5)
MCV RBC AUTO: 98.5 FL (ref 80–99.9)
MONOCYTES NFR BLD: 1.48 K/UL (ref 0.1–0.95)
MONOCYTES NFR BLD: 11 % (ref 2–12)
NEUTROPHILS NFR BLD: 74 % (ref 43–80)
NEUTS SEG NFR BLD: 10.07 K/UL (ref 1.8–7.3)
PARTIAL THROMBOPLASTIN TIME: 50.5 SEC (ref 24.5–35.1)
PHOSPHATE SERPL-MCNC: 4 MG/DL (ref 2.5–4.5)
PLATELET # BLD AUTO: 364 K/UL (ref 130–450)
PMV BLD AUTO: 10.6 FL (ref 7–12)
POTASSIUM SERPL-SCNC: 4.4 MMOL/L (ref 3.5–5)
PROT SERPL-MCNC: 5.4 G/DL (ref 6.4–8.3)
PROTHROMBIN TIME: 11.8 SEC (ref 9.3–12.4)
RBC # BLD AUTO: 2.73 M/UL (ref 3.8–5.8)
SODIUM SERPL-SCNC: 137 MMOL/L (ref 132–146)
WBC OTHER # BLD: 13.6 K/UL (ref 4.5–11.5)

## 2024-11-20 PROCEDURE — 6370000000 HC RX 637 (ALT 250 FOR IP): Performed by: STUDENT IN AN ORGANIZED HEALTH CARE EDUCATION/TRAINING PROGRAM

## 2024-11-20 PROCEDURE — 02HV33Z INSERTION OF INFUSION DEVICE INTO SUPERIOR VENA CAVA, PERCUTANEOUS APPROACH: ICD-10-PCS | Performed by: FAMILY MEDICINE

## 2024-11-20 PROCEDURE — 85610 PROTHROMBIN TIME: CPT

## 2024-11-20 PROCEDURE — 76937 US GUIDE VASCULAR ACCESS: CPT

## 2024-11-20 PROCEDURE — 82550 ASSAY OF CK (CPK): CPT

## 2024-11-20 PROCEDURE — 0JH60XZ INSERTION OF TUNNELED VASCULAR ACCESS DEVICE INTO CHEST SUBCUTANEOUS TISSUE AND FASCIA, OPEN APPROACH: ICD-10-PCS | Performed by: FAMILY MEDICINE

## 2024-11-20 PROCEDURE — 6370000000 HC RX 637 (ALT 250 FOR IP): Performed by: SURGERY

## 2024-11-20 PROCEDURE — 90935 HEMODIALYSIS ONE EVALUATION: CPT

## 2024-11-20 PROCEDURE — 84100 ASSAY OF PHOSPHORUS: CPT

## 2024-11-20 PROCEDURE — 2580000003 HC RX 258: Performed by: SURGERY

## 2024-11-20 PROCEDURE — 6360000002 HC RX W HCPCS: Performed by: SURGERY

## 2024-11-20 PROCEDURE — 02H633Z INSERTION OF INFUSION DEVICE INTO RIGHT ATRIUM, PERCUTANEOUS APPROACH: ICD-10-PCS | Performed by: SURGERY

## 2024-11-20 PROCEDURE — 2580000003 HC RX 258: Performed by: NURSE PRACTITIONER

## 2024-11-20 PROCEDURE — 82330 ASSAY OF CALCIUM: CPT

## 2024-11-20 PROCEDURE — 6360000002 HC RX W HCPCS: Performed by: NURSE PRACTITIONER

## 2024-11-20 PROCEDURE — 36558 INSERT TUNNELED CV CATH: CPT

## 2024-11-20 PROCEDURE — 85730 THROMBOPLASTIN TIME PARTIAL: CPT

## 2024-11-20 PROCEDURE — 77001 FLUOROGUIDE FOR VEIN DEVICE: CPT

## 2024-11-20 PROCEDURE — C1750 CATH, HEMODIALYSIS,LONG-TERM: HCPCS

## 2024-11-20 PROCEDURE — 6360000002 HC RX W HCPCS: Performed by: INTERNAL MEDICINE

## 2024-11-20 PROCEDURE — 36415 COLL VENOUS BLD VENIPUNCTURE: CPT

## 2024-11-20 PROCEDURE — 85025 COMPLETE CBC W/AUTO DIFF WBC: CPT

## 2024-11-20 PROCEDURE — 6370000000 HC RX 637 (ALT 250 FOR IP): Performed by: NURSE PRACTITIONER

## 2024-11-20 PROCEDURE — 99233 SBSQ HOSP IP/OBS HIGH 50: CPT | Performed by: INTERNAL MEDICINE

## 2024-11-20 PROCEDURE — 83735 ASSAY OF MAGNESIUM: CPT

## 2024-11-20 PROCEDURE — 6360000002 HC RX W HCPCS: Performed by: RADIOLOGY

## 2024-11-20 PROCEDURE — 2140000000 HC CCU INTERMEDIATE R&B

## 2024-11-20 PROCEDURE — 2500000003 HC RX 250 WO HCPCS: Performed by: RADIOLOGY

## 2024-11-20 PROCEDURE — 82947 ASSAY GLUCOSE BLOOD QUANT: CPT

## 2024-11-20 PROCEDURE — 2580000003 HC RX 258: Performed by: RADIOLOGY

## 2024-11-20 PROCEDURE — 2700000000 HC OXYGEN THERAPY PER DAY

## 2024-11-20 PROCEDURE — 6360000002 HC RX W HCPCS

## 2024-11-20 PROCEDURE — 36556 INSERT NON-TUNNEL CV CATH: CPT

## 2024-11-20 PROCEDURE — 80053 COMPREHEN METABOLIC PANEL: CPT

## 2024-11-20 RX ORDER — MIDAZOLAM HYDROCHLORIDE 2 MG/2ML
INJECTION, SOLUTION INTRAMUSCULAR; INTRAVENOUS PRN
Status: COMPLETED | OUTPATIENT
Start: 2024-11-20 | End: 2024-11-20

## 2024-11-20 RX ORDER — LIDOCAINE HYDROCHLORIDE AND EPINEPHRINE BITARTRATE 20; .01 MG/ML; MG/ML
INJECTION, SOLUTION SUBCUTANEOUS PRN
Status: COMPLETED | OUTPATIENT
Start: 2024-11-20 | End: 2024-11-20

## 2024-11-20 RX ORDER — LIDOCAINE HYDROCHLORIDE 20 MG/ML
INJECTION, SOLUTION INFILTRATION; PERINEURAL PRN
Status: COMPLETED | OUTPATIENT
Start: 2024-11-20 | End: 2024-11-20

## 2024-11-20 RX ORDER — LACTULOSE 10 G/15ML
20 SOLUTION ORAL 2 TIMES DAILY
Status: DISCONTINUED | OUTPATIENT
Start: 2024-11-20 | End: 2024-11-28 | Stop reason: HOSPADM

## 2024-11-20 RX ORDER — FENTANYL CITRATE 50 UG/ML
INJECTION, SOLUTION INTRAMUSCULAR; INTRAVENOUS PRN
Status: COMPLETED | OUTPATIENT
Start: 2024-11-20 | End: 2024-11-20

## 2024-11-20 RX ORDER — HEPARIN SODIUM 1000 [USP'U]/ML
3500 INJECTION, SOLUTION INTRAVENOUS; SUBCUTANEOUS PRN
Status: DISCONTINUED | OUTPATIENT
Start: 2024-11-20 | End: 2024-11-23

## 2024-11-20 RX ADMIN — POLYETHYLENE GLYCOL 3350 17 G: 17 POWDER, FOR SOLUTION ORAL at 12:13

## 2024-11-20 RX ADMIN — LIDOCAINE HYDROCHLORIDE,EPINEPHRINE BITARTRATE 8 ML: 20; .01 INJECTION, SOLUTION INFILTRATION; PERINEURAL at 11:04

## 2024-11-20 RX ADMIN — METOPROLOL TARTRATE 50 MG: 50 TABLET, FILM COATED ORAL at 12:17

## 2024-11-20 RX ADMIN — Medication 10 MG: at 21:14

## 2024-11-20 RX ADMIN — PHENOBARBITAL SODIUM 130 MG: 65 INJECTION INTRAMUSCULAR; INTRAVENOUS at 21:22

## 2024-11-20 RX ADMIN — INSULIN LISPRO 4 UNITS: 100 INJECTION, SOLUTION INTRAVENOUS; SUBCUTANEOUS at 09:00

## 2024-11-20 RX ADMIN — Medication 100 MG: at 12:17

## 2024-11-20 RX ADMIN — CEFAZOLIN 2000 MG: 2 INJECTION, POWDER, FOR SOLUTION INTRAMUSCULAR; INTRAVENOUS at 10:56

## 2024-11-20 RX ADMIN — HEPARIN SODIUM 18 UNITS/KG/HR: 10000 INJECTION, SOLUTION INTRAVENOUS at 19:12

## 2024-11-20 RX ADMIN — LIDOCAINE HYDROCHLORIDE 10 ML: 20 INJECTION, SOLUTION INFILTRATION; PERINEURAL at 11:04

## 2024-11-20 RX ADMIN — PHENOBARBITAL SODIUM 130 MG: 65 INJECTION INTRAMUSCULAR; INTRAVENOUS at 09:14

## 2024-11-20 RX ADMIN — PHENOBARBITAL SODIUM 130 MG: 65 INJECTION INTRAMUSCULAR; INTRAVENOUS at 18:37

## 2024-11-20 RX ADMIN — PHENOBARBITAL SODIUM 130 MG: 65 INJECTION INTRAMUSCULAR; INTRAVENOUS at 03:49

## 2024-11-20 RX ADMIN — OXYCODONE HYDROCHLORIDE 5 MG: 5 TABLET ORAL at 18:30

## 2024-11-20 RX ADMIN — INSULIN LISPRO 16 UNITS: 100 INJECTION, SOLUTION INTRAVENOUS; SUBCUTANEOUS at 21:16

## 2024-11-20 RX ADMIN — DOCUSATE SODIUM 100 MG: 100 CAPSULE, LIQUID FILLED ORAL at 21:15

## 2024-11-20 RX ADMIN — DOCUSATE SODIUM 100 MG: 100 CAPSULE, LIQUID FILLED ORAL at 12:13

## 2024-11-20 RX ADMIN — ACETAMINOPHEN 1000 MG: 500 TABLET ORAL at 21:14

## 2024-11-20 RX ADMIN — SODIUM CHLORIDE, PRESERVATIVE FREE 10 ML: 5 INJECTION INTRAVENOUS at 21:14

## 2024-11-20 RX ADMIN — OXYCODONE HYDROCHLORIDE 5 MG: 5 TABLET ORAL at 12:14

## 2024-11-20 RX ADMIN — LACTULOSE 20 G: 20 SOLUTION ORAL at 21:14

## 2024-11-20 RX ADMIN — Medication 1 MG: at 12:13

## 2024-11-20 RX ADMIN — HYDROMORPHONE HYDROCHLORIDE 0.5 MG: 1 INJECTION, SOLUTION INTRAMUSCULAR; INTRAVENOUS; SUBCUTANEOUS at 06:21

## 2024-11-20 RX ADMIN — FENTANYL CITRATE 50 MCG: 50 INJECTION, SOLUTION INTRAMUSCULAR; INTRAVENOUS at 11:00

## 2024-11-20 RX ADMIN — SODIUM CHLORIDE, PRESERVATIVE FREE 10 ML: 5 INJECTION INTRAVENOUS at 08:59

## 2024-11-20 RX ADMIN — FENTANYL CITRATE 50 MCG: 50 INJECTION, SOLUTION INTRAMUSCULAR; INTRAVENOUS at 11:05

## 2024-11-20 RX ADMIN — MIDAZOLAM HYDROCHLORIDE 1 MG: 1 INJECTION, SOLUTION INTRAMUSCULAR; INTRAVENOUS at 11:00

## 2024-11-20 RX ADMIN — HEPARIN SODIUM 3500 UNITS: 1000 INJECTION INTRAVENOUS; SUBCUTANEOUS at 18:10

## 2024-11-20 RX ADMIN — INSULIN GLARGINE 12 UNITS: 100 INJECTION, SOLUTION SUBCUTANEOUS at 09:00

## 2024-11-20 RX ADMIN — ATORVASTATIN CALCIUM 10 MG: 20 TABLET, FILM COATED ORAL at 21:15

## 2024-11-20 RX ADMIN — ASPIRIN 81 MG CHEWABLE TABLET 81 MG: 81 TABLET CHEWABLE at 12:13

## 2024-11-20 RX ADMIN — ACETAMINOPHEN 1000 MG: 500 TABLET ORAL at 13:40

## 2024-11-20 RX ADMIN — MIDAZOLAM HYDROCHLORIDE 1 MG: 1 INJECTION, SOLUTION INTRAMUSCULAR; INTRAVENOUS at 11:05

## 2024-11-20 ASSESSMENT — PAIN DESCRIPTION - ORIENTATION
ORIENTATION: RIGHT
ORIENTATION: LEFT
ORIENTATION: RIGHT
ORIENTATION: LEFT
ORIENTATION: RIGHT
ORIENTATION: RIGHT

## 2024-11-20 ASSESSMENT — PAIN DESCRIPTION - DESCRIPTORS
DESCRIPTORS: SHARP;STABBING
DESCRIPTORS: ACHING;NAGGING;DISCOMFORT
DESCRIPTORS: ACHING;DISCOMFORT;SORE
DESCRIPTORS: ACHING;NUMBNESS;DISCOMFORT

## 2024-11-20 ASSESSMENT — PAIN - FUNCTIONAL ASSESSMENT
PAIN_FUNCTIONAL_ASSESSMENT: PREVENTS OR INTERFERES SOME ACTIVE ACTIVITIES AND ADLS

## 2024-11-20 ASSESSMENT — PAIN DESCRIPTION - LOCATION
LOCATION: LEG
LOCATION: NECK

## 2024-11-20 ASSESSMENT — PAIN DESCRIPTION - PAIN TYPE: TYPE: SURGICAL PAIN

## 2024-11-20 ASSESSMENT — PAIN SCALES - GENERAL
PAINLEVEL_OUTOF10: 4
PAINLEVEL_OUTOF10: 10
PAINLEVEL_OUTOF10: 4
PAINLEVEL_OUTOF10: 6
PAINLEVEL_OUTOF10: 5
PAINLEVEL_OUTOF10: 6
PAINLEVEL_OUTOF10: 6
PAINLEVEL_OUTOF10: 4
PAINLEVEL_OUTOF10: 4
PAINLEVEL_OUTOF10: 6

## 2024-11-20 ASSESSMENT — PAIN DESCRIPTION - ONSET: ONSET: GRADUAL

## 2024-11-20 ASSESSMENT — PAIN DESCRIPTION - FREQUENCY: FREQUENCY: INTERMITTENT

## 2024-11-20 NOTE — PROGRESS NOTES
mL, PRN  sodium chloride, , PRN  potassium chloride, 40 mEq, PRN   Or  potassium alternative oral replacement, 40 mEq, PRN   Or  potassium chloride, 10 mEq, PRN  magnesium sulfate, 2,000 mg, PRN  ondansetron, 4 mg, Q8H PRN   Or  ondansetron, 4 mg, Q6H PRN  polyethylene glycol, 17 g, Daily PRN  glucose, 4 tablet, PRN  dextrose bolus, 125 mL, PRN   Or  dextrose bolus, 250 mL, PRN  glucagon (rDNA), 1 mg, PRN  dextrose, , Continuous PRN  oxyCODONE, 5 mg, Q4H PRN   Or  oxyCODONE, 10 mg, Q4H PRN  HYDROmorphone, 0.5 mg, Q3H PRN         Objective:    BP (!) 148/79   Pulse 99   Temp 99 °F (37.2 °C) (Bladder)   Resp 16   Ht 1.727 m (5' 7.99\")   Wt 99.8 kg (220 lb 0.3 oz)   SpO2 93%   BMI 33.46 kg/m²     General Appearance: alert and oriented to person, place and time and in no acute distress  Skin: warm and dry  Head: normocephalic and atraumatic  Eyes: pupils equal, round, and reactive to light, extraocular eye movements intact, conjunctivae normal  Neck: neck supple and non tender without mass   Pulmonary/Chest: Bilateral decreased with some, no wheezes, rales or rhonchi, normal air movement, no respiratory distress  Cardiovascular: normal rate, normal S1 and S2 and no carotid bruits  Abdomen: soft, non-tender, non-distended, normal bowel sounds, no masses or organomegaly  Extremities: Right lower extremity AKA with dressing in placed, left lower extremity fasciotomy dressing placed  Neurologic: no cranial nerve deficit and speech normal        Recent Labs     11/18/24  0859 11/19/24  0257 11/20/24  0356    135 137   K 4.3 4.3 4.4   CL 97* 99 98   CO2 27 28 26   BUN 44* 36* 37*   CREATININE 7.4* 6.7* 6.0*   GLUCOSE 204* 122* 170*   CALCIUM 8.6 8.6 8.4*       Recent Labs     11/18/24  0305 11/19/24  0257 11/20/24  0356   WBC 9.7 11.6* 13.6*   RBC 2.70* 2.77* 2.73*   HGB 8.3* 8.6* 8.5*   HCT 27.2* 27.2* 26.9*   .7* 98.2 98.5   MCH 30.7 31.0 31.1   MCHC 30.5* 31.6* 31.6*   RDW 13.8 13.6 13.9    345  364   MPV 10.2 10.1 10.6       Radiology: Reviewed    Assessment:    Principal Problem:    Femoral artery occlusion (HCC)  Active Problems:    Acute lower limb ischemia    ANTIONE (acute kidney injury) (HCC)    Non-traumatic rhabdomyolysis  Resolved Problems:    * No resolved hospital problems. 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  Acute limb ischemia  Bilateral renal infarct  Lactic acidosis  ANTIONE with severe rhabdomyolysis  Diabetes mellitus  Hyperkalemia, resolved  Newly diagnosed Atrial Flutter/Fib  Alcoholism     -Manage as per surgical ICU  -CT abdomenStatus post bilateral femoral embolectomy with fasciotomies on 11/12 followed by right AKA  -On heparin drip  -Vascular surgery on board  -Pain medication as needed  -Trend CPK level, slowly downtrending.  -Nephrology consulted, was on bicarb drip, now on daily HD as per nephrology, tolerating HD well.   TDC placed with IR 11/20  -Closely monitor creatinine and CPK level  -concern of alcohol withdrawal, CIWA protocol has been placed, EKG normal sinus rhythm.  No ST-T wave changes, echocardiogram from 11/12 reviewed, continue monitoring on CIWA protocol, Lopressor ordered.  Cardiology consulted, appreciate recommendations, he will need to discharge on AC, currently he is on heparin gtt, HR better controlled with oral lopressor.   Precedex is now stopped with improved mental status, sitter in place.   Discussed with RN.      NOTE: This report was transcribed using voice recognition software. Every effort was made to ensure accuracy; however, inadvertent computerized transcription errors may be present.  Electronically signed by Rani Dorsey MD on 11/20/2024 at 7:11 AM

## 2024-11-20 NOTE — CARE COORDINATION
11/20 Care Coordination:Pt remains in [facility], S/p Bilateral femoral endarterectomies, bilateral fasciotomies S/p  Rt AKA 2/2 RLE ischemia, necrotic muscle, rhabdomyolysis.Heparin gtt.on hold.  Still at high risk for Left AKA per Vascular.Cont with Temp HD.plan for tunnel line with IR today. Heparin gtt, on hold for tunnel dialysis line today Per Vascular will possibly close fasciotomy incision sites late this week vs early next week. Discharge plan  as of today, ARU following, [facility], has In House HD.Also Back door to [facility].Await PT/OT evals.[facility] states pt has LTAC criteria. Thy will follow.CM/SW will continue to follow for discharge planning.   Swapnil WYMAN,RN--BC  [phone]

## 2024-11-20 NOTE — PROGRESS NOTES
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  Mention he has been noticing bluish discoloration of both lower extremity worsened over the last 3 days.  He was found to have lactic acidosis on presentation of 3.9.  Leukocytosis of 16,500.  Blood pressure on presentation of 119/75.  CT abdomen with aorta with bilateral runoff with contrast was obtained and showed severe atherosclerotic disease with common femoral arterial occlusions with some reconstitution in the superficial femoral and popliteal location but without significant runoff in the lower extremities, bilateral large renal infarcts.  He was started on heparin by weight.  Patient was seen by vascular surgery and taken straight to the OR.  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   Tolerated HD well  TDC placed with IR on 11/20  Sitter in place    ROS: denies fever, chills, cp, sob, n/v, HA unless stated above.      atorvastatin  10 mg Oral Nightly    docusate sodium  100 mg Oral BID    polyethylene glycol  17 g Oral Daily    bisacodyl  10 mg Rectal Daily    metoprolol tartrate  50 mg Oral BID    PHENobarbital  130 mg IntraVENous Q6H    thiamine  100 mg Oral Daily    folic acid  1 mg Oral Daily    melatonin  10 mg Oral Nightly    sodium chloride flush  5-40 mL IntraVENous 2 times per day    insulin glargine  12 Units SubCUTAneous QAM    aspirin  81 mg Oral Daily    acetaminophen  1,000 mg Oral 3 times per day    insulin lispro  0-16 Units SubCUTAneous Q4H     heparin (porcine), 80 Units/kg, PRN  heparin (porcine), 40 Units/kg, PRN  sodium chloride flush, 5-40

## 2024-11-20 NOTE — PROGRESS NOTES
[facility]  MD Bryce Milligan MD Ali Hassan, MD Lisa Kniska, CNP   Natalia Rey, CODY Harris, DEMETRIUS  Progress Note    11/20/2024    SUBJECTIVE:   11/13:  Seen in [facility].  Awake and alert.  S/P right above knee amputation yesterday. Complains of pain at right AKA site.  Oxygen on per NC.  Denies chest pain, palpitation or SOB.  Denies nausea, vomiting or abd pain.  Continues on heparin drip and bicarb IV.  He is oligoanuric.     11/14: Seen in the [facility] while on dialysis. Tolerating therapy without issues. His blood pressure is stable. Heart rate is mildly elevated. He is in good spirits and reports that he is having less pain today. His appetite is fair, he did eat some cereal for dinner. Urine output is stable.     11/15: Seen in the ICU. No acute distress or complaints. Received metoprolol earlier for tachycardia. Tolerated HD today with 2.8 liters of fluid removal. He is still making urine, though urine output has decreased. He is on oxygen via nasal canula. Denies dyspnea. PO intake is stable.     11/16: Undergoing with withdrawal of alcohol symptoms.  Obtunded.  Awake agitated aggressive at times though symptoms have improved status post sedation    11/17: Feeling better today.  Had a \"rough night\" notable for worsening agitation and aggression.  Now on Precedex drip with control of symptoms.  Enjoying his lunch.  Denies complaint.  Family at bedside.  BP stable.    11/18: Seen in the [facility]. Mentation has improved. Sitter is present at the bedside. He is still tachycardic. Denies any acute complaints. Denies dyspnea. His blood pressure is stable. Complains of some discomfort to his RLE. Minimal urine output.     11/19: Seen today while on dialysis. Tolerating treatment well. Line will only tolerate BFR of 250. Plan is for tunneled line placement in IR tomorrow. Blood pressure is stable. Heart rate has improved. Urine output remains low. He denies any  midline, no JVD, temporary HD catheter   Cardiovascular: S1, S2 regular rhythm, no murmur,or rub  Respiratory:  CTAB, diminished in the bases. No crackles, no wheeze  Gastrointestinal:  Soft, nontender, nondistended, NABS  Ext: Right AKA, sutures intact, LLE dressed, trace edema, feet warm  Skin: dry, no rash  Neuro: awake, alert, interactive      DATA:    Recent Labs     11/18/24 0305 11/19/24 0257 11/20/24  0356   WBC 9.7 11.6* 13.6*   HGB 8.3* 8.6* 8.5*   HCT 27.2* 27.2* 26.9*   .7* 98.2 98.5    345 364     Recent Labs     11/18/24 0305 11/18/24  0859 11/19/24 0257 11/20/24  0356    134 135 137   K 4.6 4.3 4.3 4.4   CL 99 97* 99 98   CO2 26 27 28 26   MG 2.1  --  2.1 2.2   PHOS 4.5  --  4.3 4.0   BUN 40* 44* 36* 37*   CREATININE 7.0* 7.4* 6.7* 6.0*   ALT 27  --  27 23   *  --  112* 83*   BILITOT 0.2  --  0.2 0.2   ALKPHOS 109  --  128 123       No results found for: \"LABPROT\"    Assessment  -Acute kidney injury nonoliguric in the setting of bilateral renal infarct and bilateral common femoral artery occlusions along with severe rhabdomyolysis  -Bilateral femoral artery occlusions status post bilateral fasciotomy  -For above-knee amputation on the right side  -Hyperkalemia  -Mild lactic acidosis  -Extensive atherosclerotic disease and the patient was actively smoking  History of coronary artery disease status post bypass 2 years ago    Remains oligoanuric  BP stable  Heart rate improved, metoprolol dose increased   CK remains elevated, improving nicely  S/p Right above knee amputation   For HD today  No current signs of renal recovery   TDC placed in RIJ today    Recommendations:  -Continue IHD support for solute and volume clearance as ordered --dialysis today  -Check CK tomorrow   -DC vargas catheter  -External vargas catheter  -Avoid nephrotoxins as able  -Monitor labs  -Monitor I & O  -No current signs of renal recovery, will continue to follow closely   [facility]

## 2024-11-20 NOTE — PROGRESS NOTES
Spiritual Health History and Assessment/Progress Note  [facility]    Spiritual/Emotional Needs,  ,  ,      Name: Himanshu Bravo MRN: [id number]    Age: 64 y.o.     Sex: male   Language: English   Protestant: Scientologist   Femoral artery occlusion (HCC)     Date: 11/20/2024                           Spiritual Assessment began in [facility]        Referral/Consult From: Rounding   Encounter Overview/Reason: Spiritual/Emotional Needs  Service Provided For: Patient    Jenny, Belief, Meaning:   Patient identifies as spiritual  Family/Friends No family/friends present      Importance and Influence:  Patient has spiritual/personal beliefs that influence decisions regarding their health  Family/Friends No family/friends present    Community:  Patient is connected with a spiritual community  Family/Friends No family/friends present    Assessment and Plan of Care:     Patient Interventions include: Affirmed coping skills/support systems  Family/Friends Interventions include: No family/friends present    Patient Plan of Care: No spiritual needs identified for follow-up  Family/Friends Plan of Care: No family/friends present    Patient appeared to be a bit restless -       Electronically signed by Chaplain ETTA on 11/20/2024 at 1:16 PM

## 2024-11-20 NOTE — BRIEF OP NOTE
Brief Postoperative Note      Patient: Himanshu Bravo  YOB: 1960  MRN: [id number]    Date of Procedure: 11/20/2024    * No Diagnosis Codes entered *    Post-Op Diagnosis: HD catheter placement right IJV       * No procedures listed *    Surgeon(s):  Stephania Valle MD Morar, Kamal N, MD    Assistant:  * No surgical staff found *    Anesthesia: * No anesthesia type entered *    Estimated Blood Loss (mL): Minimal    Complications: None    Specimens:   * No specimens in log *    Implants:  * No implants in log *      Drains:   Urinary Catheter 11/12/24 2 Way;[place]-Temperature (Active)   Catheter Indications Need for fluid volume management of the critically ill patient in a critical care setting 11/20/24 1400   Site Assessment No urethral drainage 11/20/24 1400   Urine Color Yellow 11/20/24 1400   Urine Appearance Hazy 11/20/24 1400   Collection Container Standard 11/20/24 1400   Securement Method Securing device (Describe) 11/20/24 1400   Catheter Care  Soap and water 11/19/24 2000   Catheter Best Practices  Drainage tube clipped to bed;Catheter secured to thigh;Tamper seal intact;Bag below bladder;Bag not on floor;Lack of dependent loop in tubing;Drainage bag less than half full 11/20/24 1400   Status Draining;Patent 11/20/24 1400   Manual Irrigation Volume Input (mL) 60 mL 11/13/24 1100   Output (mL) 5 mL 11/20/24 1400       Findings:  Infection Present At Time Of Surgery (PATOS) (choose all levels that have infection present):  No infection present  Other Findings: None    Electronically signed by Stephania Valle MD on 11/20/2024 at 2:29 PM

## 2024-11-20 NOTE — PLAN OF CARE
Problem: Chronic Conditions and Co-morbidities  Goal: Patient's chronic conditions and co-morbidity symptoms are monitored and maintained or improved  Outcome: Progressing     Problem: Discharge Planning  Goal: Discharge to home or other facility with appropriate resources  Outcome: Progressing     Problem: Pain  Goal: Verbalizes/displays adequate comfort level or baseline comfort level  Outcome: Progressing     Problem: Safety - Adult  Goal: Free from fall injury  Outcome: Progressing     Problem: Skin/Tissue Integrity  Goal: Absence of new skin breakdown  Description: 1.  Monitor for areas of redness and/or skin breakdown  2.  Assess vascular access sites hourly  3.  Every 4-6 hours minimum:  Change oxygen saturation probe site  4.  Every 4-6 hours:  If on nasal continuous positive airway pressure, respiratory therapy assess nares and determine need for appliance change or resting period.  Outcome: Progressing     Problem: ABCDS Injury Assessment  Goal: Absence of physical injury  Outcome: Progressing     Problem: Skin/Tissue Integrity - Adult  Goal: Skin integrity remains intact  Outcome: Progressing     Problem: Musculoskeletal - Adult  Goal: Return mobility to safest level of function  Outcome: Progressing     Problem: Hematologic - Adult  Goal: Maintains hematologic stability  Outcome: Progressing     Problem: Nutrition Deficit:  Goal: Optimize nutritional status  Outcome: Progressing

## 2024-11-20 NOTE — FLOWSHEET NOTE
11/20/24 1802   Vital Signs   /79   Temp 99.5 °F (37.5 °C)   Pulse 96   Respirations 22   Weight - Scale 97.8 kg (215 lb 9.8 oz)   Weight Method Bed scale   Percent Weight Change -2   Post-Hemodialysis Assessment   Post-Treatment Procedures Blood returned;Catheter capped, clamped and heparinized x 2 ports   Machine Disinfection Process Acid/Vinegar Clean;Heat Disinfect;Exterior Machine Disinfection   Rinseback Volume (ml) 300 ml   Blood Volume Processed (Liters) 60.9 L   Dialyzer Clearance Lightly streaked   Duration of Treatment (minutes) 225 minutes   Heparin Amount Administered During Treatment (mL) 3500 mL   Hemodialysis Intake (ml) 300 ml   Hemodialysis Output (ml) 2300 ml   NET Removed (ml) 2000   Tolerated Treatment Good   Patient Response to Treatment PT tolerated well, terminated, all blood rinsed back, aseptically de-accessed RIJ TDC per [facility] p&p; net UF 2 L; BVP 60.9 L; post VSS; report given to HECTOR Bell   Time Off 1802   Patient Disposition Remain in ICU/ED   Observations & Evaluations   Level of Consciousness 0   Heart Rhythm Regular   Respiratory Quality/Effort Unlabored   Skin Condition/Temp Dry;Warm

## 2024-11-20 NOTE — PROGRESS NOTES
Vascular Surgery Progress Note    Pt is being seen in f/u today regarding bilateral femoral embolectomies     Subjective  Hep gtt held at 0400 for IR tunneled line today. Patient with some desaturations overnight from sleep apnea, oxygen improves when awake.     Current Medications:    heparin (PORCINE) Infusion Stopped (11/20/24 0346)    sodium chloride      dextrose        heparin (porcine), heparin (porcine), sodium chloride flush, sodium chloride, potassium chloride **OR** potassium alternative oral replacement **OR** potassium chloride, magnesium sulfate, ondansetron **OR** ondansetron, polyethylene glycol, glucose, dextrose bolus **OR** dextrose bolus, glucagon (rDNA), dextrose, oxyCODONE **OR** oxyCODONE, HYDROmorphone    atorvastatin  10 mg Oral Nightly    docusate sodium  100 mg Oral BID    polyethylene glycol  17 g Oral Daily    bisacodyl  10 mg Rectal Daily    metoprolol tartrate  50 mg Oral BID    PHENobarbital  130 mg IntraVENous Q6H    thiamine  100 mg Oral Daily    folic acid  1 mg Oral Daily    melatonin  10 mg Oral Nightly    sodium chloride flush  5-40 mL IntraVENous 2 times per day    insulin glargine  12 Units SubCUTAneous QAM    aspirin  81 mg Oral Daily    acetaminophen  1,000 mg Oral 3 times per day    insulin lispro  0-16 Units SubCUTAneous Q4H        PHYSICAL EXAM:    BP (!) 148/79   Pulse 99   Temp 99 °F (37.2 °C) (Bladder)   Resp 30   Ht 1.727 m (5' 7.99\")   Wt 99.8 kg (220 lb 0.3 oz)   SpO2 93%   BMI 33.46 kg/m²     Intake/Output Summary (Last 24 hours) at 11/20/2024 0728  Last data filed at 11/20/2024 0600  Gross per 24 hour   Intake 1086.46 ml   Output 2370 ml   Net -1283.54 ml          Gen: awake, no apparent distress  CVS: RRR  Resp: No increased work of breathing  Abd: Soft, non-tender, non-distended  R LE: AKA, hematoma to medial inferior aspect of incision, remains soft, incision intact   L LE: Biphasic DP/ Monophasic PT, dressing with serous saturation, dressing taken down

## 2024-11-20 NOTE — PROGRESS NOTES
CVICU Progress Note    Name: Himanshu Bravo  MRN: [id number]    CC: Postoperative Critical Care Management      Indication for Surgery/Procedure: Bilateral Common Femoral Artery Occlusion      Important/Relevant PMH/PSH: Tobacco abuse, DMII, HTN, HLD, CAD s/p cardiac stents, s/p CABG, postop atrial fibrillation, chronic diastolic heart failure     11/11/2024 CTA abdominal aorta with bilateral runoff with contrast showed 1. Severe atherosclerotic disease with common femoral arterial occlusions,   some reconstitution in the superficial femoral and popliteal locations but   without significant runoff of the lower extremities.   2. 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      Procedure/Surgeries: 11/12/2024 Bilateral femoral endarterectomies, bilateral fasciotomies     11/12/2024 Right leg amputation above knee       Intake/Output Summary (Last 24 hours) at 11/20/2024 0943  Last data filed at 11/20/2024 0900  Gross per 24 hour   Intake 846.46 ml   Output 2381 ml   Net -1534.54 ml       Recent Labs     11/18/24  0305 11/19/24  0257 11/20/24  0356   WBC 9.7 11.6* 13.6*   HGB 8.3* 8.6* 8.5*   HCT 27.2* 27.2* 26.9*    345 364      Lab Results   Component Value Date/Time     11/20/2024 03:56 AM    K 4.4 11/20/2024 03:56 AM    K 3.9 01/09/2018 03:15 AM    CL 98 11/20/2024 03:56 AM    CO2 26 11/20/2024 03:56 AM    BUN 37 11/20/2024 03:56 AM    CREATININE 6.0 11/20/2024 03:56 AM    GLUCOSE 170 11/20/2024 03:56 AM    GLUCOSE 216 06/14/2023 12:55 PM    CALCIUM 8.4 11/20/2024 03:56 AM    MG 2.2 11/20/2024 03:56 AM    PHOS 4.0 11/20/2024 03:56 AM         Physical Exam:    /72   Pulse (!) 103   Temp 99.3 °F (37.4 °C) (Bladder)   Resp 15   Ht 1.727 m (5' 7.99\")   Wt 99.8 kg (220 lb 0.3 oz)   SpO2 99%   BMI 33.46 kg/m²       General: Awake, alert. No complaints.Sitter at bedside. Heparin on hold   Eyes: PERRL, anicteric   Pulmonary: Diminished bibasilar. No wheezes, no accessory muscle use noted on 2L

## 2024-11-20 NOTE — PROGRESS NOTES
Report given to Candida YOUNG for room 6515-A. All questions answered to her satisfaction. Sister Supriya updated on transfer and new room number.

## 2024-11-21 ENCOUNTER — ANESTHESIA (OUTPATIENT)
Dept: OPERATING ROOM | Age: 64
End: 2024-11-21
Payer: COMMERCIAL

## 2024-11-21 ENCOUNTER — ANESTHESIA EVENT (OUTPATIENT)
Dept: OPERATING ROOM | Age: 64
End: 2024-11-21
Payer: COMMERCIAL

## 2024-11-21 LAB
ALBUMIN SERPL-MCNC: 2.9 G/DL (ref 3.5–5.2)
ALP SERPL-CCNC: 125 U/L (ref 40–129)
ALT SERPL-CCNC: 16 U/L (ref 0–40)
ANION GAP SERPL CALCULATED.3IONS-SCNC: 13 MMOL/L (ref 7–16)
AST SERPL-CCNC: 62 U/L (ref 0–39)
BASOPHILS # BLD: 0.03 K/UL (ref 0–0.2)
BASOPHILS NFR BLD: 0 % (ref 0–2)
BILIRUB SERPL-MCNC: 0.2 MG/DL (ref 0–1.2)
BUN SERPL-MCNC: 33 MG/DL (ref 6–23)
CA-I BLD-SCNC: 1.2 MMOL/L (ref 1.15–1.33)
CALCIUM SERPL-MCNC: 8.8 MG/DL (ref 8.6–10.2)
CHLORIDE SERPL-SCNC: 96 MMOL/L (ref 98–107)
CK SERPL-CCNC: 618 U/L (ref 20–200)
CO2 SERPL-SCNC: 28 MMOL/L (ref 22–29)
CREAT SERPL-MCNC: 5.5 MG/DL (ref 0.7–1.2)
EOSINOPHIL # BLD: 0.24 K/UL (ref 0.05–0.5)
EOSINOPHILS RELATIVE PERCENT: 2 % (ref 0–6)
ERYTHROCYTE [DISTWIDTH] IN BLOOD BY AUTOMATED COUNT: 13.9 % (ref 11.5–15)
GFR, ESTIMATED: 11 ML/MIN/1.73M2
GLUCOSE BLD-MCNC: 140 MG/DL (ref 74–99)
GLUCOSE BLD-MCNC: 155 MG/DL (ref 74–99)
GLUCOSE BLD-MCNC: 176 MG/DL (ref 74–99)
GLUCOSE BLD-MCNC: 179 MG/DL (ref 74–99)
GLUCOSE BLD-MCNC: 180 MG/DL (ref 74–99)
GLUCOSE BLD-MCNC: 208 MG/DL (ref 74–99)
GLUCOSE BLD-MCNC: 309 MG/DL (ref 74–99)
GLUCOSE SERPL-MCNC: 167 MG/DL (ref 74–99)
HCT VFR BLD AUTO: 27.7 % (ref 37–54)
HGB BLD-MCNC: 8.8 G/DL (ref 12.5–16.5)
IMM GRANULOCYTES # BLD AUTO: 0.17 K/UL (ref 0–0.58)
IMM GRANULOCYTES NFR BLD: 1 % (ref 0–5)
LYMPHOCYTES NFR BLD: 1.45 K/UL (ref 1.5–4)
LYMPHOCYTES RELATIVE PERCENT: 11 % (ref 20–42)
MAGNESIUM SERPL-MCNC: 2.3 MG/DL (ref 1.6–2.6)
MCH RBC QN AUTO: 31.4 PG (ref 26–35)
MCHC RBC AUTO-ENTMCNC: 31.8 G/DL (ref 32–34.5)
MCV RBC AUTO: 98.9 FL (ref 80–99.9)
MONOCYTES NFR BLD: 1.33 K/UL (ref 0.1–0.95)
MONOCYTES NFR BLD: 10 % (ref 2–12)
NEUTROPHILS NFR BLD: 75 % (ref 43–80)
NEUTS SEG NFR BLD: 9.86 K/UL (ref 1.8–7.3)
PHOSPHATE SERPL-MCNC: 4.2 MG/DL (ref 2.5–4.5)
PLATELET # BLD AUTO: 359 K/UL (ref 130–450)
PMV BLD AUTO: 10.2 FL (ref 7–12)
POTASSIUM SERPL-SCNC: 3.9 MMOL/L (ref 3.5–5)
PROT SERPL-MCNC: 5.8 G/DL (ref 6.4–8.3)
RBC # BLD AUTO: 2.8 M/UL (ref 3.8–5.8)
SODIUM SERPL-SCNC: 137 MMOL/L (ref 132–146)
WBC OTHER # BLD: 13.1 K/UL (ref 4.5–11.5)

## 2024-11-21 PROCEDURE — 7100000000 HC PACU RECOVERY - FIRST 15 MIN: Performed by: SURGERY

## 2024-11-21 PROCEDURE — 6370000000 HC RX 637 (ALT 250 FOR IP): Performed by: NURSE PRACTITIONER

## 2024-11-21 PROCEDURE — 0HRLXK3 REPLACEMENT OF LEFT LOWER LEG SKIN WITH NONAUTOLOGOUS TISSUE SUBSTITUTE, FULL THICKNESS, EXTERNAL APPROACH: ICD-10-PCS | Performed by: FAMILY MEDICINE

## 2024-11-21 PROCEDURE — 6360000002 HC RX W HCPCS

## 2024-11-21 PROCEDURE — 6360000002 HC RX W HCPCS: Performed by: SURGERY

## 2024-11-21 PROCEDURE — 85025 COMPLETE CBC W/AUTO DIFF WBC: CPT

## 2024-11-21 PROCEDURE — 82947 ASSAY GLUCOSE BLOOD QUANT: CPT

## 2024-11-21 PROCEDURE — 2580000003 HC RX 258: Performed by: NURSE PRACTITIONER

## 2024-11-21 PROCEDURE — 36415 COLL VENOUS BLD VENIPUNCTURE: CPT

## 2024-11-21 PROCEDURE — 80053 COMPREHEN METABOLIC PANEL: CPT

## 2024-11-21 PROCEDURE — 3600000002 HC SURGERY LEVEL 2 BASE: Performed by: SURGERY

## 2024-11-21 PROCEDURE — 84100 ASSAY OF PHOSPHORUS: CPT

## 2024-11-21 PROCEDURE — 11046 DBRDMT MUSC&/FSCA EA ADDL: CPT | Performed by: SURGERY

## 2024-11-21 PROCEDURE — 2140000000 HC CCU INTERMEDIATE R&B

## 2024-11-21 PROCEDURE — 83735 ASSAY OF MAGNESIUM: CPT

## 2024-11-21 PROCEDURE — 6360000002 HC RX W HCPCS: Performed by: NURSE ANESTHETIST, CERTIFIED REGISTERED

## 2024-11-21 PROCEDURE — 2500000003 HC RX 250 WO HCPCS: Performed by: NURSE ANESTHETIST, CERTIFIED REGISTERED

## 2024-11-21 PROCEDURE — 3700000000 HC ANESTHESIA ATTENDED CARE: Performed by: SURGERY

## 2024-11-21 PROCEDURE — 15271 SKIN SUB GRAFT TRNK/ARM/LEG: CPT | Performed by: SURGERY

## 2024-11-21 PROCEDURE — 0KBT0ZZ EXCISION OF LEFT LOWER LEG MUSCLE, OPEN APPROACH: ICD-10-PCS | Performed by: SURGERY

## 2024-11-21 PROCEDURE — 11043 DBRDMT MUSC&/FSCA 1ST 20/<: CPT | Performed by: SURGERY

## 2024-11-21 PROCEDURE — 2700000000 HC OXYGEN THERAPY PER DAY

## 2024-11-21 PROCEDURE — 2580000003 HC RX 258: Performed by: NURSE ANESTHETIST, CERTIFIED REGISTERED

## 2024-11-21 PROCEDURE — 99233 SBSQ HOSP IP/OBS HIGH 50: CPT | Performed by: INTERNAL MEDICINE

## 2024-11-21 PROCEDURE — 2709999900 HC NON-CHARGEABLE SUPPLY: Performed by: SURGERY

## 2024-11-21 PROCEDURE — 82330 ASSAY OF CALCIUM: CPT

## 2024-11-21 PROCEDURE — 51798 US URINE CAPACITY MEASURE: CPT

## 2024-11-21 PROCEDURE — 3700000001 HC ADD 15 MINUTES (ANESTHESIA): Performed by: SURGERY

## 2024-11-21 PROCEDURE — 82550 ASSAY OF CK (CPK): CPT

## 2024-11-21 PROCEDURE — 2720000010 HC SURG SUPPLY STERILE: Performed by: SURGERY

## 2024-11-21 PROCEDURE — 7100000001 HC PACU RECOVERY - ADDTL 15 MIN: Performed by: SURGERY

## 2024-11-21 PROCEDURE — 3600000012 HC SURGERY LEVEL 2 ADDTL 15MIN: Performed by: SURGERY

## 2024-11-21 PROCEDURE — 2500000003 HC RX 250 WO HCPCS: Performed by: SURGERY

## 2024-11-21 PROCEDURE — 05HY33Z INSERTION OF INFUSION DEVICE INTO UPPER VEIN, PERCUTANEOUS APPROACH: ICD-10-PCS | Performed by: FAMILY MEDICINE

## 2024-11-21 PROCEDURE — 6360000002 HC RX W HCPCS: Performed by: NURSE PRACTITIONER

## 2024-11-21 DEVICE — FISH-SKIN GRAFT FOR SURGICAL USE. KERECIS® SURGICLOSE® IS INDICATED FOR THE MANAGEMENT OF WOUNDS INCLUDING: PARTIAL AND FULL THICKNESS WOUNDS, PRESSURE ULCERS, CHRONIC VASCULAR ULCERS, DIABETIC ULCERS, TRAUMA WOUNDS (ABRASIONS, LACERATIONS, SECOND-DEGREE BURNS, SKIN TEARS), SURGICAL WOUNDS (DONOR SITE/GRAFTS, POST-MOHS SURGERY, POST-LASER SURGERY, PODIATRIC, WOUND DEHISCENCE) AND DRAINING WOUNDS.IFU: HTTPS://WWW.KERECIS.COM/IFUS/IFU-KERECIS-SURGICLOSE/
Type: IMPLANTABLE DEVICE | Site: LEG | Status: FUNCTIONAL
Brand: KERECIS® SURGICLOSE®

## 2024-11-21 RX ORDER — PROPOFOL 10 MG/ML
INJECTION, EMULSION INTRAVENOUS
Status: DISCONTINUED | OUTPATIENT
Start: 2024-11-21 | End: 2024-11-21 | Stop reason: SDUPTHER

## 2024-11-21 RX ORDER — CEFAZOLIN SODIUM 1 G/3ML
INJECTION, POWDER, FOR SOLUTION INTRAMUSCULAR; INTRAVENOUS
Status: DISCONTINUED | OUTPATIENT
Start: 2024-11-21 | End: 2024-11-21 | Stop reason: SDUPTHER

## 2024-11-21 RX ORDER — HEPARIN SODIUM 10000 [USP'U]/100ML
5-30 INJECTION, SOLUTION INTRAVENOUS CONTINUOUS
Status: DISCONTINUED | OUTPATIENT
Start: 2024-11-21 | End: 2024-11-23

## 2024-11-21 RX ORDER — FENTANYL CITRATE 50 UG/ML
INJECTION, SOLUTION INTRAMUSCULAR; INTRAVENOUS
Status: DISCONTINUED | OUTPATIENT
Start: 2024-11-21 | End: 2024-11-21 | Stop reason: SDUPTHER

## 2024-11-21 RX ORDER — SODIUM CHLORIDE 9 MG/ML
INJECTION, SOLUTION INTRAVENOUS
Status: DISCONTINUED | OUTPATIENT
Start: 2024-11-21 | End: 2024-11-21 | Stop reason: SDUPTHER

## 2024-11-21 RX ORDER — LIDOCAINE HYDROCHLORIDE 20 MG/ML
JELLY TOPICAL PRN
Status: DISCONTINUED | OUTPATIENT
Start: 2024-11-21 | End: 2024-11-21 | Stop reason: ALTCHOICE

## 2024-11-21 RX ORDER — PHENYLEPHRINE HCL IN 0.9% NACL 1 MG/10 ML
SYRINGE (ML) INTRAVENOUS
Status: DISCONTINUED | OUTPATIENT
Start: 2024-11-21 | End: 2024-11-21 | Stop reason: SDUPTHER

## 2024-11-21 RX ORDER — DEXMEDETOMIDINE HYDROCHLORIDE 100 UG/ML
INJECTION, SOLUTION INTRAVENOUS
Status: DISCONTINUED | OUTPATIENT
Start: 2024-11-21 | End: 2024-11-21 | Stop reason: SDUPTHER

## 2024-11-21 RX ADMIN — PROPOFOL 60 MG: 10 INJECTION, EMULSION INTRAVENOUS at 15:45

## 2024-11-21 RX ADMIN — HEPARIN SODIUM 18 UNITS/KG/HR: 10000 INJECTION, SOLUTION INTRAVENOUS at 22:41

## 2024-11-21 RX ADMIN — INSULIN LISPRO 4 UNITS: 100 INJECTION, SOLUTION INTRAVENOUS; SUBCUTANEOUS at 12:54

## 2024-11-21 RX ADMIN — Medication 100 MCG: at 16:21

## 2024-11-21 RX ADMIN — Medication 200 MCG: at 16:27

## 2024-11-21 RX ADMIN — DOCUSATE SODIUM 100 MG: 100 CAPSULE, LIQUID FILLED ORAL at 22:44

## 2024-11-21 RX ADMIN — DEXMEDETOMIDINE HYDROCHLORIDE 12 MCG: 100 INJECTION, SOLUTION INTRAVENOUS at 15:57

## 2024-11-21 RX ADMIN — CEFAZOLIN 2 G: 1 INJECTION, POWDER, FOR SOLUTION INTRAMUSCULAR; INTRAVENOUS at 15:46

## 2024-11-21 RX ADMIN — Medication 100 MG: at 09:14

## 2024-11-21 RX ADMIN — SODIUM CHLORIDE, PRESERVATIVE FREE 10 ML: 5 INJECTION INTRAVENOUS at 09:14

## 2024-11-21 RX ADMIN — FENTANYL CITRATE 25 MCG: 50 INJECTION, SOLUTION INTRAMUSCULAR; INTRAVENOUS at 15:48

## 2024-11-21 RX ADMIN — DEXMEDETOMIDINE HYDROCHLORIDE 16 MCG: 100 INJECTION, SOLUTION INTRAVENOUS at 15:42

## 2024-11-21 RX ADMIN — ACETAMINOPHEN 1000 MG: 500 TABLET ORAL at 12:53

## 2024-11-21 RX ADMIN — METOPROLOL TARTRATE 50 MG: 50 TABLET, FILM COATED ORAL at 22:44

## 2024-11-21 RX ADMIN — ATORVASTATIN CALCIUM 10 MG: 20 TABLET, FILM COATED ORAL at 22:44

## 2024-11-21 RX ADMIN — SODIUM CHLORIDE, PRESERVATIVE FREE 10 ML: 5 INJECTION INTRAVENOUS at 22:45

## 2024-11-21 RX ADMIN — PROPOFOL 50 MCG/KG/MIN: 10 INJECTION, EMULSION INTRAVENOUS at 15:46

## 2024-11-21 RX ADMIN — Medication 100 MCG: at 16:07

## 2024-11-21 RX ADMIN — FENTANYL CITRATE 25 MCG: 50 INJECTION, SOLUTION INTRAMUSCULAR; INTRAVENOUS at 16:18

## 2024-11-21 RX ADMIN — PHENOBARBITAL SODIUM 130 MG: 65 INJECTION INTRAMUSCULAR; INTRAVENOUS at 04:37

## 2024-11-21 RX ADMIN — FENTANYL CITRATE 25 MCG: 50 INJECTION, SOLUTION INTRAMUSCULAR; INTRAVENOUS at 16:05

## 2024-11-21 RX ADMIN — ACETAMINOPHEN 1000 MG: 500 TABLET ORAL at 04:37

## 2024-11-21 RX ADMIN — ACETAMINOPHEN 1000 MG: 500 TABLET ORAL at 22:43

## 2024-11-21 RX ADMIN — DOCUSATE SODIUM 100 MG: 100 CAPSULE, LIQUID FILLED ORAL at 09:13

## 2024-11-21 RX ADMIN — Medication 100 MCG: at 16:03

## 2024-11-21 RX ADMIN — PHENOBARBITAL SODIUM 130 MG: 65 INJECTION INTRAMUSCULAR; INTRAVENOUS at 09:14

## 2024-11-21 RX ADMIN — Medication 1 MG: at 09:13

## 2024-11-21 RX ADMIN — Medication 200 MCG: at 16:12

## 2024-11-21 RX ADMIN — INSULIN GLARGINE 12 UNITS: 100 INJECTION, SOLUTION SUBCUTANEOUS at 09:14

## 2024-11-21 RX ADMIN — PHENOBARBITAL SODIUM 130 MG: 65 INJECTION INTRAMUSCULAR; INTRAVENOUS at 18:56

## 2024-11-21 RX ADMIN — LACTULOSE 20 G: 20 SOLUTION ORAL at 22:43

## 2024-11-21 RX ADMIN — INSULIN LISPRO 12 UNITS: 100 INJECTION, SOLUTION INTRAVENOUS; SUBCUTANEOUS at 00:19

## 2024-11-21 RX ADMIN — INSULIN LISPRO 4 UNITS: 100 INJECTION, SOLUTION INTRAVENOUS; SUBCUTANEOUS at 04:38

## 2024-11-21 RX ADMIN — METOPROLOL TARTRATE 50 MG: 50 TABLET, FILM COATED ORAL at 09:13

## 2024-11-21 RX ADMIN — Medication 10 MG: at 22:44

## 2024-11-21 RX ADMIN — ASPIRIN 81 MG CHEWABLE TABLET 81 MG: 81 TABLET CHEWABLE at 09:14

## 2024-11-21 RX ADMIN — SODIUM CHLORIDE: 9 INJECTION, SOLUTION INTRAVENOUS at 15:39

## 2024-11-21 ASSESSMENT — PAIN DESCRIPTION - LOCATION
LOCATION: LEG
LOCATION: LEG

## 2024-11-21 ASSESSMENT — PAIN DESCRIPTION - DESCRIPTORS
DESCRIPTORS: ACHING;DISCOMFORT;SORE
DESCRIPTORS: ACHING;DISCOMFORT;SORE

## 2024-11-21 ASSESSMENT — PAIN DESCRIPTION - ORIENTATION
ORIENTATION: LEFT
ORIENTATION: LEFT

## 2024-11-21 ASSESSMENT — LIFESTYLE VARIABLES: SMOKING_STATUS: 1

## 2024-11-21 ASSESSMENT — PAIN SCALES - GENERAL
PAINLEVEL_OUTOF10: 6
PAINLEVEL_OUTOF10: 6

## 2024-11-21 NOTE — PATIENT CARE CONFERENCE
P Quality Flow/Interdisciplinary Rounds Progress Note        Quality Flow Rounds held on November 21, 2024    Disciplines Attending:  Bedside Nurse, , , and Nursing Unit Leadership    Himanshu Bravo was admitted on 11/12/2024  2:39 AM    Anticipated Discharge Date:       Disposition:    Aramis Score:  Aramis Scale Score: 14    Readmission Risk              Risk of Unplanned Readmission:  19           Discussed patient goal for the day, patient clinical progression, and barriers to discharge.  The following Goal(s) of the Day/Commitment(s) have been identified:  Report labs/diagnostics      Monique Amaya RN  November 21, 2024

## 2024-11-21 NOTE — PROGRESS NOTES
4 Eyes Skin Assessment     NAME:  Himanshu Bravo  YOB: 1960  MEDICAL RECORD NUMBER:  [id number]    The patient is being assessed for  Transfer to New Unit    I agree that at least one RN has performed a thorough Head to Toe Skin Assessment on the patient. ALL assessment sites listed below have been assessed.      Areas assessed by both nurses:    Head, Face, Ears, Shoulders, Back, Chest, Arms, Elbows, Hands, Sacrum. Buttock, Coccyx, Ischium, Legs. Feet and Heels, and Under Medical Devices         Does the Patient have a Wound? No noted wound(s)       Aramis Prevention initiated by RN: Yes  Wound Care Orders initiated by RN: No    Pressure Injury (Stage 3,4, Unstageable, DTI, NWPT, and Complex wounds) if present, place Wound referral order by RN under : No    New Ostomies, if present place, Ostomy referral order under : No     Nurse 1 eSignature: Electronically signed by Cherelle Portillo RN on 11/21/24 at 2:11 AM EST    **SHARE this note so that the co-signing nurse can place an eSignature**    Nurse 2 eSignature: Electronically signed by Rdaha Tomas RN on 11/21/24 at 2:12 AM EST

## 2024-11-21 NOTE — PROGRESS NOTES
[facility]  MD Bryce Milligan MD Ali Hassan, MD Lisa Kniska, CNP   Natalia Rey, CODY Harris, DEMETRIUS  Progress Note    11/21/2024    SUBJECTIVE:   11/13:  Seen in [facility].  Awake and alert.  S/P right above knee amputation yesterday. Complains of pain at right AKA site.  Oxygen on per NC.  Denies chest pain, palpitation or SOB.  Denies nausea, vomiting or abd pain.  Continues on heparin drip and bicarb IV.  He is oligoanuric.     11/14: Seen in the CVIC while on dialysis. Tolerating therapy without issues. His blood pressure is stable. Heart rate is mildly elevated. He is in good spirits and reports that he is having less pain today. His appetite is fair, he did eat some cereal for dinner. Urine output is stable.     11/15: Seen in the ICU. No acute distress or complaints. Received metoprolol earlier for tachycardia. Tolerated HD today with 2.8 liters of fluid removal. He is still making urine, though urine output has decreased. He is on oxygen via nasal canula. Denies dyspnea. PO intake is stable.     11/16: Undergoing with withdrawal of alcohol symptoms.  Obtunded.  Awake agitated aggressive at times though symptoms have improved status post sedation    11/17: Feeling better today.  Had a \"rough night\" notable for worsening agitation and aggression.  Now on Precedex drip with control of symptoms.  Enjoying his lunch.  Denies complaint.  Family at bedside.  BP stable.    11/18: Seen in the [facility]. Mentation has improved. Sitter is present at the bedside. He is still tachycardic. Denies any acute complaints. Denies dyspnea. His blood pressure is stable. Complains of some discomfort to his RLE. Minimal urine output.     11/19: Seen today while on dialysis. Tolerating treatment well. Line will only tolerate BFR of 250. Plan is for tunneled line placement in IR tomorrow. Blood pressure is stable. Heart rate has improved. Urine output remains low. He denies any

## 2024-11-21 NOTE — ANESTHESIA PRE PROCEDURE
Department of Anesthesiology  Preprocedure Note       Name:  Himanshu Bravo   Age:  64 y.o.  :  1960                                          MRN:  [id number]         Date:  2024      Surgeon: Surgeon(s):  Siobhan Khanna MD    Procedure: Procedure(s):  LEFT LOWER EXTREMITY WOUND DEBRIDEMENT, POSSIBLE FASCIOTOMY CLOSURE, POSSIBLE ADVANCED SKIN THERAPY    Medications prior to admission:   Prior to Admission medications    Medication Sig Start Date End Date Taking? Authorizing Provider   indomethacin (INDOCIN) 50 MG capsule Take 1 capsule by mouth at bedtime for 10 days Take with food. 10/10/24 10/20/24  Franky Montana DO   Continuous Glucose Sensor (FREESTYLE CARA 2 SENSOR) MISC Change every 14 days 10/1/24   Franky Montana DO   albuterol-ipratropium (COMBIVENT RESPIMAT)  MCG/ACT AERS inhaler Inhale 1 puff into the lungs every 6 hours 10/1/24   Franky Montana DO   montelukast (SINGULAIR) 10 MG tablet Take 1 tablet by mouth daily 10/1/24   Franky Montana DO   Semaglutide,0.25 or 0.5MG/DOS, (OZEMPIC, 0.25 OR 0.5 MG/DOSE,) 2 MG/1.5ML SOPN Inject into the skin once a week wed    Nate Jama MD   metoprolol (LOPRESSOR) 100 MG tablet Take 1 tablet by mouth 2 times daily 10/21/23   Franky Montana DO   losartan (COZAAR) 25 MG tablet Take 1 tablet by mouth daily 22   Nate Jama MD   BRILINTA 90 MG TABS tablet Take 1 tablet by mouth 2 times daily 23   Nate Jama MD   Icosapent Ethyl (VASCEPA) 1 g CAPS capsule Take 1 g by mouth    Nate Jama MD   aspirin 81 MG chewable tablet Take by mouth 22   Nate Jama MD   colestipol (COLESTID) 1 g tablet Take 1 tablet by mouth 2 times daily 10/4/23   Renée Simpson, APRN - CNP   Insulin Syringes, Disposable, U-100 0.3 ML MISC 1 each by Does not apply route daily 18   Delgado Allen, DO   Multiple Vitamins-Minerals (THERAPEUTIC MULTIVITAMIN-MINERALS) tablet Take 1 tablet by

## 2024-11-21 NOTE — PROGRESS NOTES
Message sent to Dr. Armando with vascular regarding clarification on LLE fasciotomy dressing changes, states dressings are daily and done by vascular resident; right AKA is to remain open to air.

## 2024-11-21 NOTE — PROGRESS NOTES
Patient's blood sugar is 371, per sliding scale pt is getting 16 units of humalog. Per sliding scale order, attending was also notified of sugar level. Pt states he drank a milkshake this evening.    Mastoid Interpolation Flap Text: A decision was made to reconstruct the defect utilizing an interpolation axial flap and a staged reconstruction.  A telfa template was made of the defect.  This telfa template was then used to outline the mastoid interpolation flap.  The donor area for the pedicle flap was then injected with anesthesia.  The flap was excised through the skin and subcutaneous tissue down to the layer of the underlying musculature.  The pedicle flap was carefully excised within this deep plane to maintain its blood supply.  The edges of the donor site were undermined.   The donor site was closed in a primary fashion.  The pedicle was then rotated into position and sutured.  Once the tube was sutured into place, adequate blood supply was confirmed with blanching and refill.  The pedicle was then wrapped with xeroform gauze and dressed appropriately with a telfa and gauze bandage to ensure continued blood supply and protect the attached pedicle.

## 2024-11-21 NOTE — OP NOTE
Operative Note      Patient: Himanshu Bravo  YOB: 1960  MRN: [id number]    Date of Procedure: 11/21/2024    Pre-Op Diagnosis Codes:      * Hx of fasciotomy [Z98.890] of left LE    Post-Op Diagnosis: Same       Procedure :  L LE wound debridement  L medial calf closure - 20 cm length x 5 cm width x 1.5 cm depth   L lateral calf  Kerecis 7x10 meshed applied    Surgeon(s):  Siobhan Khanna MD    Assistant:   Resident: Linda Gómez DO; Kwabena Delarosa DPM    Anesthesia: Monitor Anesthesia Care    Estimated Blood Loss (mL): less than 100     Complications: None    Specimens:   * No specimens in log *    Implants:  Implant Name Type Inv. Item Serial No.  Lot No. [id number] No. Used Action   Amanda Do     [id number] Left 1 Implanted         Drains:   [REMOVED] Urinary Catheter 11/12/24 2 Way;[place]keon-Temperature (Removed)   Catheter Indications Need for fluid volume management of the critically ill patient in a critical care setting 11/20/24 1800   Site Assessment No urethral drainage 11/20/24 1800   Urine Color Yellow 11/20/24 1800   Urine Appearance Hazy 11/20/24 1800   Collection Container Standard 11/20/24 1800   Securement Method Securing device (Describe) 11/20/24 1800   Catheter Care  Soap and water 11/19/24 2000   Catheter Best Practices  Drainage tube clipped to bed;Catheter secured to thigh;Tamper seal intact;Bag below bladder;Bag not on floor;Lack of dependent loop in tubing;Drainage bag less than half full 11/20/24 1800   Status Draining;Patent 11/20/24 1800   Manual Irrigation Volume Input (mL) 60 mL 11/13/24 1100   Output (mL) 10 mL 11/20/24 1800       Findings:  Infection Present At Time Of Surgery (PATOS) (choose all levels that have infection present):  No infection present  Other Findings: majority of muscle was viable    DESCRIPTION OF PROCEDURE: The patient was identified and the procedure was confirmed.  The wound and surrounding area was cleaned, and draped.

## 2024-11-21 NOTE — PROGRESS NOTES
Vascular Surgery Progress Note    Pt is being seen in f/u today regarding bilateral femoral embolectomies     Subjective  Hep gtt held for OR today for debridement and partial closure of LLE.     Current Medications:    [Held by provider] heparin (PORCINE) Infusion Stopped (11/21/24 0400)    sodium chloride      dextrose        heparin (porcine), heparin (porcine), heparin (porcine), sodium chloride flush, sodium chloride, magnesium sulfate, ondansetron **OR** ondansetron, polyethylene glycol, glucose, dextrose bolus **OR** dextrose bolus, glucagon (rDNA), dextrose, oxyCODONE **OR** oxyCODONE, HYDROmorphone    lactulose  20 g Oral BID    atorvastatin  10 mg Oral Nightly    docusate sodium  100 mg Oral BID    polyethylene glycol  17 g Oral Daily    bisacodyl  10 mg Rectal Daily    metoprolol tartrate  50 mg Oral BID    PHENobarbital  130 mg IntraVENous Q6H    thiamine  100 mg Oral Daily    folic acid  1 mg Oral Daily    melatonin  10 mg Oral Nightly    sodium chloride flush  5-40 mL IntraVENous 2 times per day    insulin glargine  12 Units SubCUTAneous QAM    aspirin  81 mg Oral Daily    acetaminophen  1,000 mg Oral 3 times per day    insulin lispro  0-16 Units SubCUTAneous Q4H        PHYSICAL EXAM:    /78   Pulse 80   Temp 97 °F (36.1 °C) (Temporal)   Resp 14   Ht 1.727 m (5' 7.99\")   Wt 97.8 kg (215 lb 9.8 oz)   SpO2 100%   BMI 32.79 kg/m²     Intake/Output Summary (Last 24 hours) at 11/21/2024 1214  Last data filed at 11/21/2024 0404  Gross per 24 hour   Intake 4075.95 ml   Output 2325 ml   Net 1750.95 ml          Gen: awake, no apparent distress  CVS: RRR  Resp: No increased work of breathing  Abd: Soft, non-tender, non-distended  R LE: AKA, hematoma to medial inferior aspect of incision, remains soft, incision intact - improving  L LE: Biphasic DP/ Monophasic PT, dressing with serous saturation, dressing taken down and bilateral fasciotomy wounds pink and viable appearing w some serosanguineous

## 2024-11-21 NOTE — PROGRESS NOTES
Patient due to void by 0400, bladder scan showing only 7mL urine in bladder. Patient denies any abdominal pressure or urge to void. Dr. Callahan notified of bladder scan result.

## 2024-11-21 NOTE — PLAN OF CARE
Adjustment  Goal: Verbalization of feelings regarding change/loss  Outcome: Progressing  Goal: Finding meaning/purpose in the midst of illness/suffering  Outcome: Progressing  Goal: Identifies/restores coping resources/skills  Outcome: Progressing  Goal: Growing sense of peace/hope  Outcome: Progressing     Problem: Support with Decision-Making  Goal: Verbalization of thoughts/feelings regarding decision  Outcome: Progressing  Goal: Identifies/restores coping resources/skills  Outcome: Progressing     Problem: Unresolved Conflict/Relationships  Goal: Explore resources for additional follow up, post discharge  Outcome: Progressing     Problem: Skin/Tissue Integrity - Adult  Goal: Skin integrity remains intact  11/20/2024 2053 by Cherelle Portillo RN  Outcome: Progressing  11/20/2024 0926 by Ryan Trejo RN  Outcome: Progressing  Goal: Incisions, wounds, or drain sites healing without S/S of infection  Outcome: Progressing  Goal: Oral mucous membranes remain intact  Outcome: Progressing     Problem: Musculoskeletal - Adult  Goal: Return mobility to safest level of function  11/20/2024 2053 by Cherelle Portillo RN  Outcome: Progressing  11/20/2024 0926 by Ryan Trejo RN  Outcome: Progressing  Goal: Maintain proper alignment of affected body part  Outcome: Progressing  Goal: Return ADL status to a safe level of function  Outcome: Progressing     Problem: Metabolic/Fluid and Electrolytes - Adult  Goal: Electrolytes maintained within normal limits  Outcome: Progressing  Goal: Hemodynamic stability and optimal renal function maintained  Outcome: Progressing  Goal: Glucose maintained within prescribed range  Outcome: Progressing     Problem: Hematologic - Adult  Goal: Maintains hematologic stability  11/20/2024 2053 by Cherelle Portillo RN  Outcome: Progressing  11/20/2024 0926 by Ryan Trejo RN  Outcome: Progressing     Problem: Nutrition Deficit:  Goal: Optimize nutritional status  11/20/2024 2053 by Cherelle Portillo RN  Outcome:  Progressing  11/20/2024 0926 by Ryan Trejo, RN  Outcome: Progressing

## 2024-11-21 NOTE — PLAN OF CARE
Problem: Chronic Conditions and Co-morbidities  Goal: Patient's chronic conditions and co-morbidity symptoms are monitored and maintained or improved  11/21/2024 0755 by Cecily Wahl RN  Outcome: Progressing     Problem: Discharge Planning  Goal: Discharge to home or other facility with appropriate resources  11/21/2024 0755 by Cecily Wahl RN  Outcome: Progressing     Problem: Pain  Goal: Verbalizes/displays adequate comfort level or baseline comfort level  11/21/2024 0755 by Cecily Wahl RN  Outcome: Progressing     Problem: Safety - Adult  Goal: Free from fall injury  11/21/2024 0755 by Cecily Wahl RN  Outcome: Progressing     Problem: Skin/Tissue Integrity  Goal: Absence of new skin breakdown  Description: 1.  Monitor for areas of redness and/or skin breakdown  2.  Assess vascular access sites hourly  3.  Every 4-6 hours minimum:  Change oxygen saturation probe site  4.  Every 4-6 hours:  If on nasal continuous positive airway pressure, respiratory therapy assess nares and determine need for appliance change or resting period.  11/21/2024 0755 by Cecily Wahl RN  Outcome: Progressing     Problem: ABCDS Injury Assessment  Goal: Absence of physical injury  11/21/2024 0755 by Cecily Wahl RN  Outcome: Progressing

## 2024-11-21 NOTE — PROGRESS NOTES
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  Mention he has been noticing bluish discoloration of both lower extremity worsened over the last 3 days.  He was found to have lactic acidosis on presentation of 3.9.  Leukocytosis of 16,500.  Blood pressure on presentation of 119/75.  CT abdomen with aorta with bilateral runoff with contrast was obtained and showed severe atherosclerotic disease with common femoral arterial occlusions with some reconstitution in the superficial femoral and popliteal location but without significant runoff in the lower extremities, bilateral large renal infarcts.  He was started on heparin by weight.  Patient was seen by vascular surgery and taken straight to the OR.  Right AKA was done.     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  No overnight events noted.  Plan for or today for left lower extremity debridement and partial closure.    ROS: denies fever, chills, cp, sob, n/v, HA unless stated above.      lactulose  20 g Oral BID    atorvastatin  10 mg Oral Nightly    docusate sodium  100 mg Oral BID    polyethylene glycol  17 g Oral Daily    bisacodyl  10 mg Rectal Daily    metoprolol tartrate  50 mg Oral BID    PHENobarbital  130 mg IntraVENous Q6H    thiamine  100 mg Oral Daily    folic acid  1 mg Oral Daily    melatonin  10 mg Oral Nightly    sodium chloride flush  5-40 mL IntraVENous 2 times per day    insulin glargine  12 Units SubCUTAneous QAM    aspirin  81 mg Oral Daily    acetaminophen  1,000 mg Oral 3 times per day    insulin lispro  0-16 Units SubCUTAneous Q4H     heparin (porcine), 3,500 Units, PRN  heparin (porcine), 80

## 2024-11-21 NOTE — PROGRESS NOTES
Patient arrived to unit with following belongings: eyeglasses, cell phone, , shirt. Patient oriented to room and instructed on call light for needs. Sitter at bedside.

## 2024-11-22 LAB
ALBUMIN SERPL-MCNC: 2.7 G/DL (ref 3.5–5.2)
ALP SERPL-CCNC: 169 U/L (ref 40–129)
ALT SERPL-CCNC: 9 U/L (ref 0–40)
ANION GAP SERPL CALCULATED.3IONS-SCNC: 12 MMOL/L (ref 7–16)
AST SERPL-CCNC: 65 U/L (ref 0–39)
BASOPHILS # BLD: 0.04 K/UL (ref 0–0.2)
BASOPHILS NFR BLD: 0 % (ref 0–2)
BILIRUB SERPL-MCNC: 0.3 MG/DL (ref 0–1.2)
BUN SERPL-MCNC: 49 MG/DL (ref 6–23)
CA-I BLD-SCNC: 1.18 MMOL/L (ref 1.15–1.33)
CALCIUM SERPL-MCNC: 8.9 MG/DL (ref 8.6–10.2)
CHLORIDE SERPL-SCNC: 96 MMOL/L (ref 98–107)
CO2 SERPL-SCNC: 28 MMOL/L (ref 22–29)
CREAT SERPL-MCNC: 7.5 MG/DL (ref 0.7–1.2)
EOSINOPHIL # BLD: 0.25 K/UL (ref 0.05–0.5)
EOSINOPHILS RELATIVE PERCENT: 2 % (ref 0–6)
ERYTHROCYTE [DISTWIDTH] IN BLOOD BY AUTOMATED COUNT: 14.1 % (ref 11.5–15)
GFR, ESTIMATED: 8 ML/MIN/1.73M2
GLUCOSE BLD-MCNC: 138 MG/DL (ref 74–99)
GLUCOSE BLD-MCNC: 182 MG/DL (ref 74–99)
GLUCOSE BLD-MCNC: 197 MG/DL (ref 74–99)
GLUCOSE BLD-MCNC: 235 MG/DL (ref 74–99)
GLUCOSE BLD-MCNC: 390 MG/DL (ref 74–99)
GLUCOSE SERPL-MCNC: 160 MG/DL (ref 74–99)
HCT VFR BLD AUTO: 25.8 % (ref 37–54)
HGB BLD-MCNC: 8.1 G/DL (ref 12.5–16.5)
IMM GRANULOCYTES # BLD AUTO: 0.15 K/UL (ref 0–0.58)
IMM GRANULOCYTES NFR BLD: 1 % (ref 0–5)
LYMPHOCYTES NFR BLD: 1.15 K/UL (ref 1.5–4)
LYMPHOCYTES RELATIVE PERCENT: 8 % (ref 20–42)
MAGNESIUM SERPL-MCNC: 2.3 MG/DL (ref 1.6–2.6)
MCH RBC QN AUTO: 31.2 PG (ref 26–35)
MCHC RBC AUTO-ENTMCNC: 31.4 G/DL (ref 32–34.5)
MCV RBC AUTO: 99.2 FL (ref 80–99.9)
MONOCYTES NFR BLD: 1.02 K/UL (ref 0.1–0.95)
MONOCYTES NFR BLD: 7 % (ref 2–12)
NEUTROPHILS NFR BLD: 81 % (ref 43–80)
NEUTS SEG NFR BLD: 11.48 K/UL (ref 1.8–7.3)
PARTIAL THROMBOPLASTIN TIME: 30.9 SEC (ref 24.5–35.1)
PARTIAL THROMBOPLASTIN TIME: 59.7 SEC (ref 24.5–35.1)
PARTIAL THROMBOPLASTIN TIME: 72.8 SEC (ref 24.5–35.1)
PHOSPHATE SERPL-MCNC: 5.2 MG/DL (ref 2.5–4.5)
PLATELET # BLD AUTO: 403 K/UL (ref 130–450)
PMV BLD AUTO: 10 FL (ref 7–12)
POTASSIUM SERPL-SCNC: 4.2 MMOL/L (ref 3.5–5)
PROT SERPL-MCNC: 5.6 G/DL (ref 6.4–8.3)
RBC # BLD AUTO: 2.6 M/UL (ref 3.8–5.8)
SODIUM SERPL-SCNC: 136 MMOL/L (ref 132–146)
WBC OTHER # BLD: 14.1 K/UL (ref 4.5–11.5)

## 2024-11-22 PROCEDURE — 6360000002 HC RX W HCPCS: Performed by: NURSE PRACTITIONER

## 2024-11-22 PROCEDURE — 82947 ASSAY GLUCOSE BLOOD QUANT: CPT

## 2024-11-22 PROCEDURE — 6370000000 HC RX 637 (ALT 250 FOR IP): Performed by: INTERNAL MEDICINE

## 2024-11-22 PROCEDURE — 84100 ASSAY OF PHOSPHORUS: CPT

## 2024-11-22 PROCEDURE — 2700000000 HC OXYGEN THERAPY PER DAY

## 2024-11-22 PROCEDURE — 6370000000 HC RX 637 (ALT 250 FOR IP): Performed by: NURSE PRACTITIONER

## 2024-11-22 PROCEDURE — 6360000002 HC RX W HCPCS

## 2024-11-22 PROCEDURE — 85730 THROMBOPLASTIN TIME PARTIAL: CPT

## 2024-11-22 PROCEDURE — 99233 SBSQ HOSP IP/OBS HIGH 50: CPT | Performed by: INTERNAL MEDICINE

## 2024-11-22 PROCEDURE — 90935 HEMODIALYSIS ONE EVALUATION: CPT

## 2024-11-22 PROCEDURE — 36415 COLL VENOUS BLD VENIPUNCTURE: CPT

## 2024-11-22 PROCEDURE — 6360000002 HC RX W HCPCS: Performed by: INTERNAL MEDICINE

## 2024-11-22 PROCEDURE — 85025 COMPLETE CBC W/AUTO DIFF WBC: CPT

## 2024-11-22 PROCEDURE — 80053 COMPREHEN METABOLIC PANEL: CPT

## 2024-11-22 PROCEDURE — 2580000003 HC RX 258: Performed by: NURSE PRACTITIONER

## 2024-11-22 PROCEDURE — 82330 ASSAY OF CALCIUM: CPT

## 2024-11-22 PROCEDURE — 2140000000 HC CCU INTERMEDIATE R&B

## 2024-11-22 PROCEDURE — 83735 ASSAY OF MAGNESIUM: CPT

## 2024-11-22 RX ORDER — SEVELAMER CARBONATE 800 MG/1
800 TABLET, FILM COATED ORAL
Status: DISCONTINUED | OUTPATIENT
Start: 2024-11-22 | End: 2024-11-28 | Stop reason: HOSPADM

## 2024-11-22 RX ADMIN — INSULIN GLARGINE 12 UNITS: 100 INJECTION, SOLUTION SUBCUTANEOUS at 11:54

## 2024-11-22 RX ADMIN — Medication 10 MG: at 21:07

## 2024-11-22 RX ADMIN — PHENOBARBITAL SODIUM 130 MG: 65 INJECTION INTRAMUSCULAR; INTRAVENOUS at 00:24

## 2024-11-22 RX ADMIN — DOCUSATE SODIUM 100 MG: 100 CAPSULE, LIQUID FILLED ORAL at 21:08

## 2024-11-22 RX ADMIN — SODIUM CHLORIDE, PRESERVATIVE FREE 10 ML: 5 INJECTION INTRAVENOUS at 21:08

## 2024-11-22 RX ADMIN — SODIUM CHLORIDE, PRESERVATIVE FREE 10 ML: 5 INJECTION INTRAVENOUS at 11:55

## 2024-11-22 RX ADMIN — SEVELAMER CARBONATE 800 MG: 800 TABLET, FILM COATED ORAL at 16:21

## 2024-11-22 RX ADMIN — PHENOBARBITAL SODIUM 130 MG: 65 INJECTION INTRAMUSCULAR; INTRAVENOUS at 18:10

## 2024-11-22 RX ADMIN — INSULIN LISPRO 16 UNITS: 100 INJECTION, SOLUTION INTRAVENOUS; SUBCUTANEOUS at 21:07

## 2024-11-22 RX ADMIN — METOPROLOL TARTRATE 50 MG: 50 TABLET, FILM COATED ORAL at 21:13

## 2024-11-22 RX ADMIN — POLYETHYLENE GLYCOL 3350 17 G: 17 POWDER, FOR SOLUTION ORAL at 14:04

## 2024-11-22 RX ADMIN — METOPROLOL TARTRATE 50 MG: 50 TABLET, FILM COATED ORAL at 12:20

## 2024-11-22 RX ADMIN — ATORVASTATIN CALCIUM 10 MG: 20 TABLET, FILM COATED ORAL at 21:08

## 2024-11-22 RX ADMIN — DOCUSATE SODIUM 100 MG: 100 CAPSULE, LIQUID FILLED ORAL at 11:54

## 2024-11-22 RX ADMIN — ASPIRIN 81 MG CHEWABLE TABLET 81 MG: 81 TABLET CHEWABLE at 11:53

## 2024-11-22 RX ADMIN — Medication 100 MG: at 11:53

## 2024-11-22 RX ADMIN — INSULIN LISPRO 4 UNITS: 100 INJECTION, SOLUTION INTRAVENOUS; SUBCUTANEOUS at 00:24

## 2024-11-22 RX ADMIN — LACTULOSE 20 G: 20 SOLUTION ORAL at 11:54

## 2024-11-22 RX ADMIN — INSULIN LISPRO 4 UNITS: 100 INJECTION, SOLUTION INTRAVENOUS; SUBCUTANEOUS at 16:30

## 2024-11-22 RX ADMIN — ACETAMINOPHEN 1000 MG: 500 TABLET ORAL at 21:08

## 2024-11-22 RX ADMIN — EPOETIN ALFA-EPBX 3000 UNITS: 3000 INJECTION, SOLUTION INTRAVENOUS; SUBCUTANEOUS at 16:20

## 2024-11-22 RX ADMIN — INSULIN LISPRO 4 UNITS: 100 INJECTION, SOLUTION INTRAVENOUS; SUBCUTANEOUS at 12:19

## 2024-11-22 RX ADMIN — PHENOBARBITAL SODIUM 130 MG: 65 INJECTION INTRAMUSCULAR; INTRAVENOUS at 12:19

## 2024-11-22 RX ADMIN — PHENOBARBITAL SODIUM 130 MG: 65 INJECTION INTRAMUSCULAR; INTRAVENOUS at 05:58

## 2024-11-22 RX ADMIN — ACETAMINOPHEN 1000 MG: 500 TABLET ORAL at 14:14

## 2024-11-22 RX ADMIN — OXYCODONE HYDROCHLORIDE 5 MG: 5 TABLET ORAL at 16:21

## 2024-11-22 RX ADMIN — ACETAMINOPHEN 1000 MG: 500 TABLET ORAL at 05:57

## 2024-11-22 RX ADMIN — SEVELAMER CARBONATE 800 MG: 800 TABLET, FILM COATED ORAL at 11:54

## 2024-11-22 RX ADMIN — HEPARIN SODIUM 18 UNITS/KG/HR: 10000 INJECTION, SOLUTION INTRAVENOUS at 15:14

## 2024-11-22 RX ADMIN — Medication 1 MG: at 12:20

## 2024-11-22 ASSESSMENT — PAIN DESCRIPTION - DESCRIPTORS
DESCRIPTORS: ACHING;SORE;DISCOMFORT
DESCRIPTORS: ACHING;DULL;NAGGING
DESCRIPTORS: ACHING;DISCOMFORT;SORE

## 2024-11-22 ASSESSMENT — PAIN SCALES - GENERAL
PAINLEVEL_OUTOF10: 0
PAINLEVEL_OUTOF10: 3
PAINLEVEL_OUTOF10: 5
PAINLEVEL_OUTOF10: 0
PAINLEVEL_OUTOF10: 6
PAINLEVEL_OUTOF10: 5

## 2024-11-22 ASSESSMENT — PAIN DESCRIPTION - ORIENTATION
ORIENTATION: RIGHT;LEFT;OTHER (COMMENT)
ORIENTATION: RIGHT
ORIENTATION: LEFT

## 2024-11-22 ASSESSMENT — PAIN DESCRIPTION - LOCATION
LOCATION: LEG

## 2024-11-22 NOTE — PLAN OF CARE
Problem: Chronic Conditions and Co-morbidities  Goal: Patient's chronic conditions and co-morbidity symptoms are monitored and maintained or improved  Outcome: Progressing     Problem: Discharge Planning  Goal: Discharge to home or other facility with appropriate resources  Outcome: Progressing     Problem: Pain  Goal: Verbalizes/displays adequate comfort level or baseline comfort level  Outcome: Progressing     Problem: Safety - Adult  Goal: Free from fall injury  Outcome: Progressing     Problem: Skin/Tissue Integrity  Goal: Absence of new skin breakdown  Description: 1.  Monitor for areas of redness and/or skin breakdown  2.  Assess vascular access sites hourly  3.  Every 4-6 hours minimum:  Change oxygen saturation probe site  4.  Every 4-6 hours:  If on nasal continuous positive airway pressure, respiratory therapy assess nares and determine need for appliance change or resting period.  Outcome: Progressing     Problem: ABCDS Injury Assessment  Goal: Absence of physical injury  Outcome: Progressing     Problem: Spiritual Struggle  Goal: Verbalizes spiritual struggle  Outcome: Progressing     Problem: Emotional Distress  Goal: Verbalization of thoughts and feelings  Outcome: Progressing  Goal: Reduce evidence of anxiety/worry/anger  Outcome: Progressing  Goal: Identifies/restores coping resources/skills  Outcome: Progressing     Problem: Life Adjustment  Goal: Verbalization of feelings regarding change/loss  Outcome: Progressing  Goal: Finding meaning/purpose in the midst of illness/suffering  Outcome: Progressing  Goal: Identifies/restores coping resources/skills  Outcome: Progressing  Goal: Growing sense of peace/hope  Outcome: Progressing     Problem: Support with Decision-Making  Goal: Verbalization of thoughts/feelings regarding decision  Outcome: Progressing  Goal: Identifies/restores coping resources/skills  Outcome: Progressing     Problem: Unresolved Conflict/Relationships  Goal: Explore resources for  additional follow up, post discharge  Outcome: Progressing     Problem: Skin/Tissue Integrity - Adult  Goal: Skin integrity remains intact  Outcome: Progressing  Goal: Incisions, wounds, or drain sites healing without S/S of infection  Outcome: Progressing  Goal: Oral mucous membranes remain intact  Outcome: Progressing     Problem: Musculoskeletal - Adult  Goal: Return mobility to safest level of function  Outcome: Progressing  Goal: Maintain proper alignment of affected body part  Outcome: Progressing  Goal: Return ADL status to a safe level of function  Outcome: Progressing     Problem: Metabolic/Fluid and Electrolytes - Adult  Goal: Electrolytes maintained within normal limits  Outcome: Progressing  Goal: Hemodynamic stability and optimal renal function maintained  Outcome: Progressing  Goal: Glucose maintained within prescribed range  Outcome: Progressing     Problem: Hematologic - Adult  Goal: Maintains hematologic stability  Outcome: Progressing     Problem: Nutrition Deficit:  Goal: Optimize nutritional status  Outcome: Progressing

## 2024-11-22 NOTE — CARE COORDINATION
Transition of care update: S/P bilateral femoral embolectomies with fasciotomies subsequent R AKA on 11/12. LLE wound debridement, left medial calf closure on 11/21. LOS: day 10. Pt has a sitter. IV heparin infusion, iv phenobarbital 130mg q 6 hrs. Labs noted. TDC - pt was started on HD this admission. Met with pt's brother, Christian, in room. [facility]charge plan is [facility] and then if unable to do [facility] then he would like pt to go to [facility]. [facility] does in house HD. Spoke with Alexey with [facility] and they have accepted pt and will start pre cert this afternoon if no further vascular procedures planned. Left vm with Tatum with [facility] to check on acceptance. Pt will need to work with therapies. Pt's nurse is checking with vascular surgery to see if pt can work with therapies. Also, [facility] fontanez is following pt. Ambulance form was started. Transport envelope was placed with pt's soft chart. Cm/sw will follow.

## 2024-11-22 NOTE — PROGRESS NOTES
Vascular Surgery Progress Note    Pt is being seen in f/u today regarding bilateral femoral embolectomies     Subjective  Hep gtt resumed. OR yesterday for partial fasciotomy closure and grafter to lateral fasciotomy site. Doing well this AM. Remains somewhat somnolent. No complaints this AM.     Current Medications:    heparin (PORCINE) Infusion 18 Units/kg/hr (11/21/24 2241)    sodium chloride      dextrose        heparin (porcine), heparin (porcine), heparin (porcine), sodium chloride flush, sodium chloride, magnesium sulfate, ondansetron **OR** ondansetron, polyethylene glycol, glucose, dextrose bolus **OR** dextrose bolus, glucagon (rDNA), dextrose, oxyCODONE **OR** oxyCODONE, HYDROmorphone    lactulose  20 g Oral BID    atorvastatin  10 mg Oral Nightly    docusate sodium  100 mg Oral BID    polyethylene glycol  17 g Oral Daily    bisacodyl  10 mg Rectal Daily    metoprolol tartrate  50 mg Oral BID    PHENobarbital  130 mg IntraVENous Q6H    thiamine  100 mg Oral Daily    folic acid  1 mg Oral Daily    melatonin  10 mg Oral Nightly    sodium chloride flush  5-40 mL IntraVENous 2 times per day    insulin glargine  12 Units SubCUTAneous QAM    aspirin  81 mg Oral Daily    acetaminophen  1,000 mg Oral 3 times per day    insulin lispro  0-16 Units SubCUTAneous Q4H        PHYSICAL EXAM:    /70   Pulse 88   Temp 97.1 °F (36.2 °C) (Temporal)   Resp 18   Ht 1.727 m (5' 7.99\")   Wt 97.8 kg (215 lb 9.8 oz)   SpO2 97%   BMI 32.79 kg/m²     Intake/Output Summary (Last 24 hours) at 11/22/2024 0648  Last data filed at 11/21/2024 2244  Gross per 24 hour   Intake 100 ml   Output 0 ml   Net 100 ml          Gen: awake, no apparent distress  CVS: RRR, tunneled line to R chest  Resp: No increased work of breathing  Abd: Soft, non-tender, non-distended  R LE: AKA, hematoma to medial inferior aspect of incision, remains soft, incision intact  L LE: Surgical dressing in place, dry and intact, motor and sensation

## 2024-11-22 NOTE — ANESTHESIA POSTPROCEDURE EVALUATION
Department of Anesthesiology  Postprocedure Note    Patient: Himanshu Bravo  MRN: [id number]  YOB: 1960  Date of evaluation: 11/22/2024    Procedure Summary       Date: 11/21/24 Room / Location: [address]    Anesthesia Start: 1535 Anesthesia Stop: 1721    Procedure: LEFT LOWER EXTREMITY DEBRIDEMENT, CLOSURE OF MEDIAL FASCIOTOMY AND APPLICATION OF KERETIS TO LATERAL FASCIOTOMY. (Left: Leg Lower) Diagnosis:       Hx of fasciotomy      (Hx of fasciotomy [Z98.890])    Surgeons: Siobhan Khanna MD Responsible Provider: Pablo Oliver DO    Anesthesia Type: MAC ASA Status: 3            Anesthesia Type: No value filed.    Angelica Phase I: Angelica Score: 8    Angelica Phase II:      Anesthesia Post Evaluation    Patient location during evaluation: PACU  Patient participation: complete - patient participated  Level of consciousness: awake and alert  Pain score: 1  Airway patency: patent  Nausea & Vomiting: no nausea and no vomiting  Cardiovascular status: hemodynamically stable  Respiratory status: acceptable  Hydration status: euvolemic  Pain management: adequate    No notable events documented.

## 2024-11-22 NOTE — PROGRESS NOTES
[facility]  MD Bryce Milligan MD Ali Hassan, MD Lisa Kniska, CNP   Natalia Rey, CODY Harris, DEMETRIUS  Progress Note    11/22/2024    SUBJECTIVE:   11/13:  Seen in [facility].  Awake and alert.  S/P right above knee amputation yesterday. Complains of pain at right AKA site.  Oxygen on per NC.  Denies chest pain, palpitation or SOB.  Denies nausea, vomiting or abd pain.  Continues on heparin drip and bicarb IV.  He is oligoanuric.     11/14: Seen in the [facility] while on dialysis. Tolerating therapy without issues. His blood pressure is stable. Heart rate is mildly elevated. He is in good spirits and reports that he is having less pain today. His appetite is fair, he did eat some cereal for dinner. Urine output is stable.     11/15: Seen in the ICU. No acute distress or complaints. Received metoprolol earlier for tachycardia. Tolerated HD today with 2.8 liters of fluid removal. He is still making urine, though urine output has decreased. He is on oxygen via nasal canula. Denies dyspnea. PO intake is stable.     11/16: Undergoing with withdrawal of alcohol symptoms.  Obtunded.  Awake agitated aggressive at times though symptoms have improved status post sedation    11/17: Feeling better today.  Had a \"rough night\" notable for worsening agitation and aggression.  Now on Precedex drip with control of symptoms.  Enjoying his lunch.  Denies complaint.  Family at bedside.  BP stable.    11/18: Seen in the [facility]. Mentation has improved. Sitter is present at the bedside. He is still tachycardic. Denies any acute complaints. Denies dyspnea. His blood pressure is stable. Complains of some discomfort to his RLE. Minimal urine output.     11/19: Seen today while on dialysis. Tolerating treatment well. Line will only tolerate BFR of 250. Plan is for tunneled line placement in IR tomorrow. Blood pressure is stable. Heart rate has improved. Urine output remains low. He denies any  dyspnea, chest pain, or palpitations. Sitter is present at the bedside.     11/20:  Returned to unit from IR.  Tunneled dialysis catheter was placed in the right IJ.  He complains of pain at the site.  Remains oligoanuric.     11/21: Sitting up in bed, no acute distress. Sitter is present at the bedside. He is NPO for LLE debridement and partial closure. He denies any dyspnea, chest pain, or palpitations. Blood pressure is stable.     11/22: Mentally more alert today.  Denies pain.  No dyspnea at rest on nasal cannula.  Mild dependent swelling.  Appetite improved.    PROBLEM LIST:    Principal Problem:    Femoral artery occlusion (HCC)  Active Problems:    Acute lower limb ischemia    ANTIONE (acute kidney injury) (HCC)    Non-traumatic rhabdomyolysis  Resolved Problems:    * No resolved hospital problems. *         DIET:    ADULT DIET; Regular; 4 carb choices (60 gm/meal)  ADULT ORAL NUTRITION SUPPLEMENT; Breakfast, Lunch, Dinner; Wound Healing Oral Supplement     MEDS (scheduled):    sevelamer  800 mg Oral TID WC    epoetin noe-epbx  3,000 Units SubCUTAneous Once per day on Monday Wednesday Friday    lactulose  20 g Oral BID    atorvastatin  10 mg Oral Nightly    docusate sodium  100 mg Oral BID    polyethylene glycol  17 g Oral Daily    bisacodyl  10 mg Rectal Daily    metoprolol tartrate  50 mg Oral BID    PHENobarbital  130 mg IntraVENous Q6H    thiamine  100 mg Oral Daily    folic acid  1 mg Oral Daily    melatonin  10 mg Oral Nightly    sodium chloride flush  5-40 mL IntraVENous 2 times per day    insulin glargine  12 Units SubCUTAneous QAM    aspirin  81 mg Oral Daily    acetaminophen  1,000 mg Oral 3 times per day    insulin lispro  0-16 Units SubCUTAneous Q4H       MEDS (infusions):   heparin (PORCINE) Infusion 18 Units/kg/hr (11/22/24 1514)    sodium chloride      dextrose         MEDS (prn):  heparin (porcine), heparin (porcine), heparin (porcine), sodium chloride flush, sodium chloride, magnesium sulfate,

## 2024-11-22 NOTE — PLAN OF CARE
Problem: Chronic Conditions and Co-morbidities  Goal: Patient's chronic conditions and co-morbidity symptoms are monitored and maintained or improved  11/22/2024 1402 by Mona Ledbetter RN  Outcome: Progressing  11/22/2024 0429 by Cherelle Portillo RN  Outcome: Progressing     Problem: Discharge Planning  Goal: Discharge to home or other facility with appropriate resources  11/22/2024 1402 by Mona Ledbetter RN  Outcome: Progressing  Flowsheets (Taken 11/22/2024 1402)  Discharge to home or other facility with appropriate resources:   Identify barriers to discharge with patient and caregiver   Identify discharge learning needs (meds, wound care, etc)   Arrange for needed discharge resources and transportation as appropriate   Refer to discharge planning if patient needs post-hospital services based on physician order or complex needs related to functional status, cognitive ability or social support system  11/22/2024 0429 by Cherelle Portillo RN  Outcome: Progressing     Problem: Pain  Goal: Verbalizes/displays adequate comfort level or baseline comfort level  11/22/2024 1402 by Mona Ledbetter RN  Outcome: Progressing  11/22/2024 0429 by Cherelle Portillo RN  Outcome: Progressing     Problem: Safety - Adult  Goal: Free from fall injury  11/22/2024 1402 by Mona Ledbetter RN  Outcome: Progressing  11/22/2024 0429 by Cherelle Portillo RN  Outcome: Progressing     Problem: Skin/Tissue Integrity  Goal: Absence of new skin breakdown  Description: 1.  Monitor for areas of redness and/or skin breakdown  2.  Assess vascular access sites hourly  3.  Every 4-6 hours minimum:  Change oxygen saturation probe site  4.  Every 4-6 hours:  If on nasal continuous positive airway pressure, respiratory therapy assess nares and determine need for appliance change or resting period.  11/22/2024 1402 by Mona Ledbetter RN  Outcome: Progressing  11/22/2024 0429 by Cherelle Portillo RN  Outcome: Progressing     Problem: ABCDS Injury  Progressing  11/22/2024 0429 by Cherelle Portillo RN  Outcome: Progressing  Goal: Hemodynamic stability and optimal renal function maintained  11/22/2024 1402 by Mona Ledbetter RN  Outcome: Progressing  11/22/2024 0429 by Cherelle Portillo RN  Outcome: Progressing  Goal: Glucose maintained within prescribed range  11/22/2024 1402 by Mona Ledbetter RN  Outcome: Progressing  11/22/2024 0429 by Cherelle Portillo RN  Outcome: Progressing     Problem: Hematologic - Adult  Goal: Maintains hematologic stability  11/22/2024 1402 by Mona Ledbetter RN  Outcome: Progressing  Flowsheets (Taken 11/22/2024 1402)  Maintains hematologic stability: Assess for signs and symptoms of bleeding or hemorrhage  11/22/2024 0429 by Cherelle Portillo RN  Outcome: Progressing     Problem: Nutrition Deficit:  Goal: Optimize nutritional status  11/22/2024 1402 by Mona Ledbetter RN  Outcome: Progressing  11/22/2024 0429 by Cherelle Portillo RN  Outcome: Progressing

## 2024-11-22 NOTE — PROGRESS NOTES
.Vascular Surgery Progress Note    Pt is being seen in f/u today regarding bilateral femoral embolectomies, R AKA    Subjective  Pt s/e. Somnolent. S/p OR yesterday for partial fasciotomy closure and graft to lateral fasciotomy site. No complaints this AM.     Current Medications:    heparin (PORCINE) Infusion 18 Units/kg/hr (11/21/24 5071)    sodium chloride      dextrose        heparin (porcine), heparin (porcine), heparin (porcine), sodium chloride flush, sodium chloride, magnesium sulfate, ondansetron **OR** ondansetron, glucose, dextrose bolus **OR** dextrose bolus, glucagon (rDNA), dextrose, oxyCODONE **OR** oxyCODONE, HYDROmorphone    sevelamer  800 mg Oral TID WC    epoetin noe-epbx  30 Units/kg SubCUTAneous Once per day on Monday Wednesday Friday    lactulose  20 g Oral BID    atorvastatin  10 mg Oral Nightly    docusate sodium  100 mg Oral BID    polyethylene glycol  17 g Oral Daily    bisacodyl  10 mg Rectal Daily    metoprolol tartrate  50 mg Oral BID    PHENobarbital  130 mg IntraVENous Q6H    thiamine  100 mg Oral Daily    folic acid  1 mg Oral Daily    melatonin  10 mg Oral Nightly    sodium chloride flush  5-40 mL IntraVENous 2 times per day    insulin glargine  12 Units SubCUTAneous QAM    aspirin  81 mg Oral Daily    acetaminophen  1,000 mg Oral 3 times per day    insulin lispro  0-16 Units SubCUTAneous Q4H        PHYSICAL EXAM:    /74   Pulse 94   Temp 97.8 °F (36.6 °C) (Temporal)   Resp 18   Ht 1.727 m (5' 7.99\")   Wt 100.7 kg (222 lb 0.1 oz)   SpO2 95%   BMI 33.76 kg/m²     Intake/Output Summary (Last 24 hours) at 11/22/2024 1241  Last data filed at 11/22/2024 1047  Gross per 24 hour   Intake 400 ml   Output 3000 ml   Net -2600 ml          Gen: awake, no apparent distress  CVS: RRR, tunneled line to R chest  Resp: No increased work of breathing  Abd: Soft, non-tender, non-distended  R LE: AKA, hematoma to medial inferior aspect of incision, remains soft, incision intact  L LE:  Surgical dressing in place, dry and intact, motor and sensation intact    LABS:    Lab Results   Component Value Date    WBC 14.1 (H) 11/22/2024    HGB 8.1 (L) 11/22/2024    HCT 25.8 (L) 11/22/2024     11/22/2024    PROTIME 11.8 11/20/2024    INR 1.1 11/20/2024    APTT 72.8 (H) 11/22/2024    K 4.2 11/22/2024    BUN 49 (H) 11/22/2024    CREATININE 7.5 (H) 11/22/2024       A/P  64 y.o. 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     - continue to monitor neurovascular exam  - continue Hep gtt   - Surgical dressing in place to LLE; dressing to remain in place, will be changed by surgical resident 11/23  - RLE open to air, small hematoma present, monitor incision closely  - prn pain control   - continue regular diet as tolerated  - appreciate nephrology recommendations, continue HD  - Cardiology recs; continued Metoprolol 50 mg BID, continue ASA, Lipitor 10mg, trasnition to NOAC when ok from surgical standpoint  - continue high dose Phenobarb; keep at 130 for total of 7 days    Plan discussed with Dr. Jey Moffett, APRN - CNP

## 2024-11-22 NOTE — PROGRESS NOTES
[facility] Hospitalist Progress Note    Admitting Date and Time: 11/12/2024  2:39 AM  Admit Dx: 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]  Atrial fibrillation, unspecified type (HCC) [I48.91]    Synopsis:  Presented to ER due to concern for leg pain and shortness of breath.  Mention he has been noticing bluish discoloration of both lower extremity worsened over the last 3 days.  He was found to have lactic acidosis on presentation of 3.9.  Leukocytosis of 16,500.  Blood pressure on presentation of 119/75.  CT abdomen with aorta with bilateral runoff with contrast was obtained and showed severe atherosclerotic disease with common femoral arterial occlusions with some reconstitution in the superficial femoral and popliteal location but without significant runoff in the lower extremities, bilateral large renal infarcts.  He was started on heparin by weight.  Patient was seen by vascular surgery and taken straight to the OR.  Right AKA was done.     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  No overnight events noted.      ROS: denies fever, chills, cp, sob, n/v, HA unless stated above.      sevelamer  800 mg Oral TID WC    epoetin noe-epbx  3,000 Units SubCUTAneous Once per day on Monday Wednesday Friday    lactulose  20 g Oral BID    atorvastatin  10 mg Oral Nightly    docusate sodium  100 mg Oral BID    polyethylene glycol  17 g Oral Daily    bisacodyl  10 mg Rectal Daily    metoprolol tartrate  50 mg Oral BID    PHENobarbital  130 mg IntraVENous Q6H    thiamine  100 mg Oral Daily    folic acid  1 mg Oral Daily    melatonin  10 mg Oral Nightly    sodium chloride flush  5-40 mL IntraVENous 2 times per day    insulin glargine  12 Units SubCUTAneous QAM    aspirin  81 mg Oral Daily    acetaminophen  1,000 mg Oral 3 times per day    insulin lispro  0-16 Units SubCUTAneous Q4H     heparin  MD Willian on 11/22/2024 at 2:05 PM

## 2024-11-22 NOTE — PATIENT CARE CONFERENCE
P Quality Flow/Interdisciplinary Rounds Progress Note        Quality Flow Rounds held on November 22, 2024    Disciplines Attending:  Bedside Nurse, , , and Nursing Unit Leadership    Himanshu Bravo was admitted on 11/12/2024  2:39 AM    Anticipated Discharge Date:       Disposition:    Aramis Score:  Aramis Scale Score: 14    Readmission Risk              Risk of Unplanned Readmission:  17           Discussed patient goal for the day, patient clinical progression, and barriers to discharge.  The following Goal(s) of the Day/Commitment(s) have been identified:  Report labs/diagnostics      Monique Amaya RN  November 22, 2024

## 2024-11-22 NOTE — FLOWSHEET NOTE
11/22/24 1047   Vital Signs   BP (!) 143/72   Temp 96.9 °F (36.1 °C)   Pulse 100   Respirations 16   Weight - Scale 100.7 kg (222 lb 0.1 oz)   Weight Method Bedside scale   Percent Weight Change 2.97   Post-Hemodialysis Assessment   Post-Treatment Procedures Blood returned;Catheter capped, clamped and heparinized x 2 ports   Machine Disinfection Process Exterior Machine Disinfection   Rinseback Volume (ml) 300 ml   Blood Volume Processed (Liters) 68.2 L   Dialyzer Clearance Lightly streaked   Duration of Treatment (minutes) 240 minutes   Heparin Amount Administered During Treatment (mL) 0 mL   Hemodialysis Intake (ml) 300 ml   Hemodialysis Output (ml) 3000 ml   NET Removed (ml) 2700   Tolerated Treatment Good   Patient Response to Treatment tolerated well, blood returned, cath care per policy/procedure, lines flushed, heparin instilled ports capped

## 2024-11-23 ENCOUNTER — APPOINTMENT (OUTPATIENT)
Dept: GENERAL RADIOLOGY | Age: 64
DRG: 239 | End: 2024-11-23
Payer: COMMERCIAL

## 2024-11-23 LAB
ALBUMIN SERPL-MCNC: 2.8 G/DL (ref 3.5–5.2)
ALP SERPL-CCNC: 165 U/L (ref 40–129)
ALT SERPL-CCNC: <5 U/L (ref 0–40)
ANION GAP SERPL CALCULATED.3IONS-SCNC: 16 MMOL/L (ref 7–16)
AST SERPL-CCNC: 51 U/L (ref 0–39)
BASOPHILS # BLD: 0.06 K/UL (ref 0–0.2)
BASOPHILS NFR BLD: 0 % (ref 0–2)
BILIRUB SERPL-MCNC: 0.2 MG/DL (ref 0–1.2)
BUN SERPL-MCNC: 40 MG/DL (ref 6–23)
CA-I BLD-SCNC: 1.19 MMOL/L (ref 1.15–1.33)
CALCIUM SERPL-MCNC: 8.9 MG/DL (ref 8.6–10.2)
CHLORIDE SERPL-SCNC: 95 MMOL/L (ref 98–107)
CO2 SERPL-SCNC: 25 MMOL/L (ref 22–29)
CREAT SERPL-MCNC: 6 MG/DL (ref 0.7–1.2)
EOSINOPHIL # BLD: 0.2 K/UL (ref 0.05–0.5)
EOSINOPHILS RELATIVE PERCENT: 1 % (ref 0–6)
ERYTHROCYTE [DISTWIDTH] IN BLOOD BY AUTOMATED COUNT: 14 % (ref 11.5–15)
FERRITIN SERPL-MCNC: 382 NG/ML
GFR, ESTIMATED: 10 ML/MIN/1.73M2
GLUCOSE BLD-MCNC: 117 MG/DL (ref 74–99)
GLUCOSE BLD-MCNC: 145 MG/DL (ref 74–99)
GLUCOSE BLD-MCNC: 156 MG/DL (ref 74–99)
GLUCOSE BLD-MCNC: 251 MG/DL (ref 74–99)
GLUCOSE BLD-MCNC: 252 MG/DL (ref 74–99)
GLUCOSE BLD-MCNC: 81 MG/DL (ref 74–99)
GLUCOSE SERPL-MCNC: 184 MG/DL (ref 74–99)
HCT VFR BLD AUTO: 27.2 % (ref 37–54)
HGB BLD-MCNC: 8.5 G/DL (ref 12.5–16.5)
IMM GRANULOCYTES # BLD AUTO: 0.19 K/UL (ref 0–0.58)
IMM GRANULOCYTES NFR BLD: 1 % (ref 0–5)
IRON SATN MFR SERPL: 26 % (ref 20–55)
IRON SERPL-MCNC: 51 UG/DL (ref 59–158)
LYMPHOCYTES NFR BLD: 1.4 K/UL (ref 1.5–4)
LYMPHOCYTES RELATIVE PERCENT: 8 % (ref 20–42)
MAGNESIUM SERPL-MCNC: 2.1 MG/DL (ref 1.6–2.6)
MCH RBC QN AUTO: 30.6 PG (ref 26–35)
MCHC RBC AUTO-ENTMCNC: 31.3 G/DL (ref 32–34.5)
MCV RBC AUTO: 97.8 FL (ref 80–99.9)
MONOCYTES NFR BLD: 1.07 K/UL (ref 0.1–0.95)
MONOCYTES NFR BLD: 6 % (ref 2–12)
NEUTROPHILS NFR BLD: 84 % (ref 43–80)
NEUTS SEG NFR BLD: 14.74 K/UL (ref 1.8–7.3)
PARTIAL THROMBOPLASTIN TIME: 40.2 SEC (ref 24.5–35.1)
PARTIAL THROMBOPLASTIN TIME: 43.7 SEC (ref 24.5–35.1)
PHOSPHATE SERPL-MCNC: 4.3 MG/DL (ref 2.5–4.5)
PLATELET # BLD AUTO: 429 K/UL (ref 130–450)
PMV BLD AUTO: 10 FL (ref 7–12)
POTASSIUM SERPL-SCNC: 4.2 MMOL/L (ref 3.5–5)
PROT SERPL-MCNC: 6.2 G/DL (ref 6.4–8.3)
RBC # BLD AUTO: 2.78 M/UL (ref 3.8–5.8)
SODIUM SERPL-SCNC: 136 MMOL/L (ref 132–146)
TIBC SERPL-MCNC: 200 UG/DL (ref 250–450)
WBC OTHER # BLD: 17.7 K/UL (ref 4.5–11.5)

## 2024-11-23 PROCEDURE — 6370000000 HC RX 637 (ALT 250 FOR IP): Performed by: NURSE PRACTITIONER

## 2024-11-23 PROCEDURE — 6360000002 HC RX W HCPCS: Performed by: NURSE PRACTITIONER

## 2024-11-23 PROCEDURE — 85730 THROMBOPLASTIN TIME PARTIAL: CPT

## 2024-11-23 PROCEDURE — 80053 COMPREHEN METABOLIC PANEL: CPT

## 2024-11-23 PROCEDURE — 82330 ASSAY OF CALCIUM: CPT

## 2024-11-23 PROCEDURE — 36415 COLL VENOUS BLD VENIPUNCTURE: CPT

## 2024-11-23 PROCEDURE — 71045 X-RAY EXAM CHEST 1 VIEW: CPT

## 2024-11-23 PROCEDURE — 6360000002 HC RX W HCPCS

## 2024-11-23 PROCEDURE — 84100 ASSAY OF PHOSPHORUS: CPT

## 2024-11-23 PROCEDURE — 85025 COMPLETE CBC W/AUTO DIFF WBC: CPT

## 2024-11-23 PROCEDURE — 83550 IRON BINDING TEST: CPT

## 2024-11-23 PROCEDURE — 2580000003 HC RX 258: Performed by: NURSE PRACTITIONER

## 2024-11-23 PROCEDURE — 2140000000 HC CCU INTERMEDIATE R&B

## 2024-11-23 PROCEDURE — 83735 ASSAY OF MAGNESIUM: CPT

## 2024-11-23 PROCEDURE — 6370000000 HC RX 637 (ALT 250 FOR IP): Performed by: SURGERY

## 2024-11-23 PROCEDURE — 82947 ASSAY GLUCOSE BLOOD QUANT: CPT

## 2024-11-23 PROCEDURE — 6370000000 HC RX 637 (ALT 250 FOR IP): Performed by: INTERNAL MEDICINE

## 2024-11-23 PROCEDURE — 82728 ASSAY OF FERRITIN: CPT

## 2024-11-23 PROCEDURE — 99233 SBSQ HOSP IP/OBS HIGH 50: CPT | Performed by: INTERNAL MEDICINE

## 2024-11-23 PROCEDURE — 83540 ASSAY OF IRON: CPT

## 2024-11-23 PROCEDURE — 87040 BLOOD CULTURE FOR BACTERIA: CPT

## 2024-11-23 RX ORDER — PHENOBARBITAL SODIUM 65 MG/ML
100 INJECTION, SOLUTION INTRAMUSCULAR; INTRAVENOUS EVERY 6 HOURS
Status: COMPLETED | OUTPATIENT
Start: 2024-11-24 | End: 2024-11-24

## 2024-11-23 RX ADMIN — SEVELAMER CARBONATE 800 MG: 800 TABLET, FILM COATED ORAL at 08:23

## 2024-11-23 RX ADMIN — DOCUSATE SODIUM 100 MG: 100 CAPSULE, LIQUID FILLED ORAL at 21:41

## 2024-11-23 RX ADMIN — SEVELAMER CARBONATE 800 MG: 800 TABLET, FILM COATED ORAL at 17:12

## 2024-11-23 RX ADMIN — PHENOBARBITAL SODIUM 130 MG: 65 INJECTION INTRAMUSCULAR; INTRAVENOUS at 13:31

## 2024-11-23 RX ADMIN — HEPARIN SODIUM 20 UNITS/KG/HR: 10000 INJECTION, SOLUTION INTRAVENOUS at 09:24

## 2024-11-23 RX ADMIN — PHENOBARBITAL SODIUM 130 MG: 65 INJECTION INTRAMUSCULAR; INTRAVENOUS at 00:44

## 2024-11-23 RX ADMIN — INSULIN LISPRO 8 UNITS: 100 INJECTION, SOLUTION INTRAVENOUS; SUBCUTANEOUS at 21:42

## 2024-11-23 RX ADMIN — APIXABAN 5 MG: 5 TABLET, FILM COATED ORAL at 13:31

## 2024-11-23 RX ADMIN — PHENOBARBITAL SODIUM 130 MG: 65 INJECTION INTRAMUSCULAR; INTRAVENOUS at 06:25

## 2024-11-23 RX ADMIN — POLYETHYLENE GLYCOL 3350 17 G: 17 POWDER, FOR SOLUTION ORAL at 08:23

## 2024-11-23 RX ADMIN — Medication 100 MG: at 08:23

## 2024-11-23 RX ADMIN — METOPROLOL TARTRATE 50 MG: 50 TABLET, FILM COATED ORAL at 21:41

## 2024-11-23 RX ADMIN — INSULIN GLARGINE 12 UNITS: 100 INJECTION, SOLUTION SUBCUTANEOUS at 08:24

## 2024-11-23 RX ADMIN — ATORVASTATIN CALCIUM 10 MG: 20 TABLET, FILM COATED ORAL at 21:41

## 2024-11-23 RX ADMIN — APIXABAN 5 MG: 5 TABLET, FILM COATED ORAL at 21:41

## 2024-11-23 RX ADMIN — ACETAMINOPHEN 1000 MG: 500 TABLET ORAL at 21:41

## 2024-11-23 RX ADMIN — DOCUSATE SODIUM 100 MG: 100 CAPSULE, LIQUID FILLED ORAL at 08:23

## 2024-11-23 RX ADMIN — SODIUM CHLORIDE, PRESERVATIVE FREE 10 ML: 5 INJECTION INTRAVENOUS at 21:40

## 2024-11-23 RX ADMIN — BISACODYL 10 MG: 10 SUPPOSITORY RECTAL at 08:24

## 2024-11-23 RX ADMIN — HEPARIN SODIUM 4400 UNITS: 1000 INJECTION INTRAVENOUS; SUBCUTANEOUS at 09:27

## 2024-11-23 RX ADMIN — INSULIN LISPRO 8 UNITS: 100 INJECTION, SOLUTION INTRAVENOUS; SUBCUTANEOUS at 12:12

## 2024-11-23 RX ADMIN — ACETAMINOPHEN 1000 MG: 500 TABLET ORAL at 12:12

## 2024-11-23 RX ADMIN — ACETAMINOPHEN 1000 MG: 500 TABLET ORAL at 05:37

## 2024-11-23 RX ADMIN — METOPROLOL TARTRATE 50 MG: 50 TABLET, FILM COATED ORAL at 08:23

## 2024-11-23 RX ADMIN — ASPIRIN 81 MG CHEWABLE TABLET 81 MG: 81 TABLET CHEWABLE at 08:23

## 2024-11-23 RX ADMIN — Medication 10 MG: at 21:41

## 2024-11-23 RX ADMIN — LACTULOSE 20 G: 20 SOLUTION ORAL at 08:23

## 2024-11-23 RX ADMIN — Medication 1 MG: at 08:23

## 2024-11-23 RX ADMIN — PHENOBARBITAL SODIUM 130 MG: 65 INJECTION INTRAMUSCULAR; INTRAVENOUS at 17:28

## 2024-11-23 RX ADMIN — SEVELAMER CARBONATE 800 MG: 800 TABLET, FILM COATED ORAL at 12:12

## 2024-11-23 ASSESSMENT — PAIN SCALES - GENERAL: PAINLEVEL_OUTOF10: 7

## 2024-11-23 ASSESSMENT — PAIN DESCRIPTION - DESCRIPTORS: DESCRIPTORS: ACHING;DISCOMFORT;SORE

## 2024-11-23 ASSESSMENT — PAIN - FUNCTIONAL ASSESSMENT
PAIN_FUNCTIONAL_ASSESSMENT: ACTIVITIES ARE NOT PREVENTED
PAIN_FUNCTIONAL_ASSESSMENT: ACTIVITIES ARE NOT PREVENTED

## 2024-11-23 ASSESSMENT — PAIN DESCRIPTION - LOCATION: LOCATION: GENERALIZED

## 2024-11-23 NOTE — PROGRESS NOTES
[facility]  MD Bryce Milligan MD Ali Hassan, MD Lisa Kniska, CNP   Natalia Rey, CODY Harris, DEMETRIUS  Progress Note    11/23/2024    SUBJECTIVE:   11/13:  Seen in [facility].  Awake and alert.  S/P right above knee amputation yesterday. Complains of pain at right AKA site.  Oxygen on per NC.  Denies chest pain, palpitation or SOB.  Denies nausea, vomiting or abd pain.  Continues on heparin drip and bicarb IV.  He is oligoanuric.     11/14: Seen in the [facility] while on dialysis. Tolerating therapy without issues. His blood pressure is stable. Heart rate is mildly elevated. He is in good spirits and reports that he is having less pain today. His appetite is fair, he did eat some cereal for dinner. Urine output is stable.     11/15: Seen in the ICU. No acute distress or complaints. Received metoprolol earlier for tachycardia. Tolerated HD today with 2.8 liters of fluid removal. He is still making urine, though urine output has decreased. He is on oxygen via nasal canula. Denies dyspnea. PO intake is stable.     11/16: Undergoing with withdrawal of alcohol symptoms.  Obtunded.  Awake agitated aggressive at times though symptoms have improved status post sedation    11/17: Feeling better today.  Had a \"rough night\" notable for worsening agitation and aggression.  Now on Precedex drip with control of symptoms.  Enjoying his lunch.  Denies complaint.  Family at bedside.  BP stable.    11/18: Seen in the [facility]. Mentation has improved. Sitter is present at the bedside. He is still tachycardic. Denies any acute complaints. Denies dyspnea. His blood pressure is stable. Complains of some discomfort to his RLE. Minimal urine output.     11/19: Seen today while on dialysis. Tolerating treatment well. Line will only tolerate BFR of 250. Plan is for tunneled line placement in IR tomorrow. Blood pressure is stable. Heart rate has improved. Urine output remains low. He denies any

## 2024-11-23 NOTE — PLAN OF CARE
Problem: Chronic Conditions and Co-morbidities  Goal: Patient's chronic conditions and co-morbidity symptoms are monitored and maintained or improved  11/23/2024 1142 by Billie Frances RN  Outcome: Progressing  11/23/2024 0109 by Martine Brown RN  Outcome: Progressing  11/23/2024 0006 by Estefanía Hodges RN  Outcome: Progressing     Problem: Discharge Planning  Goal: Discharge to home or other facility with appropriate resources  11/23/2024 1142 by Billie Frances RN  Outcome: Progressing  Flowsheets (Taken 11/23/2024 0110 by Martine Brown RN)  Discharge to home or other facility with appropriate resources:   Identify barriers to discharge with patient and caregiver   Arrange for needed discharge resources and transportation as appropriate   Identify discharge learning needs (meds, wound care, etc)   Arrange for interpreters to assist at discharge as needed   Refer to discharge planning if patient needs post-hospital services based on physician order or complex needs related to functional status, cognitive ability or social support system  11/23/2024 0109 by Martine Brown RN  Outcome: Progressing  11/23/2024 0006 by Estefanía Hodges RN  Outcome: Progressing     Problem: Pain  Goal: Verbalizes/displays adequate comfort level or baseline comfort level  11/23/2024 1142 by Billie Frances RN  Outcome: Progressing  11/23/2024 0109 by Martine Brown RN  Outcome: Progressing  11/23/2024 0006 by Estefanía Hodges RN  Outcome: Progressing     Problem: Safety - Adult  Goal: Free from fall injury  11/23/2024 1142 by Billie Frances RN  Outcome: Progressing  11/23/2024 0109 by Martine Brown RN  Outcome: Progressing  11/23/2024 0006 by Estefanía Hodges RN  Outcome: Progressing     Problem: Skin/Tissue Integrity  Goal: Absence of new skin breakdown  Description: 1.  Monitor for areas of redness and/or skin breakdown  2.  Assess vascular access sites hourly  3.  Every 4-6 hours minimum:  Change oxygen  midst of illness/suffering  11/23/2024 0109 by Martine Brown RN  Outcome: Progressing  11/23/2024 0006 by Estefanía Hodges RN  Outcome: Progressing  Goal: Identifies/restores coping resources/skills  11/23/2024 1142 by Billie Frances RN  Outcome: Progressing  11/23/2024 0109 by Martine Brown RN  Outcome: Progressing  11/23/2024 0006 by Estefanía Hodges RN  Outcome: Progressing  Goal: Growing sense of peace/hope  11/23/2024 1142 by Billie Frances RN  Outcome: Progressing  11/23/2024 0109 by Martine Brown RN  Outcome: Progressing  11/23/2024 0006 by Estefanía Hodges RN  Outcome: Progressing     Problem: Support with Decision-Making  Goal: Verbalization of thoughts/feelings regarding decision  11/23/2024 1142 by Billie Frances RN  Outcome: Progressing  11/23/2024 0109 by Martine Brown RN  Outcome: Progressing  11/23/2024 0006 by Estefanía Hodges RN  Outcome: Progressing  Goal: Identifies/restores coping resources/skills  11/23/2024 1142 by Billie Frances RN  Outcome: Progressing  11/23/2024 0109 by Martine Brown RN  Outcome: Progressing  11/23/2024 0006 by Estefanía Hodges RN  Outcome: Progressing     Problem: Unresolved Conflict/Relationships  Goal: Explore resources for additional follow up, post discharge  11/23/2024 1142 by Billie Frances RN  Outcome: Progressing  11/23/2024 0109 by Martine Brown RN  Outcome: Progressing  11/23/2024 0006 by Estefanía Hodges RN  Outcome: Progressing     Problem: Skin/Tissue Integrity - Adult  Goal: Skin integrity remains intact  11/23/2024 1142 by Billie Frances RN  Outcome: Progressing  11/23/2024 0109 by Martine Brown RN  Outcome: Progressing  11/23/2024 0006 by Estefanía Hodges RN  Outcome: Progressing  Goal: Incisions, wounds, or drain sites healing without S/S of infection  11/23/2024 1142 by Billie Frances, RN  Outcome: Progressing  11/23/2024 0109 by Martine Brown, RN  Outcome: Progressing  11/23/2024 0006 by Tracie

## 2024-11-23 NOTE — PLAN OF CARE
Problem: Chronic Conditions and Co-morbidities  Goal: Patient's chronic conditions and co-morbidity symptoms are monitored and maintained or improved  11/23/2024 0006 by Estefanía Hogdes RN  Outcome: Progressing  11/22/2024 1402 by Mona Ledbetter RN  Outcome: Progressing     Problem: Discharge Planning  Goal: Discharge to home or other facility with appropriate resources  11/23/2024 0006 by Estefanía Hodges RN  Outcome: Progressing  11/22/2024 1402 by Mona Ledbetter RN  Outcome: Progressing  Flowsheets (Taken 11/22/2024 1402)  Discharge to home or other facility with appropriate resources:   Identify barriers to discharge with patient and caregiver   Identify discharge learning needs (meds, wound care, etc)   Arrange for needed discharge resources and transportation as appropriate   Refer to discharge planning if patient needs post-hospital services based on physician order or complex needs related to functional status, cognitive ability or social support system     Problem: Pain  Goal: Verbalizes/displays adequate comfort level or baseline comfort level  11/23/2024 0006 by Estefanía Hodges RN  Outcome: Progressing  11/22/2024 1402 by Mona Ledbetter RN  Outcome: Progressing     Problem: Safety - Adult  Goal: Free from fall injury  11/23/2024 0006 by Estefanía Hodges RN  Outcome: Progressing  11/22/2024 1402 by Mona Ledbetter RN  Outcome: Progressing     Problem: Skin/Tissue Integrity  Goal: Absence of new skin breakdown  Description: 1.  Monitor for areas of redness and/or skin breakdown  2.  Assess vascular access sites hourly  3.  Every 4-6 hours minimum:  Change oxygen saturation probe site  4.  Every 4-6 hours:  If on nasal continuous positive airway pressure, respiratory therapy assess nares and determine need for appliance change or resting period.  11/23/2024 0006 by Estefanía Hodges RN  Outcome: Progressing  11/22/2024 1402 by Mona Ledbetter RN  Outcome: Progressing     Problem: ABCDS

## 2024-11-23 NOTE — PROGRESS NOTES
[facility] Hospitalist Progress Note    Admitting Date and Time: 11/12/2024  2:39 AM  Admit Dx: 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]  Atrial fibrillation, unspecified type (HCC) [I48.91]    Synopsis:  Presented to ER due to concern for leg pain and shortness of breath.  Mention he has been noticing bluish discoloration of both lower extremity worsened over the last 3 days.  He was found to have lactic acidosis on presentation of 3.9.  Leukocytosis of 16,500.  Blood pressure on presentation of 119/75.  CT abdomen with aorta with bilateral runoff with contrast was obtained and showed severe atherosclerotic disease with common femoral arterial occlusions with some reconstitution in the superficial femoral and popliteal location but without significant runoff in the lower extremities, bilateral large renal infarcts.  He was started on heparin by weight.  Patient was seen by vascular surgery and taken straight to the OR.  Right AKA was done.     Subjective:    Telemetry sitter in place.  No overnight events reported.  Remains on phenobarb, sleepy but easily arousable      ROS: denies fever, chills, cp, sob, n/v, HA unless stated above.      sevelamer  800 mg Oral TID WC    epoetin noe-epbx  3,000 Units SubCUTAneous Once per day on Monday Wednesday Friday    lactulose  20 g Oral BID    atorvastatin  10 mg Oral Nightly    docusate sodium  100 mg Oral BID    polyethylene glycol  17 g Oral Daily    bisacodyl  10 mg Rectal Daily    metoprolol tartrate  50 mg Oral BID    PHENobarbital  130 mg IntraVENous Q6H    thiamine  100 mg Oral Daily    folic acid  1 mg Oral Daily    melatonin  10 mg Oral Nightly    sodium chloride flush  5-40 mL IntraVENous 2 times per day    insulin glargine  12 Units SubCUTAneous QAM    aspirin  81 mg Oral Daily    acetaminophen  1,000 mg Oral 3 times per day    insulin lispro  0-16 Units SubCUTAneous Q4H     heparin (porcine), 3,500 Units, PRN  heparin (porcine), 80 Units/kg,

## 2024-11-23 NOTE — PROGRESS NOTES
Notified Abbi Lucas NP via [facility] that patients blood sugar is 390. Per order give 16U and notify physician.

## 2024-11-23 NOTE — PROGRESS NOTES
Vascular Surgery Progress Note    Pt is being seen in f/u today regarding bilateral femoral embolectomies     Subjective  On heparin drip overnight.  Patient denies pain over left lower extremity.  Still confused.    Current Medications:    heparin (PORCINE) Infusion 18 Units/kg/hr (11/23/24 0223)    sodium chloride      dextrose        heparin (porcine), heparin (porcine), heparin (porcine), sodium chloride flush, sodium chloride, magnesium sulfate, ondansetron **OR** ondansetron, glucose, dextrose bolus **OR** dextrose bolus, glucagon (rDNA), dextrose, oxyCODONE **OR** oxyCODONE, HYDROmorphone    sevelamer  800 mg Oral TID WC    epoetin noe-epbx  3,000 Units SubCUTAneous Once per day on Monday Wednesday Friday    lactulose  20 g Oral BID    atorvastatin  10 mg Oral Nightly    docusate sodium  100 mg Oral BID    polyethylene glycol  17 g Oral Daily    bisacodyl  10 mg Rectal Daily    metoprolol tartrate  50 mg Oral BID    PHENobarbital  130 mg IntraVENous Q6H    thiamine  100 mg Oral Daily    folic acid  1 mg Oral Daily    melatonin  10 mg Oral Nightly    sodium chloride flush  5-40 mL IntraVENous 2 times per day    insulin glargine  12 Units SubCUTAneous QAM    aspirin  81 mg Oral Daily    acetaminophen  1,000 mg Oral 3 times per day    insulin lispro  0-16 Units SubCUTAneous Q4H        PHYSICAL EXAM:    BP (!) 104/58   Pulse 89   Temp 97.8 °F (36.6 °C) (Temporal)   Resp 16   Ht 1.727 m (5' 7.99\")   Wt 100.7 kg (222 lb 0.1 oz)   SpO2 97%   BMI 33.76 kg/m²     Intake/Output Summary (Last 24 hours) at 11/23/2024 0720  Last data filed at 11/23/2024 0614  Gross per 24 hour   Intake 540 ml   Output 3025 ml   Net -2485 ml          Gen: awake, no apparent distress, confused  CVS: RRR, tunneled line to R chest  Resp: No increased work of breathing  Abd: Soft, non-tender, non-distended  R LE: AKA with staples clean/dry/intact.  Right groin incision with minimal amount of breakdown around the midpoint of the

## 2024-11-23 NOTE — CARE COORDINATION
[facility] Quality Flow/Interdisciplinary Rounds Progress Note        Quality Flow Rounds held on November 23, 2024    Disciplines Attending:  Bedside Nurse and Nursing Unit Leadership    Himanshu Bravo was admitted on 11/12/2024  2:39 AM    Anticipated Discharge Date:       Disposition:    Aramis Score:  Aramis Scale Score: 14    Readmission Risk              Risk of Unplanned Readmission:  17           Discussed patient goal for the day, patient clinical progression, and barriers to discharge.  The following Goal(s) of the Day/Commitment(s) have been identified:  Diagnostics - Report Results and Labs - Report Results      Kandi Silveira RN  November 23, 2024

## 2024-11-23 NOTE — PLAN OF CARE
Problem: Chronic Conditions and Co-morbidities  Goal: Patient's chronic conditions and co-morbidity symptoms are monitored and maintained or improved  11/23/2024 0109 by Martine Brown RN  Outcome: Progressing  11/23/2024 0006 by Estefanía Hodges RN  Outcome: Progressing  11/22/2024 1402 by Mona Ledbetter RN  Outcome: Progressing     Problem: Discharge Planning  Goal: Discharge to home or other facility with appropriate resources  11/23/2024 0109 by Martine Brown RN  Outcome: Progressing  11/23/2024 0006 by Estefanía Hodges RN  Outcome: Progressing  11/22/2024 1402 by Mona Ledbetter RN  Outcome: Progressing  Flowsheets (Taken 11/22/2024 1402)  Discharge to home or other facility with appropriate resources:   Identify barriers to discharge with patient and caregiver   Identify discharge learning needs (meds, wound care, etc)   Arrange for needed discharge resources and transportation as appropriate   Refer to discharge planning if patient needs post-hospital services based on physician order or complex needs related to functional status, cognitive ability or social support system     Problem: Pain  Goal: Verbalizes/displays adequate comfort level or baseline comfort level  11/23/2024 0109 by Martine Brown RN  Outcome: Progressing  11/23/2024 0006 by Estefanía Hodges RN  Outcome: Progressing  11/22/2024 1402 by Mona Ledbetter RN  Outcome: Progressing     Problem: Safety - Adult  Goal: Free from fall injury  11/23/2024 0109 by Martine Brown RN  Outcome: Progressing  11/23/2024 0006 by Estefanía Hodges RN  Outcome: Progressing  11/22/2024 1402 by Mona Ledbetter RN  Outcome: Progressing     Problem: Skin/Tissue Integrity  Goal: Absence of new skin breakdown  Description: 1.  Monitor for areas of redness and/or skin breakdown  2.  Assess vascular access sites hourly  3.  Every 4-6 hours minimum:  Change oxygen saturation probe site  4.  Every 4-6 hours:  If on nasal continuous positive airway

## 2024-11-24 LAB
ALBUMIN SERPL-MCNC: 3 G/DL (ref 3.5–5.2)
ALP SERPL-CCNC: 153 U/L (ref 40–129)
ALT SERPL-CCNC: <5 U/L (ref 0–40)
ANION GAP SERPL CALCULATED.3IONS-SCNC: 19 MMOL/L (ref 7–16)
AST SERPL-CCNC: 41 U/L (ref 0–39)
BASOPHILS # BLD: 0.07 K/UL (ref 0–0.2)
BASOPHILS NFR BLD: 0 % (ref 0–2)
BILIRUB SERPL-MCNC: 0.2 MG/DL (ref 0–1.2)
BUN SERPL-MCNC: 64 MG/DL (ref 6–23)
CA-I BLD-SCNC: 1.19 MMOL/L (ref 1.15–1.33)
CALCIUM SERPL-MCNC: 8.8 MG/DL (ref 8.6–10.2)
CHLORIDE SERPL-SCNC: 93 MMOL/L (ref 98–107)
CO2 SERPL-SCNC: 24 MMOL/L (ref 22–29)
CREAT SERPL-MCNC: 7.9 MG/DL (ref 0.7–1.2)
EOSINOPHIL # BLD: 0.22 K/UL (ref 0.05–0.5)
EOSINOPHILS RELATIVE PERCENT: 1 % (ref 0–6)
ERYTHROCYTE [DISTWIDTH] IN BLOOD BY AUTOMATED COUNT: 14.3 % (ref 11.5–15)
GFR, ESTIMATED: 7 ML/MIN/1.73M2
GLUCOSE BLD-MCNC: 105 MG/DL (ref 74–99)
GLUCOSE BLD-MCNC: 166 MG/DL (ref 74–99)
GLUCOSE BLD-MCNC: 185 MG/DL (ref 74–99)
GLUCOSE BLD-MCNC: 193 MG/DL (ref 74–99)
GLUCOSE BLD-MCNC: 279 MG/DL (ref 74–99)
GLUCOSE SERPL-MCNC: 121 MG/DL (ref 74–99)
HCT VFR BLD AUTO: 27.3 % (ref 37–54)
HGB BLD-MCNC: 8.5 G/DL (ref 12.5–16.5)
IMM GRANULOCYTES # BLD AUTO: 0.2 K/UL (ref 0–0.58)
IMM GRANULOCYTES NFR BLD: 1 % (ref 0–5)
LYMPHOCYTES NFR BLD: 1.56 K/UL (ref 1.5–4)
LYMPHOCYTES RELATIVE PERCENT: 6 % (ref 20–42)
MAGNESIUM SERPL-MCNC: 2.2 MG/DL (ref 1.6–2.6)
MCH RBC QN AUTO: 30.7 PG (ref 26–35)
MCHC RBC AUTO-ENTMCNC: 31.1 G/DL (ref 32–34.5)
MCV RBC AUTO: 98.6 FL (ref 80–99.9)
MONOCYTES NFR BLD: 1.33 K/UL (ref 0.1–0.95)
MONOCYTES NFR BLD: 5 % (ref 2–12)
NEUTROPHILS NFR BLD: 87 % (ref 43–80)
NEUTS SEG NFR BLD: 21.77 K/UL (ref 1.8–7.3)
PHOSPHATE SERPL-MCNC: 4.5 MG/DL (ref 2.5–4.5)
PLATELET # BLD AUTO: 524 K/UL (ref 130–450)
PMV BLD AUTO: 10.2 FL (ref 7–12)
POTASSIUM SERPL-SCNC: 4.4 MMOL/L (ref 3.5–5)
PROT SERPL-MCNC: 6.6 G/DL (ref 6.4–8.3)
RBC # BLD AUTO: 2.77 M/UL (ref 3.8–5.8)
RBC # BLD: ABNORMAL 10*6/UL
RBC # BLD: ABNORMAL 10*6/UL
SODIUM SERPL-SCNC: 136 MMOL/L (ref 132–146)
WBC OTHER # BLD: 25.2 K/UL (ref 4.5–11.5)

## 2024-11-24 PROCEDURE — 85025 COMPLETE CBC W/AUTO DIFF WBC: CPT

## 2024-11-24 PROCEDURE — 6370000000 HC RX 637 (ALT 250 FOR IP): Performed by: INTERNAL MEDICINE

## 2024-11-24 PROCEDURE — 82947 ASSAY GLUCOSE BLOOD QUANT: CPT

## 2024-11-24 PROCEDURE — 6370000000 HC RX 637 (ALT 250 FOR IP): Performed by: NURSE PRACTITIONER

## 2024-11-24 PROCEDURE — 99232 SBSQ HOSP IP/OBS MODERATE 35: CPT | Performed by: INTERNAL MEDICINE

## 2024-11-24 PROCEDURE — 6370000000 HC RX 637 (ALT 250 FOR IP): Performed by: SURGERY

## 2024-11-24 PROCEDURE — 90935 HEMODIALYSIS ONE EVALUATION: CPT

## 2024-11-24 PROCEDURE — 80053 COMPREHEN METABOLIC PANEL: CPT

## 2024-11-24 PROCEDURE — 82330 ASSAY OF CALCIUM: CPT

## 2024-11-24 PROCEDURE — 36415 COLL VENOUS BLD VENIPUNCTURE: CPT

## 2024-11-24 PROCEDURE — 6360000002 HC RX W HCPCS

## 2024-11-24 PROCEDURE — 84100 ASSAY OF PHOSPHORUS: CPT

## 2024-11-24 PROCEDURE — 2580000003 HC RX 258: Performed by: NURSE PRACTITIONER

## 2024-11-24 PROCEDURE — 83735 ASSAY OF MAGNESIUM: CPT

## 2024-11-24 PROCEDURE — 2140000000 HC CCU INTERMEDIATE R&B

## 2024-11-24 RX ORDER — PHENOBARBITAL 32.4 MG/1
32.4 TABLET ORAL 4 TIMES DAILY
Status: DISCONTINUED | OUTPATIENT
Start: 2024-11-24 | End: 2024-11-25

## 2024-11-24 RX ORDER — PHENOBARBITAL 32.4 MG/1
32.4 TABLET ORAL EVERY 6 HOURS PRN
Status: DISCONTINUED | OUTPATIENT
Start: 2024-11-24 | End: 2024-11-25

## 2024-11-24 RX ORDER — PHENOBARBITAL 32.4 MG/1
16.2 TABLET ORAL EVERY 6 HOURS PRN
Status: DISCONTINUED | OUTPATIENT
Start: 2024-11-26 | End: 2024-11-25

## 2024-11-24 RX ORDER — PHENOBARBITAL 32.4 MG/1
32.4 TABLET ORAL 2 TIMES DAILY
Status: DISCONTINUED | OUTPATIENT
Start: 2024-11-25 | End: 2024-11-25

## 2024-11-24 RX ORDER — PHENOBARBITAL 32.4 MG/1
16.2 TABLET ORAL 2 TIMES DAILY
Status: DISCONTINUED | OUTPATIENT
Start: 2024-11-26 | End: 2024-11-25

## 2024-11-24 RX ADMIN — PHENOBARBITAL 32.4 MG: 32.4 TABLET ORAL at 20:36

## 2024-11-24 RX ADMIN — Medication 1 MG: at 11:58

## 2024-11-24 RX ADMIN — METOPROLOL TARTRATE 50 MG: 50 TABLET, FILM COATED ORAL at 11:58

## 2024-11-24 RX ADMIN — SEVELAMER CARBONATE 800 MG: 800 TABLET, FILM COATED ORAL at 16:28

## 2024-11-24 RX ADMIN — ASPIRIN 81 MG CHEWABLE TABLET 81 MG: 81 TABLET CHEWABLE at 11:58

## 2024-11-24 RX ADMIN — INSULIN LISPRO 8 UNITS: 100 INJECTION, SOLUTION INTRAVENOUS; SUBCUTANEOUS at 16:28

## 2024-11-24 RX ADMIN — INSULIN LISPRO 4 UNITS: 100 INJECTION, SOLUTION INTRAVENOUS; SUBCUTANEOUS at 00:10

## 2024-11-24 RX ADMIN — Medication 10 MG: at 20:36

## 2024-11-24 RX ADMIN — APIXABAN 5 MG: 5 TABLET, FILM COATED ORAL at 20:36

## 2024-11-24 RX ADMIN — ONDANSETRON 4 MG: 4 TABLET, ORALLY DISINTEGRATING ORAL at 23:52

## 2024-11-24 RX ADMIN — ONDANSETRON 4 MG: 4 TABLET, ORALLY DISINTEGRATING ORAL at 11:57

## 2024-11-24 RX ADMIN — PHENOBARBITAL SODIUM 100 MG: 65 INJECTION INTRAMUSCULAR; INTRAVENOUS at 00:07

## 2024-11-24 RX ADMIN — OXYCODONE HYDROCHLORIDE 10 MG: 10 TABLET ORAL at 20:35

## 2024-11-24 RX ADMIN — PHENOBARBITAL 32.4 MG: 32.4 TABLET ORAL at 17:17

## 2024-11-24 RX ADMIN — INSULIN LISPRO 4 UNITS: 100 INJECTION, SOLUTION INTRAVENOUS; SUBCUTANEOUS at 20:36

## 2024-11-24 RX ADMIN — Medication 100 MG: at 11:57

## 2024-11-24 RX ADMIN — DOCUSATE SODIUM 100 MG: 100 CAPSULE, LIQUID FILLED ORAL at 20:36

## 2024-11-24 RX ADMIN — SODIUM CHLORIDE, PRESERVATIVE FREE 10 ML: 5 INJECTION INTRAVENOUS at 20:37

## 2024-11-24 RX ADMIN — SEVELAMER CARBONATE 800 MG: 800 TABLET, FILM COATED ORAL at 11:57

## 2024-11-24 RX ADMIN — INSULIN GLARGINE 12 UNITS: 100 INJECTION, SOLUTION SUBCUTANEOUS at 11:58

## 2024-11-24 RX ADMIN — ACETAMINOPHEN 1000 MG: 500 TABLET ORAL at 22:35

## 2024-11-24 RX ADMIN — ACETAMINOPHEN 1000 MG: 500 TABLET ORAL at 12:19

## 2024-11-24 RX ADMIN — ATORVASTATIN CALCIUM 10 MG: 20 TABLET, FILM COATED ORAL at 20:36

## 2024-11-24 RX ADMIN — ACETAMINOPHEN 1000 MG: 500 TABLET ORAL at 06:00

## 2024-11-24 RX ADMIN — METOPROLOL TARTRATE 50 MG: 50 TABLET, FILM COATED ORAL at 20:35

## 2024-11-24 RX ADMIN — APIXABAN 5 MG: 5 TABLET, FILM COATED ORAL at 11:58

## 2024-11-24 ASSESSMENT — PAIN DESCRIPTION - DESCRIPTORS
DESCRIPTORS: ACHING;DISCOMFORT;SORE;TENDER
DESCRIPTORS: ACHING;DISCOMFORT;SORE;TENDER

## 2024-11-24 ASSESSMENT — PAIN SCALES - GENERAL
PAINLEVEL_OUTOF10: 7
PAINLEVEL_OUTOF10: 4
PAINLEVEL_OUTOF10: 4

## 2024-11-24 ASSESSMENT — PAIN DESCRIPTION - PAIN TYPE: TYPE: SURGICAL PAIN

## 2024-11-24 ASSESSMENT — PAIN DESCRIPTION - LOCATION
LOCATION: LEG
LOCATION: LEG

## 2024-11-24 ASSESSMENT — PAIN DESCRIPTION - ORIENTATION
ORIENTATION: RIGHT;LEFT
ORIENTATION: RIGHT;LEFT

## 2024-11-24 ASSESSMENT — PAIN DESCRIPTION - FREQUENCY: FREQUENCY: INTERMITTENT

## 2024-11-24 ASSESSMENT — PAIN DESCRIPTION - ONSET: ONSET: GRADUAL

## 2024-11-24 NOTE — FLOWSHEET NOTE
11/24/24 1130   Vital Signs   BP (!) 140/69   Temp 97.1 °F (36.2 °C)   Pulse (!) 104   Respirations 18   Weight - Scale 101.3 kg (223 lb 5.2 oz)   Weight Method Bed scale   Percent Weight Change 0.6   Post-Hemodialysis Assessment   Post-Treatment Procedures Blood returned;Catheter capped, clamped and heparinized x 2 ports   Machine Disinfection Process Exterior Machine Disinfection   Rinseback Volume (ml) 300 ml   Blood Volume Processed (Liters) 87.8 L   Dialyzer Clearance Lightly streaked   Duration of Treatment (minutes) 240 minutes   Heparin Amount Administered During Treatment (mL) 0 mL   Hemodialysis Intake (ml) 300 ml   Hemodialysis Output (ml) 2300 ml   NET Removed (ml) 2000   Tolerated Treatment Good   Patient Response to Treatment tolerated well   Bilateral Breath Sounds Diminished   LLE Edema +1   Physician Notified No   Time Off 1126   Patient Disposition Return to room   Observations & Evaluations   Level of Consciousness 1

## 2024-11-24 NOTE — PROGRESS NOTES
Notified Darrell Callahan regarding patients scheduled 130mg IV Q6 hour phenobarbital. Patient stated this medicine makes him lethargic and hallucinate. Questioned lowering this dose.    Per Darrell Callahan, lower phenobarbital dose to 100mg IV Q6 hours.

## 2024-11-24 NOTE — PROGRESS NOTES
Vascular Surgery Progress Note    Pt is being seen in f/u today regarding bilateral femoral embolectomies     Subjective  No issues overnight.  No complaints.  Denies any pain in lower extremities.    Current Medications:    sodium chloride      dextrose        sodium chloride flush, sodium chloride, magnesium sulfate, ondansetron **OR** ondansetron, glucose, dextrose bolus **OR** dextrose bolus, glucagon (rDNA), dextrose, oxyCODONE **OR** oxyCODONE, HYDROmorphone    apixaban  5 mg Oral BID    sevelamer  800 mg Oral TID WC    epoetin noe-epbx  3,000 Units SubCUTAneous Once per day on Monday Wednesday Friday    lactulose  20 g Oral BID    atorvastatin  10 mg Oral Nightly    docusate sodium  100 mg Oral BID    polyethylene glycol  17 g Oral Daily    bisacodyl  10 mg Rectal Daily    metoprolol tartrate  50 mg Oral BID    thiamine  100 mg Oral Daily    folic acid  1 mg Oral Daily    melatonin  10 mg Oral Nightly    sodium chloride flush  5-40 mL IntraVENous 2 times per day    insulin glargine  12 Units SubCUTAneous QAM    aspirin  81 mg Oral Daily    acetaminophen  1,000 mg Oral 3 times per day    insulin lispro  0-16 Units SubCUTAneous Q4H        PHYSICAL EXAM:    BP (!) 118/56   Pulse 84   Temp 97.3 °F (36.3 °C) (Temporal)   Resp 22   Ht 1.727 m (5' 7.99\")   Wt 100.7 kg (222 lb 0.1 oz)   SpO2 96%   BMI 33.76 kg/m²     Intake/Output Summary (Last 24 hours) at 11/24/2024 0716  Last data filed at 11/23/2024 2018  Gross per 24 hour   Intake 480 ml   Output 5 ml   Net 475 ml          Gen: awake, no apparent distress, confused  CVS: RRR, tunneled line to R chest  Resp: No increased work of breathing  Abd: Soft, non-tender, non-distended  R LE: AKA with staples clean/dry/intact.    L LE: Left lower extremity wrapped with Ace wrap.  LABS:    Lab Results   Component Value Date    WBC 17.7 (H) 11/23/2024    HGB 8.5 (L) 11/23/2024    HCT 27.2 (L) 11/23/2024     11/23/2024    PROTIME 11.8 11/20/2024    INR 1.1

## 2024-11-24 NOTE — CARE COORDINATION
[facility] Quality Flow/Interdisciplinary Rounds Progress Note        Quality Flow Rounds held on November 24, 2024    Disciplines Attending:  Bedside Nurse and Nursing Unit Leadership    Himanshu Bravo was admitted on 11/12/2024  2:39 AM    Anticipated Discharge Date:       Disposition:    Aramis Score:  Aramis Scale Score: 15    Readmission Risk              Risk of Unplanned Readmission:  17           Discussed patient goal for the day, patient clinical progression, and barriers to discharge.  The following Goal(s) of the Day/Commitment(s) have been identified:  Labs - Report Results      Kandi Silveira RN  November 24, 2024

## 2024-11-24 NOTE — PLAN OF CARE
Problem: Chronic Conditions and Co-morbidities  Goal: Patient's chronic conditions and co-morbidity symptoms are monitored and maintained or improved  11/23/2024 2352 by Estefanía Hodges RN  Outcome: Progressing  11/23/2024 1142 by Billie Frances RN  Outcome: Progressing     Problem: Discharge Planning  Goal: Discharge to home or other facility with appropriate resources  11/23/2024 2352 by Estefanía Hodges RN  Outcome: Progressing  11/23/2024 1142 by Billie Frances RN  Outcome: Progressing  Flowsheets (Taken 11/23/2024 0110 by Martine Brown RN)  Discharge to home or other facility with appropriate resources:   Identify barriers to discharge with patient and caregiver   Arrange for needed discharge resources and transportation as appropriate   Identify discharge learning needs (meds, wound care, etc)   Arrange for interpreters to assist at discharge as needed   Refer to discharge planning if patient needs post-hospital services based on physician order or complex needs related to functional status, cognitive ability or social support system     Problem: Pain  Goal: Verbalizes/displays adequate comfort level or baseline comfort level  11/23/2024 2352 by Estefanía Hodges RN  Outcome: Progressing  11/23/2024 1142 by Billie Frances RN  Outcome: Progressing     Problem: Safety - Adult  Goal: Free from fall injury  11/23/2024 2352 by Estefanía Hodges RN  Outcome: Progressing  11/23/2024 1142 by Billie Frances RN  Outcome: Progressing     Problem: Skin/Tissue Integrity  Goal: Absence of new skin breakdown  Description: 1.  Monitor for areas of redness and/or skin breakdown  2.  Assess vascular access sites hourly  3.  Every 4-6 hours minimum:  Change oxygen saturation probe site  4.  Every 4-6 hours:  If on nasal continuous positive airway pressure, respiratory therapy assess nares and determine need for appliance change or resting period.  11/23/2024 2352 by Estefanía Hodges RN  Outcome:  Progressing  11/23/2024 1142 by Billie Frances RN  Outcome: Progressing     Problem: ABCDS Injury Assessment  Goal: Absence of physical injury  11/23/2024 2352 by Estefanía Hodges RN  Outcome: Progressing  11/23/2024 1142 by Billie Frances RN  Outcome: Progressing     Problem: Spiritual Struggle  Goal: Verbalizes spiritual struggle  11/23/2024 2352 by Estefanía Hodges RN  Outcome: Progressing  11/23/2024 1142 by Billie Frances RN  Outcome: Progressing     Problem: Emotional Distress  Goal: Verbalization of thoughts and feelings  11/23/2024 2352 by Estefanía Hodges RN  Outcome: Progressing  11/23/2024 1142 by Billie Frances RN  Outcome: Progressing  Goal: Reduce evidence of anxiety/worry/anger  11/23/2024 2352 by Estefanía Hodges RN  Outcome: Progressing  11/23/2024 1142 by Billie Frances RN  Outcome: Progressing  Goal: Identifies/restores coping resources/skills  11/23/2024 2352 by Estefanía Hodges RN  Outcome: Progressing  11/23/2024 1142 by Billie Frances RN  Outcome: Progressing     Problem: Life Adjustment  Goal: Verbalization of feelings regarding change/loss  11/23/2024 2352 by Estefanía Hodges RN  Outcome: Progressing  11/23/2024 1142 by Billie Frances RN  Outcome: Progressing  Goal: Finding meaning/purpose in the midst of illness/suffering  Outcome: Progressing  Goal: Identifies/restores coping resources/skills  11/23/2024 1142 by Billie Frances RN  Outcome: Progressing  Goal: Growing sense of peace/hope  11/23/2024 2352 by Estefanía Hodges RN  Outcome: Progressing  11/23/2024 1142 by Billie rFances RN  Outcome: Progressing     Problem: Unresolved Conflict/Relationships  Goal: Explore resources for additional follow up, post discharge  11/23/2024 2352 by Estefanía Hodges RN  Outcome: Progressing  11/23/2024 1142 by Billie Frances RN  Outcome: Progressing     Problem: Skin/Tissue Integrity - Adult  Goal: Skin integrity remains

## 2024-11-24 NOTE — PROGRESS NOTES
Notified Darrell Callahan via [employer] that patient had 6 beats of vtach and pt asymptomatic.

## 2024-11-24 NOTE — PROGRESS NOTES
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  Mention he has been noticing bluish discoloration of both lower extremity worsened over the last 3 days.  He was found to have lactic acidosis on presentation of 3.9.  Leukocytosis of 16,500.  Blood pressure on presentation of 119/75.  CT abdomen with aorta with bilateral runoff with contrast was obtained and showed severe atherosclerotic disease with common femoral arterial occlusions with some reconstitution in the superficial femoral and popliteal location but without significant runoff in the lower extremities, bilateral large renal infarcts.  He was started on heparin by weight.  Patient was seen by vascular surgery and taken straight to the OR.  Right AKA was done.     Subjective:      No overnight events reported.  Remains on phenobarb.  Seen in hemodialysis      ROS: denies fever, chills, cp, sob, n/v, HA unless stated above.      apixaban  5 mg Oral BID    sevelamer  800 mg Oral TID WC    epoetin noe-epbx  3,000 Units SubCUTAneous Once per day on Monday Wednesday Friday    lactulose  20 g Oral BID    atorvastatin  10 mg Oral Nightly    docusate sodium  100 mg Oral BID    polyethylene glycol  17 g Oral Daily    bisacodyl  10 mg Rectal Daily    metoprolol tartrate  50 mg Oral BID    thiamine  100 mg Oral Daily    folic acid  1 mg Oral Daily    melatonin  10 mg Oral Nightly    sodium chloride flush  5-40 mL IntraVENous 2 times per day    insulin glargine  12 Units SubCUTAneous QAM    aspirin  81 mg Oral Daily    acetaminophen  1,000 mg Oral 3 times per day    insulin lispro  0-16 Units SubCUTAneous Q4H     sodium chloride flush, 5-40 mL, PRN  sodium chloride, , PRN  magnesium sulfate, 2,000 mg, PRN  ondansetron, 4 mg, Q8H PRN    plan:    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  Acute limb ischemia  Bilateral renal infarct  Lactic acidosis  ANTIONE with severe rhabdomyolysis  Diabetes mellitus  Hyperkalemia, resolved  Newly diagnosed Atrial Flutter/Fib  Alcoholism   10.  Leukocytosis-with no fever  -was cleared to move out of SICU 11/20  -Status post bilateral femoral embolectomy with fasciotomies on 11/12 followed by right AKA  -S/p OR 11/22-left lower extremity s/p debridement, partial fasciotomy closure and graft to lateral fasciotomy site.  -Pain medication as needed  -Trend CPK level, downtrending, .  -Nephrology consulted, was on bicarb drip, now on daily HD as per nephrology, tolerating HD well.   TDC placed with IR 11/20  -concern of alcohol withdrawal, CIWA protocol has been placed, now on phenobarb.  EKG normal sinus rhythm.  No ST-T wave changes, echocardiogram from 11/12 reviewed, continue monitoring on CIWA protocol, Lopressor ordered.  Cardiology consulted, heart rate better controlled on Lopressor, was on heparin drip, this has been transition to Eliquis now.  Precedex is now stopped with improved mental status, sitter in place, mental status is improved today-continue to monitor, on high dose phenobarb as started in SICU-plan for 7 days.   Discussed with RN.  Leukocytosis with no fever, white count is daily worsening,blood culture NTD and chest x-ray no acute findings, white count is further elevated at 25.2 today no diarrhea reported so far.    NOTE: This report was transcribed using voice recognition software. Every effort was made to ensure accuracy; however, inadvertent computerized transcription errors may be present.  Electronically signed by Rani Dorsey MD on 11/24/2024 at 9:55 AM

## 2024-11-25 LAB
ALBUMIN SERPL-MCNC: 3 G/DL (ref 3.5–5.2)
ALP SERPL-CCNC: 145 U/L (ref 40–129)
ALT SERPL-CCNC: <5 U/L (ref 0–40)
ANION GAP SERPL CALCULATED.3IONS-SCNC: 14 MMOL/L (ref 7–16)
AST SERPL-CCNC: 36 U/L (ref 0–39)
BASOPHILS # BLD: 0.04 K/UL (ref 0–0.2)
BASOPHILS NFR BLD: 0 % (ref 0–2)
BILIRUB SERPL-MCNC: 0.2 MG/DL (ref 0–1.2)
BUN SERPL-MCNC: 44 MG/DL (ref 6–23)
CA-I BLD-SCNC: 1.13 MMOL/L (ref 1.15–1.33)
CALCIUM SERPL-MCNC: 8.3 MG/DL (ref 8.6–10.2)
CHLORIDE SERPL-SCNC: 92 MMOL/L (ref 98–107)
CO2 SERPL-SCNC: 26 MMOL/L (ref 22–29)
CREAT SERPL-MCNC: 5.8 MG/DL (ref 0.7–1.2)
EOSINOPHIL # BLD: 0.06 K/UL (ref 0.05–0.5)
EOSINOPHILS RELATIVE PERCENT: 0 % (ref 0–6)
ERYTHROCYTE [DISTWIDTH] IN BLOOD BY AUTOMATED COUNT: 14.6 % (ref 11.5–15)
GFR, ESTIMATED: 10 ML/MIN/1.73M2
GLUCOSE BLD-MCNC: 149 MG/DL (ref 74–99)
GLUCOSE BLD-MCNC: 202 MG/DL (ref 74–99)
GLUCOSE BLD-MCNC: 212 MG/DL (ref 74–99)
GLUCOSE BLD-MCNC: 216 MG/DL (ref 74–99)
GLUCOSE BLD-MCNC: 301 MG/DL (ref 74–99)
GLUCOSE SERPL-MCNC: 205 MG/DL (ref 74–99)
HCT VFR BLD AUTO: 26.5 % (ref 37–54)
HGB BLD-MCNC: 8.4 G/DL (ref 12.5–16.5)
IMM GRANULOCYTES # BLD AUTO: 0.12 K/UL (ref 0–0.58)
IMM GRANULOCYTES NFR BLD: 1 % (ref 0–5)
LYMPHOCYTES NFR BLD: 0.74 K/UL (ref 1.5–4)
LYMPHOCYTES RELATIVE PERCENT: 4 % (ref 20–42)
MAGNESIUM SERPL-MCNC: 2 MG/DL (ref 1.6–2.6)
MCH RBC QN AUTO: 31.1 PG (ref 26–35)
MCHC RBC AUTO-ENTMCNC: 31.7 G/DL (ref 32–34.5)
MCV RBC AUTO: 98.1 FL (ref 80–99.9)
MONOCYTES NFR BLD: 0.8 K/UL (ref 0.1–0.95)
MONOCYTES NFR BLD: 5 % (ref 2–12)
NEUTROPHILS NFR BLD: 90 % (ref 43–80)
NEUTS SEG NFR BLD: 15.77 K/UL (ref 1.8–7.3)
PHOSPHATE SERPL-MCNC: 3.5 MG/DL (ref 2.5–4.5)
PLATELET # BLD AUTO: 474 K/UL (ref 130–450)
PMV BLD AUTO: 10 FL (ref 7–12)
POTASSIUM SERPL-SCNC: 4.7 MMOL/L (ref 3.5–5)
PROT SERPL-MCNC: 6.2 G/DL (ref 6.4–8.3)
RBC # BLD AUTO: 2.7 M/UL (ref 3.8–5.8)
SODIUM SERPL-SCNC: 132 MMOL/L (ref 132–146)
WBC OTHER # BLD: 17.5 K/UL (ref 4.5–11.5)

## 2024-11-25 PROCEDURE — 6370000000 HC RX 637 (ALT 250 FOR IP): Performed by: NURSE PRACTITIONER

## 2024-11-25 PROCEDURE — 2580000003 HC RX 258: Performed by: NURSE PRACTITIONER

## 2024-11-25 PROCEDURE — 6360000002 HC RX W HCPCS: Performed by: NURSE PRACTITIONER

## 2024-11-25 PROCEDURE — 2140000000 HC CCU INTERMEDIATE R&B

## 2024-11-25 PROCEDURE — 84100 ASSAY OF PHOSPHORUS: CPT

## 2024-11-25 PROCEDURE — 6360000002 HC RX W HCPCS: Performed by: INTERNAL MEDICINE

## 2024-11-25 PROCEDURE — 83735 ASSAY OF MAGNESIUM: CPT

## 2024-11-25 PROCEDURE — 36415 COLL VENOUS BLD VENIPUNCTURE: CPT

## 2024-11-25 PROCEDURE — 82947 ASSAY GLUCOSE BLOOD QUANT: CPT

## 2024-11-25 PROCEDURE — 6370000000 HC RX 637 (ALT 250 FOR IP): Performed by: INTERNAL MEDICINE

## 2024-11-25 PROCEDURE — 97530 THERAPEUTIC ACTIVITIES: CPT

## 2024-11-25 PROCEDURE — 97535 SELF CARE MNGMENT TRAINING: CPT

## 2024-11-25 PROCEDURE — 97161 PT EVAL LOW COMPLEX 20 MIN: CPT

## 2024-11-25 PROCEDURE — 99232 SBSQ HOSP IP/OBS MODERATE 35: CPT | Performed by: INTERNAL MEDICINE

## 2024-11-25 PROCEDURE — 82330 ASSAY OF CALCIUM: CPT

## 2024-11-25 PROCEDURE — 92610 EVALUATE SWALLOWING FUNCTION: CPT

## 2024-11-25 PROCEDURE — 6370000000 HC RX 637 (ALT 250 FOR IP): Performed by: SURGERY

## 2024-11-25 PROCEDURE — 80053 COMPREHEN METABOLIC PANEL: CPT

## 2024-11-25 PROCEDURE — 85025 COMPLETE CBC W/AUTO DIFF WBC: CPT

## 2024-11-25 PROCEDURE — 97165 OT EVAL LOW COMPLEX 30 MIN: CPT

## 2024-11-25 RX ORDER — LORAZEPAM 2 MG/ML
1 INJECTION INTRAMUSCULAR
Status: DISCONTINUED | OUTPATIENT
Start: 2024-11-25 | End: 2024-11-26

## 2024-11-25 RX ORDER — LORAZEPAM 2 MG/ML
2 INJECTION INTRAMUSCULAR
Status: DISCONTINUED | OUTPATIENT
Start: 2024-11-25 | End: 2024-11-26

## 2024-11-25 RX ORDER — LORAZEPAM 1 MG/1
4 TABLET ORAL
Status: DISCONTINUED | OUTPATIENT
Start: 2024-11-25 | End: 2024-11-26

## 2024-11-25 RX ORDER — LORAZEPAM 1 MG/1
2 TABLET ORAL
Status: DISCONTINUED | OUTPATIENT
Start: 2024-11-25 | End: 2024-11-26

## 2024-11-25 RX ORDER — LORAZEPAM 1 MG/1
3 TABLET ORAL
Status: DISCONTINUED | OUTPATIENT
Start: 2024-11-25 | End: 2024-11-26

## 2024-11-25 RX ORDER — LORAZEPAM 2 MG/ML
4 INJECTION INTRAMUSCULAR
Status: DISCONTINUED | OUTPATIENT
Start: 2024-11-25 | End: 2024-11-26

## 2024-11-25 RX ORDER — LORAZEPAM 2 MG/ML
3 INJECTION INTRAMUSCULAR
Status: DISCONTINUED | OUTPATIENT
Start: 2024-11-25 | End: 2024-11-26

## 2024-11-25 RX ORDER — LORAZEPAM 1 MG/1
1 TABLET ORAL
Status: DISCONTINUED | OUTPATIENT
Start: 2024-11-25 | End: 2024-11-26

## 2024-11-25 RX ADMIN — ACETAMINOPHEN 1000 MG: 500 TABLET ORAL at 23:27

## 2024-11-25 RX ADMIN — METOPROLOL TARTRATE 50 MG: 50 TABLET, FILM COATED ORAL at 20:45

## 2024-11-25 RX ADMIN — Medication 10 MG: at 23:27

## 2024-11-25 RX ADMIN — DOCUSATE SODIUM 100 MG: 100 CAPSULE, LIQUID FILLED ORAL at 08:10

## 2024-11-25 RX ADMIN — SEVELAMER CARBONATE 800 MG: 800 TABLET, FILM COATED ORAL at 08:08

## 2024-11-25 RX ADMIN — ACETAMINOPHEN 1000 MG: 500 TABLET ORAL at 13:47

## 2024-11-25 RX ADMIN — SODIUM CHLORIDE, PRESERVATIVE FREE 10 ML: 5 INJECTION INTRAVENOUS at 20:45

## 2024-11-25 RX ADMIN — LACTULOSE 20 G: 20 SOLUTION ORAL at 20:45

## 2024-11-25 RX ADMIN — INSULIN LISPRO 12 UNITS: 100 INJECTION, SOLUTION INTRAVENOUS; SUBCUTANEOUS at 16:16

## 2024-11-25 RX ADMIN — PHENOBARBITAL 32.4 MG: 32.4 TABLET ORAL at 08:09

## 2024-11-25 RX ADMIN — METOPROLOL TARTRATE 50 MG: 50 TABLET, FILM COATED ORAL at 08:09

## 2024-11-25 RX ADMIN — POLYETHYLENE GLYCOL 3350 17 G: 17 POWDER, FOR SOLUTION ORAL at 08:09

## 2024-11-25 RX ADMIN — ATORVASTATIN CALCIUM 10 MG: 20 TABLET, FILM COATED ORAL at 20:45

## 2024-11-25 RX ADMIN — ONDANSETRON 4 MG: 2 INJECTION INTRAMUSCULAR; INTRAVENOUS at 19:57

## 2024-11-25 RX ADMIN — SEVELAMER CARBONATE 800 MG: 800 TABLET, FILM COATED ORAL at 11:53

## 2024-11-25 RX ADMIN — INSULIN LISPRO 4 UNITS: 100 INJECTION, SOLUTION INTRAVENOUS; SUBCUTANEOUS at 09:50

## 2024-11-25 RX ADMIN — INSULIN LISPRO 4 UNITS: 100 INJECTION, SOLUTION INTRAVENOUS; SUBCUTANEOUS at 11:53

## 2024-11-25 RX ADMIN — INSULIN GLARGINE 12 UNITS: 100 INJECTION, SOLUTION SUBCUTANEOUS at 08:10

## 2024-11-25 RX ADMIN — ASPIRIN 81 MG CHEWABLE TABLET 81 MG: 81 TABLET CHEWABLE at 08:09

## 2024-11-25 RX ADMIN — DOCUSATE SODIUM 100 MG: 100 CAPSULE, LIQUID FILLED ORAL at 20:45

## 2024-11-25 RX ADMIN — LORAZEPAM 1 MG: 2 INJECTION INTRAMUSCULAR; INTRAVENOUS at 20:43

## 2024-11-25 RX ADMIN — INSULIN LISPRO 4 UNITS: 100 INJECTION, SOLUTION INTRAVENOUS; SUBCUTANEOUS at 04:17

## 2024-11-25 RX ADMIN — EPOETIN ALFA-EPBX 3000 UNITS: 3000 INJECTION, SOLUTION INTRAVENOUS; SUBCUTANEOUS at 16:16

## 2024-11-25 RX ADMIN — APIXABAN 5 MG: 5 TABLET, FILM COATED ORAL at 20:45

## 2024-11-25 RX ADMIN — ACETAMINOPHEN 1000 MG: 500 TABLET ORAL at 06:44

## 2024-11-25 RX ADMIN — Medication 1 MG: at 08:09

## 2024-11-25 RX ADMIN — SEVELAMER CARBONATE 800 MG: 800 TABLET, FILM COATED ORAL at 16:16

## 2024-11-25 RX ADMIN — LACTULOSE 20 G: 20 SOLUTION ORAL at 08:09

## 2024-11-25 RX ADMIN — INSULIN LISPRO 4 UNITS: 100 INJECTION, SOLUTION INTRAVENOUS; SUBCUTANEOUS at 23:32

## 2024-11-25 RX ADMIN — APIXABAN 5 MG: 5 TABLET, FILM COATED ORAL at 08:09

## 2024-11-25 RX ADMIN — SODIUM CHLORIDE, PRESERVATIVE FREE 10 ML: 5 INJECTION INTRAVENOUS at 08:10

## 2024-11-25 RX ADMIN — Medication 100 MG: at 08:09

## 2024-11-25 ASSESSMENT — PAIN DESCRIPTION - ONSET: ONSET: GRADUAL

## 2024-11-25 ASSESSMENT — PAIN DESCRIPTION - DESCRIPTORS: DESCRIPTORS: ACHING;DISCOMFORT;SORE

## 2024-11-25 ASSESSMENT — PAIN DESCRIPTION - LOCATION: LOCATION: LEG

## 2024-11-25 ASSESSMENT — PAIN SCALES - GENERAL
PAINLEVEL_OUTOF10: 0
PAINLEVEL_OUTOF10: 3
PAINLEVEL_OUTOF10: 0
PAINLEVEL_OUTOF10: 4

## 2024-11-25 ASSESSMENT — PAIN - FUNCTIONAL ASSESSMENT: PAIN_FUNCTIONAL_ASSESSMENT: ACTIVITIES ARE NOT PREVENTED

## 2024-11-25 ASSESSMENT — PAIN DESCRIPTION - PAIN TYPE: TYPE: SURGICAL PAIN

## 2024-11-25 ASSESSMENT — PAIN DESCRIPTION - FREQUENCY: FREQUENCY: INTERMITTENT

## 2024-11-25 ASSESSMENT — PAIN DESCRIPTION - ORIENTATION: ORIENTATION: RIGHT;LEFT

## 2024-11-25 NOTE — PROGRESS NOTES
Or  LORazepam, 2 mg, Q1H PRN   Or  LORazepam, 3 mg, Q1H PRN   Or  LORazepam, 3 mg, Q1H PRN   Or  LORazepam, 4 mg, Q1H PRN   Or  LORazepam, 4 mg, Q1H PRN  sodium chloride flush, 5-40 mL, PRN  sodium chloride, , PRN  magnesium sulfate, 2,000 mg, PRN  ondansetron, 4 mg, Q8H PRN   Or  ondansetron, 4 mg, Q6H PRN  glucose, 4 tablet, PRN  dextrose bolus, 125 mL, PRN   Or  dextrose bolus, 250 mL, PRN  glucagon (rDNA), 1 mg, PRN  dextrose, , Continuous PRN  oxyCODONE, 5 mg, Q4H PRN   Or  oxyCODONE, 10 mg, Q4H PRN  HYDROmorphone, 0.5 mg, Q3H PRN         Objective:    BP (!) 142/77   Pulse 84   Temp 98 °F (36.7 °C) (Temporal)   Resp 18   Ht 1.702 m (5' 7\")   Wt 101.3 kg (223 lb 5.2 oz)   SpO2 92%   BMI 34.98 kg/m²     General Appearance: More alert today  Skin: warm and dry  Head: normocephalic and atraumatic  Eyes: pupils equal, round, and reactive to light, extraocular eye movements intact, conjunctivae normal  Neck: neck supple and non tender without mass   Pulmonary/Chest: Bilateral decreased with some, no wheezes, rales or rhonchi, normal air movement, no respiratory distress  Cardiovascular: normal rate, normal S1 and S2 and no carotid bruits  Abdomen: soft, non-tender, non-distended, normal bowel sounds, no masses or organomegaly  Extremities: Right lower extremity AKA with dressing in placed, left lower extremity fasciotomy dressing placed  Neurologic: no cranial nerve deficit and speech normal        Recent Labs     11/23/24 0620 11/24/24 0628 11/25/24 0628    136 132   K 4.2 4.4 4.7   CL 95* 93* 92*   CO2 25 24 26   BUN 40* 64* 44*   CREATININE 6.0* 7.9* 5.8*   GLUCOSE 184* 121* 205*   CALCIUM 8.9 8.8 8.3*       Recent Labs     11/23/24 0620 11/24/24 0628 11/25/24 0628   WBC 17.7* 25.2* 17.5*   RBC 2.78* 2.77* 2.70*   HGB 8.5* 8.5* 8.4*   HCT 27.2* 27.3* 26.5*   MCV 97.8 98.6 98.1   MCH 30.6 30.7 31.1   MCHC 31.3* 31.1* 31.7*   RDW 14.0 14.3 14.6    524* 474*   MPV 10.0 10.2 10.0        Radiology: Reviewed    Assessment:    Principal Problem:    Femoral artery occlusion (HCC)  Active Problems:    Acute lower limb ischemia    ANTIONE (acute kidney injury) (HCC)    Non-traumatic rhabdomyolysis  Resolved Problems:    * No resolved hospital problems. 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  Acute limb ischemia  Bilateral renal infarct  Lactic acidosis  ANTIONE with severe rhabdomyolysis  Diabetes mellitus  Hyperkalemia, resolved  Newly diagnosed Atrial Flutter/Fib  Alcoholism   10.  Leukocytosis-with no fever  -was cleared to move out of SICU 11/20  -Status post bilateral femoral embolectomy with fasciotomies on 11/12 followed by right AKA  -S/p OR 11/22-left lower extremity s/p debridement, partial fasciotomy closure and graft to lateral fasciotomy site.  -Pain medication as needed  -Trend CPK level, downtrending, .  -Nephrology consulted, was on bicarb drip, now on daily HD as per nephrology, tolerating HD well.   TDC placed with IR 11/20  -concern of alcohol withdrawal, CIWA protocol has been placed, now on phenobarb.  EKG normal sinus rhythm.  No ST-T wave changes, echocardiogram from 11/12 reviewed, continue monitoring on CIWA protocol, Lopressor ordered.  Cardiology consulted, heart rate better controlled on Lopressor, was on heparin drip, this has been transition to Eliquis now.  Precedex is now stopped with improved mental status, sitter in place, mental status is improved today-continue to monitor, on high dose phenobarb as started in SICU-plan for 7 days, now maintained on as needed Ativan, he was jittery with tremors 11/24 needing phenobarb dosing, monitor on as needed Ativan  Discussed with RN.  Leukocytosis with no fever, white count is daily worsening,blood culture NTD and chest x-ray no acute findings, white count is starting to downtrend.    Dispo: Pending

## 2024-11-25 NOTE — CARE COORDINATION
Transition of care update:  S/P bilateral femoral embolectomies with fasciotomies subsequent R AKA on 11/12. LLE wound debridement, left medial calf closure on 11/21.  Vascular is doing daily dressing changes. Hgb 8.4 and other labs noted. SLP eval and treat. Eliquis 5mg 2 times daily. CIWA. TDC - pt was started on HD this admission. Has JOSEPH. Per Alexey with [facility], pre cert is pending with pt's insurance. Back up plan is [facility]- this [facility] facility has in house hemodialysis. Also. Janelle rocau is following pt. Ambulance form was started. Transport envelope was placed with pt's soft chart. PT/OT ordered. Cm/sw will follow.

## 2024-11-25 NOTE — PROGRESS NOTES
SPEECH/LANGUAGE PATHOLOGY  CLINICAL ASSESSMENT OF SWALLOWING FUNCTION   and PLAN OF CARE      PATIENT NAME:  Himanshu Bravo  (male)     MRN:  [id number]    :  1960  (64 y.o.)  STATUS:  Inpatient: Room 6515/6515-A    TODAY'S DATE:  2024  ORDER DATE, DESCRIPTION AND REFERRING PROVIDER: 24 104    SLP eval and treat  Start:  24,   End:  24,   ONE TIME,   Standing Count:  1 Occurrences,   R       Rani Dorsey MD  REASON FOR REFERRAL: RN reports of coughing with medication administration   EVALUATING THERAPIST: MERLINE Weldon                 RESULTS:    DYSPHAGIA DIAGNOSIS:   normal swallow function      DIET RECOMMENDATIONS:  Regular consistency solids (IDDSI level 7) with  thin liquids (IDDSI level 0)     FEEDING RECOMMENDATIONS:     Assistance level:  Encourage self-feeding as function allows      Compensatory strategies recommended: Thorough oral care to prevent colonization of oral bacteria   Upright in bed/ chair as tolerated  Fully alert for all PO  Slow rate of intake   SMALL bites  Liquid wash to help clear oral cavity of thicker consistency items  Consume water or applesauce prior to med administration  Plenty of liquids with medication  Crush meds in applesauce/pudding as needed      Discussed recommendations with:  patient nurse in person    SPEECH THERAPY  PLAN OF CARE   The dysphagia POC is established based on physician order, dysphagia diagnosis and results of clinical assessment     Dysphagia therapy is not recommended following s session due to independent with implementation of strategies/modifications.     Conditions Requiring Skilled Therapeutic Intervention for dysphagia:    Not applicable    Specific dysphagia interventions to include:     Not applicable no therapy warranted     Specific instructions for next treatment:  not applicable   Patient Treatment Goals:    Short Term Goals:  Not applicable no therapy warranted     Long Term Goals:   Not  applicable no therapy warranted      Patient/family Goal:    To take medication easier    Plan of care discussed with Patient   The Patient understand(s) the diagnosis, prognosis and plan of care     Rehabilitation Potential/Prognosis: good                    ADMITTING DIAGNOSIS: 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]  Atrial fibrillation, unspecified type (HCC) [I48.91]    VISIT DIAGNOSIS:   Visit Diagnoses         Codes    Atrial fibrillation, unspecified type (HCC)     I48.91    Sensation of cold in lower extremity     R20.9             PATIENT REPORT/COMPLAINT: difficulty swallowing pills  meal tray present during evaluation     PRIOR LEVEL OF SWALLOW FUNCTION:    PAST HISTORY OF OROPHARYNGEAL DYSPHAGIA?: none reported    Home diet: Regular consistency solids (IDDSI level 7) with  thin liquids (IDDSI level 0)  Current Diet Order:  ADULT DIET; Regular; 4 carb choices (60 gm/meal)  ADULT ORAL NUTRITION SUPPLEMENT; Breakfast, Dinner; Wound Healing Oral Supplement  ADULT ORAL NUTRITION SUPPLEMENT; Lunch, Dinner; Renal Oral Supplement    PROCEDURE:  Consistencies Administered During the Evaluation   Liquids: thin liquid   Solids:  Pureed  and Hard solid      Method of Intake:   cup, straw, spoon  Self fed      Position:   Sitting in bed with head elevated above 75 degrees    CLINICAL ASSESSMENT:  Oral Stage:       The oral stage of swallowing was within functional limits for consistencies administered      Pharyngeal Stage:    No signs of aspiration were noted during this evaluation however, silent aspiration cannot be ruled out at bedside.  If silent aspiration is suspected, a Videofluoroscopic Study of Swallowing (MBS) is recommended and requires a physician order.    Cognition:   Within functional limits for this exam    Oral Peripheral Examination   Adequate lingual/labial strength     Current Respiratory Status    room air     Parameters of Speech Production  Respiration:  Adequate for speech

## 2024-11-25 NOTE — PROGRESS NOTES
.Vascular Surgery Progress Note    Pt is being seen in f/u today regarding bilateral femoral embolectomies, R AKA    Subjective  Pt s/e. Falls asleep easily during exam. No complaints this AM.     Current Medications:    sodium chloride      dextrose        LORazepam **OR** LORazepam **OR** LORazepam **OR** LORazepam **OR** LORazepam **OR** LORazepam **OR** LORazepam **OR** LORazepam, sodium chloride flush, sodium chloride, magnesium sulfate, ondansetron **OR** ondansetron, glucose, dextrose bolus **OR** dextrose bolus, glucagon (rDNA), dextrose, oxyCODONE **OR** oxyCODONE, HYDROmorphone    apixaban  5 mg Oral BID    sevelamer  800 mg Oral TID WC    epoetin noe-epbx  3,000 Units SubCUTAneous Once per day on Monday Wednesday Friday    lactulose  20 g Oral BID    atorvastatin  10 mg Oral Nightly    docusate sodium  100 mg Oral BID    polyethylene glycol  17 g Oral Daily    bisacodyl  10 mg Rectal Daily    metoprolol tartrate  50 mg Oral BID    thiamine  100 mg Oral Daily    folic acid  1 mg Oral Daily    melatonin  10 mg Oral Nightly    sodium chloride flush  5-40 mL IntraVENous 2 times per day    insulin glargine  12 Units SubCUTAneous QAM    aspirin  81 mg Oral Daily    acetaminophen  1,000 mg Oral 3 times per day    insulin lispro  0-16 Units SubCUTAneous Q4H        PHYSICAL EXAM:    BP (!) 142/77   Pulse 84   Temp 98 °F (36.7 °C) (Temporal)   Resp 18   Ht 1.702 m (5' 7\")   Wt 101.3 kg (223 lb 5.2 oz)   SpO2 92%   BMI 34.98 kg/m²     Intake/Output Summary (Last 24 hours) at 11/25/2024 1239  Last data filed at 11/25/2024 0951  Gross per 24 hour   Intake 960 ml   Output 10 ml   Net 950 ml          Gen: awake, no apparent distress  CVS: RRR, tunneled line to R chest  Resp: No increased work of breathing  Abd: Soft, non-tender, non-distended  R LE: AKA  L LE: Dressing in place    LABS:    Lab Results   Component Value Date    WBC 17.5 (H) 11/25/2024    HGB 8.4 (L) 11/25/2024    HCT 26.5 (L) 11/25/2024    PLT

## 2024-11-25 NOTE — PATIENT CARE CONFERENCE
P Quality Flow/Interdisciplinary Rounds Progress Note        Quality Flow Rounds held on November 25, 2024    Disciplines Attending:  Bedside Nurse, , , and Nursing Unit Leadership    Himanshu Bravo was admitted on 11/12/2024  2:39 AM    Anticipated Discharge Date:       Disposition:    Aramis Score:  Aramis Scale Score: 15    Readmission Risk              Risk of Unplanned Readmission:  18           Discussed patient goal for the day, patient clinical progression, and barriers to discharge.  The following Goal(s) of the Day/Commitment(s) have been identified:  downgrade/discharge planning       Monique Amaya RN  November 25, 2024

## 2024-11-25 NOTE — PROGRESS NOTES
Vascular Surgery Progress Note    Pt is being seen in f/u today regarding bilateral femoral embolectomies     Subjective  Very hard to arouse this AM. Reports he has no complaints.     Current Medications:    sodium chloride      dextrose        PHENobarbital **FOLLOWED BY** [START ON 11/26/2024] PHENobarbital, sodium chloride flush, sodium chloride, magnesium sulfate, ondansetron **OR** ondansetron, glucose, dextrose bolus **OR** dextrose bolus, glucagon (rDNA), dextrose, oxyCODONE **OR** oxyCODONE, HYDROmorphone    PHENobarbital  32.4 mg Oral 4x daily    Followed by    PHENobarbital  32.4 mg Oral BID    Followed by    [START ON 11/26/2024] PHENobarbital  16.2 mg Oral BID    apixaban  5 mg Oral BID    sevelamer  800 mg Oral TID WC    epoetin noe-epbx  3,000 Units SubCUTAneous Once per day on Monday Wednesday Friday    lactulose  20 g Oral BID    atorvastatin  10 mg Oral Nightly    docusate sodium  100 mg Oral BID    polyethylene glycol  17 g Oral Daily    bisacodyl  10 mg Rectal Daily    metoprolol tartrate  50 mg Oral BID    thiamine  100 mg Oral Daily    folic acid  1 mg Oral Daily    melatonin  10 mg Oral Nightly    sodium chloride flush  5-40 mL IntraVENous 2 times per day    insulin glargine  12 Units SubCUTAneous QAM    aspirin  81 mg Oral Daily    acetaminophen  1,000 mg Oral 3 times per day    insulin lispro  0-16 Units SubCUTAneous Q4H        PHYSICAL EXAM:    BP (!) 140/78   Pulse 97   Temp 98.9 °F (37.2 °C) (Oral)   Resp 18   Ht 1.727 m (5' 7.99\")   Wt 101.3 kg (223 lb 5.2 oz)   SpO2 95%   BMI 33.96 kg/m²     Intake/Output Summary (Last 24 hours) at 11/25/2024 0653  Last data filed at 11/25/2024 0600  Gross per 24 hour   Intake 1020 ml   Output 2310 ml   Net -1290 ml          Gen: awake, no apparent distress, lethargic  CVS: RRR, tunneled line to R chest  Resp: No increased work of breathing  Abd: Soft, non-tender, non-distended  R LE: AKA with staples clean/dry/intact.  Right groin incision with

## 2024-11-25 NOTE — PROGRESS NOTES
Comprehensive Nutrition Assessment    Type and Reason for Visit:  Reassess    Nutrition Recommendations/Plan:   Recommend and start Nepro renal supplement BID and Shane wound healing supplement BID to help meet increased nutritional needs from surgical wound healing and known losses from dialysis.         Malnutrition Assessment:  Malnutrition Status:  At risk for malnutrition (11/25/24 1135)    Context:  Acute Illness     Findings of the 6 clinical characteristics of malnutrition:  Energy Intake:  75% or less of estimated energy requirements for 7 or more days  Weight Loss:  Unable to assess (d/t lack of actual weight history)     Body Fat Loss:  Unable to assess     Muscle Mass Loss:  Unable to assess    Fluid Accumulation:  No fluid accumulation     Strength:  Not Performed    Nutrition Assessment:    Patients po intake has been sporadic, averaging ~50% of meals served ; adm w/ SOB/leg pain/acute limb ischemia ; noted bilateral embolic femoral artery occlusions s/p bilateral femoral embolectomies with fasciotomies on 11/12 ; s/p subsequent R AKA on 11/12 ; s/p medial fasciotomy closure and lateral fasciotomy grafting on 11/21 ; noted ANTIONE in the setting of bilateral renal infarct (w/ severe rhabdomyolysis) ; s/p temporary HD on 11/13 ; s/p HD cath insertion on 11/20 ; hx of DM/ETOH abuse/tobacco abuse (noted alcohol withdrawal) ; hx of congenital heart disease/extensive atherosclerotic disease ; remote history of CABG ; noted CAD and newly diagnosed A-fib ; Alcoholism  will provide updated recommendations    Nutrition Related Findings:    +I&Os (+9.7 L), trace edema, R AKA, A&O x 3, missing teeth, active BS, hyperglycemia ; Wound Type: Multiple, Surgical Incision, Open Wounds (Incisions x 5 ; wound x 1 noted to brachial)       Current Nutrition Intake & Therapies:    Average Meal Intake: 26-50%, 51-75% (~50%)  Average Supplements Intake: None Ordered  ADULT DIET; Regular; 4 carb choices (60 gm/meal)  ADULT  Signs/Symptoms Outcomes: Biochemical Data, Chewing or Swallowing, GI Status, Fluid Status or Edema, Hemodynamic Status, Meal Time Behavior, Weight, Nutrition Focused Physical Findings, Skin    Discharge Planning:    No discharge needs at this time     Jose Cedeno RD, LD  Contact: [phone]

## 2024-11-25 NOTE — PLAN OF CARE
Problem: Chronic Conditions and Co-morbidities  Goal: Patient's chronic conditions and co-morbidity symptoms are monitored and maintained or improved  Outcome: Progressing     Problem: Discharge Planning  Goal: Discharge to home or other facility with appropriate resources  Outcome: Progressing     Problem: Pain  Goal: Verbalizes/displays adequate comfort level or baseline comfort level  Outcome: Progressing     Problem: Safety - Adult  Goal: Free from fall injury  Outcome: Progressing     Problem: Skin/Tissue Integrity  Goal: Absence of new skin breakdown  Description: 1.  Monitor for areas of redness and/or skin breakdown  2.  Assess vascular access sites hourly  3.  Every 4-6 hours minimum:  Change oxygen saturation probe site  4.  Every 4-6 hours:  If on nasal continuous positive airway pressure, respiratory therapy assess nares and determine need for appliance change or resting period.  Outcome: Progressing     Problem: ABCDS Injury Assessment  Goal: Absence of physical injury  Outcome: Progressing     Problem: Spiritual Struggle  Goal: Verbalizes spiritual struggle  Outcome: Progressing     Problem: Emotional Distress  Goal: Verbalization of thoughts and feelings  Outcome: Progressing  Goal: Reduce evidence of anxiety/worry/anger  Outcome: Progressing  Goal: Identifies/restores coping resources/skills  Outcome: Progressing     Problem: Life Adjustment  Goal: Verbalization of feelings regarding change/loss  Outcome: Progressing  Goal: Finding meaning/purpose in the midst of illness/suffering  Outcome: Progressing  Goal: Identifies/restores coping resources/skills  Outcome: Progressing     Problem: Support with Decision-Making  Goal: Verbalization of thoughts/feelings regarding decision  Outcome: Progressing  Goal: Identifies/restores coping resources/skills  Outcome: Progressing     Problem: Unresolved Conflict/Relationships  Goal: Explore resources for additional follow up, post discharge  Outcome:

## 2024-11-25 NOTE — PROGRESS NOTES
Physical Therapy Initial Evaluation    Name: Himanshu Bravo  : 1960  MRN: [id number]      Date of Service: 2024    Evaluating PT:  Himanshu Liu, PT [id number]    Referring provider/PT Order:  PT Eval and Treat   24 0700  PT eval and treat  Start:  24 0700,   End:  24 0700,   ONE TIME,   Standing Count:  1 Occurrences,   R         Linda Gómez DO     Room #:  6515/6515-A  Diagnosis:  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]  Atrial fibrillation, unspecified type (HCC) [I48.91]  PMHx/PSHx:     has a past medical history of Congenital heart disease, Diabetes mellitus (HCC), H/O cardiovascular stress test, Hyperlipidemia, and Type 2 diabetes mellitus without complication (HCC).    has a past surgical history that includes Clavicle surgery (Left); Cardiac catheterization; Cardiac surgery; Colonoscopy (N/A, 2024); Percutaneous Transluminal Coronary Angio; Leg Surgery (Right, 2024); vascular surgery (Bilateral, 2024); and IR TUNNELED CVC PLACE WO SQ PORT/PUMP > 5 YEARS (2024).     Procedure/Surgery:  s/p bilateral femoral embolectomies with fasciotomies , subsequent R AKA .   Precautions:  Falls, NWB (non-weight bearing) RLE,   Equipment Needs: To be determined,    SUBJECTIVE:    Patient lives alone  in a two story home able to reside first   with 3 steps to enter. Stated Brother is building him a ramp. Patient ambulated independently  PTA. Equipment owned: unknown,      OBJECTIVE:   Initial Evaluation  Date: 24 Treatment Short Term/ Long Term   Goals   AM-PAC 6 Clicks      Was pt agreeable to Eval/treatment? Yes      Does pt have pain? yes     Bed Mobility  Rolling: Min  Supine to sit:   Min   Sit to supine: Min   Scooting: Min   Rolling: Ind  Supine to sit: Ind  Sit to supine: Ind  Scooting: Ind   Transfers Sit to stand: Max x 2 with fww, then HHA knee blocked. Difficulty clearing hips from the bed due to weakness. Attempted x 3 reps.   Stand to  sit: Max x 2  Stand pivot: NT   Sit to stand: Mod  to Wheeled Walker  Stand to sit: Mod    Stand pivot: Mod  with Wheeled Walker  If unable to stand SB transfer.    Ambulation    NT  w/c for mobility.    Stair negotiation: ascended and descended  NT       Strength/ROM:   See OT note for BUE ROM and strength  RLE grossly 2/5 hip  LLE grossly 3/5  RLE AROM WFL  LLE AROM WFL    Balance:     Static Sitting: Min  Dynamic Sitting: Min  Static Standing: Max x 2 partial stand.   Dynamic Standing: Max x 2       Patient is Alert & Oriented x person and place cues for month and follows directions increased time required to awaken.   Sensation:  Pt denies numbness and tingling to extremities  Edema:    Therapeutic Exercises:  Functional activity as stated above. Limited ability due to weakness.     Patient education  Pt educated on role of Physical Therapy, risks of immobility, safety and plan of care and  importance of mobility while in hospital  and use of call light for safety.     Patient response to education:   Pt verbalized understanding Pt demonstrated skill Pt requires further education in this area   yes  yes  yes     ASSESSMENT:    Conditions Requiring Skilled Therapeutic Intervention:    [x]Decreased strength     [x]Decreased ROM  [x]Decreased functional mobility  [x]Decreased balance   [x]Decreased endurance   [x]Decreased posture  []Decreased sensation  []Decreased coordination   []Decreased vision  [x]Decreased safety awareness   []Increased pain       Comments:    RN cleared patient for participation in therapy session. Patient was seen this date for PT evaluation.  Patient was agreeable to intervention.  Results of the functional assessment are noted above.  Upon entering the room patient was found supine in bed. Sleeping soundly. Increased time required to awaken. Min A to EOB. Able to sit with Min A. Sat EOB x 10 minutes to increase dynamic sitting balance and activity tolerance.  Multiple STS attempted

## 2024-11-25 NOTE — PROGRESS NOTES
Occupational Therapy    OCCUPATIONAL THERAPY INITIAL EVALUATION    [facility]  [address] [place], [place]        Date:2024                                                  Patient Name: Himanshu Bravo    MRN: [id number]    : 1960    Room: [address] [place] [address]          Evaluating OT: Zayda Escalona, MALENAD, OTR/L; [id number]      Occupational therapy physician order:   Start   Ordering Provider    24  OT eval and treat  Start:  24,   End:  24,   ONE TIME,   Standing Count:  1 Occurrences,   R         Rico, Linda ADO          Pt presents to ED with RLE pain      Diagnosis:    1. Femoral artery occlusion (HCC)    2. Atrial fibrillation, unspecified type (HCC)    3. Sensation of cold in lower extremity    4. Non-traumatic rhabdomyolysis    5. ANTIONE (acute kidney injury) (HCC)       Patient Active Problem List   Diagnosis    Type 2 diabetes mellitus without complication (HCC)    Mixed hyperlipidemia    Tobacco abuse    Cervical pain (neck)    DKA, type 2, not at goal (HCC)    Diabetic ketoacidosis (HCC)    Neck pain    Pneumonia    Diarrhea    Femoral artery occlusion (HCC)    Acute lower limb ischemia    ANTIONE (acute kidney injury) (HCC)    Non-traumatic rhabdomyolysis          Pertinent Medical History:   Past Medical History:   Diagnosis Date    Congenital heart disease     Diabetes mellitus (HCC)     H/O cardiovascular stress test 01/15/2018    lexiscan    Hyperlipidemia     Type 2 diabetes mellitus without complication (HCC)           Surgery/Procedures: 24 BILATERAL FEMORAL ENDERECTOMY POSS. BILATERAL FASCIOTOMIES, 24 RIGHT LEG AMPUTATION ABOVE KNEE, 24 L LE wound debridement, L medial calf closure - 20 cm length x 5 cm width x 1.5 cm depth, L lateral calf  Kerecis 7x10 meshed applied        Recommended Adaptive Equipment: TBD , AE for LB dressing and bathing, w/c, sliding board?      Precautions:  Fall  PLOF, completion of standardized testing, informal observation of tasks, consultation with other medical professions/disciplines, assessment of data & development of POC/goals.     Time In: 1105  Time Out: 1145  Total Treatment Time: 25 min    Min Units   OT Eval Low 75873  X     OT Eval Medium 63997      OT Eval High 15449      OT Re-Eval 76759       Therapeutic Ex 50719       Therapeutic Activities 04276  10 1    ADL/Self Care 26722  15  1   Orthotic Management 08428       Manual 15072     Neuro Re-Ed 92079       Non-Billable Time                  Zayda Escalona OTAURORA, OTR/L; [id number]

## 2024-11-25 NOTE — PROGRESS NOTES
[facility]  MD Bryce Milligan MD Ali Hassan, MD Lisa Kniska, CNP   Natalia Rey, CODY Harris, DEMETRIUS  Progress Note    11/25/2024    SUBJECTIVE:   11/13:  Seen in [facility].  Awake and alert.  S/P right above knee amputation yesterday. Complains of pain at right AKA site.  Oxygen on per NC.  Denies chest pain, palpitation or SOB.  Denies nausea, vomiting or abd pain.  Continues on heparin drip and bicarb IV.  He is oligoanuric.     11/14: Seen in the [facility] while on dialysis. Tolerating therapy without issues. His blood pressure is stable. Heart rate is mildly elevated. He is in good spirits and reports that he is having less pain today. His appetite is fair, he did eat some cereal for dinner. Urine output is stable.     11/15: Seen in the ICU. No acute distress or complaints. Received metoprolol earlier for tachycardia. Tolerated HD today with 2.8 liters of fluid removal. He is still making urine, though urine output has decreased. He is on oxygen via nasal canula. Denies dyspnea. PO intake is stable.     11/16: Undergoing with withdrawal of alcohol symptoms.  Obtunded.  Awake agitated aggressive at times though symptoms have improved status post sedation    11/17: Feeling better today.  Had a \"rough night\" notable for worsening agitation and aggression.  Now on Precedex drip with control of symptoms.  Enjoying his lunch.  Denies complaint.  Family at bedside.  BP stable.    11/18: Seen in the [facility]. Mentation has improved. Sitter is present at the bedside. He is still tachycardic. Denies any acute complaints. Denies dyspnea. His blood pressure is stable. Complains of some discomfort to his RLE. Minimal urine output.     11/19: Seen today while on dialysis. Tolerating treatment well. Line will only tolerate BFR of 250. Plan is for tunneled line placement in IR tomorrow. Blood pressure is stable. Heart rate has improved. Urine output remains low. He denies any  no rash  Neuro: awake, alert, interactive      DATA:    Recent Labs     11/23/24  0620 11/24/24  0628 11/25/24 0628   WBC 17.7* 25.2* 17.5*   HGB 8.5* 8.5* 8.4*   HCT 27.2* 27.3* 26.5*   MCV 97.8 98.6 98.1    524* 474*     Recent Labs     11/23/24  0620 11/24/24 0628 11/25/24 0628    136 132   K 4.2 4.4 4.7   CL 95* 93* 92*   CO2 25 24 26   MG 2.1 2.2 2.0   PHOS 4.3 4.5 3.5   BUN 40* 64* 44*   CREATININE 6.0* 7.9* 5.8*   ALT <5 <5 <5   AST 51* 41* 36   BILITOT 0.2 0.2 0.2   ALKPHOS 165* 153* 145*       No results found for: \"LABPROT\"    Assessment  -Acute kidney injury nonoliguric in the setting of bilateral renal infarct and bilateral common femoral artery occlusions along with severe rhabdomyolysis  -Bilateral femoral artery occlusions status post bilateral fasciotomy  -For above-knee amputation on the right side  -Hyperkalemia  -Mild lactic acidosis  -Extensive atherosclerotic disease and the patient was actively smoking  History of coronary artery disease status post bypass 2 years ago    Remains oligoanuric  BP stable  Heart rate improved, metoprolol dose increased   S/p Right above knee amputation   No current signs of renal recovery   S/p TDC placement     Recommendations:  -Continue IHD support for solute and volume clearance as ordered -- dialysis tomorrow   -External vargas catheter  -Retacrit  -Sevelamer  -Avoid nephrotoxins as able  -Monitor labs  -Monitor I & O  -No current signs of renal recovery, will continue to follow closely   -Vascular surgery on board       Electronically signed by NUZHAT Ray - CNP on 11/25/2024 at 2:27 PM

## 2024-11-25 NOTE — PROGRESS NOTES
[facility]  MD Bryce Milligan MD Ali Hassan, MD Lisa Kniska, CNP   Natalia Rey, CODY Harris, DEMETRIUS  Progress Note    11/24/2024    SUBJECTIVE:   11/13:  Seen in [facility].  Awake and alert.  S/P right above knee amputation yesterday. Complains of pain at right AKA site.  Oxygen on per NC.  Denies chest pain, palpitation or SOB.  Denies nausea, vomiting or abd pain.  Continues on heparin drip and bicarb IV.  He is oligoanuric.     11/14: Seen in the [facility] while on dialysis. Tolerating therapy without issues. His blood pressure is stable. Heart rate is mildly elevated. He is in good spirits and reports that he is having less pain today. His appetite is fair, he did eat some cereal for dinner. Urine output is stable.     11/15: Seen in the ICU. No acute distress or complaints. Received metoprolol earlier for tachycardia. Tolerated HD today with 2.8 liters of fluid removal. He is still making urine, though urine output has decreased. He is on oxygen via nasal canula. Denies dyspnea. PO intake is stable.     11/16: Undergoing with withdrawal of alcohol symptoms.  Obtunded.  Awake agitated aggressive at times though symptoms have improved status post sedation    11/17: Feeling better today.  Had a \"rough night\" notable for worsening agitation and aggression.  Now on Precedex drip with control of symptoms.  Enjoying his lunch.  Denies complaint.  Family at bedside.  BP stable.    11/18: Seen in the [facility]. Mentation has improved. Sitter is present at the bedside. He is still tachycardic. Denies any acute complaints. Denies dyspnea. His blood pressure is stable. Complains of some discomfort to his RLE. Minimal urine output.     11/19: Seen today while on dialysis. Tolerating treatment well. Line will only tolerate BFR of 250. Plan is for tunneled line placement in IR tomorrow. Blood pressure is stable. Heart rate has improved. Urine output remains low. He denies any  acetaminophen  1,000 mg Oral 3 times per day    insulin lispro  0-16 Units SubCUTAneous Q4H       MEDS (infusions):   sodium chloride      dextrose         MEDS (prn):  PHENobarbital **FOLLOWED BY** [START ON 11/26/2024] PHENobarbital, sodium chloride flush, sodium chloride, magnesium sulfate, ondansetron **OR** ondansetron, glucose, dextrose bolus **OR** dextrose bolus, glucagon (rDNA), dextrose, oxyCODONE **OR** oxyCODONE, HYDROmorphone    PHYSICAL EXAM:     Patient Vitals for the past 24 hrs:   BP Temp Temp src Pulse Resp SpO2 Weight   11/24/24 1504 118/72 97.9 °F (36.6 °C) Temporal 95 17 96 % --   11/24/24 1159 135/76 97.6 °F (36.4 °C) Temporal 88 18 95 % --   11/24/24 1130 (!) 140/69 97.1 °F (36.2 °C) -- (!) 104 18 -- 101.3 kg (223 lb 5.2 oz)   11/24/24 0253 (!) 118/56 97.3 °F (36.3 °C) Temporal 84 22 96 % --   11/23/24 2244 100/70 97.9 °F (36.6 °C) Temporal 93 22 97 % --   11/23/24 2018 128/64 98 °F (36.7 °C) Temporal 96 22 96 % --   @      Intake/Output Summary (Last 24 hours) at 11/24/2024 1944  Last data filed at 11/24/2024 1836  Gross per 24 hour   Intake 540 ml   Output 2305 ml   Net -1765 ml         Wt Readings from Last 3 Encounters:   11/24/24 101.3 kg (223 lb 5.2 oz)   11/11/24 108.9 kg (240 lb)   10/01/24 108.9 kg (240 lb)       Constitutional:  in no acute distress  HEENT: NC/AT, EOMI, sclera and conjunctiva are clear and anicteric, mucus membranes moist  Neck: Trachea midline, no JVD, temporary HD catheter   Cardiovascular: S1, S2 regular rhythm, no murmur,or rub  Respiratory:  CTAB, diminished in the bases. No crackles, no wheeze  Gastrointestinal:  Soft, nontender, nondistended, NABS  Ext: Right AKA, sutures intact, LLE dressed, trace edema, feet warm  Skin: dry, no rash  Neuro: awake, alert, interactive      DATA:    Recent Labs     11/22/24  0646 11/23/24  0620 11/24/24  0628   WBC 14.1* 17.7* 25.2*   HGB 8.1* 8.5* 8.5*   HCT 25.8* 27.2* 27.3*   MCV 99.2 97.8 98.6    429 524*     Recent

## 2024-11-26 LAB
ALBUMIN SERPL-MCNC: 2.9 G/DL (ref 3.5–5.2)
ALP SERPL-CCNC: 131 U/L (ref 40–129)
ALT SERPL-CCNC: 6 U/L (ref 0–40)
ANION GAP SERPL CALCULATED.3IONS-SCNC: 20 MMOL/L (ref 7–16)
AST SERPL-CCNC: 46 U/L (ref 0–39)
B PARAP IS1001 DNA NPH QL NAA+NON-PROBE: NOT DETECTED
B PERT DNA SPEC QL NAA+PROBE: NOT DETECTED
BASOPHILS # BLD: 0 K/UL (ref 0–0.2)
BASOPHILS NFR BLD: 0 % (ref 0–2)
BILIRUB SERPL-MCNC: 0.2 MG/DL (ref 0–1.2)
BUN SERPL-MCNC: 67 MG/DL (ref 6–23)
C PNEUM DNA NPH QL NAA+NON-PROBE: NOT DETECTED
CA-I BLD-SCNC: 1.14 MMOL/L (ref 1.15–1.33)
CALCIUM SERPL-MCNC: 8.6 MG/DL (ref 8.6–10.2)
CHLORIDE SERPL-SCNC: 90 MMOL/L (ref 98–107)
CO2 SERPL-SCNC: 22 MMOL/L (ref 22–29)
CREAT SERPL-MCNC: 8.1 MG/DL (ref 0.7–1.2)
CRP SERPL HS-MCNC: 287 MG/L (ref 0–5)
EOSINOPHIL # BLD: 0 K/UL (ref 0.05–0.5)
EOSINOPHILS RELATIVE PERCENT: 0 % (ref 0–6)
ERYTHROCYTE [DISTWIDTH] IN BLOOD BY AUTOMATED COUNT: 14.6 % (ref 11.5–15)
ERYTHROCYTE [SEDIMENTATION RATE] IN BLOOD BY WESTERGREN METHOD: 110 MM/HR (ref 0–15)
FLUAV RNA NPH QL NAA+NON-PROBE: NOT DETECTED
FLUBV RNA NPH QL NAA+NON-PROBE: NOT DETECTED
GFR, ESTIMATED: 7 ML/MIN/1.73M2
GLUCOSE BLD-MCNC: 132 MG/DL (ref 74–99)
GLUCOSE BLD-MCNC: 137 MG/DL (ref 74–99)
GLUCOSE BLD-MCNC: 154 MG/DL (ref 74–99)
GLUCOSE BLD-MCNC: 237 MG/DL (ref 74–99)
GLUCOSE BLD-MCNC: 304 MG/DL (ref 74–99)
GLUCOSE BLD-MCNC: 364 MG/DL (ref 74–99)
GLUCOSE SERPL-MCNC: 155 MG/DL (ref 74–99)
HADV DNA NPH QL NAA+NON-PROBE: NOT DETECTED
HCOV 229E RNA NPH QL NAA+NON-PROBE: NOT DETECTED
HCOV HKU1 RNA NPH QL NAA+NON-PROBE: NOT DETECTED
HCOV NL63 RNA NPH QL NAA+NON-PROBE: NOT DETECTED
HCOV OC43 RNA NPH QL NAA+NON-PROBE: NOT DETECTED
HCT VFR BLD AUTO: 24.3 % (ref 37–54)
HGB BLD-MCNC: 7.7 G/DL (ref 12.5–16.5)
HMPV RNA NPH QL NAA+NON-PROBE: NOT DETECTED
HPIV1 RNA NPH QL NAA+NON-PROBE: NOT DETECTED
HPIV2 RNA NPH QL NAA+NON-PROBE: NOT DETECTED
HPIV3 RNA NPH QL NAA+NON-PROBE: NOT DETECTED
HPIV4 RNA NPH QL NAA+NON-PROBE: NOT DETECTED
LYMPHOCYTES NFR BLD: 1.26 K/UL (ref 1.5–4)
LYMPHOCYTES RELATIVE PERCENT: 7 % (ref 20–42)
M PNEUMO DNA NPH QL NAA+NON-PROBE: NOT DETECTED
MAGNESIUM SERPL-MCNC: 2.1 MG/DL (ref 1.6–2.6)
MCH RBC QN AUTO: 31 PG (ref 26–35)
MCHC RBC AUTO-ENTMCNC: 31.7 G/DL (ref 32–34.5)
MCV RBC AUTO: 98 FL (ref 80–99.9)
MONOCYTES NFR BLD: 1.1 K/UL (ref 0.1–0.95)
MONOCYTES NFR BLD: 6 % (ref 2–12)
NEUTROPHILS NFR BLD: 87 % (ref 43–80)
NEUTS SEG NFR BLD: 15.74 K/UL (ref 1.8–7.3)
PHOSPHATE SERPL-MCNC: 4.8 MG/DL (ref 2.5–4.5)
PLATELET # BLD AUTO: 509 K/UL (ref 130–450)
PMV BLD AUTO: 9.9 FL (ref 7–12)
POTASSIUM SERPL-SCNC: 5 MMOL/L (ref 3.5–5)
PROCALCITONIN SERPL-MCNC: 2.56 NG/ML (ref 0–0.08)
PROCALCITONIN SERPL-MCNC: 2.57 NG/ML (ref 0–0.08)
PROT SERPL-MCNC: 6.2 G/DL (ref 6.4–8.3)
PTH-INTACT SERPL-MCNC: 202.2 PG/ML (ref 15–65)
RBC # BLD AUTO: 2.48 M/UL (ref 3.8–5.8)
RBC # BLD: ABNORMAL 10*6/UL
RSV RNA NPH QL NAA+NON-PROBE: NOT DETECTED
RV+EV RNA NPH QL NAA+NON-PROBE: NOT DETECTED
SARS-COV-2 RNA NPH QL NAA+NON-PROBE: NOT DETECTED
SODIUM SERPL-SCNC: 132 MMOL/L (ref 132–146)
SPECIMEN DESCRIPTION: NORMAL
WBC OTHER # BLD: 18.1 K/UL (ref 4.5–11.5)

## 2024-11-26 PROCEDURE — 85652 RBC SED RATE AUTOMATED: CPT

## 2024-11-26 PROCEDURE — 2140000000 HC CCU INTERMEDIATE R&B

## 2024-11-26 PROCEDURE — 99232 SBSQ HOSP IP/OBS MODERATE 35: CPT | Performed by: INTERNAL MEDICINE

## 2024-11-26 PROCEDURE — 6370000000 HC RX 637 (ALT 250 FOR IP): Performed by: NURSE PRACTITIONER

## 2024-11-26 PROCEDURE — 82330 ASSAY OF CALCIUM: CPT

## 2024-11-26 PROCEDURE — 83970 ASSAY OF PARATHORMONE: CPT

## 2024-11-26 PROCEDURE — 84145 PROCALCITONIN (PCT): CPT

## 2024-11-26 PROCEDURE — 2580000003 HC RX 258: Performed by: NURSE PRACTITIONER

## 2024-11-26 PROCEDURE — 82947 ASSAY GLUCOSE BLOOD QUANT: CPT

## 2024-11-26 PROCEDURE — 0202U NFCT DS 22 TRGT SARS-COV-2: CPT

## 2024-11-26 PROCEDURE — 6370000000 HC RX 637 (ALT 250 FOR IP): Performed by: INTERNAL MEDICINE

## 2024-11-26 PROCEDURE — 83735 ASSAY OF MAGNESIUM: CPT

## 2024-11-26 PROCEDURE — 36415 COLL VENOUS BLD VENIPUNCTURE: CPT

## 2024-11-26 PROCEDURE — 80053 COMPREHEN METABOLIC PANEL: CPT

## 2024-11-26 PROCEDURE — 84100 ASSAY OF PHOSPHORUS: CPT

## 2024-11-26 PROCEDURE — 86140 C-REACTIVE PROTEIN: CPT

## 2024-11-26 PROCEDURE — 6370000000 HC RX 637 (ALT 250 FOR IP): Performed by: SURGERY

## 2024-11-26 PROCEDURE — 87040 BLOOD CULTURE FOR BACTERIA: CPT

## 2024-11-26 PROCEDURE — 90935 HEMODIALYSIS ONE EVALUATION: CPT

## 2024-11-26 PROCEDURE — 85025 COMPLETE CBC W/AUTO DIFF WBC: CPT

## 2024-11-26 RX ORDER — INSULIN LISPRO 100 [IU]/ML
4 INJECTION, SOLUTION INTRAVENOUS; SUBCUTANEOUS
Status: DISCONTINUED | OUTPATIENT
Start: 2024-11-26 | End: 2024-11-28 | Stop reason: HOSPADM

## 2024-11-26 RX ORDER — LORAZEPAM 0.5 MG/1
0.5 TABLET ORAL EVERY 6 HOURS PRN
Status: DISCONTINUED | OUTPATIENT
Start: 2024-11-26 | End: 2024-11-28 | Stop reason: HOSPADM

## 2024-11-26 RX ADMIN — Medication 10 MG: at 20:53

## 2024-11-26 RX ADMIN — SODIUM CHLORIDE, PRESERVATIVE FREE 10 ML: 5 INJECTION INTRAVENOUS at 20:54

## 2024-11-26 RX ADMIN — INSULIN GLARGINE 12 UNITS: 100 INJECTION, SOLUTION SUBCUTANEOUS at 11:18

## 2024-11-26 RX ADMIN — SODIUM CHLORIDE, PRESERVATIVE FREE 10 ML: 5 INJECTION INTRAVENOUS at 11:19

## 2024-11-26 RX ADMIN — SEVELAMER CARBONATE 800 MG: 800 TABLET, FILM COATED ORAL at 11:17

## 2024-11-26 RX ADMIN — LACTULOSE 20 G: 20 SOLUTION ORAL at 20:53

## 2024-11-26 RX ADMIN — Medication 100 MG: at 11:17

## 2024-11-26 RX ADMIN — SEVELAMER CARBONATE 800 MG: 800 TABLET, FILM COATED ORAL at 16:43

## 2024-11-26 RX ADMIN — Medication 1 MG: at 11:17

## 2024-11-26 RX ADMIN — ATORVASTATIN CALCIUM 10 MG: 20 TABLET, FILM COATED ORAL at 20:53

## 2024-11-26 RX ADMIN — DOCUSATE SODIUM 100 MG: 100 CAPSULE, LIQUID FILLED ORAL at 20:53

## 2024-11-26 RX ADMIN — DOCUSATE SODIUM 100 MG: 100 CAPSULE, LIQUID FILLED ORAL at 11:17

## 2024-11-26 RX ADMIN — ACETAMINOPHEN 1000 MG: 500 TABLET ORAL at 06:06

## 2024-11-26 RX ADMIN — METOPROLOL TARTRATE 50 MG: 50 TABLET, FILM COATED ORAL at 20:53

## 2024-11-26 RX ADMIN — ASPIRIN 81 MG CHEWABLE TABLET 81 MG: 81 TABLET CHEWABLE at 11:16

## 2024-11-26 RX ADMIN — INSULIN LISPRO 4 UNITS: 100 INJECTION, SOLUTION INTRAVENOUS; SUBCUTANEOUS at 20:54

## 2024-11-26 RX ADMIN — INSULIN LISPRO 4 UNITS: 100 INJECTION, SOLUTION INTRAVENOUS; SUBCUTANEOUS at 18:37

## 2024-11-26 RX ADMIN — METOPROLOL TARTRATE 50 MG: 50 TABLET, FILM COATED ORAL at 11:17

## 2024-11-26 RX ADMIN — APIXABAN 5 MG: 5 TABLET, FILM COATED ORAL at 20:54

## 2024-11-26 RX ADMIN — LORAZEPAM 0.5 MG: 0.5 TABLET ORAL at 16:03

## 2024-11-26 RX ADMIN — LORAZEPAM 0.5 MG: 0.5 TABLET ORAL at 23:54

## 2024-11-26 RX ADMIN — APIXABAN 5 MG: 5 TABLET, FILM COATED ORAL at 11:17

## 2024-11-26 RX ADMIN — LACTULOSE 20 G: 20 SOLUTION ORAL at 11:18

## 2024-11-26 RX ADMIN — POLYETHYLENE GLYCOL 3350 17 G: 17 POWDER, FOR SOLUTION ORAL at 11:17

## 2024-11-26 RX ADMIN — INSULIN LISPRO 16 UNITS: 100 INJECTION, SOLUTION INTRAVENOUS; SUBCUTANEOUS at 16:42

## 2024-11-26 ASSESSMENT — PAIN SCALES - GENERAL
PAINLEVEL_OUTOF10: 0

## 2024-11-26 NOTE — PROGRESS NOTES
sodium  100 mg Oral BID    polyethylene glycol  17 g Oral Daily    bisacodyl  10 mg Rectal Daily    metoprolol tartrate  50 mg Oral BID    thiamine  100 mg Oral Daily    folic acid  1 mg Oral Daily    melatonin  10 mg Oral Nightly    sodium chloride flush  5-40 mL IntraVENous 2 times per day    insulin glargine  12 Units SubCUTAneous QAM    aspirin  81 mg Oral Daily    acetaminophen  1,000 mg Oral 3 times per day    insulin lispro  0-16 Units SubCUTAneous Q4H       MEDS (infusions):   sodium chloride      dextrose         MEDS (prn):  LORazepam, sodium chloride flush, sodium chloride, magnesium sulfate, ondansetron **OR** ondansetron, glucose, dextrose bolus **OR** dextrose bolus, glucagon (rDNA), dextrose, oxyCODONE **OR** oxyCODONE, HYDROmorphone    PHYSICAL EXAM:     Patient Vitals for the past 24 hrs:   BP Temp Temp src Pulse Resp SpO2 Weight   11/26/24 1535 120/65 98 °F (36.7 °C) Temporal 95 18 90 % --   11/26/24 1100 119/87 97.6 °F (36.4 °C) Temporal (!) 104 19 94 % --   11/26/24 1034 (!) 147/80 97.3 °F (36.3 °C) -- 94 16 -- 100.6 kg (221 lb 12.5 oz)   11/26/24 0458 -- -- -- -- -- -- 100.6 kg (221 lb 12.8 oz)   11/26/24 0453 134/85 98.2 °F (36.8 °C) Oral 96 18 98 % --   11/25/24 2358 (!) 144/65 100.1 °F (37.8 °C) Temporal (!) 104 18 93 % --   11/25/24 2035 (!) 152/68 -- -- (!) 112 -- -- --   11/25/24 1909 138/67 97.8 °F (36.6 °C) Temporal (!) 108 18 100 % --   @      Intake/Output Summary (Last 24 hours) at 11/26/2024 1636  Last data filed at 11/26/2024 1034  Gross per 24 hour   Intake 540 ml   Output 1500 ml   Net -960 ml         Wt Readings from Last 3 Encounters:   11/26/24 100.6 kg (221 lb 12.5 oz)   11/11/24 108.9 kg (240 lb)   10/01/24 108.9 kg (240 lb)       Constitutional:  in no acute distress  HEENT: NC/AT, EOMI, sclera and conjunctiva are clear and anicteric, mucus membranes moist  Neck: Trachea midline, no JVD, temporary HD catheter   Cardiovascular: S1, S2 regular rhythm, no murmur,or  rub  Respiratory:  CTAB, diminished in the bases. No crackles, no wheeze  Gastrointestinal:  Soft, nontender, nondistended, NABS  Ext: Right AKA, sutures intact, LLE dressed, trace edema, feet warm  Skin: dry, no rash  Neuro: awake, alert, interactive      DATA:    Recent Labs     11/24/24 0628 11/25/24 0628 11/26/24 0622   WBC 25.2* 17.5* 18.1*   HGB 8.5* 8.4* 7.7*   HCT 27.3* 26.5* 24.3*   MCV 98.6 98.1 98.0   * 474* 509*     Recent Labs     11/24/24 0628 11/25/24 0628 11/26/24 0622    132 132   K 4.4 4.7 5.0   CL 93* 92* 90*   CO2 24 26 22   MG 2.2 2.0 2.1   PHOS 4.5 3.5 4.8*   BUN 64* 44* 67*   CREATININE 7.9* 5.8* 8.1*   ALT <5 <5 6   AST 41* 36 46*   BILITOT 0.2 0.2 0.2   ALKPHOS 153* 145* 131*       No results found for: \"LABPROT\"    Assessment  -Acute kidney injury nonoliguric in the setting of bilateral renal infarct and bilateral common femoral artery occlusions along with severe rhabdomyolysis  -Bilateral femoral artery occlusions status post bilateral fasciotomy  -For above-knee amputation on the right side  -Hyperkalemia  -Mild lactic acidosis  -Extensive atherosclerotic disease and the patient was actively smoking  History of coronary artery disease status post bypass 2 years ago    Remains oligoanuric  BP stable  Heart rate improved, metoprolol dose increased   S/p Right above knee amputation   No current signs of renal recovery   S/p TDC placement     Recommendations:  -Continue IHD support for solute and volume clearance as ordered - dialysis today  -External vargas catheter  -Retacrit  -Sevelamer  -Avoid nephrotoxins as able  -Monitor labs  -Monitor I & O  -No current signs of renal recovery, will continue to follow closely   -Vascular surgery on board       Electronically signed by NUZHAT Ray - CNP on 11/26/2024 at 4:36 PM

## 2024-11-26 NOTE — PLAN OF CARE
Problem: Chronic Conditions and Co-morbidities  Goal: Patient's chronic conditions and co-morbidity symptoms are monitored and maintained or improved  11/26/2024 0812 by Emilie Obrien RN  Outcome: Progressing     Problem: Discharge Planning  Goal: Discharge to home or other facility with appropriate resources  11/26/2024 0812 by Emilie Obrien RN  Outcome: Progressing     Problem: Pain  Goal: Verbalizes/displays adequate comfort level or baseline comfort level  11/26/2024 0812 by Emilie Obrien RN  Outcome: Progressing     Problem: Safety - Adult  Goal: Free from fall injury  11/26/2024 0812 by Emilie Obrien RN  Outcome: Progressing     Problem: Skin/Tissue Integrity  Goal: Absence of new skin breakdown  Description: 1.  Monitor for areas of redness and/or skin breakdown  2.  Assess vascular access sites hourly  3.  Every 4-6 hours minimum:  Change oxygen saturation probe site  4.  Every 4-6 hours:  If on nasal continuous positive airway pressure, respiratory therapy assess nares and determine need for appliance change or resting period.  11/26/2024 0812 by Emilie Obrien RN  Outcome: Progressing

## 2024-11-26 NOTE — CARE COORDINATION
CM Update: Met with patient at bedside to discuss transition of care plan. Discharge plan is LTAC vs SNF.    LTAC: [facility] accepted patient and started precert 11/25. Checked with Alexey and the precert is still pending.    SNF: [facility] is the backup choice.     Background: Patient presented to [facility] with chief complaint of leg pain. Was transferred to [facility] for Vascular consult. Found to have bilateral femoral artery occlusion. Patient had right above the knee amputation, Fasciotomy. Patient is an HD patient. CM/SW to follow. MT

## 2024-11-26 NOTE — PLAN OF CARE
Problem: Chronic Conditions and Co-morbidities  Goal: Patient's chronic conditions and co-morbidity symptoms are monitored and maintained or improved  11/25/2024 2243 by Marycruz Raza RN  Outcome: Progressing     Problem: Discharge Planning  Goal: Discharge to home or other facility with appropriate resources  11/25/2024 2243 by Marycruz Raza RN  Outcome: Progressing     Problem: Pain  Goal: Verbalizes/displays adequate comfort level or baseline comfort level  11/25/2024 2243 by Marycruz Raza RN  Outcome: Progressing     Problem: Safety - Adult  Goal: Free from fall injury  11/25/2024 2243 by Marycruz Raza RN  Outcome: Progressing     Problem: Skin/Tissue Integrity  Goal: Absence of new skin breakdown  Description: 1.  Monitor for areas of redness and/or skin breakdown  2.  Assess vascular access sites hourly  3.  Every 4-6 hours minimum:  Change oxygen saturation probe site  4.  Every 4-6 hours:  If on nasal continuous positive airway pressure, respiratory therapy assess nares and determine need for appliance change or resting period.  11/25/2024 2243 by Marycruz Raza RN  Outcome: Progressing     Problem: ABCDS Injury Assessment  Goal: Absence of physical injury  11/25/2024 2243 by Marycruz Raza RN  Outcome: Progressing     Problem: Spiritual Struggle  Goal: Verbalizes spiritual struggle  11/25/2024 2243 by Marycruz Raza RN  Outcome: Progressing     Problem: Skin/Tissue Integrity - Adult  Goal: Skin integrity remains intact  11/25/2024 2243 by Marycruz Raza RN  Outcome: Progressing     Problem: Musculoskeletal - Adult  Goal: Return mobility to safest level of function  Outcome: Progressing     Problem: Metabolic/Fluid and Electrolytes - Adult  Goal: Electrolytes maintained within normal limits  11/25/2024 2243 by Marycruz Raza RN  Outcome: Progressing     Problem: Hematologic - Adult  Goal: Maintains hematologic stability  11/25/2024 2243 by Marycruz Raza RN  Outcome: Progressing     Problem: Nutrition Deficit:  Goal:

## 2024-11-26 NOTE — PROGRESS NOTES
Reached out to Alexey Christiansen re: \"Hello, pt went down to dialysis and once he got there he's been in afib/RVR. Rate was 140s-150s sustaining for about 5 minutes. Rate is more controlled now, high 90s to low 100s but still a fib.\"  Instructed to notify attending after 7

## 2024-11-26 NOTE — FLOWSHEET NOTE
11/26/24 1034   Vital Signs   BP (!) 147/80   Temp 97.3 °F (36.3 °C)   Pulse 94   Respirations 16   Weight - Scale 100.6 kg (221 lb 12.5 oz)   Weight Method Bed scale   Percent Weight Change -0.01   Post-Hemodialysis Assessment   Post-Treatment Procedures Blood returned;Catheter capped, clamped and heparinized x 2 ports   Machine Disinfection Process Exterior Machine Disinfection   Rinseback Volume (ml) 300 ml   Blood Volume Processed (Liters) 88.8 L   Dialyzer Clearance Lightly streaked   Duration of Treatment (minutes) 240 minutes   Heparin Amount Administered During Treatment (mL) 0 mL   Hemodialysis Intake (ml) 300 ml   Hemodialysis Output (ml) 1500 ml   NET Removed (ml) 1200   Tolerated Treatment Good   Patient Response to Treatment tolerated well, blood returned, cath care per policy/procedure, lines flushed, heparin instilled ports capped

## 2024-11-26 NOTE — PROGRESS NOTES
Vascular Surgery Progress Note    Pt is being seen in f/u today regarding bilateral femoral embolectomies     Subjective  Afib with RVR intermittently this AM. Dressing change completed on rounds. Doing well otherwise. Patient goes in and out of sleep during encounter.     Current Medications:    sodium chloride      dextrose        LORazepam **OR** LORazepam **OR** LORazepam **OR** LORazepam **OR** LORazepam **OR** LORazepam **OR** LORazepam **OR** LORazepam, sodium chloride flush, sodium chloride, magnesium sulfate, ondansetron **OR** ondansetron, glucose, dextrose bolus **OR** dextrose bolus, glucagon (rDNA), dextrose, oxyCODONE **OR** oxyCODONE, HYDROmorphone    apixaban  5 mg Oral BID    sevelamer  800 mg Oral TID WC    epoetin noe-epbx  3,000 Units SubCUTAneous Once per day on Monday Wednesday Friday    lactulose  20 g Oral BID    atorvastatin  10 mg Oral Nightly    docusate sodium  100 mg Oral BID    polyethylene glycol  17 g Oral Daily    bisacodyl  10 mg Rectal Daily    metoprolol tartrate  50 mg Oral BID    thiamine  100 mg Oral Daily    folic acid  1 mg Oral Daily    melatonin  10 mg Oral Nightly    sodium chloride flush  5-40 mL IntraVENous 2 times per day    insulin glargine  12 Units SubCUTAneous QAM    aspirin  81 mg Oral Daily    acetaminophen  1,000 mg Oral 3 times per day    insulin lispro  0-16 Units SubCUTAneous Q4H        PHYSICAL EXAM:    /85   Pulse 96   Temp 98.2 °F (36.8 °C) (Oral)   Resp 18   Ht 1.702 m (5' 7\")   Wt 100.6 kg (221 lb 12.8 oz)   SpO2 98%   BMI 34.74 kg/m²     Intake/Output Summary (Last 24 hours) at 11/26/2024 0747  Last data filed at 11/25/2024 1735  Gross per 24 hour   Intake 720 ml   Output 0 ml   Net 720 ml          Gen: awake, no apparent distress, lethargic  CVS: RRR, tunneled line to R chest  Resp: No increased work of breathing  Abd: Soft, non-tender, non-distended  R LE: AKA with staples clean/dry/intact, hematoma improving.  Right groin incision with

## 2024-11-26 NOTE — PROGRESS NOTES
[facility] Hospitalist Progress Note    Admitting Date and Time: 11/12/2024  2:39 AM  Admit Dx: 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]  Atrial fibrillation, unspecified type (HCC) [I48.91]    Synopsis:  Presented to ER due to concern for leg pain and shortness of breath.   He was found to have lactic acidosis on presentation of 3.9.  Leukocytosis of 16,500.  Blood pressure on presentation of 119/75.  CT abdomen with aorta with bilateral runoff with contrast was obtained and showed severe atherosclerotic disease with common femoral arterial occlusions with some reconstitution in the superficial femoral and popliteal location but without significant runoff in the lower extremities, bilateral large renal infarcts.  He was started on heparin by weight.  Patient was seen by vascular surgery and taken straight to the OR.  Right AKA was done 11/12.  Patient was in the SICU for further monitoring.  At the time there was also some concern for alcohol withdrawals and he was started on Precedex.  Patient was transferred out of the ICU 11/20 and medial fasciotomy closure 11/21.  Developed ANTIONE in the setting of bilateral renal infarcts.  Nephrology is following and patient was initiated on IHD status post TDC placement.    Subjective:    Feeling better today  Less jittery  Denies any cough, colds, sore throat,   Pain in surgical site is tolerable.      ROS: denies fever, chills, cp, sob, n/v, HA unless stated above.      apixaban  5 mg Oral BID    sevelamer  800 mg Oral TID WC    epoetin noe-epbx  3,000 Units SubCUTAneous Once per day on Monday Wednesday Friday    lactulose  20 g Oral BID    atorvastatin  10 mg Oral Nightly    docusate sodium  100 mg Oral BID    polyethylene glycol  17 g Oral Daily    bisacodyl  10 mg Rectal Daily    metoprolol tartrate  50 mg Oral BID    thiamine  100 mg Oral Daily    folic acid  1 mg Oral Daily    melatonin  10 mg Oral Nightly    sodium chloride flush  5-40 mL IntraVENous 2 times    * 474* 509*   MPV 10.2 10.0 9.9       Radiology: Reviewed    Assessment:    Principal Problem:    Femoral artery occlusion (HCC)  Active Problems:    Acute lower limb ischemia    ANTIONE (acute kidney injury) (HCC)    Non-traumatic rhabdomyolysis  Resolved Problems:    * No resolved hospital problems. *    Assessment and plan:    Acute limb ischemia, Bilateral embolic femoral artery occlusion s/p bilateral femoral embolectomies with fasciotomies 11/12, subsequent right AKA 11/12, medial fasciotomy closure and lateral fasciotomy grafting 11/21. -Vascular surgery following, pain control, on aspirin and Lipitor  ANTIONE secondary to bilateral renal infarct  -nephrology following, patient initiated on IHD with TDC 11/20 during this admission.  Diabetes mellitus  Hyperkalemia, resolved  Newly diagnosed Atrial Flutter/Fib cardiology was consulted, sob A-fib likely due to thrombotic events.  Currently on Eliquis and metoprolol.  Alcoholism -previously on Precedex, now with improved mental status.  Continue on thiamine  10.  Leukocytosis- Possibly reactive although it continues to uptrend as high as 25>17>18 today.  With no fever, blood cultures, chest x-ray, viral panel negative. Will get ID on board.    Disposition: Leukocytosis with slight uptrend today, precert pending.     NOTE: This report was transcribed using voice recognition software. Every effort was made to ensure accuracy; however, inadvertent computerized transcription errors may be present.  Electronically signed by Katherine Patel MD on 11/26/2024 at 1:37 PM

## 2024-11-27 ENCOUNTER — APPOINTMENT (OUTPATIENT)
Dept: CT IMAGING | Age: 64
DRG: 239 | End: 2024-11-27
Attending: INTERNAL MEDICINE
Payer: COMMERCIAL

## 2024-11-27 DIAGNOSIS — E11.9 TYPE 2 DIABETES MELLITUS WITHOUT COMPLICATION, WITH LONG-TERM CURRENT USE OF INSULIN (MULTI): Primary | ICD-10-CM

## 2024-11-27 DIAGNOSIS — Z79.4 TYPE 2 DIABETES MELLITUS WITHOUT COMPLICATION, WITH LONG-TERM CURRENT USE OF INSULIN (MULTI): Primary | ICD-10-CM

## 2024-11-27 LAB
ALBUMIN SERPL-MCNC: 2.8 G/DL (ref 3.5–5.2)
ALP SERPL-CCNC: 114 U/L (ref 40–129)
ALT SERPL-CCNC: 9 U/L (ref 0–40)
ANION GAP SERPL CALCULATED.3IONS-SCNC: 13 MMOL/L (ref 7–16)
AST SERPL-CCNC: 47 U/L (ref 0–39)
BASOPHILS # BLD: 0 K/UL (ref 0–0.2)
BASOPHILS NFR BLD: 0 % (ref 0–2)
BILIRUB SERPL-MCNC: 0.2 MG/DL (ref 0–1.2)
BUN SERPL-MCNC: 45 MG/DL (ref 6–23)
CA-I BLD-SCNC: 1.15 MMOL/L (ref 1.15–1.33)
CALCIUM SERPL-MCNC: 8.5 MG/DL (ref 8.6–10.2)
CHLORIDE SERPL-SCNC: 89 MMOL/L (ref 98–107)
CO2 SERPL-SCNC: 28 MMOL/L (ref 22–29)
CREAT SERPL-MCNC: 6 MG/DL (ref 0.7–1.2)
EKG ATRIAL RATE: 288 BPM
EKG P AXIS: -77 DEGREES
EKG Q-T INTERVAL: 304 MS
EKG QRS DURATION: 106 MS
EKG QTC CALCULATION (BAZETT): 441 MS
EKG R AXIS: -49 DEGREES
EKG T AXIS: 65 DEGREES
EKG VENTRICULAR RATE: 127 BPM
EOSINOPHIL # BLD: 0 K/UL (ref 0.05–0.5)
EOSINOPHILS RELATIVE PERCENT: 0 % (ref 0–6)
ERYTHROCYTE [DISTWIDTH] IN BLOOD BY AUTOMATED COUNT: 14.5 % (ref 11.5–15)
GFR, ESTIMATED: 10 ML/MIN/1.73M2
GLUCOSE BLD-MCNC: 178 MG/DL (ref 74–99)
GLUCOSE BLD-MCNC: 187 MG/DL (ref 74–99)
GLUCOSE BLD-MCNC: 199 MG/DL (ref 74–99)
GLUCOSE BLD-MCNC: 234 MG/DL (ref 74–99)
GLUCOSE SERPL-MCNC: 179 MG/DL (ref 74–99)
HCT VFR BLD AUTO: 24.2 % (ref 37–54)
HGB BLD-MCNC: 7.6 G/DL (ref 12.5–16.5)
LACTATE BLDV-SCNC: 1.2 MMOL/L (ref 0.5–2.2)
LYMPHOCYTES NFR BLD: 1.82 K/UL (ref 1.5–4)
LYMPHOCYTES RELATIVE PERCENT: 11 % (ref 20–42)
MAGNESIUM SERPL-MCNC: 2.3 MG/DL (ref 1.6–2.6)
MCH RBC QN AUTO: 30.9 PG (ref 26–35)
MCHC RBC AUTO-ENTMCNC: 31.4 G/DL (ref 32–34.5)
MCV RBC AUTO: 98.4 FL (ref 80–99.9)
MONOCYTES NFR BLD: 1.26 K/UL (ref 0.1–0.95)
MONOCYTES NFR BLD: 8 % (ref 2–12)
NEUTROPHILS NFR BLD: 81 % (ref 43–80)
NEUTS SEG NFR BLD: 13.02 K/UL (ref 1.8–7.3)
PHOSPHATE SERPL-MCNC: 3.8 MG/DL (ref 2.5–4.5)
PLATELET # BLD AUTO: 489 K/UL (ref 130–450)
PMV BLD AUTO: 9.7 FL (ref 7–12)
POTASSIUM SERPL-SCNC: 5.2 MMOL/L (ref 3.5–5)
PROT SERPL-MCNC: 6.2 G/DL (ref 6.4–8.3)
RBC # BLD AUTO: 2.46 M/UL (ref 3.8–5.8)
RBC # BLD: ABNORMAL 10*6/UL
SODIUM SERPL-SCNC: 130 MMOL/L (ref 132–146)
WBC OTHER # BLD: 16.1 K/UL (ref 4.5–11.5)

## 2024-11-27 PROCEDURE — 83735 ASSAY OF MAGNESIUM: CPT

## 2024-11-27 PROCEDURE — 36415 COLL VENOUS BLD VENIPUNCTURE: CPT

## 2024-11-27 PROCEDURE — 71250 CT THORAX DX C-: CPT

## 2024-11-27 PROCEDURE — 6370000000 HC RX 637 (ALT 250 FOR IP): Performed by: NURSE PRACTITIONER

## 2024-11-27 PROCEDURE — 73700 CT LOWER EXTREMITY W/O DYE: CPT

## 2024-11-27 PROCEDURE — 85025 COMPLETE CBC W/AUTO DIFF WBC: CPT

## 2024-11-27 PROCEDURE — 84100 ASSAY OF PHOSPHORUS: CPT

## 2024-11-27 PROCEDURE — 93010 ELECTROCARDIOGRAM REPORT: CPT | Performed by: INTERNAL MEDICINE

## 2024-11-27 PROCEDURE — 2140000000 HC CCU INTERMEDIATE R&B

## 2024-11-27 PROCEDURE — 82330 ASSAY OF CALCIUM: CPT

## 2024-11-27 PROCEDURE — 2580000003 HC RX 258

## 2024-11-27 PROCEDURE — 80053 COMPREHEN METABOLIC PANEL: CPT

## 2024-11-27 PROCEDURE — 83605 ASSAY OF LACTIC ACID: CPT

## 2024-11-27 PROCEDURE — 2580000003 HC RX 258: Performed by: INTERNAL MEDICINE

## 2024-11-27 PROCEDURE — 6360000002 HC RX W HCPCS: Performed by: INTERNAL MEDICINE

## 2024-11-27 PROCEDURE — 2500000003 HC RX 250 WO HCPCS

## 2024-11-27 PROCEDURE — 99232 SBSQ HOSP IP/OBS MODERATE 35: CPT | Performed by: INTERNAL MEDICINE

## 2024-11-27 PROCEDURE — 6370000000 HC RX 637 (ALT 250 FOR IP): Performed by: INTERNAL MEDICINE

## 2024-11-27 PROCEDURE — 2580000003 HC RX 258: Performed by: NURSE PRACTITIONER

## 2024-11-27 PROCEDURE — 93005 ELECTROCARDIOGRAM TRACING: CPT | Performed by: INTERNAL MEDICINE

## 2024-11-27 PROCEDURE — 82947 ASSAY GLUCOSE BLOOD QUANT: CPT

## 2024-11-27 PROCEDURE — 74150 CT ABDOMEN W/O CONTRAST: CPT

## 2024-11-27 PROCEDURE — 90935 HEMODIALYSIS ONE EVALUATION: CPT

## 2024-11-27 PROCEDURE — 6370000000 HC RX 637 (ALT 250 FOR IP): Performed by: SURGERY

## 2024-11-27 RX ORDER — INSULIN LISPRO 100 [IU]/ML
0-16 INJECTION, SOLUTION INTRAVENOUS; SUBCUTANEOUS EVERY 4 HOURS
DISCHARGE
Start: 2024-11-27 | End: 2024-12-05 | Stop reason: ALTCHOICE

## 2024-11-27 RX ORDER — FOLIC ACID 1 MG/1
1 TABLET ORAL DAILY
Status: ON HOLD | DISCHARGE
Start: 2024-11-28

## 2024-11-27 RX ORDER — INSULIN LISPRO 100 [IU]/ML
4 INJECTION, SOLUTION INTRAVENOUS; SUBCUTANEOUS
DISCHARGE
Start: 2024-11-27 | End: 2024-12-05 | Stop reason: ALTCHOICE

## 2024-11-27 RX ORDER — LORAZEPAM 0.5 MG/1
0.5 TABLET ORAL EVERY 6 HOURS PRN
Status: SHIPPED | DISCHARGE
Start: 2024-11-27 | End: 2024-12-05 | Stop reason: ALTCHOICE

## 2024-11-27 RX ORDER — ATORVASTATIN CALCIUM 10 MG/1
10 TABLET, FILM COATED ORAL NIGHTLY
Status: ON HOLD | DISCHARGE
Start: 2024-11-27

## 2024-11-27 RX ORDER — SEVELAMER CARBONATE 800 MG/1
800 TABLET, FILM COATED ORAL
Status: ON HOLD | DISCHARGE
Start: 2024-11-27

## 2024-11-27 RX ORDER — METOPROLOL TARTRATE 1 MG/ML
5 INJECTION, SOLUTION INTRAVENOUS ONCE
Status: COMPLETED | OUTPATIENT
Start: 2024-11-27 | End: 2024-11-27

## 2024-11-27 RX ORDER — PSEUDOEPHEDRINE HCL 30 MG
100 TABLET ORAL 2 TIMES DAILY
DISCHARGE
Start: 2024-11-27 | End: 2024-12-05 | Stop reason: ALTCHOICE

## 2024-11-27 RX ORDER — 0.9 % SODIUM CHLORIDE 0.9 %
250 INTRAVENOUS SOLUTION INTRAVENOUS ONCE
Status: COMPLETED | OUTPATIENT
Start: 2024-11-27 | End: 2024-11-27

## 2024-11-27 RX ORDER — INSULIN GLARGINE 100 [IU]/ML
12 INJECTION, SOLUTION SUBCUTANEOUS EVERY MORNING
DISCHARGE
Start: 2024-11-28 | End: 2024-12-05 | Stop reason: ALTCHOICE

## 2024-11-27 RX ORDER — OXYCODONE HYDROCHLORIDE 5 MG/1
5 TABLET ORAL EVERY 6 HOURS PRN
Status: SHIPPED | DISCHARGE
Start: 2024-11-27 | End: 2024-11-30

## 2024-11-27 RX ORDER — LANOLIN ALCOHOL/MO/W.PET/CERES
100 CREAM (GRAM) TOPICAL DAILY
Status: ON HOLD | DISCHARGE
Start: 2024-11-28

## 2024-11-27 RX ADMIN — Medication 10 MG: at 20:10

## 2024-11-27 RX ADMIN — DOCUSATE SODIUM 100 MG: 100 CAPSULE, LIQUID FILLED ORAL at 20:11

## 2024-11-27 RX ADMIN — PIPERACILLIN AND TAZOBACTAM 4500 MG: 4; .5 INJECTION, POWDER, FOR SOLUTION INTRAVENOUS at 16:58

## 2024-11-27 RX ADMIN — METOPROLOL TARTRATE 5 MG: 5 INJECTION INTRAVENOUS at 05:39

## 2024-11-27 RX ADMIN — APIXABAN 5 MG: 5 TABLET, FILM COATED ORAL at 20:11

## 2024-11-27 RX ADMIN — ATORVASTATIN CALCIUM 10 MG: 20 TABLET, FILM COATED ORAL at 20:11

## 2024-11-27 RX ADMIN — METOPROLOL TARTRATE 50 MG: 50 TABLET, FILM COATED ORAL at 08:48

## 2024-11-27 RX ADMIN — ACETAMINOPHEN 1000 MG: 500 TABLET ORAL at 20:11

## 2024-11-27 RX ADMIN — SODIUM CHLORIDE 250 ML: 9 INJECTION, SOLUTION INTRAVENOUS at 05:00

## 2024-11-27 RX ADMIN — PIPERACILLIN AND TAZOBACTAM 4500 MG: 4; .5 INJECTION, POWDER, FOR SOLUTION INTRAVENOUS at 20:21

## 2024-11-27 RX ADMIN — INSULIN GLARGINE 12 UNITS: 100 INJECTION, SOLUTION SUBCUTANEOUS at 08:49

## 2024-11-27 RX ADMIN — Medication 100 MG: at 08:48

## 2024-11-27 RX ADMIN — DOCUSATE SODIUM 100 MG: 100 CAPSULE, LIQUID FILLED ORAL at 08:48

## 2024-11-27 RX ADMIN — LACTULOSE 20 G: 20 SOLUTION ORAL at 20:10

## 2024-11-27 RX ADMIN — INSULIN LISPRO 4 UNITS: 100 INJECTION, SOLUTION INTRAVENOUS; SUBCUTANEOUS at 20:11

## 2024-11-27 RX ADMIN — Medication 1 MG: at 08:48

## 2024-11-27 RX ADMIN — SODIUM CHLORIDE, PRESERVATIVE FREE 10 ML: 5 INJECTION INTRAVENOUS at 20:12

## 2024-11-27 RX ADMIN — METOPROLOL TARTRATE 50 MG: 50 TABLET, FILM COATED ORAL at 20:10

## 2024-11-27 RX ADMIN — INSULIN LISPRO 4 UNITS: 100 INJECTION, SOLUTION INTRAVENOUS; SUBCUTANEOUS at 08:51

## 2024-11-27 RX ADMIN — LACTULOSE 20 G: 20 SOLUTION ORAL at 08:49

## 2024-11-27 RX ADMIN — LORAZEPAM 0.5 MG: 0.5 TABLET ORAL at 20:11

## 2024-11-27 RX ADMIN — INSULIN LISPRO 4 UNITS: 100 INJECTION, SOLUTION INTRAVENOUS; SUBCUTANEOUS at 04:30

## 2024-11-27 RX ADMIN — SODIUM CHLORIDE, PRESERVATIVE FREE 10 ML: 5 INJECTION INTRAVENOUS at 08:48

## 2024-11-27 RX ADMIN — ASPIRIN 81 MG CHEWABLE TABLET 81 MG: 81 TABLET CHEWABLE at 08:48

## 2024-11-27 RX ADMIN — APIXABAN 5 MG: 5 TABLET, FILM COATED ORAL at 08:48

## 2024-11-27 RX ADMIN — INSULIN LISPRO 4 UNITS: 100 INJECTION, SOLUTION INTRAVENOUS; SUBCUTANEOUS at 08:48

## 2024-11-27 RX ADMIN — SEVELAMER CARBONATE 800 MG: 800 TABLET, FILM COATED ORAL at 16:52

## 2024-11-27 RX ADMIN — EPOETIN ALFA-EPBX 3000 UNITS: 3000 INJECTION, SOLUTION INTRAVENOUS; SUBCUTANEOUS at 18:30

## 2024-11-27 RX ADMIN — INSULIN LISPRO 4 UNITS: 100 INJECTION, SOLUTION INTRAVENOUS; SUBCUTANEOUS at 16:53

## 2024-11-27 RX ADMIN — SEVELAMER CARBONATE 800 MG: 800 TABLET, FILM COATED ORAL at 08:48

## 2024-11-27 ASSESSMENT — PAIN SCALES - GENERAL: PAINLEVEL_OUTOF10: 0

## 2024-11-27 NOTE — DISCHARGE INSTR - COC
Continuity of Care Form    Patient Name: Himanshu Clay   :  1960  MRN:  [id number]    Admit date:  2024  Discharge date:  24    Code Status Order: Full Code   Advance Directives:   Advance Care Flowsheet Documentation             Admitting Physician:  Billy Simental MD  PCP: Franky Montana DO    Discharging Nurse: Alona   Discharging Hospital Unit/Room#: 6515/6515-A  Discharging Unit Phone Number: [phone]    Emergency Contact:   Extended Emergency Contact Information  Primary Emergency Contact: jordan clay  Home Phone: [phone]  Mobile Phone: [phone]  Relation: Brother/Sister   needed? No  Secondary Emergency Contact: Schwab,Lisa  Mobile Phone: [phone]  Relation: Brother/Sister  Preferred language: English    Past Surgical History:  Past Surgical History:   Procedure Laterality Date    CARDIAC CATHETERIZATION      2 stents    CARDIAC SURGERY      minimally invasive    CLAVICLE SURGERY Left     COLONOSCOPY N/A 2024    COLONOSCOPY WITH BIOPSY performed by Arnold Ross MD at [facility]    IR TUNNELED CATHETER PLACEMENT GREATER THAN 5 YEARS  2024    IR TUNNELED CATHETER PLACEMENT GREATER THAN 5 YEARS 2024 Stephania Valle MD [facility] SPECIAL PROCEDURES    LEG SURGERY Right 2024    RIGHT LEG AMPUTATION ABOVE KNEE performed by Jasmyne Henriquez MD at [facility] [place]    PTCA      VASCULAR SURGERY Bilateral 2024    BILATERAL embolectomy, with bilateral 2 compartment fasciotomy performed by Jasmyne Henriquez MD at [facility] [place]       Immunization History:   Immunization History   Administered Date(s) Administered    COVID-19, PFIZER PURPLE top, DILUTE for use, (age 12 y+), 30mcg/0.3mL 2021, 2021, 2021, 2021    Influenza, FLUCELVAX, (age 6 mo+), MDCK, Quadv PF, 0.5mL 2022, 10/02/2023    Pneumococcal, PCV20, PREVNAR 20, (age 6w+), IM, 0.5mL 2022    Pneumococcal, PPSV23, PNEUMOVAX 23, (age 2y+), SC/IM, 0.5mL 2022        Active Problems:  Patient Active Problem List   Diagnosis Code    Type 2 diabetes mellitus without complication ([facility]) E11.9    Mixed hyperlipidemia E78.2    Tobacco abuse Z72.0    Cervical pain (neck) M54.2    DKA, type 2, not at goal ([facility]) E11.10    Diabetic ketoacidosis ([facility]) E11.10    Neck pain M54.2    Pneumonia J18.9    Diarrhea R19.7    Femoral artery occlusion ([facility]) I70.209    Acute lower limb ischemia I99.8    ANTIONE (acute kidney injury) ([facility]) N17.9    Non-traumatic rhabdomyolysis M62.82       Isolation/Infection:   Isolation            No Isolation          Patient Infection Status       None to display                     Nurse Assessment:  Last Vital Signs: BP (!) 101/55   Pulse 70   Temp 97.6 °F (36.4 °C) (Temporal)   Resp 17   Ht 1.702 m (5' 7\")   Wt 100.2 kg (220 lb 12.8 oz)   SpO2 96%   BMI 34.58 kg/m²     Last documented pain score (0-10 scale): Pain Level: 0  Last Weight:   Wt Readings from Last 1 Encounters:   11/27/24 100.2 kg (220 lb 12.8 oz)     Mental Status:  disoriented and oriented    IV Access:  - None    Nursing Mobility/ADLs:  Walking   Dependent  Transfer  Assisted  Bathing  Dependent  Dressing  Dependent  Toileting  Assisted  Feeding  Independent  Med Admin  Independent  Med Delivery   whole    Wound Care Documentation and Therapy:  Wound 11/19/24 Brachial Left;Proximal;Anterior (Active)   Wound Cleansed Cleansed with saline 11/27/24 0717   Dressing/Treatment Open to air 11/27/24 0717   Norma-wound Assessment Intact 11/27/24 0717   Number of days: 8       Incision 11/12/24 Femoral Proximal;Right;Anterior (Active)   Dressing Status Clean;Dry;Intact 11/20/24 0800   Incision Cleansed Wound cleanser 11/27/24 0717   Dressing/Treatment Open to air 11/27/24 0717   Closure Other (Comment) 11/18/24 2000   Margins Other (Comment) 11/18/24 2000   Incision Assessment Dry 11/27/24 0717   Drainage Amount Scant (moist but unmeasurable) 11/27/24 0717   Drainage Description Serous;Yellow

## 2024-11-27 NOTE — PROGRESS NOTES
Reached out to Alexey Christiansen NP re: \"Hi, pt's heart rate elevated again in the 120s-140s sustaining. Pt asymptomatic. I did an EKG and transmitted it, it says it shows a flutter. Pt currently in the 120s right now.\"  250cc bolus ordered and if SBP>100 after complete, instructed to give 5mg IV Lopressor.

## 2024-11-27 NOTE — PROGRESS NOTES
[facility] Hospitalist Progress Note    Admitting Date and Time: 11/12/2024  2:39 AM  Admit Dx: 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]  Atrial fibrillation, unspecified type (HCC) [I48.91]    Synopsis:  Presented to ER due to concern for leg pain and shortness of breath.   He was found to have lactic acidosis on presentation of 3.9.  Leukocytosis of 16,500.  Blood pressure on presentation of 119/75.  CT abdomen with aorta with bilateral runoff with contrast was obtained and showed severe atherosclerotic disease with common femoral arterial occlusions with some reconstitution in the superficial femoral and popliteal location but without significant runoff in the lower extremities, bilateral large renal infarcts.  He was started on heparin by weight.  Patient was seen by vascular surgery and taken straight to the OR.  Right AKA was done 11/12.  Patient was in the SICU for further monitoring.  At the time there was also some concern for alcohol withdrawals and he was started on Precedex.  Patient was transferred out of the ICU 11/20 and medial fasciotomy closure 11/21.  Developed ANTIONE in the setting of bilateral renal infarcts.  Nephrology is following and patient was initiated on IHD status post TDC placement.    Subjective:    Feeling better today  Less anxious, would like to go out of the hospital  Denies any cough, colds, sore throat,   Pain in surgical site is tolerable.      ROS: denies fever, chills, cp, sob, n/v, HA unless stated above.      insulin lispro  4 Units SubCUTAneous TID WC    apixaban  5 mg Oral BID    sevelamer  800 mg Oral TID WC    epoetin noe-epbx  3,000 Units SubCUTAneous Once per day on Monday Wednesday Friday    lactulose  20 g Oral BID    atorvastatin  10 mg Oral Nightly    docusate sodium  100 mg Oral BID    polyethylene glycol  17 g Oral Daily    bisacodyl  10 mg Rectal Daily    metoprolol tartrate  50 mg Oral BID    thiamine  100 mg Oral Daily    folic acid  1 mg Oral Daily     melatonin  10 mg Oral Nightly    sodium chloride flush  5-40 mL IntraVENous 2 times per day    insulin glargine  12 Units SubCUTAneous QAM    aspirin  81 mg Oral Daily    acetaminophen  1,000 mg Oral 3 times per day    insulin lispro  0-16 Units SubCUTAneous Q4H     LORazepam, 0.5 mg, Q6H PRN  sodium chloride flush, 5-40 mL, PRN  sodium chloride, , PRN  magnesium sulfate, 2,000 mg, PRN  ondansetron, 4 mg, Q8H PRN   Or  ondansetron, 4 mg, Q6H PRN  glucose, 4 tablet, PRN  dextrose bolus, 125 mL, PRN   Or  dextrose bolus, 250 mL, PRN  glucagon (rDNA), 1 mg, PRN  dextrose, , Continuous PRN  oxyCODONE, 5 mg, Q4H PRN   Or  oxyCODONE, 10 mg, Q4H PRN  HYDROmorphone, 0.5 mg, Q3H PRN         Objective:    /70   Pulse (!) 120   Temp 98.2 °F (36.8 °C) (Temporal)   Resp 17   Ht 1.702 m (5' 7\")   Wt 100.2 kg (220 lb 12.8 oz)   SpO2 95%   BMI 34.58 kg/m²     General Appearance: Awake, alert in no acute distress  Skin: warm and dry  Head: normocephalic and atraumatic  Eyes: pupils equal, round, and reactive to light, extraocular eye movements intact, conjunctivae normal  Neck: neck supple and non tender without mass   Pulmonary/Chest: Bilateral decreased with some, no wheezes, rales or rhonchi, normal air movement, no respiratory distress  Cardiovascular: normal rate, normal S1 and S2 and no carotid bruits  Abdomen: soft, non-tender, non-distended, normal bowel sounds, no masses or organomegaly  Extremities: Right lower extremity AKA with dressing in placed, left lower extremity fasciotomy dressing placed  Neurologic: no cranial nerve deficit and speech normal        Recent Labs     11/25/24  0628 11/26/24  0622 11/27/24  0650    132 130*   K 4.7 5.0 5.2*   CL 92* 90* 89*   CO2 26 22 28   BUN 44* 67* 45*   CREATININE 5.8* 8.1* 6.0*   GLUCOSE 205* 155* 179*   CALCIUM 8.3* 8.6 8.5*       Recent Labs     11/25/24  0628 11/26/24  0622 11/27/24  0650   WBC 17.5* 18.1* 16.1*   RBC 2.70* 2.48* 2.46*   HGB 8.4* 7.7* 7.6*

## 2024-11-27 NOTE — PROGRESS NOTES
Pattie spoke with Alexey Suarez with [facility] and notified discharge was being held. Alexey stated that they can admit patient tomorrow.

## 2024-11-27 NOTE — PROGRESS NOTES
[facility]  MD Bryce Milligan MD Ali Hassan, MD Lisa Kniska, CNP   Natalia Rey, CODY Harris, DEMETRIUS  Progress Note    11/27/2024    SUBJECTIVE:   11/13:  Seen in [facility].  Awake and alert.  S/P right above knee amputation yesterday. Complains of pain at right AKA site.  Oxygen on per NC.  Denies chest pain, palpitation or SOB.  Denies nausea, vomiting or abd pain.  Continues on heparin drip and bicarb IV.  He is oligoanuric.     11/14: Seen in the [facility]IC while on dialysis. Tolerating therapy without issues. His blood pressure is stable. Heart rate is mildly elevated. He is in good spirits and reports that he is having less pain today. His appetite is fair, he did eat some cereal for dinner. Urine output is stable.     11/15: Seen in the ICU. No acute distress or complaints. Received metoprolol earlier for tachycardia. Tolerated HD today with 2.8 liters of fluid removal. He is still making urine, though urine output has decreased. He is on oxygen via nasal canula. Denies dyspnea. PO intake is stable.     11/16: Undergoing with withdrawal of alcohol symptoms.  Obtunded.  Awake agitated aggressive at times though symptoms have improved status post sedation    11/17: Feeling better today.  Had a \"rough night\" notable for worsening agitation and aggression.  Now on Precedex drip with control of symptoms.  Enjoying his lunch.  Denies complaint.  Family at bedside.  BP stable.    11/18: Seen in the [facility]. Mentation has improved. Sitter is present at the bedside. He is still tachycardic. Denies any acute complaints. Denies dyspnea. His blood pressure is stable. Complains of some discomfort to his RLE. Minimal urine output.     11/19: Seen today while on dialysis. Tolerating treatment well. Line will only tolerate BFR of 250. Plan is for tunneled line placement in IR tomorrow. Blood pressure is stable. Heart rate has improved. Urine output remains low. He denies any

## 2024-11-27 NOTE — CONSULTS
[facility]  [facility]    Consultation Note     Admit Date: 11/12/2024  2:39 AM    Reason for Consult:   Leukocytosis    Attending Physician:  Katherine Vance *     Chief Complaint: PAD, status post right AKA    HISTORY OF PRESENT ILLNESS:   64-year-old male with past medical history of DM, congenital heart disease, HLD presented with leg pain and shortness of breath.  Lactic acidosis on presentation with leukocytosis.  CT abdomen without bilateral runoff showed severe atherosclerotic disease, status post right AKA 11/12.  Found to have bilateral renal infarct and developed ANTIONE.  Status post HD.  Elevated procalcitonin, CSP and ESR noted.  ID consulted for leukocytosis.    Past Medical History:        Diagnosis Date    Congenital heart disease     Diabetes mellitus (HCC)     H/O cardiovascular stress test 01/15/2018    lexiscan    Hyperlipidemia     Type 2 diabetes mellitus without complication (HCC)      Past Surgical History:        Procedure Laterality Date    CARDIAC CATHETERIZATION      2 stents    CARDIAC SURGERY      minimally invasive    CLAVICLE SURGERY Left     COLONOSCOPY N/A 01/19/2024    COLONOSCOPY WITH BIOPSY [place] [facility] [place]    IR TUNNELED CATHETER PLACEMENT GREATER THAN 5 YEARS  11/20/2024    IR TUNNELED CATHETER PLACEMENT GREATER THAN 5 YEARS 11/20/2024 Stephania Valle [place] [facility] [place]    LEG SURGERY Right 11/12/2024    RIGHT LEG AMPUTATION ABOVE KNEE performed by Jasmyne Henriquez MD at [facility] [place]    PTCA      VASCULAR SURGERY Bilateral 11/12/2024    BILATERAL embolectomy, with bilateral 2 compartment fasciotomy performed [place]asmyne Henriquez [place] [facility] [place]     Current Medications:   Scheduled Meds:   insulin lispro  4 Units SubCUTAneous TID WC    apixaban  5 mg Oral BID    sevelamer  800 mg Oral TID WC    epoetin noe-epbx  3,000 Units SubCUTAneous Once per day on Monday Wednesday Friday    lactulose  20 g Oral BID     10:25 PM    CLARITYU Clear 01/16/2018 10:25 PM    LEUKOCYTESUR Negative 01/16/2018 10:25 PM    UROBILINOGEN 0.2 01/16/2018 10:25 PM    BILIRUBINUR Negative 01/16/2018 10:25 PM    BLOODU Negative 01/16/2018 10:25 PM    GLUCOSEU Negative 01/16/2018 10:25 PM       No results found for: \"MRR9NET\", \"BEART\", \"T3KJRGOG\", \"PHART\", \"THGBART\", \"GGC3XBI\", \"PO2ART\", \"IOL4XKJ\"  Radiology:  XR CHEST PORTABLE   Final Result   No acute cardiopulmonary findings.         IR TUNNELED CVC PLACE WO SQ PORT/PUMP > 5 YEARS   Final Result   Successful ultrasound and fluoroscopy guided tunneled catheter placement      11/20/2024.            HISTORY:   ORDERING SYSTEM PROVIDED HISTORY: tunnelled diaylsis line   TECHNOLOGIST PROVIDED HISTORY:   Reason for exam:->tunnelled diaylsis line   How many lumens are being requested?->2   What side should this line be placed?->Either   What site is the preferred site?->Internal Jugular   What reading provider will be dictating this exam?->CRC; ORDERING SYSTEM   PROVIDED HISTORY: tunnelled diaylsis line   TECHNOLOGIST PROVIDED HISTORY:   Reason for exam:->tunnelled diaylsis line   What reading provider will be dictating this exam?->CRC      SEDATION:      FLUOROSCOPY DOSE AND TYPE:      Radiation Exposure Index: Kerma mGy,      TECHNIQUE:   Informed consent was obtained after a detailed explanation of the procedure   including risks, benefits, and alternatives. All aspects of maximum sterile   barrier technique were used including washing hands with conventional soap   and water or with alcohol-based hand rubs (ABHR), skin preparation, cap,   mask, sterile gown, sterile gloves, and sterile full body drape. Local   anesthesia was achieved with lidocaine. A micropuncture needle was used to   access the right internal jugular vein using ultrasound guidance.  An   ultrasound image demonstrating patency of the vein with needle tip located   within it was obtained and stored in PACS.  A 0.035 guidewire was

## 2024-11-27 NOTE — PLAN OF CARE
Problem: Chronic Conditions and Co-morbidities  Goal: Patient's chronic conditions and co-morbidity symptoms are monitored and maintained or improved  Outcome: Progressing     Problem: Discharge Planning  Goal: Discharge to home or other facility with appropriate resources  Outcome: Progressing     Problem: Pain  Goal: Verbalizes/displays adequate comfort level or baseline comfort level  Outcome: Progressing     Problem: Safety - Adult  Goal: Free from fall injury  Outcome: Progressing     Problem: Skin/Tissue Integrity  Goal: Absence of new skin breakdown  Description: 1.  Monitor for areas of redness and/or skin breakdown  2.  Assess vascular access sites hourly  3.  Every 4-6 hours minimum:  Change oxygen saturation probe site  4.  Every 4-6 hours:  If on nasal continuous positive airway pressure, respiratory therapy assess nares and determine need for appliance change or resting period.  Outcome: Progressing     Problem: ABCDS Injury Assessment  Goal: Absence of physical injury  Outcome: Progressing     Problem: Spiritual Struggle  Goal: Verbalizes spiritual struggle  Outcome: Progressing     Problem: Skin/Tissue Integrity - Adult  Goal: Skin integrity remains intact  Outcome: Progressing     Problem: Musculoskeletal - Adult  Goal: Return mobility to safest level of function  Outcome: Progressing     Problem: Metabolic/Fluid and Electrolytes - Adult  Goal: Electrolytes maintained within normal limits  Outcome: Progressing     Problem: Hematologic - Adult  Goal: Maintains hematologic stability  Outcome: Progressing     Problem: Nutrition Deficit:  Goal: Optimize nutritional status  Outcome: Progressing

## 2024-11-27 NOTE — CARE COORDINATION
Transition of care Pre cert obtained for [facility] and is good through tomorrow 11/28 per Alexey with [facility]. Pt's room number will be 108B. Checked with DORI Harris NP and pt is ok for dc to [facility]. Per attending, wait for ID recommendations. Spoke with Kari with [facility] and pt was placed on will call. Pt's nurse and charge rn was notified pf above. Also, called pt's brother, Christian, to update him and had to leave . Met with pt in room and informed him. Back up plan to [facility]. This facility does in house HD. Pt's nurse is aware to complete ambulance form at discharge. Transport envelope is with pt's soft chart. Cm/sw will follow.     1220  Spoke with pt's nurse and she said ID was ok with discharge. Called Ashley with [facility] and pt is setup for 6:30pm ambulance . Called pt's brother, Christian, and informed him of transport time to [facility] with room number. Ambulance form was completed. Transport envelope is with pt's soft chart. Pt's nurse has been updated.

## 2024-11-27 NOTE — PLAN OF CARE
Problem: Chronic Conditions and Co-morbidities  Goal: Patient's chronic conditions and co-morbidity symptoms are monitored and maintained or improved  11/27/2024 1240 by Alona Rodriguez RN  Outcome: Progressing     Problem: Discharge Planning  Goal: Discharge to home or other facility with appropriate resources  11/27/2024 1240 by Alona Rodriguez RN  Outcome: Progressing     Problem: Pain  Goal: Verbalizes/displays adequate comfort level or baseline comfort level  11/27/2024 1240 by Alona Rodriguez RN  Outcome: Progressing     Problem: Safety - Adult  Goal: Free from fall injury  11/27/2024 1240 by Alona Rodriguez RN  Outcome: Progressing     Problem: Skin/Tissue Integrity  Goal: Absence of new skin breakdown  Description: 1.  Monitor for areas of redness and/or skin breakdown  2.  Assess vascular access sites hourly  3.  Every 4-6 hours minimum:  Change oxygen saturation probe site  4.  Every 4-6 hours:  If on nasal continuous positive airway pressure, respiratory therapy assess nares and determine need for appliance change or resting period.  11/27/2024 1240 by Alona Rodriguez RN  Outcome: Progressing     Problem: ABCDS Injury Assessment  Goal: Absence of physical injury  11/27/2024 1240 by Alona Rodriguez RN  Outcome: Progressing     Problem: Spiritual Struggle  Goal: Verbalizes spiritual struggle  11/27/2024 1240 by Alona Rodriguez RN  Outcome: Progressing     Problem: Emotional Distress  Goal: Verbalization of thoughts and feelings  Outcome: Progressing     Problem: Emotional Distress  Goal: Reduce evidence of anxiety/worry/anger  Outcome: Progressing     Problem: Emotional Distress  Goal: Identifies/restores coping resources/skills  Outcome: Progressing     Problem: Life Adjustment  Goal: Verbalization of feelings regarding change/loss  Outcome: Progressing     Problem: Life Adjustment  Goal: Finding meaning/purpose in the midst of illness/suffering  Outcome: Progressing     Problem: Life Adjustment  Goal:  HECTOR Sage  Outcome: Progressing

## 2024-11-27 NOTE — PROGRESS NOTES
Vascular Surgery Progress Note    Pt is being seen in f/u today regarding bilateral femoral embolectomies     Subjective  Received 1 dose lopressor overnight. Much more alert this AM. Dressings changed on rounds this AM.     Current Medications:    sodium chloride      dextrose        LORazepam, sodium chloride flush, sodium chloride, magnesium sulfate, ondansetron **OR** ondansetron, glucose, dextrose bolus **OR** dextrose bolus, glucagon (rDNA), dextrose, oxyCODONE **OR** oxyCODONE, HYDROmorphone    insulin lispro  4 Units SubCUTAneous TID WC    apixaban  5 mg Oral BID    sevelamer  800 mg Oral TID WC    epoetin noe-epbx  3,000 Units SubCUTAneous Once per day on Monday Wednesday Friday    lactulose  20 g Oral BID    atorvastatin  10 mg Oral Nightly    docusate sodium  100 mg Oral BID    polyethylene glycol  17 g Oral Daily    bisacodyl  10 mg Rectal Daily    metoprolol tartrate  50 mg Oral BID    thiamine  100 mg Oral Daily    folic acid  1 mg Oral Daily    melatonin  10 mg Oral Nightly    sodium chloride flush  5-40 mL IntraVENous 2 times per day    insulin glargine  12 Units SubCUTAneous QAM    aspirin  81 mg Oral Daily    acetaminophen  1,000 mg Oral 3 times per day    insulin lispro  0-16 Units SubCUTAneous Q4H        PHYSICAL EXAM:    /70   Pulse (!) 120   Temp 98.2 °F (36.8 °C) (Temporal)   Resp 17   Ht 1.702 m (5' 7\")   Wt 100.2 kg (220 lb 12.8 oz)   SpO2 95%   BMI 34.58 kg/m²     Intake/Output Summary (Last 24 hours) at 11/27/2024 0725  Last data filed at 11/26/2024 1034  Gross per 24 hour   Intake 300 ml   Output 1500 ml   Net -1200 ml          Gen: awake, no apparent distress, lethargic  CVS: RRR, tunneled line to R chest  Resp: No increased work of breathing  Abd: Soft, non-tender, non-distended  R LE: AKA with staples clean/dry/intact, hematoma improving.  Right groin incision with minimal amount of breakdown around the midpoint to inferior portion of the incision without significant

## 2024-11-27 NOTE — FLOWSHEET NOTE
11/27/24 1513   Vital Signs   /69   Temp 97.1 °F (36.2 °C)   Pulse 89   Respirations 18   Weight - Scale 100.5 kg (221 lb 9 oz)   Weight Method Bed scale   Percent Weight Change 0.35   Post-Hemodialysis Assessment   Post-Treatment Procedures Blood returned;Catheter Capped, clamped with Saline x2 ports   Machine Disinfection Process Acid/Vinegar Clean   Rinseback Volume (ml) 300 ml   Blood Volume Processed (Liters) 64 L   Dialyzer Clearance Clear   Duration of Treatment (minutes) 180 minutes   Hemodialysis Intake (ml) 300 ml   Hemodialysis Output (ml) 1800 ml   NET Removed (ml) 1500   Tolerated Treatment Good   Patient Response to Treatment pt tolerated tx well. pt rinsedback , cath flushed, heparinized and capped x2 ports. pt taken back to his room.   Time Off 1513

## 2024-11-27 NOTE — PROGRESS NOTES
.Vascular Surgery Progress Note    Pt is being seen in f/u today regarding bilateral femoral embolectomies, R AKA    Subjective  Pt s/e. Much more awake this am. Eating breakfast. No new complaints.      Current Medications:    sodium chloride      dextrose        LORazepam, sodium chloride flush, sodium chloride, magnesium sulfate, ondansetron **OR** ondansetron, glucose, dextrose bolus **OR** dextrose bolus, glucagon (rDNA), dextrose, oxyCODONE **OR** oxyCODONE, HYDROmorphone    insulin lispro  4 Units SubCUTAneous TID WC    apixaban  5 mg Oral BID    sevelamer  800 mg Oral TID WC    epoetin noe-epbx  3,000 Units SubCUTAneous Once per day on Monday Wednesday Friday    lactulose  20 g Oral BID    atorvastatin  10 mg Oral Nightly    docusate sodium  100 mg Oral BID    polyethylene glycol  17 g Oral Daily    bisacodyl  10 mg Rectal Daily    metoprolol tartrate  50 mg Oral BID    thiamine  100 mg Oral Daily    folic acid  1 mg Oral Daily    melatonin  10 mg Oral Nightly    sodium chloride flush  5-40 mL IntraVENous 2 times per day    insulin glargine  12 Units SubCUTAneous QAM    aspirin  81 mg Oral Daily    acetaminophen  1,000 mg Oral 3 times per day    insulin lispro  0-16 Units SubCUTAneous Q4H        PHYSICAL EXAM:    /70   Pulse (!) 120   Temp 98.2 °F (36.8 °C) (Temporal)   Resp 17   Ht 1.702 m (5' 7\")   Wt 100.2 kg (220 lb 12.8 oz)   SpO2 95%   BMI 34.58 kg/m²     Intake/Output Summary (Last 24 hours) at 11/27/2024 0910  Last data filed at 11/26/2024 1034  Gross per 24 hour   Intake 300 ml   Output 1500 ml   Net -1200 ml          Gen: awake, no apparent distress  CVS: RRR, tunneled line to R chest  Resp: No increased work of breathing  Abd: Soft, non-tender, non-distended  R LE: AKA  L LE: Dressing in place- see photos in media    LABS:    Lab Results   Component Value Date    WBC 16.1 (H) 11/27/2024    HGB 7.6 (L) 11/27/2024    HCT 24.2 (L) 11/27/2024     (H) 11/27/2024    PROTIME 11.8

## 2024-11-27 NOTE — PROGRESS NOTES
Antibiotic Extended Infusion Policy     This patient is on medication that requires renal, weight, and/or indication dose adjustment.      Date Body Weight IBW  Adjusted BW SCr  CrCl Dialysis status BMI   11/27/2024 100.5 kg (221 lb 9 oz) Ideal body weight: 66.1 kg (145 lb 11.6 oz)  Adjusted ideal body weight: 79.9 kg (176 lb 0.9 oz) Serum creatinine: 6 mg/dL (H) 11/27/24 0650  Estimated creatinine clearance: 14 mL/min (A) N/a Body mass index is 34.7 kg/m².       Pharmacy has dose-adjusted the following medication(s):    Ordered Medication: Zosyn 3375mg q8H     Order Changed/converted to: Zosyn 4500mg q12h    These changes were made per protocol according to the [facility]   Automatic Extended Infusion Dose Adjustment Policy.     *Please note this dose may need readjusted if patient's condition changes.    Please contact pharmacy with any questions regarding these changes.    Malik Riley RPH  11/27/2024  3:28 PM

## 2024-11-27 NOTE — PROGRESS NOTES
Occupational Therapy  OT BEDSIDE TREATMENT NOTE   [facility]  [address], [place]      Date:2024  Patient Name: Himanshu Bravo  MRN: [id number]  : 1960  Room: [address] [place] [address]A       Attempted OT session this date:    [] unavailable due to other medical staff currently with pt   [] on hold per nursing staff   [] on hold per nursing staff secondary to lab / radiology results    [] declined treatment  this date due to ____.  Benefits of participation in therapy reviewed with pt.    [x] off unit, will re attempt    [] Other:      Continue with current OT P.O.C      Dipti VEGA/L [id number]

## 2024-11-28 ENCOUNTER — HOSPITAL ENCOUNTER (OUTPATIENT)
Dept: INPATIENT UNIT | Age: 64
Discharge: OTHER INSTITUTION | End: 2024-12-05
Attending: INTERNAL MEDICINE | Admitting: INTERNAL MEDICINE
Payer: COMMERCIAL

## 2024-11-28 VITALS
RESPIRATION RATE: 18 BRPM | OXYGEN SATURATION: 99 % | WEIGHT: 221.56 LBS | DIASTOLIC BLOOD PRESSURE: 74 MMHG | BODY MASS INDEX: 34.78 KG/M2 | SYSTOLIC BLOOD PRESSURE: 115 MMHG | HEART RATE: 86 BPM | HEIGHT: 67 IN | TEMPERATURE: 97.8 F

## 2024-11-28 LAB
ALBUMIN SERPL-MCNC: 2.9 G/DL (ref 3.5–5.2)
ALP SERPL-CCNC: 116 U/L (ref 40–129)
ALT SERPL-CCNC: 18 U/L (ref 0–40)
ANION GAP SERPL CALCULATED.3IONS-SCNC: 17 MMOL/L (ref 7–16)
AST SERPL-CCNC: 66 U/L (ref 0–39)
BACTERIA URNS QL MICRO: ABNORMAL
BASOPHILS # BLD: 0.05 K/UL (ref 0–0.2)
BASOPHILS NFR BLD: 0 % (ref 0–2)
BILIRUB SERPL-MCNC: 0.2 MG/DL (ref 0–1.2)
BILIRUB UR QL STRIP: ABNORMAL
BUN SERPL-MCNC: 36 MG/DL (ref 6–23)
CA-I BLD-SCNC: 1.15 MMOL/L (ref 1.15–1.33)
CALCIUM SERPL-MCNC: 8.5 MG/DL (ref 8.6–10.2)
CHLORIDE SERPL-SCNC: 92 MMOL/L (ref 98–107)
CLARITY UR: CLEAR
CO2 SERPL-SCNC: 26 MMOL/L (ref 22–29)
COLOR UR: YELLOW
CREAT SERPL-MCNC: 5 MG/DL (ref 0.7–1.2)
EOSINOPHIL # BLD: 0.14 K/UL (ref 0.05–0.5)
EOSINOPHILS RELATIVE PERCENT: 1 % (ref 0–6)
ERYTHROCYTE [DISTWIDTH] IN BLOOD BY AUTOMATED COUNT: 14.5 % (ref 11.5–15)
GFR, ESTIMATED: 12 ML/MIN/1.73M2
GLUCOSE BLD-MCNC: 187 MG/DL (ref 74–99)
GLUCOSE BLD-MCNC: 194 MG/DL (ref 74–99)
GLUCOSE BLD-MCNC: 205 MG/DL (ref 74–99)
GLUCOSE BLD-MCNC: 211 MG/DL (ref 74–99)
GLUCOSE SERPL-MCNC: 199 MG/DL (ref 74–99)
GLUCOSE UR STRIP-MCNC: NEGATIVE MG/DL
HCT VFR BLD AUTO: 25.4 % (ref 37–54)
HGB BLD-MCNC: 7.9 G/DL (ref 12.5–16.5)
HGB UR QL STRIP.AUTO: ABNORMAL
IMM GRANULOCYTES # BLD AUTO: 0.09 K/UL (ref 0–0.58)
IMM GRANULOCYTES NFR BLD: 1 % (ref 0–5)
KETONES UR STRIP-MCNC: NEGATIVE MG/DL
LEUKOCYTE ESTERASE UR QL STRIP: ABNORMAL
LYMPHOCYTES NFR BLD: 1.13 K/UL (ref 1.5–4)
LYMPHOCYTES RELATIVE PERCENT: 9 % (ref 20–42)
MAGNESIUM SERPL-MCNC: 2.3 MG/DL (ref 1.6–2.6)
MCH RBC QN AUTO: 30.4 PG (ref 26–35)
MCHC RBC AUTO-ENTMCNC: 31.1 G/DL (ref 32–34.5)
MCV RBC AUTO: 97.7 FL (ref 80–99.9)
MICROORGANISM SPEC CULT: NORMAL
MICROORGANISM SPEC CULT: NORMAL
MONOCYTES NFR BLD: 1.32 K/UL (ref 0.1–0.95)
MONOCYTES NFR BLD: 10 % (ref 2–12)
NEUTROPHILS NFR BLD: 79 % (ref 43–80)
NEUTS SEG NFR BLD: 10.58 K/UL (ref 1.8–7.3)
NITRITE UR QL STRIP: NEGATIVE
PH UR STRIP: 6 [PH] (ref 5–9)
PHOSPHATE SERPL-MCNC: 3.8 MG/DL (ref 2.5–4.5)
PLATELET # BLD AUTO: 557 K/UL (ref 130–450)
PMV BLD AUTO: 9.7 FL (ref 7–12)
POTASSIUM SERPL-SCNC: 3.8 MMOL/L (ref 3.5–5)
PROT SERPL-MCNC: 6.4 G/DL (ref 6.4–8.3)
PROT UR STRIP-MCNC: >=300 MG/DL
RBC # BLD AUTO: 2.6 M/UL (ref 3.8–5.8)
RBC #/AREA URNS HPF: ABNORMAL /HPF
SERVICE CMNT-IMP: NORMAL
SERVICE CMNT-IMP: NORMAL
SODIUM SERPL-SCNC: 135 MMOL/L (ref 132–146)
SP GR UR STRIP: 1.02 (ref 1–1.03)
SPECIMEN DESCRIPTION: NORMAL
SPECIMEN DESCRIPTION: NORMAL
UROBILINOGEN UR STRIP-ACNC: 0.2 EU/DL (ref 0–1)
WBC #/AREA URNS HPF: ABNORMAL /HPF
WBC OTHER # BLD: 13.3 K/UL (ref 4.5–11.5)

## 2024-11-28 PROCEDURE — 84100 ASSAY OF PHOSPHORUS: CPT

## 2024-11-28 PROCEDURE — 85025 COMPLETE CBC W/AUTO DIFF WBC: CPT

## 2024-11-28 PROCEDURE — 6370000000 HC RX 637 (ALT 250 FOR IP): Performed by: INTERNAL MEDICINE

## 2024-11-28 PROCEDURE — 6370000000 HC RX 637 (ALT 250 FOR IP): Performed by: NURSE PRACTITIONER

## 2024-11-28 PROCEDURE — 87086 URINE CULTURE/COLONY COUNT: CPT

## 2024-11-28 PROCEDURE — 99232 SBSQ HOSP IP/OBS MODERATE 35: CPT | Performed by: INTERNAL MEDICINE

## 2024-11-28 PROCEDURE — 82947 ASSAY GLUCOSE BLOOD QUANT: CPT

## 2024-11-28 PROCEDURE — 87077 CULTURE AEROBIC IDENTIFY: CPT

## 2024-11-28 PROCEDURE — 2580000003 HC RX 258: Performed by: INTERNAL MEDICINE

## 2024-11-28 PROCEDURE — 6360000002 HC RX W HCPCS: Performed by: INTERNAL MEDICINE

## 2024-11-28 PROCEDURE — 6370000000 HC RX 637 (ALT 250 FOR IP): Performed by: SURGERY

## 2024-11-28 PROCEDURE — 80053 COMPREHEN METABOLIC PANEL: CPT

## 2024-11-28 PROCEDURE — 36415 COLL VENOUS BLD VENIPUNCTURE: CPT

## 2024-11-28 PROCEDURE — 2580000003 HC RX 258: Performed by: NURSE PRACTITIONER

## 2024-11-28 PROCEDURE — 82330 ASSAY OF CALCIUM: CPT

## 2024-11-28 PROCEDURE — 83735 ASSAY OF MAGNESIUM: CPT

## 2024-11-28 PROCEDURE — 81001 URINALYSIS AUTO W/SCOPE: CPT

## 2024-11-28 RX ORDER — TAMSULOSIN HYDROCHLORIDE 0.4 MG/1
0.4 CAPSULE ORAL DAILY
Status: DISCONTINUED | OUTPATIENT
Start: 2024-11-28 | End: 2024-11-28 | Stop reason: HOSPADM

## 2024-11-28 RX ORDER — TAMSULOSIN HYDROCHLORIDE 0.4 MG/1
0.4 CAPSULE ORAL DAILY
DISCHARGE
Start: 2024-11-28 | End: 2024-12-05 | Stop reason: ALTCHOICE

## 2024-11-28 RX ADMIN — INSULIN LISPRO 4 UNITS: 100 INJECTION, SOLUTION INTRAVENOUS; SUBCUTANEOUS at 12:16

## 2024-11-28 RX ADMIN — ASPIRIN 81 MG CHEWABLE TABLET 81 MG: 81 TABLET CHEWABLE at 08:32

## 2024-11-28 RX ADMIN — INSULIN LISPRO 4 UNITS: 100 INJECTION, SOLUTION INTRAVENOUS; SUBCUTANEOUS at 08:32

## 2024-11-28 RX ADMIN — APIXABAN 5 MG: 5 TABLET, FILM COATED ORAL at 08:32

## 2024-11-28 RX ADMIN — INSULIN LISPRO 4 UNITS: 100 INJECTION, SOLUTION INTRAVENOUS; SUBCUTANEOUS at 08:34

## 2024-11-28 RX ADMIN — INSULIN LISPRO 2 UNITS: 100 INJECTION, SOLUTION INTRAVENOUS; SUBCUTANEOUS at 05:04

## 2024-11-28 RX ADMIN — SEVELAMER CARBONATE 800 MG: 800 TABLET, FILM COATED ORAL at 12:15

## 2024-11-28 RX ADMIN — LACTULOSE 20 G: 20 SOLUTION ORAL at 08:32

## 2024-11-28 RX ADMIN — PIPERACILLIN AND TAZOBACTAM 4500 MG: 4; .5 INJECTION, POWDER, FOR SOLUTION INTRAVENOUS at 08:39

## 2024-11-28 RX ADMIN — INSULIN LISPRO 4 UNITS: 100 INJECTION, SOLUTION INTRAVENOUS; SUBCUTANEOUS at 12:15

## 2024-11-28 RX ADMIN — Medication 1 MG: at 08:32

## 2024-11-28 RX ADMIN — DOCUSATE SODIUM 100 MG: 100 CAPSULE, LIQUID FILLED ORAL at 08:32

## 2024-11-28 RX ADMIN — INSULIN LISPRO 4 UNITS: 100 INJECTION, SOLUTION INTRAVENOUS; SUBCUTANEOUS at 01:04

## 2024-11-28 RX ADMIN — SODIUM CHLORIDE, PRESERVATIVE FREE 10 ML: 5 INJECTION INTRAVENOUS at 08:33

## 2024-11-28 RX ADMIN — SEVELAMER CARBONATE 800 MG: 800 TABLET, FILM COATED ORAL at 08:32

## 2024-11-28 RX ADMIN — METOPROLOL TARTRATE 50 MG: 50 TABLET, FILM COATED ORAL at 08:32

## 2024-11-28 RX ADMIN — INSULIN GLARGINE 12 UNITS: 100 INJECTION, SOLUTION SUBCUTANEOUS at 08:33

## 2024-11-28 RX ADMIN — Medication 100 MG: at 08:32

## 2024-11-28 NOTE — PLAN OF CARE
Problem: Chronic Conditions and Co-morbidities  Goal: Patient's chronic conditions and co-morbidity symptoms are monitored and maintained or improved  11/28/2024 0733 by Alona Rodriguez RN  Outcome: Progressing     Problem: Discharge Planning  Goal: Discharge to home or other facility with appropriate resources  11/28/2024 0733 by Alona Rodriguez RN  Outcome: Progressing     Problem: Pain  Goal: Verbalizes/displays adequate comfort level or baseline comfort level  Outcome: Progressing     Problem: Safety - Adult  Goal: Free from fall injury  Outcome: Progressing     Problem: Skin/Tissue Integrity  Goal: Absence of new skin breakdown  Description: 1.  Monitor for areas of redness and/or skin breakdown  2.  Assess vascular access sites hourly  3.  Every 4-6 hours minimum:  Change oxygen saturation probe site  4.  Every 4-6 hours:  If on nasal continuous positive airway pressure, respiratory therapy assess nares and determine need for appliance change or resting period.  Outcome: Progressing     Problem: ABCDS Injury Assessment  Goal: Absence of physical injury  Outcome: Progressing     Problem: Spiritual Struggle  Goal: Verbalizes spiritual struggle  Outcome: Progressing     Problem: Emotional Distress  Goal: Verbalization of thoughts and feelings  Outcome: Progressing     Problem: Emotional Distress  Goal: Reduce evidence of anxiety/worry/anger  Outcome: Progressing     Problem: Emotional Distress  Goal: Identifies/restores coping resources/skills  Outcome: Progressing     Problem: Life Adjustment  Goal: Verbalization of feelings regarding change/loss  Outcome: Progressing     Problem: Skin/Tissue Integrity - Adult  Goal: Skin integrity remains intact  Outcome: Progressing     Problem: Skin/Tissue Integrity - Adult  Goal: Incisions, wounds, or drain sites healing without S/S of infection  Outcome: Progressing     Problem: Skin/Tissue Integrity - Adult  Goal: Oral mucous membranes remain intact  Outcome: Progressing

## 2024-11-28 NOTE — PROGRESS NOTES
No jaundice.  HEENT: Eyes show round, and reactive pupils. Moist mucous membranes, no ulcerations, no thrush.   Neck: Supple to movements. No lymphadenopathy.   Chest: No use of accessory muscles to breathe. Symmetrical expansion. Auscultation reveals no wheezing, crackles, or rhonchi.   Cardiovascular: S1 and S2 are rhythmic and regular. No murmurs appreciated.   Abdomen: Positive bowel sounds to auscultation. Benign to palpation. No masses felt. No hepatosplenomegaly.  Genitourinary: No pain in the lower abdomen  Extremities: No clubbing, no cyanosis, no edema.  Musculoskeletal: No pain in range of motion of any joints  Neurological: Following commands, no focal neurodeficit  Lines: peripheral    Laboratory and Tests Review:  Lab Results   Component Value Date    WBC 13.3 (H) 11/28/2024    WBC 16.1 (H) 11/27/2024    WBC 18.1 (H) 11/26/2024    HGB 7.9 (L) 11/28/2024    HCT 25.4 (L) 11/28/2024    MCV 97.7 11/28/2024     (H) 11/28/2024     Lab Results   Component Value Date    NEUTROABS 10.58 (H) 11/28/2024    NEUTROABS 13.02 (H) 11/27/2024    NEUTROABS 15.74 (H) 11/26/2024     No results found for: \"CRPHS\"  Lab Results   Component Value Date    ALT 18 11/28/2024    AST 66 (H) 11/28/2024    ALKPHOS 116 11/28/2024    BILITOT 0.2 11/28/2024     Lab Results   Component Value Date/Time     11/28/2024 06:16 AM    K 3.8 11/28/2024 06:16 AM    K 3.9 01/09/2018 03:15 AM    CL 92 11/28/2024 06:16 AM    CO2 26 11/28/2024 06:16 AM    BUN 36 11/28/2024 06:16 AM    CREATININE 5.0 11/28/2024 06:16 AM    CREATININE 6.0 11/27/2024 06:50 AM    CREATININE 8.1 11/26/2024 06:22 AM    GFRAA >60 01/21/2018 06:00 AM    LABGLOM 12 11/28/2024 06:16 AM    GLUCOSE 199 11/28/2024 06:16 AM    GLUCOSE 216 06/14/2023 12:55 PM    CALCIUM 8.5 11/28/2024 06:16 AM    BILITOT 0.2 11/28/2024 06:16 AM    ALKPHOS 116 11/28/2024 06:16 AM    AST 66 11/28/2024 06:16 AM    ALT 18 11/28/2024 06:16 AM     Lab Results   Component Value Date    CRP  287.0 (H) 11/26/2024     Lab Results   Component Value Date    SEDRATE 110 (H) 11/26/2024     Radiology:      Microbiology:   Lab Results   Component Value Date/Time    BC 5 Days- no growth 01/16/2018 10:03 PM    BC 5 Days- no growth 01/07/2018 10:30 AM    ORG Staphylococcus aureus 01/17/2018 02:43 AM    ORG Staphylococcus aureus 01/16/2018 02:03 PM    ORG Candida albicans 01/10/2018 06:50 PM    ORG Staphylococcus aureus 01/10/2018 06:50 PM     Lab Results   Component Value Date/Time    BLOODCULT2 5 Days- no growth 01/16/2018 10:00 PM    BLOODCULT2 5 Days- no growth 01/07/2018 11:00 AM    ORG Staphylococcus aureus 01/17/2018 02:43 AM    ORG Staphylococcus aureus 01/16/2018 02:03 PM    ORG Candida albicans 01/10/2018 06:50 PM    ORG Staphylococcus aureus 01/10/2018 06:50 PM     No results found for: \"WNDABS\"  Smear, Respiratory   Date Value Ref Range Status   01/17/2018   Final    Refer to ordered Gram stain for results  Group 6: <25 PMN's/LPF and <25 Epithelial cells/LPF  Rare Polymorphonuclear leukocytes  Rare Epithelial cells  No organisms seen           Component Value Date/Time    MPNEUMO Not Detected 11/26/2024 1000    AFBCX No growth after 6 weeks of incubation. 01/09/2018 0718    AFBCX No growth after 6 weeks of incubation. 01/09/2018 0718    FUNGSM 1+ Budding yeast 01/09/2018 0718    FUNGSM No Fungal elements seen 01/09/2018 0718    LABFUNG Moderate growth  No further workup   01/09/2018 0718    LABFUNG Light growth  No further workup   01/09/2018 0718     CULTURE, RESPIRATORY   Date Value Ref Range Status   01/17/2018 Oral Pharyngeal Stacey present (A)  Final   01/17/2018 Light growth  Final     No results found for: \"CXCATHTIP\"  No results found for: \"BFCS\"  No results found for: \"CXSURG\"  No results found for: \"LABURIN\"  MRSA Culture Only   Date Value Ref Range Status   01/17/2018 Methicillin resistant Staph aureus not isolated  Final   01/07/2018 Methicillin resistant Staph aureus not isolated  Final

## 2024-11-28 NOTE — PROGRESS NOTES
Vascular Surgery Progress Note    Pt is being seen in f/u today regarding bilateral femoral embolectomies     Subjective  Resting comfortably this a.m.  Patient more awake.  Denies complaints.    Current Medications:    sodium chloride      dextrose        LORazepam, sodium chloride flush, sodium chloride, magnesium sulfate, ondansetron **OR** ondansetron, glucose, dextrose bolus **OR** dextrose bolus, glucagon (rDNA), dextrose, oxyCODONE **OR** oxyCODONE, HYDROmorphone    piperacillin-tazobactam  4,500 mg IntraVENous 2 times per day    insulin lispro  4 Units SubCUTAneous TID WC    apixaban  5 mg Oral BID    sevelamer  800 mg Oral TID WC    epoetin noe-epbx  3,000 Units SubCUTAneous Once per day on Monday Wednesday Friday    lactulose  20 g Oral BID    atorvastatin  10 mg Oral Nightly    docusate sodium  100 mg Oral BID    polyethylene glycol  17 g Oral Daily    bisacodyl  10 mg Rectal Daily    metoprolol tartrate  50 mg Oral BID    thiamine  100 mg Oral Daily    folic acid  1 mg Oral Daily    melatonin  10 mg Oral Nightly    sodium chloride flush  5-40 mL IntraVENous 2 times per day    insulin glargine  12 Units SubCUTAneous QAM    aspirin  81 mg Oral Daily    acetaminophen  1,000 mg Oral 3 times per day    insulin lispro  0-16 Units SubCUTAneous Q4H        PHYSICAL EXAM:    /60   Pulse 80   Temp 98.4 °F (36.9 °C) (Temporal)   Resp 16   Ht 1.702 m (5' 7\")   Wt 100.5 kg (221 lb 9 oz)   SpO2 97%   BMI 34.70 kg/m²     Intake/Output Summary (Last 24 hours) at 11/28/2024 0803  Last data filed at 11/28/2024 0335  Gross per 24 hour   Intake 780 ml   Output 2000 ml   Net -1220 ml          Gen: awake, no apparent distress, more awake.  CVS: RRR, tunneled line to R chest  Resp: No increased work of breathing  Abd: Soft, non-tender, non-distended  R LE: AKA with staples clean/dry/intact, hematoma improving.  Right groin incision with minimal amount of breakdown around the midpoint to inferior portion of the  incision without significant drainage.  L LE: L groin incision moist with minimal superficial skin breakdown. Dressing to LLE C/D/I. Lateral fasciotomy site remains intact with Nylon sutures, medial fasciotomy site with graft in place. DP biphasic, PT monophasic.     LABS:    Lab Results   Component Value Date    WBC 13.3 (H) 11/28/2024    HGB 7.9 (L) 11/28/2024    HCT 25.4 (L) 11/28/2024     (H) 11/28/2024    PROTIME 11.8 11/20/2024    INR 1.1 11/20/2024    APTT 43.7 (H) 11/23/2024    K 3.8 11/28/2024    BUN 36 (H) 11/28/2024    CREATININE 5.0 (H) 11/28/2024       A/P  64 y.o. 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     - continue to monitor neurovascular exam  - continue Eliquis  -Daily dressing changes per vascular surgery team-change today with Opticell, 4 x 4's, ABD, Kerlix, and Coban.  Change dressing over bilateral groin incisions daily with Opticel  - prn pain control   - continue regular diet as tolerated  - appreciate nephrology recommendations, continue HD  - continued Metoprolol 25mg BID, continue ASA, Lipitor 10mg   - [facility] 11/24  - placement pending; [facility]      Discussed with Dr. Nydia Cordoba, DO    Okay to discharge from vascular standpoint    Chika Stafford MD

## 2024-11-28 NOTE — PROGRESS NOTES
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   He was found to have lactic acidosis on presentation of 3.9.  Leukocytosis of 16,500.  Blood pressure on presentation of 119/75.  CT abdomen with aorta with bilateral runoff with contrast was obtained and showed severe atherosclerotic disease with common femoral arterial occlusions with some reconstitution in the superficial femoral and popliteal location but without significant runoff in the lower extremities, bilateral large renal infarcts.  He was started on heparin by weight.  Patient was seen by vascular surgery and taken straight to the OR.  Right AKA was done 11/12.  Patient was in the SICU for further monitoring.  At the time there was also some concern for alcohol withdrawals and he was started on Precedex.  Patient was transferred out of the ICU 11/20 and medial fasciotomy closure 11/21.  Developed ANTIONE in the setting of bilateral renal infarcts.  Nephrology is following and patient was initiated on IHD status post TDC placement.    Subjective:    No new issues. Denies nausea, vomiting or abdominal pain.         ROS: denies fever, chills, cp, sob, n/v, HA unless stated above.      piperacillin-tazobactam  4,500 mg IntraVENous 2 times per day    insulin lispro  4 Units SubCUTAneous TID WC    apixaban  5 mg Oral BID    sevelamer  800 mg Oral TID WC    epoetin noe-epbx  3,000 Units SubCUTAneous Once per day on Monday Wednesday Friday    lactulose  20 g Oral BID    atorvastatin  10 mg Oral Nightly    docusate sodium  100 mg Oral BID    polyethylene glycol  17 g Oral Daily    bisacodyl  10 mg Rectal Daily    metoprolol tartrate  50 mg Oral BID    thiamine  100 mg Oral Daily    folic acid  1 mg Oral Daily    melatonin  10 mg Oral  98.0 98.4 97.7   MCH 31.0 30.9 30.4   MCHC 31.7* 31.4* 31.1*   RDW 14.6 14.5 14.5   * 489* 557*   MPV 9.9 9.7 9.7       Radiology: Reviewed    Assessment:    Principal Problem:    Femoral artery occlusion (HCC)  Active Problems:    Acute lower limb ischemia    ANTIONE (acute kidney injury) (HCC)    Non-traumatic rhabdomyolysis  Resolved Problems:    * No resolved hospital problems. *    Assessment and plan:    Acute limb ischemia, Bilateral embolic femoral artery occlusion s/p bilateral femoral embolectomies with fasciotomies 11/12, subsequent right AKA 11/12, medial fasciotomy closure and lateral fasciotomy grafting 11/21. -Vascular surgery following, pain control, on aspirin and Lipitor  ANTIONE secondary to bilateral renal infarct  -nephrology following, patient initiated on IHD with TDC 11/20 during this admission.  Diabetes mellitus  Hyperkalemia, resolved  Newly diagnosed Atrial Flutter/Fib cardiology was consulted, sob A-fib likely due to thrombotic events.  Currently on Eliquis and metoprolol - will uptitrate as BP tolerates  Alcoholism -previously on Precedex, now with improved mental status.  Continue on thiamine  10.  Leukocytosis- Sepsis of unclear etiology vs Possibly reactive 25>17>18>16>13 today.  With no fever, blood cultures, chest x-ray, viral panel negative. CT chest and abdomen WNL apart from gallbladder wall thickening. Started on Zosyn, ID recommendations appreciated.     Disposition: DC to [facility] when cleared by ID    NOTE: This report was transcribed using voice recognition software. Every effort was made to ensure accuracy; however, inadvertent computerized transcription errors may be present.  Electronically signed by Katherine Patel MD on 11/28/2024 at 10:14 AM

## 2024-11-28 NOTE — PROGRESS NOTES
[facility]  MD Bryce Milligan MD Ali Hassan, MD Lisa Kniska, CNP   Natalia Rey, CODY Harris, DEMETRIUS  Progress Note    11/28/2024    SUBJECTIVE:   11/13:  Seen in [facility].  Awake and alert.  S/P right above knee amputation yesterday. Complains of pain at right AKA site.  Oxygen on per NC.  Denies chest pain, palpitation or SOB.  Denies nausea, vomiting or abd pain.  Continues on heparin drip and bicarb IV.  He is oligoanuric.     11/14: Seen in the [facility] while on dialysis. Tolerating therapy without issues. His blood pressure is stable. Heart rate is mildly elevated. He is in good spirits and reports that he is having less pain today. His appetite is fair, he did eat some cereal for dinner. Urine output is stable.     11/15: Seen in the ICU. No acute distress or complaints. Received metoprolol earlier for tachycardia. Tolerated HD today with 2.8 liters of fluid removal. He is still making urine, though urine output has decreased. He is on oxygen via nasal canula. Denies dyspnea. PO intake is stable.     11/16: Undergoing with withdrawal of alcohol symptoms.  Obtunded.  Awake agitated aggressive at times though symptoms have improved status post sedation    11/17: Feeling better today.  Had a \"rough night\" notable for worsening agitation and aggression.  Now on Precedex drip with control of symptoms.  Enjoying his lunch.  Denies complaint.  Family at bedside.  BP stable.    11/18: Seen in the [facility]. Mentation has improved. Sitter is present at the bedside. He is still tachycardic. Denies any acute complaints. Denies dyspnea. His blood pressure is stable. Complains of some discomfort to his RLE. Minimal urine output.     11/19: Seen today while on dialysis. Tolerating treatment well. Line will only tolerate BFR of 250. Plan is for tunneled line placement in IR tomorrow. Blood pressure is stable. Heart rate has improved. Urine output remains low. He denies any

## 2024-11-28 NOTE — PLAN OF CARE
Problem: Chronic Conditions and Co-morbidities  Goal: Patient's chronic conditions and co-morbidity symptoms are monitored and maintained or improved  11/27/2024 2017 by Toma Duggan, RN  Outcome: Progressing     Problem: Discharge Planning  Goal: Discharge to home or other facility with appropriate resources  11/27/2024 2017 by Toma Duggan, RN  Outcome: Progressing

## 2024-11-29 LAB
ANION GAP SERPL CALCULATED.3IONS-SCNC: 18 MMOL/L (ref 7–16)
BASOPHILS ABSOLUTE: 0.06 K/UL (ref 0–0.2)
BASOPHILS RELATIVE PERCENT: 0 % (ref 0–2)
BUN BLDV-MCNC: 48 MG/DL (ref 6–23)
CALCIUM SERPL-MCNC: 8.6 MG/DL (ref 8.6–10.2)
CHLORIDE BLD-SCNC: 91 MMOL/L (ref 98–107)
CO2: 24 MMOL/L (ref 22–29)
CREAT SERPL-MCNC: 6.3 MG/DL (ref 0.7–1.2)
EOSINOPHILS ABSOLUTE: 0.24 K/UL (ref 0.05–0.5)
EOSINOPHILS RELATIVE PERCENT: 2 % (ref 0–6)
GFR, ESTIMATED: 9 ML/MIN/1.73M2
GLUCOSE BLD-MCNC: 165 MG/DL (ref 74–99)
HCT VFR BLD CALC: 24.6 % (ref 37–54)
HEMOGLOBIN: 7.9 G/DL (ref 12.5–16.5)
IMMATURE GRANULOCYTES %: 1 % (ref 0–5)
IMMATURE GRANULOCYTES ABSOLUTE: 0.1 K/UL (ref 0–0.58)
LYMPHOCYTES ABSOLUTE: 1.23 K/UL (ref 1.5–4)
LYMPHOCYTES RELATIVE PERCENT: 8 % (ref 20–42)
MCH RBC QN AUTO: 31.9 PG (ref 26–35)
MCHC RBC AUTO-ENTMCNC: 32.1 G/DL (ref 32–34.5)
MCV RBC AUTO: 99.2 FL (ref 80–99.9)
MONOCYTES ABSOLUTE: 1.42 K/UL (ref 0.1–0.95)
MONOCYTES RELATIVE PERCENT: 10 % (ref 2–12)
NEUTROPHILS ABSOLUTE: 11.71 K/UL (ref 1.8–7.3)
NEUTROPHILS RELATIVE PERCENT: 79 % (ref 43–80)
PDW BLD-RTO: 14.5 % (ref 11.5–15)
PLATELET # BLD: 571 K/UL (ref 130–450)
PMV BLD AUTO: 10 FL (ref 7–12)
POTASSIUM SERPL-SCNC: 4.5 MMOL/L (ref 3.5–5)
RBC # BLD: 2.48 M/UL (ref 3.8–5.8)
SODIUM BLD-SCNC: 133 MMOL/L (ref 132–146)
WBC # BLD: 14.8 K/UL (ref 4.5–11.5)

## 2024-11-29 NOTE — DISCHARGE SUMMARY
Peritoneum/Retroperitoneum:  No enlarged lymph nodes. Bones/Soft Tissues:   Left lower quadrant increased subcutaneous fat superficial indistinct density and skin thickening perhaps an area of trauma, advise clinical correlation.  Severe spinal degenerative changes. VASCULAR Severe atherosclerotic calcifications of major abdominopelvic arteries noted. Aortic branches are patent.  However there are indistinct geographic hypoattenuating areas in the kidneys, on the right is most evident in the lower pole through mid level anteriorly, on the left is lateral mid to upper location, having the appearance consistent with infarcts. Aorta has less than 50% stenosis.  Renal artery stenosis up to approximately 50%.  Common iliac artery stenoses less than 50%. Abrupt contrast termination/cut off at the bilateral common femoral arteries. There is reconstitution of the right profundal femora.  Partially and in the superficial femoral and popliteal arteries partially, but no contrast evident in the calf arteries clearly.  On the left also some reconstitution of profundal femoral S and superficial femoral artery as well as distal popliteal and tibial peroneal trunk but again no significant calf runoff identified     1. Severe atherosclerotic disease with common femoral arterial occlusions, some reconstitution in the superficial femoral and popliteal locations but without significant runoff of the lower extremities. 2. 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 Critical results were called by Dr. Stevie Adame MD to Dr. ARETHA Quarles MD on 11/12/2024 at 00:49. RECOMMENDATIONS: Clinical correlation and vascular consultation.     CTA CHEST W CONTRAST    Result Date: 11/12/2024  EXAMINATION: CTA OF THE CHEST 11/11/2024 11:41 pm TECHNIQUE: CTA of the chest was performed after the administration of intravenous contrast.  Multiplanar reformatted images are provided for review.  MIP images are provided for review. Automated exposure control,  HUMALOG,ADMELOG  Inject 4 Units into the skin 3 times daily (with meals)     LORazepam 0.5 MG tablet  Commonly known as: ATIVAN  Take 1 tablet by mouth every 6 hours as needed for Anxiety for up to 30 days. Max Daily Amount: 2 mg     oxyCODONE 5 MG immediate release tablet  Commonly known as: ROXICODONE  Take 1 tablet by mouth every 6 hours as needed for Pain for up to 3 days. Max Daily Amount: 20 mg     sevelamer 800 MG tablet  Commonly known as: RENVELA  Take 1 tablet by mouth 3 times daily (with meals)     tamsulosin 0.4 MG capsule  Commonly known as: FLOMAX  Take 1 capsule by mouth daily     thiamine 100 MG tablet  Take 1 tablet by mouth daily           * This list has 2 medication(s) that are the same as other medications prescribed for you. Read the directions carefully, and ask your doctor or other care provider to review them with you.                CONTINUE taking these medications      albuterol-ipratropium  MCG/ACT Aers inhaler  Commonly known as: COMBIVENT RESPIMAT  Inhale 1 puff into the lungs every 6 hours     aspirin 81 MG chewable tablet     colestipol 1 g tablet  Commonly known as: Colestid  Take 1 tablet by mouth 2 times daily     FreeStyle Brandee 2 Sensor Misc  Change every 14 days     Insulin Syringes (Disposable) U-100 0.3 ML Misc  1 each by Does not apply route daily     losartan 25 MG tablet  Commonly known as: COZAAR     metoprolol 100 MG tablet  Commonly known as: LOPRESSOR  Take 1 tablet by mouth 2 times daily     montelukast 10 MG tablet  Commonly known as: SINGULAIR  Take 1 tablet by mouth daily     Ozempic (0.25 or 0.5 MG/DOSE) 2 MG/1.5ML Sopn  Generic drug: Semaglutide(0.25 or 0.5MG/DOS)     therapeutic multivitamin-minerals tablet     Vascepa 1 g Caps capsule  Generic drug: Icosapent Ethyl            STOP taking these medications      Brilinta 90 MG Tabs tablet  Generic drug: ticagrelor     indomethacin 50 MG capsule  Commonly known as: INDOCIN               Where to Get Your

## 2024-11-30 LAB
ALBUMIN: 2.9 G/DL (ref 3.5–5.2)
ALP BLD-CCNC: 128 U/L (ref 40–129)
ALT SERPL-CCNC: 11 U/L (ref 0–40)
AMMONIA: 20 UMOL/L (ref 16–60)
ANION GAP SERPL CALCULATED.3IONS-SCNC: 14 MMOL/L (ref 7–16)
AST SERPL-CCNC: 34 U/L (ref 0–39)
BASOPHILS ABSOLUTE: 0.07 K/UL (ref 0–0.2)
BASOPHILS RELATIVE PERCENT: 1 % (ref 0–2)
BILIRUB SERPL-MCNC: 0.3 MG/DL (ref 0–1.2)
BUN BLDV-MCNC: 27 MG/DL (ref 6–23)
CALCIUM SERPL-MCNC: 8.9 MG/DL (ref 8.6–10.2)
CHLORIDE BLD-SCNC: 91 MMOL/L (ref 98–107)
CO2: 28 MMOL/L (ref 22–29)
CREAT SERPL-MCNC: 4.8 MG/DL (ref 0.7–1.2)
EOSINOPHILS ABSOLUTE: 0.29 K/UL (ref 0.05–0.5)
EOSINOPHILS RELATIVE PERCENT: 2 % (ref 0–6)
GFR, ESTIMATED: 13 ML/MIN/1.73M2
GLUCOSE BLD-MCNC: 171 MG/DL (ref 74–99)
HCT VFR BLD CALC: 27.7 % (ref 37–54)
HEMOGLOBIN: 8.4 G/DL (ref 12.5–16.5)
IMMATURE GRANULOCYTES %: 1 % (ref 0–5)
IMMATURE GRANULOCYTES ABSOLUTE: 0.1 K/UL (ref 0–0.58)
LYMPHOCYTES ABSOLUTE: 1.65 K/UL (ref 1.5–4)
LYMPHOCYTES RELATIVE PERCENT: 11 % (ref 20–42)
MAGNESIUM: 2.1 MG/DL (ref 1.6–2.6)
MCH RBC QN AUTO: 30.2 PG (ref 26–35)
MCHC RBC AUTO-ENTMCNC: 30.3 G/DL (ref 32–34.5)
MCV RBC AUTO: 99.6 FL (ref 80–99.9)
MICROORGANISM SPEC CULT: ABNORMAL
MONOCYTES ABSOLUTE: 1.31 K/UL (ref 0.1–0.95)
MONOCYTES RELATIVE PERCENT: 9 % (ref 2–12)
NEUTROPHILS ABSOLUTE: 11.9 K/UL (ref 1.8–7.3)
NEUTROPHILS RELATIVE PERCENT: 78 % (ref 43–80)
PDW BLD-RTO: 14.4 % (ref 11.5–15)
PHOSPHORUS: 4.1 MG/DL (ref 2.5–4.5)
PLATELET # BLD: 666 K/UL (ref 130–450)
PMV BLD AUTO: 9.4 FL (ref 7–12)
POTASSIUM SERPL-SCNC: 4.8 MMOL/L (ref 3.5–5)
RBC # BLD: 2.78 M/UL (ref 3.8–5.8)
SERVICE CMNT-IMP: ABNORMAL
SODIUM BLD-SCNC: 133 MMOL/L (ref 132–146)
SPECIMEN DESCRIPTION: ABNORMAL
TOTAL PROTEIN: 7 G/DL (ref 6.4–8.3)
WBC # BLD: 15.3 K/UL (ref 4.5–11.5)

## 2024-12-01 LAB
MICROORGANISM SPEC CULT: NORMAL
MICROORGANISM SPEC CULT: NORMAL
SERVICE CMNT-IMP: NORMAL
SERVICE CMNT-IMP: NORMAL
SPECIMEN DESCRIPTION: NORMAL
SPECIMEN DESCRIPTION: NORMAL

## 2024-12-03 LAB
ALBUMIN: 2.9 G/DL (ref 3.5–5.2)
ALP BLD-CCNC: 137 U/L (ref 40–129)
ALT SERPL-CCNC: 8 U/L (ref 0–40)
ANION GAP SERPL CALCULATED.3IONS-SCNC: 18 MMOL/L (ref 7–16)
ANION GAP SERPL CALCULATED.3IONS-SCNC: 21 MMOL/L (ref 7–16)
AST SERPL-CCNC: 31 U/L (ref 0–39)
BASOPHILS ABSOLUTE: 0.05 K/UL (ref 0–0.2)
BASOPHILS ABSOLUTE: 0.06 K/UL (ref 0–0.2)
BASOPHILS RELATIVE PERCENT: 0 % (ref 0–2)
BASOPHILS RELATIVE PERCENT: 0 % (ref 0–2)
BILIRUB SERPL-MCNC: 0.4 MG/DL (ref 0–1.2)
BILIRUBIN DIRECT: <0.2 MG/DL (ref 0–0.3)
BILIRUBIN, INDIRECT: ABNORMAL MG/DL (ref 0–1)
BUN BLDV-MCNC: 48 MG/DL (ref 6–23)
BUN BLDV-MCNC: 66 MG/DL (ref 6–23)
CALCIUM SERPL-MCNC: 8.6 MG/DL (ref 8.6–10.2)
CALCIUM SERPL-MCNC: 9.1 MG/DL (ref 8.6–10.2)
CHLORIDE BLD-SCNC: 91 MMOL/L (ref 98–107)
CHLORIDE BLD-SCNC: 91 MMOL/L (ref 98–107)
CO2: 20 MMOL/L (ref 22–29)
CO2: 24 MMOL/L (ref 22–29)
CREAT SERPL-MCNC: 6.3 MG/DL (ref 0.7–1.2)
CREAT SERPL-MCNC: 7.9 MG/DL (ref 0.7–1.2)
CULTURE: NO GROWTH
EOSINOPHILS ABSOLUTE: 0.23 K/UL (ref 0.05–0.5)
EOSINOPHILS ABSOLUTE: 0.24 K/UL (ref 0.05–0.5)
EOSINOPHILS RELATIVE PERCENT: 2 % (ref 0–6)
EOSINOPHILS RELATIVE PERCENT: 2 % (ref 0–6)
GFR, ESTIMATED: 7 ML/MIN/1.73M2
GFR, ESTIMATED: 9 ML/MIN/1.73M2
GLUCOSE BLD-MCNC: 165 MG/DL (ref 74–99)
GLUCOSE BLD-MCNC: 253 MG/DL (ref 74–99)
HCT VFR BLD CALC: 24.6 % (ref 37–54)
HCT VFR BLD CALC: 25 % (ref 37–54)
HEMOGLOBIN: 7.7 G/DL (ref 12.5–16.5)
HEMOGLOBIN: 7.9 G/DL (ref 12.5–16.5)
IMMATURE GRANULOCYTES %: 1 % (ref 0–5)
IMMATURE GRANULOCYTES %: 1 % (ref 0–5)
IMMATURE GRANULOCYTES ABSOLUTE: 0.1 K/UL (ref 0–0.58)
IMMATURE GRANULOCYTES ABSOLUTE: 0.12 K/UL (ref 0–0.58)
LYMPHOCYTES ABSOLUTE: 1.15 K/UL (ref 1.5–4)
LYMPHOCYTES ABSOLUTE: 1.23 K/UL (ref 1.5–4)
LYMPHOCYTES RELATIVE PERCENT: 8 % (ref 20–42)
LYMPHOCYTES RELATIVE PERCENT: 8 % (ref 20–42)
MAGNESIUM: 2.4 MG/DL (ref 1.6–2.6)
MCH RBC QN AUTO: 30.6 PG (ref 26–35)
MCH RBC QN AUTO: 31.9 PG (ref 26–35)
MCHC RBC AUTO-ENTMCNC: 30.8 G/DL (ref 32–34.5)
MCHC RBC AUTO-ENTMCNC: 32.1 G/DL (ref 32–34.5)
MCV RBC AUTO: 99.2 FL (ref 80–99.9)
MCV RBC AUTO: 99.2 FL (ref 80–99.9)
MONOCYTES ABSOLUTE: 0.99 K/UL (ref 0.1–0.95)
MONOCYTES ABSOLUTE: 1.42 K/UL (ref 0.1–0.95)
MONOCYTES RELATIVE PERCENT: 10 % (ref 2–12)
MONOCYTES RELATIVE PERCENT: 7 % (ref 2–12)
NEUTROPHILS ABSOLUTE: 11.71 K/UL (ref 1.8–7.3)
NEUTROPHILS ABSOLUTE: 12.61 K/UL (ref 1.8–7.3)
NEUTROPHILS RELATIVE PERCENT: 79 % (ref 43–80)
NEUTROPHILS RELATIVE PERCENT: 83 % (ref 43–80)
PDW BLD-RTO: 14.3 % (ref 11.5–15)
PDW BLD-RTO: 14.5 % (ref 11.5–15)
PHOSPHORUS: 6.4 MG/DL (ref 2.5–4.5)
PLATELET # BLD: 571 K/UL (ref 130–450)
PLATELET # BLD: 653 K/UL (ref 130–450)
PMV BLD AUTO: 10 FL (ref 7–12)
PMV BLD AUTO: 9.6 FL (ref 7–12)
POTASSIUM SERPL-SCNC: 4.5 MMOL/L (ref 3.5–5)
POTASSIUM SERPL-SCNC: 5.2 MMOL/L (ref 3.5–5)
PROCALCITONIN: 0.41 NG/ML (ref 0–0.08)
PTH INTACT: 126.2 PG/ML (ref 15–65)
RBC # BLD: 2.48 M/UL (ref 3.8–5.8)
RBC # BLD: 2.52 M/UL (ref 3.8–5.8)
SODIUM BLD-SCNC: 132 MMOL/L (ref 132–146)
SODIUM BLD-SCNC: 133 MMOL/L (ref 132–146)
SPECIMEN DESCRIPTION: NORMAL
TOTAL PROTEIN: 6.7 G/DL (ref 6.4–8.3)
VITAMIN D 25-HYDROXY: 14.7 NG/ML (ref 30–100)
WBC # BLD: 14.8 K/UL (ref 4.5–11.5)
WBC # BLD: 15.2 K/UL (ref 4.5–11.5)

## 2024-12-04 LAB
ANION GAP SERPL CALCULATED.3IONS-SCNC: 14 MMOL/L (ref 7–16)
BUN SERPL-MCNC: 47 MG/DL (ref 6–23)
CALCIUM SERPL-MCNC: 8.9 MG/DL (ref 8.6–10.2)
CHLORIDE SERPL-SCNC: 98 MMOL/L (ref 98–107)
CO2 SERPL-SCNC: 23 MMOL/L (ref 22–29)
CREAT SERPL-MCNC: 6.6 MG/DL (ref 0.7–1.2)
GFR, ESTIMATED: 9 ML/MIN/1.73M2
GLUCOSE SERPL-MCNC: 187 MG/DL (ref 74–99)
POTASSIUM SERPL-SCNC: 4.6 MMOL/L (ref 3.5–5)
SODIUM SERPL-SCNC: 135 MMOL/L (ref 132–146)

## 2024-12-04 PROCEDURE — 80048 BASIC METABOLIC PNL TOTAL CA: CPT

## 2024-12-04 NOTE — PROGRESS NOTES
Advised patient on results and recommendations, per provider's note. Patient states understanding and agreement.      Patient sent with pants, underwear, glasses, phone, , and extra battery.

## 2024-12-05 ENCOUNTER — APPOINTMENT (OUTPATIENT)
Dept: CT IMAGING | Age: 64
DRG: 208 | End: 2024-12-05
Payer: COMMERCIAL

## 2024-12-05 ENCOUNTER — APPOINTMENT (OUTPATIENT)
Age: 64
DRG: 208 | End: 2024-12-05
Attending: INTERNAL MEDICINE
Payer: COMMERCIAL

## 2024-12-05 ENCOUNTER — APPOINTMENT (OUTPATIENT)
Dept: ULTRASOUND IMAGING | Age: 64
DRG: 208 | End: 2024-12-05
Payer: COMMERCIAL

## 2024-12-05 ENCOUNTER — APPOINTMENT (OUTPATIENT)
Dept: GENERAL RADIOLOGY | Age: 64
DRG: 208 | End: 2024-12-05
Payer: COMMERCIAL

## 2024-12-05 ENCOUNTER — HOSPITAL ENCOUNTER (INPATIENT)
Age: 64
LOS: 5 days | Discharge: NURSING, REHAB OR LONG-TERM CARE | DRG: 208 | End: 2024-12-10
Attending: EMERGENCY MEDICINE | Admitting: STUDENT IN AN ORGANIZED HEALTH CARE EDUCATION/TRAINING PROGRAM
Payer: COMMERCIAL

## 2024-12-05 DIAGNOSIS — I48.19 PERSISTENT ATRIAL FIBRILLATION (HCC): ICD-10-CM

## 2024-12-05 DIAGNOSIS — J96.01 ACUTE RESPIRATORY FAILURE WITH HYPOXIA AND HYPERCAPNIA: ICD-10-CM

## 2024-12-05 DIAGNOSIS — I21.4 NSTEMI (NON-ST ELEVATED MYOCARDIAL INFARCTION) (HCC): ICD-10-CM

## 2024-12-05 DIAGNOSIS — R41.82 ALTERED MENTAL STATUS, UNSPECIFIED ALTERED MENTAL STATUS TYPE: ICD-10-CM

## 2024-12-05 DIAGNOSIS — R73.9 HYPERGLYCEMIA: ICD-10-CM

## 2024-12-05 DIAGNOSIS — J96.02 ACUTE RESPIRATORY FAILURE WITH HYPOXIA AND HYPERCAPNIA: ICD-10-CM

## 2024-12-05 DIAGNOSIS — R57.9 SHOCK: Primary | ICD-10-CM

## 2024-12-05 DIAGNOSIS — D64.9 ANEMIA, UNSPECIFIED TYPE: ICD-10-CM

## 2024-12-05 PROBLEM — K11.8 PAROTID MASS: Status: ACTIVE | Noted: 2024-12-05

## 2024-12-05 PROBLEM — G93.41 ACUTE METABOLIC ENCEPHALOPATHY: Status: ACTIVE | Noted: 2024-12-05

## 2024-12-05 PROBLEM — R79.89 ELEVATED TROPONIN: Status: ACTIVE | Noted: 2024-12-05

## 2024-12-05 PROBLEM — Z99.2 HEMODIALYSIS PATIENT (HCC): Status: ACTIVE | Noted: 2024-12-05

## 2024-12-05 PROBLEM — I48.91 ATRIAL FIBRILLATION (HCC): Status: ACTIVE | Noted: 2024-12-05

## 2024-12-05 LAB
ALBUMIN SERPL-MCNC: 3.2 G/DL (ref 3.5–5.2)
ALBUMIN SERPL-MCNC: 3.2 G/DL (ref 3.5–5.2)
ALP SERPL-CCNC: 125 U/L (ref 40–129)
ALP SERPL-CCNC: 131 U/L (ref 40–129)
ALT SERPL-CCNC: 30 U/L (ref 0–40)
ALT SERPL-CCNC: 32 U/L (ref 0–40)
AMMONIA PLAS-SCNC: 28 UMOL/L (ref 16–60)
ANION GAP SERPL CALCULATED.3IONS-SCNC: 10 MMOL/L (ref 7–16)
ANION GAP SERPL CALCULATED.3IONS-SCNC: 13 MMOL/L (ref 7–16)
ANION GAP SERPL CALCULATED.3IONS-SCNC: 14 MMOL/L (ref 7–16)
AST SERPL-CCNC: 49 U/L (ref 0–39)
AST SERPL-CCNC: 54 U/L (ref 0–39)
B PARAP IS1001 DNA NPH QL NAA+NON-PROBE: NOT DETECTED
B PERT DNA SPEC QL NAA+PROBE: NOT DETECTED
B.E.: -1.4 MMOL/L (ref -3–3)
B.E.: 0.1 MMOL/L (ref -3–3)
BACTERIA URNS QL MICRO: ABNORMAL
BACTERIA URNS QL MICRO: ABNORMAL
BASOPHILS # BLD: 0.05 K/UL (ref 0–0.2)
BASOPHILS # BLD: 0.06 K/UL (ref 0–0.2)
BASOPHILS NFR BLD: 0 % (ref 0–2)
BASOPHILS NFR BLD: 1 % (ref 0–2)
BILIRUB SERPL-MCNC: 0.3 MG/DL (ref 0–1.2)
BILIRUB SERPL-MCNC: 0.4 MG/DL (ref 0–1.2)
BILIRUB UR QL STRIP: NEGATIVE
BILIRUB UR QL STRIP: NEGATIVE
BNP SERPL-MCNC: 3360 PG/ML (ref 0–125)
BNP SERPL-MCNC: 3377 PG/ML (ref 0–125)
BUN SERPL-MCNC: 36 MG/DL (ref 6–23)
BUN SERPL-MCNC: 39 MG/DL (ref 6–23)
BUN SERPL-MCNC: 43 MG/DL (ref 6–23)
C PNEUM DNA NPH QL NAA+NON-PROBE: NOT DETECTED
CALCIUM SERPL-MCNC: 8.4 MG/DL (ref 8.6–10.2)
CALCIUM SERPL-MCNC: 9 MG/DL (ref 8.6–10.2)
CALCIUM SERPL-MCNC: 9.1 MG/DL (ref 8.6–10.2)
CHLORIDE SERPL-SCNC: 101 MMOL/L (ref 98–107)
CHLORIDE SERPL-SCNC: 97 MMOL/L (ref 98–107)
CHLORIDE SERPL-SCNC: 98 MMOL/L (ref 98–107)
CK SERPL-CCNC: 348 U/L (ref 20–200)
CK SERPL-CCNC: 380 U/L (ref 20–200)
CLARITY UR: ABNORMAL
CLARITY UR: CLEAR
CO2 SERPL-SCNC: 24 MMOL/L (ref 22–29)
CO2 SERPL-SCNC: 24 MMOL/L (ref 22–29)
CO2 SERPL-SCNC: 26 MMOL/L (ref 22–29)
COHB: 0.1 % (ref 0–1.5)
COHB: 0.5 % (ref 0–1.5)
COLOR UR: ABNORMAL
COLOR UR: YELLOW
CREAT SERPL-MCNC: 5.3 MG/DL (ref 0.7–1.2)
CREAT SERPL-MCNC: 5.6 MG/DL (ref 0.7–1.2)
CREAT SERPL-MCNC: 6.1 MG/DL (ref 0.7–1.2)
CRITICAL: ABNORMAL
CRITICAL: ABNORMAL
DATE ANALYZED: ABNORMAL
DATE ANALYZED: ABNORMAL
DATE OF COLLECTION: ABNORMAL
DATE OF COLLECTION: ABNORMAL
ECHO BSA: 2.12 M2
ECHO LA DIAMETER INDEX: 1.8 CM/M2
ECHO LA DIAMETER: 3.7 CM
ECHO LA VOL A-L A2C: 64 ML (ref 18–58)
ECHO LA VOL A-L A4C: 71 ML (ref 18–58)
ECHO LA VOL MOD A2C: 60 ML (ref 18–58)
ECHO LA VOL MOD A4C: 70 ML (ref 18–58)
ECHO LA VOLUME AREA LENGTH: 68 ML
ECHO LA VOLUME INDEX A-L A2C: 31 ML/M2 (ref 16–34)
ECHO LA VOLUME INDEX A-L A4C: 34 ML/M2 (ref 16–34)
ECHO LA VOLUME INDEX AREA LENGTH: 33 ML/M2 (ref 16–34)
ECHO LA VOLUME INDEX MOD A2C: 29 ML/M2 (ref 16–34)
ECHO LA VOLUME INDEX MOD A4C: 34 ML/M2 (ref 16–34)
ECHO LV E' LATERAL VELOCITY: 6 CM/S
ECHO LV E' SEPTAL VELOCITY: 5 CM/S
ECHO LV EDV A2C: 74 ML
ECHO LV EDV A4C: 97 ML
ECHO LV EDV BP: 85 ML (ref 67–155)
ECHO LV EDV INDEX A4C: 47 ML/M2
ECHO LV EDV INDEX BP: 41 ML/M2
ECHO LV EDV NDEX A2C: 36 ML/M2
ECHO LV EF PHYSICIAN: 52 %
ECHO LV EJECTION FRACTION A2C: 53 %
ECHO LV EJECTION FRACTION A4C: 52 %
ECHO LV EJECTION FRACTION BIPLANE: 50 % (ref 55–100)
ECHO LV ESV A2C: 35 ML
ECHO LV ESV A4C: 47 ML
ECHO LV ESV BP: 43 ML (ref 22–58)
ECHO LV ESV INDEX A2C: 17 ML/M2
ECHO LV ESV INDEX A4C: 23 ML/M2
ECHO LV ESV INDEX BP: 21 ML/M2
ECHO LV FRACTIONAL SHORTENING: 29 % (ref 28–44)
ECHO LV INTERNAL DIMENSION DIASTOLE INDEX: 2.38 CM/M2
ECHO LV INTERNAL DIMENSION DIASTOLIC: 4.9 CM (ref 4.2–5.9)
ECHO LV INTERNAL DIMENSION SYSTOLIC INDEX: 1.7 CM/M2
ECHO LV INTERNAL DIMENSION SYSTOLIC: 3.5 CM
ECHO LV IVSD: 1.1 CM (ref 0.6–1)
ECHO LV IVSS: 1.5 CM
ECHO LV MASS 2D: 200.5 G (ref 88–224)
ECHO LV MASS INDEX 2D: 97.3 G/M2 (ref 49–115)
ECHO LV POSTERIOR WALL DIASTOLIC: 1.1 CM (ref 0.6–1)
ECHO LV POSTERIOR WALL SYSTOLIC: 1.3 CM
ECHO LV RELATIVE WALL THICKNESS RATIO: 0.45
ECHO RV INTERNAL DIMENSION: 2.3 CM
ECHO RV TAPSE: 1.6 CM (ref 1.7–?)
EKG ATRIAL RATE: 98 BPM
EKG P AXIS: 52 DEGREES
EKG P-R INTERVAL: 136 MS
EKG Q-T INTERVAL: 404 MS
EKG QRS DURATION: 114 MS
EKG QTC CALCULATION (BAZETT): 515 MS
EKG R AXIS: -50 DEGREES
EKG T AXIS: 58 DEGREES
EKG VENTRICULAR RATE: 98 BPM
EOSINOPHIL # BLD: 0.21 K/UL (ref 0.05–0.5)
EOSINOPHIL # BLD: 0.29 K/UL (ref 0.05–0.5)
EOSINOPHILS RELATIVE PERCENT: 2 % (ref 0–6)
EOSINOPHILS RELATIVE PERCENT: 3 % (ref 0–6)
EPI CELLS #/AREA URNS HPF: ABNORMAL /HPF
ERYTHROCYTE [DISTWIDTH] IN BLOOD BY AUTOMATED COUNT: 13.8 % (ref 11.5–15)
FIO2: 100 %
FIO2: 100 %
FLUAV RNA NPH QL NAA+NON-PROBE: NOT DETECTED
FLUBV RNA NPH QL NAA+NON-PROBE: NOT DETECTED
GFR, ESTIMATED: 10 ML/MIN/1.73M2
GFR, ESTIMATED: 11 ML/MIN/1.73M2
GFR, ESTIMATED: 12 ML/MIN/1.73M2
GLUCOSE BLD-MCNC: 136 MG/DL (ref 74–99)
GLUCOSE BLD-MCNC: 158 MG/DL (ref 74–99)
GLUCOSE SERPL-MCNC: 130 MG/DL (ref 74–99)
GLUCOSE SERPL-MCNC: 181 MG/DL (ref 74–99)
GLUCOSE SERPL-MCNC: 210 MG/DL (ref 74–99)
GLUCOSE UR STRIP-MCNC: NEGATIVE MG/DL
GLUCOSE UR STRIP-MCNC: NEGATIVE MG/DL
HADV DNA NPH QL NAA+NON-PROBE: NOT DETECTED
HBV SURFACE AG SERPL QL IA: NONREACTIVE
HCO3: 24.1 MMOL/L (ref 22–26)
HCO3: 27.2 MMOL/L (ref 22–26)
HCOV 229E RNA NPH QL NAA+NON-PROBE: NOT DETECTED
HCOV HKU1 RNA NPH QL NAA+NON-PROBE: NOT DETECTED
HCOV NL63 RNA NPH QL NAA+NON-PROBE: NOT DETECTED
HCOV OC43 RNA NPH QL NAA+NON-PROBE: NOT DETECTED
HCT VFR BLD AUTO: 21.5 % (ref 37–54)
HCT VFR BLD AUTO: 23.1 % (ref 37–54)
HCT VFR BLD AUTO: 24.3 % (ref 37–54)
HCT VFR BLD AUTO: 25.1 % (ref 37–54)
HCT VFR BLD AUTO: 25.2 % (ref 37–54)
HCT VFR BLD AUTO: 27 % (ref 37–54)
HGB BLD-MCNC: 6.6 G/DL (ref 12.5–16.5)
HGB BLD-MCNC: 7 G/DL (ref 12.5–16.5)
HGB BLD-MCNC: 7.7 G/DL (ref 12.5–16.5)
HGB BLD-MCNC: 7.7 G/DL (ref 12.5–16.5)
HGB BLD-MCNC: 7.9 G/DL (ref 12.5–16.5)
HGB BLD-MCNC: 8.2 G/DL (ref 12.5–16.5)
HGB UR QL STRIP.AUTO: ABNORMAL
HGB UR QL STRIP.AUTO: ABNORMAL
HHB: 0.1 % (ref 0–5)
HHB: 0.2 % (ref 0–5)
HMPV RNA NPH QL NAA+NON-PROBE: NOT DETECTED
HPIV1 RNA NPH QL NAA+NON-PROBE: NOT DETECTED
HPIV2 RNA NPH QL NAA+NON-PROBE: NOT DETECTED
HPIV3 RNA NPH QL NAA+NON-PROBE: NOT DETECTED
HPIV4 RNA NPH QL NAA+NON-PROBE: NOT DETECTED
IMM GRANULOCYTES # BLD AUTO: 0.07 K/UL (ref 0–0.58)
IMM GRANULOCYTES # BLD AUTO: 0.07 K/UL (ref 0–0.58)
IMM GRANULOCYTES NFR BLD: 1 % (ref 0–5)
IMM GRANULOCYTES NFR BLD: 1 % (ref 0–5)
INR PPP: 2.1
KETONES UR STRIP-MCNC: NEGATIVE MG/DL
KETONES UR STRIP-MCNC: NEGATIVE MG/DL
LAB: ABNORMAL
LAB: ABNORMAL
LACTATE BLDV-SCNC: 0.8 MMOL/L (ref 0.5–1.9)
LACTATE BLDV-SCNC: 0.9 MMOL/L (ref 0.5–1.9)
LACTATE BLDV-SCNC: 1 MMOL/L (ref 0.5–2.2)
LEUKOCYTE ESTERASE UR QL STRIP: ABNORMAL
LEUKOCYTE ESTERASE UR QL STRIP: ABNORMAL
LYMPHOCYTES NFR BLD: 1.14 K/UL (ref 1.5–4)
LYMPHOCYTES NFR BLD: 1.26 K/UL (ref 1.5–4)
LYMPHOCYTES RELATIVE PERCENT: 10 % (ref 20–42)
LYMPHOCYTES RELATIVE PERCENT: 11 % (ref 20–42)
Lab: 703
Lab: 756
M PNEUMO DNA NPH QL NAA+NON-PROBE: NOT DETECTED
MAGNESIUM SERPL-MCNC: 2.4 MG/DL (ref 1.6–2.6)
MCH RBC QN AUTO: 30.2 PG (ref 26–35)
MCH RBC QN AUTO: 30.3 PG (ref 26–35)
MCH RBC QN AUTO: 30.4 PG (ref 26–35)
MCHC RBC AUTO-ENTMCNC: 30.3 G/DL (ref 32–34.5)
MCHC RBC AUTO-ENTMCNC: 30.4 G/DL (ref 32–34.5)
MCHC RBC AUTO-ENTMCNC: 30.6 G/DL (ref 32–34.5)
MCV RBC AUTO: 100 FL (ref 80–99.9)
MCV RBC AUTO: 100 FL (ref 80–99.9)
MCV RBC AUTO: 98.8 FL (ref 80–99.9)
METHB: 0.3 % (ref 0–1.5)
METHB: 0.3 % (ref 0–1.5)
MODE: ABNORMAL
MODE: AC
MONOCYTES NFR BLD: 1.12 K/UL (ref 0.1–0.95)
MONOCYTES NFR BLD: 1.14 K/UL (ref 0.1–0.95)
MONOCYTES NFR BLD: 10 % (ref 2–12)
MONOCYTES NFR BLD: 10 % (ref 2–12)
NEUTROPHILS NFR BLD: 76 % (ref 43–80)
NEUTROPHILS NFR BLD: 76 % (ref 43–80)
NEUTS SEG NFR BLD: 8.5 K/UL (ref 1.8–7.3)
NEUTS SEG NFR BLD: 8.72 K/UL (ref 1.8–7.3)
NITRITE UR QL STRIP: NEGATIVE
NITRITE UR QL STRIP: NEGATIVE
O2 CONTENT: 12.9 ML/DL
O2 CONTENT: 13.6 ML/DL
O2 SATURATION: 99.8 % (ref 92–98.5)
O2 SATURATION: 99.9 % (ref 92–98.5)
O2HB: 99.1 % (ref 94–97)
O2HB: 99.4 % (ref 94–97)
OPERATOR ID: 1115
OPERATOR ID: ABNORMAL
PARTIAL THROMBOPLASTIN TIME: 29 SEC (ref 24.5–35.1)
PARTIAL THROMBOPLASTIN TIME: 46.4 SEC (ref 24.5–35.1)
PARTIAL THROMBOPLASTIN TIME: 55 SEC (ref 24.5–35.1)
PATIENT TEMP: 37 C
PATIENT TEMP: 37 C
PCO2: 43.8 MMHG (ref 35–45)
PCO2: 58.4 MMHG (ref 35–45)
PEEP/CPAP: 6 CMH2O
PEEP/CPAP: 8 CMH2O
PFO2: 3.72 MMHG/%
PFO2: 4.82 MMHG/%
PH BLOOD GAS: 7.29 (ref 7.35–7.45)
PH BLOOD GAS: 7.36 (ref 7.35–7.45)
PH UR STRIP: 6 [PH] (ref 5–9)
PH UR STRIP: 6 [PH] (ref 5–9)
PIP: 16 CMH2O
PLATELET # BLD AUTO: 468 K/UL (ref 130–450)
PLATELET # BLD AUTO: 526 K/UL (ref 130–450)
PLATELET # BLD AUTO: 536 K/UL (ref 130–450)
PMV BLD AUTO: 9 FL (ref 7–12)
PMV BLD AUTO: 9 FL (ref 7–12)
PMV BLD AUTO: 9.3 FL (ref 7–12)
PO2: 372.2 MMHG (ref 75–100)
PO2: 482.5 MMHG (ref 75–100)
POTASSIUM SERPL-SCNC: 4.2 MMOL/L (ref 3.5–5)
POTASSIUM SERPL-SCNC: 4.4 MMOL/L (ref 3.5–5)
POTASSIUM SERPL-SCNC: 4.7 MMOL/L (ref 3.5–5)
PROT SERPL-MCNC: 7.1 G/DL (ref 6.4–8.3)
PROT SERPL-MCNC: 7.6 G/DL (ref 6.4–8.3)
PROT UR STRIP-MCNC: 100 MG/DL
PROT UR STRIP-MCNC: 100 MG/DL
PROTHROMBIN TIME: 22.7 SEC (ref 9.3–12.4)
RBC # BLD AUTO: 2.31 M/UL (ref 3.8–5.8)
RBC # BLD AUTO: 2.55 M/UL (ref 3.8–5.8)
RBC # BLD AUTO: 2.7 M/UL (ref 3.8–5.8)
RBC #/AREA URNS HPF: ABNORMAL /HPF
RBC #/AREA URNS HPF: ABNORMAL /HPF
RI(T): 0.36
RI(T): 0.8
RR MECHANICAL: 20 B/MIN
RSV RNA NPH QL NAA+NON-PROBE: NOT DETECTED
RV+EV RNA NPH QL NAA+NON-PROBE: NOT DETECTED
SARS-COV-2 RNA NPH QL NAA+NON-PROBE: NOT DETECTED
SODIUM SERPL-SCNC: 135 MMOL/L (ref 132–146)
SODIUM SERPL-SCNC: 135 MMOL/L (ref 132–146)
SODIUM SERPL-SCNC: 137 MMOL/L (ref 132–146)
SOURCE, BLOOD GAS: ABNORMAL
SOURCE, BLOOD GAS: ABNORMAL
SP GR UR STRIP: 1.01 (ref 1–1.03)
SP GR UR STRIP: 1.02 (ref 1–1.03)
SPECIMEN DESCRIPTION: NORMAL
THB: 8.5 G/DL (ref 11.5–16.5)
THB: 8.8 G/DL (ref 11.5–16.5)
TIME ANALYZED: 703
TIME ANALYZED: 805
TROPONIN I SERPL HS-MCNC: 3708 NG/L (ref 0–11)
TROPONIN I SERPL HS-MCNC: 4399 NG/L (ref 0–11)
TROPONIN I SERPL HS-MCNC: 4444 NG/L (ref 0–11)
UROBILINOGEN UR STRIP-ACNC: 0.2 EU/DL (ref 0–1)
UROBILINOGEN UR STRIP-ACNC: 0.2 EU/DL (ref 0–1)
VT MECHANICAL: 400 ML
WBC #/AREA URNS HPF: ABNORMAL /HPF
WBC #/AREA URNS HPF: ABNORMAL /HPF
WBC OTHER # BLD: 10.3 K/UL (ref 4.5–11.5)
WBC OTHER # BLD: 11.2 K/UL (ref 4.5–11.5)
WBC OTHER # BLD: 11.5 K/UL (ref 4.5–11.5)

## 2024-12-05 PROCEDURE — 85730 THROMBOPLASTIN TIME PARTIAL: CPT

## 2024-12-05 PROCEDURE — 31500 INSERT EMERGENCY AIRWAY: CPT

## 2024-12-05 PROCEDURE — 6360000004 HC RX CONTRAST MEDICATION: Performed by: RADIOLOGY

## 2024-12-05 PROCEDURE — 6360000004 HC RX CONTRAST MEDICATION: Performed by: INTERNAL MEDICINE

## 2024-12-05 PROCEDURE — 85027 COMPLETE CBC AUTOMATED: CPT

## 2024-12-05 PROCEDURE — C8924 2D TTE W OR W/O FOL W/CON,FU: HCPCS

## 2024-12-05 PROCEDURE — 71045 X-RAY EXAM CHEST 1 VIEW: CPT

## 2024-12-05 PROCEDURE — 96368 THER/DIAG CONCURRENT INF: CPT

## 2024-12-05 PROCEDURE — 99223 1ST HOSP IP/OBS HIGH 75: CPT | Performed by: STUDENT IN AN ORGANIZED HEALTH CARE EDUCATION/TRAINING PROGRAM

## 2024-12-05 PROCEDURE — 86900 BLOOD TYPING SEROLOGIC ABO: CPT

## 2024-12-05 PROCEDURE — 82947 ASSAY GLUCOSE BLOOD QUANT: CPT

## 2024-12-05 PROCEDURE — 94002 VENT MGMT INPAT INIT DAY: CPT

## 2024-12-05 PROCEDURE — 02HV33Z INSERTION OF INFUSION DEVICE INTO SUPERIOR VENA CAVA, PERCUTANEOUS APPROACH: ICD-10-PCS | Performed by: RADIOLOGY

## 2024-12-05 PROCEDURE — 2580000003 HC RX 258: Performed by: INTERNAL MEDICINE

## 2024-12-05 PROCEDURE — 96375 TX/PRO/DX INJ NEW DRUG ADDON: CPT

## 2024-12-05 PROCEDURE — 71275 CT ANGIOGRAPHY CHEST: CPT

## 2024-12-05 PROCEDURE — 0202U NFCT DS 22 TRGT SARS-COV-2: CPT

## 2024-12-05 PROCEDURE — 70450 CT HEAD/BRAIN W/O DYE: CPT

## 2024-12-05 PROCEDURE — 2580000003 HC RX 258

## 2024-12-05 PROCEDURE — 80053 COMPREHEN METABOLIC PANEL: CPT

## 2024-12-05 PROCEDURE — 83605 ASSAY OF LACTIC ACID: CPT

## 2024-12-05 PROCEDURE — P9016 RBC LEUKOCYTES REDUCED: HCPCS

## 2024-12-05 PROCEDURE — 2500000003 HC RX 250 WO HCPCS: Performed by: EMERGENCY MEDICINE

## 2024-12-05 PROCEDURE — 84484 ASSAY OF TROPONIN QUANT: CPT

## 2024-12-05 PROCEDURE — 96366 THER/PROPH/DIAG IV INF ADDON: CPT

## 2024-12-05 PROCEDURE — 96365 THER/PROPH/DIAG IV INF INIT: CPT

## 2024-12-05 PROCEDURE — 87340 HEPATITIS B SURFACE AG IA: CPT

## 2024-12-05 PROCEDURE — 5A1945Z RESPIRATORY VENTILATION, 24-96 CONSECUTIVE HOURS: ICD-10-PCS | Performed by: EMERGENCY MEDICINE

## 2024-12-05 PROCEDURE — 74177 CT ABD & PELVIS W/CONTRAST: CPT

## 2024-12-05 PROCEDURE — 6370000000 HC RX 637 (ALT 250 FOR IP): Performed by: INTERNAL MEDICINE

## 2024-12-05 PROCEDURE — 85018 HEMOGLOBIN: CPT

## 2024-12-05 PROCEDURE — 93010 ELECTROCARDIOGRAM REPORT: CPT | Performed by: INTERNAL MEDICINE

## 2024-12-05 PROCEDURE — 82805 BLOOD GASES W/O2 SATURATION: CPT

## 2024-12-05 PROCEDURE — 83735 ASSAY OF MAGNESIUM: CPT

## 2024-12-05 PROCEDURE — 6360000002 HC RX W HCPCS: Performed by: EMERGENCY MEDICINE

## 2024-12-05 PROCEDURE — 99285 EMERGENCY DEPT VISIT HI MDM: CPT

## 2024-12-05 PROCEDURE — 0BH17EZ INSERTION OF ENDOTRACHEAL AIRWAY INTO TRACHEA, VIA NATURAL OR ARTIFICIAL OPENING: ICD-10-PCS | Performed by: EMERGENCY MEDICINE

## 2024-12-05 PROCEDURE — 6370000000 HC RX 637 (ALT 250 FOR IP)

## 2024-12-05 PROCEDURE — 2580000003 HC RX 258: Performed by: EMERGENCY MEDICINE

## 2024-12-05 PROCEDURE — 85025 COMPLETE CBC W/AUTO DIFF WBC: CPT

## 2024-12-05 PROCEDURE — 89220 SPUTUM SPECIMEN COLLECTION: CPT

## 2024-12-05 PROCEDURE — 86923 COMPATIBILITY TEST ELECTRIC: CPT

## 2024-12-05 PROCEDURE — 2500000003 HC RX 250 WO HCPCS: Performed by: INTERNAL MEDICINE

## 2024-12-05 PROCEDURE — 5A1D70Z PERFORMANCE OF URINARY FILTRATION, INTERMITTENT, LESS THAN 6 HOURS PER DAY: ICD-10-PCS | Performed by: INTERNAL MEDICINE

## 2024-12-05 PROCEDURE — 99252 IP/OBS CONSLTJ NEW/EST SF 35: CPT | Performed by: OTOLARYNGOLOGY

## 2024-12-05 PROCEDURE — 6360000002 HC RX W HCPCS

## 2024-12-05 PROCEDURE — 2500000003 HC RX 250 WO HCPCS

## 2024-12-05 PROCEDURE — 87081 CULTURE SCREEN ONLY: CPT

## 2024-12-05 PROCEDURE — 30233N1 TRANSFUSION OF NONAUTOLOGOUS RED BLOOD CELLS INTO PERIPHERAL VEIN, PERCUTANEOUS APPROACH: ICD-10-PCS | Performed by: STUDENT IN AN ORGANIZED HEALTH CARE EDUCATION/TRAINING PROGRAM

## 2024-12-05 PROCEDURE — 76705 ECHO EXAM OF ABDOMEN: CPT

## 2024-12-05 PROCEDURE — 83880 ASSAY OF NATRIURETIC PEPTIDE: CPT

## 2024-12-05 PROCEDURE — 85610 PROTHROMBIN TIME: CPT

## 2024-12-05 PROCEDURE — 80048 BASIC METABOLIC PNL TOTAL CA: CPT

## 2024-12-05 PROCEDURE — 6360000002 HC RX W HCPCS: Performed by: INTERNAL MEDICINE

## 2024-12-05 PROCEDURE — 86901 BLOOD TYPING SEROLOGIC RH(D): CPT

## 2024-12-05 PROCEDURE — 82550 ASSAY OF CK (CPK): CPT

## 2024-12-05 PROCEDURE — 87086 URINE CULTURE/COLONY COUNT: CPT

## 2024-12-05 PROCEDURE — 85014 HEMATOCRIT: CPT

## 2024-12-05 PROCEDURE — 86850 RBC ANTIBODY SCREEN: CPT

## 2024-12-05 PROCEDURE — 51702 INSERT TEMP BLADDER CATH: CPT

## 2024-12-05 PROCEDURE — 36430 TRANSFUSION BLD/BLD COMPNT: CPT

## 2024-12-05 PROCEDURE — 87040 BLOOD CULTURE FOR BACTERIA: CPT

## 2024-12-05 PROCEDURE — 82140 ASSAY OF AMMONIA: CPT

## 2024-12-05 PROCEDURE — 36592 COLLECT BLOOD FROM PICC: CPT

## 2024-12-05 PROCEDURE — 81001 URINALYSIS AUTO W/SCOPE: CPT

## 2024-12-05 PROCEDURE — 93005 ELECTROCARDIOGRAM TRACING: CPT | Performed by: EMERGENCY MEDICINE

## 2024-12-05 PROCEDURE — 2000000000 HC ICU R&B

## 2024-12-05 PROCEDURE — 96367 TX/PROPH/DG ADDL SEQ IV INF: CPT

## 2024-12-05 RX ORDER — MIDAZOLAM HYDROCHLORIDE 2 MG/2ML
2 INJECTION, SOLUTION INTRAMUSCULAR; INTRAVENOUS ONCE
Status: COMPLETED | OUTPATIENT
Start: 2024-12-05 | End: 2024-12-05

## 2024-12-05 RX ORDER — INSULIN LISPRO 100 [IU]/ML
4 INJECTION, SOLUTION INTRAVENOUS; SUBCUTANEOUS
Status: ON HOLD | COMMUNITY

## 2024-12-05 RX ORDER — ROCURONIUM BROMIDE 10 MG/ML
100 INJECTION, SOLUTION INTRAVENOUS ONCE
Status: COMPLETED | OUTPATIENT
Start: 2024-12-05 | End: 2024-12-05

## 2024-12-05 RX ORDER — ACETAMINOPHEN 325 MG/1
650 TABLET ORAL EVERY 6 HOURS PRN
Status: ON HOLD | COMMUNITY

## 2024-12-05 RX ORDER — METOPROLOL TARTRATE 1 MG/ML
5 INJECTION, SOLUTION INTRAVENOUS EVERY 10 MIN PRN
Status: ON HOLD | COMMUNITY
End: 2024-12-10 | Stop reason: HOSPADM

## 2024-12-05 RX ORDER — FENTANYL CITRATE-0.9 % NACL/PF 10 MCG/ML
25-200 PLASTIC BAG, INJECTION (ML) INTRAVENOUS CONTINUOUS
Status: DISCONTINUED | OUTPATIENT
Start: 2024-12-05 | End: 2024-12-05

## 2024-12-05 RX ORDER — INSULIN LISPRO 100 [IU]/ML
0-8 INJECTION, SOLUTION INTRAVENOUS; SUBCUTANEOUS EVERY 6 HOURS
Status: DISCONTINUED | OUTPATIENT
Start: 2024-12-05 | End: 2024-12-06

## 2024-12-05 RX ORDER — AMIODARONE HYDROCHLORIDE 200 MG/1
200 TABLET ORAL DAILY
Status: ON HOLD | COMMUNITY

## 2024-12-05 RX ORDER — ACETAMINOPHEN 650 MG/1
650 SUPPOSITORY RECTAL EVERY 6 HOURS PRN
Status: DISCONTINUED | OUTPATIENT
Start: 2024-12-05 | End: 2024-12-10 | Stop reason: HOSPADM

## 2024-12-05 RX ORDER — TAMSULOSIN HYDROCHLORIDE 0.4 MG/1
0.4 CAPSULE ORAL NIGHTLY
Status: ON HOLD | COMMUNITY

## 2024-12-05 RX ORDER — ONDANSETRON 2 MG/ML
4 INJECTION INTRAMUSCULAR; INTRAVENOUS EVERY 6 HOURS PRN
Status: DISCONTINUED | OUTPATIENT
Start: 2024-12-05 | End: 2024-12-05 | Stop reason: CLARIF

## 2024-12-05 RX ORDER — ONDANSETRON 4 MG/1
4 TABLET, ORALLY DISINTEGRATING ORAL EVERY 8 HOURS PRN
Status: DISCONTINUED | OUTPATIENT
Start: 2024-12-05 | End: 2024-12-05 | Stop reason: CLARIF

## 2024-12-05 RX ORDER — OXYCODONE HYDROCHLORIDE 5 MG/1
5-10 TABLET ORAL EVERY 4 HOURS PRN
Status: ON HOLD | COMMUNITY

## 2024-12-05 RX ORDER — CHLORHEXIDINE GLUCONATE ORAL RINSE 1.2 MG/ML
15 SOLUTION DENTAL 2 TIMES DAILY
Status: DISCONTINUED | OUTPATIENT
Start: 2024-12-05 | End: 2024-12-10 | Stop reason: HOSPADM

## 2024-12-05 RX ORDER — LACTULOSE 10 G/15ML
20 SOLUTION ORAL 2 TIMES DAILY
Status: ON HOLD | COMMUNITY

## 2024-12-05 RX ORDER — PROCHLORPERAZINE EDISYLATE 5 MG/ML
10 INJECTION INTRAMUSCULAR; INTRAVENOUS EVERY 6 HOURS PRN
Status: DISCONTINUED | OUTPATIENT
Start: 2024-12-05 | End: 2024-12-10 | Stop reason: HOSPADM

## 2024-12-05 RX ORDER — POLYETHYLENE GLYCOL 3350 17 G/17G
17 POWDER, FOR SOLUTION ORAL DAILY PRN
Status: DISCONTINUED | OUTPATIENT
Start: 2024-12-05 | End: 2024-12-10 | Stop reason: HOSPADM

## 2024-12-05 RX ORDER — FENTANYL CITRATE 50 UG/ML
100 INJECTION, SOLUTION INTRAMUSCULAR; INTRAVENOUS ONCE
Status: COMPLETED | OUTPATIENT
Start: 2024-12-05 | End: 2024-12-05

## 2024-12-05 RX ORDER — SODIUM CHLORIDE 0.9 % (FLUSH) 0.9 %
5-40 SYRINGE (ML) INJECTION PRN
Status: DISCONTINUED | OUTPATIENT
Start: 2024-12-05 | End: 2024-12-10 | Stop reason: HOSPADM

## 2024-12-05 RX ORDER — HEPARIN SODIUM 10000 [USP'U]/100ML
5-30 INJECTION, SOLUTION INTRAVENOUS CONTINUOUS
Status: DISCONTINUED | OUTPATIENT
Start: 2024-12-05 | End: 2024-12-07

## 2024-12-05 RX ORDER — SODIUM CHLORIDE 9 MG/ML
INJECTION, SOLUTION INTRAVENOUS PRN
Status: DISCONTINUED | OUTPATIENT
Start: 2024-12-05 | End: 2024-12-10 | Stop reason: HOSPADM

## 2024-12-05 RX ORDER — DEXTROSE MONOHYDRATE 100 MG/ML
INJECTION, SOLUTION INTRAVENOUS CONTINUOUS PRN
Status: DISCONTINUED | OUTPATIENT
Start: 2024-12-05 | End: 2024-12-10 | Stop reason: HOSPADM

## 2024-12-05 RX ORDER — SODIUM CHLORIDE 0.9 % (FLUSH) 0.9 %
5-40 SYRINGE (ML) INJECTION EVERY 12 HOURS SCHEDULED
Status: DISCONTINUED | OUTPATIENT
Start: 2024-12-05 | End: 2024-12-10 | Stop reason: HOSPADM

## 2024-12-05 RX ORDER — PHENOL 1.4 %
10 AEROSOL, SPRAY (ML) MUCOUS MEMBRANE NIGHTLY PRN
Status: ON HOLD | COMMUNITY

## 2024-12-05 RX ORDER — METOPROLOL TARTRATE 50 MG
50 TABLET ORAL 2 TIMES DAILY
Status: ON HOLD | COMMUNITY
End: 2024-12-10

## 2024-12-05 RX ORDER — DOCUSATE SODIUM 100 MG/1
100 CAPSULE, LIQUID FILLED ORAL 2 TIMES DAILY PRN
Status: ON HOLD | COMMUNITY

## 2024-12-05 RX ORDER — FENTANYL CITRATE-0.9 % NACL/PF 20 MCG/2ML
50 SYRINGE (ML) INTRAVENOUS EVERY 30 MIN PRN
Status: DISCONTINUED | OUTPATIENT
Start: 2024-12-05 | End: 2024-12-10 | Stop reason: HOSPADM

## 2024-12-05 RX ORDER — ACETAMINOPHEN 325 MG/1
650 TABLET ORAL EVERY 6 HOURS PRN
Status: DISCONTINUED | OUTPATIENT
Start: 2024-12-05 | End: 2024-12-10 | Stop reason: HOSPADM

## 2024-12-05 RX ORDER — MIDAZOLAM HYDROCHLORIDE 1 MG/ML
1-10 INJECTION, SOLUTION INTRAVENOUS CONTINUOUS
Status: DISCONTINUED | OUTPATIENT
Start: 2024-12-05 | End: 2024-12-06

## 2024-12-05 RX ORDER — FENTANYL CITRATE 50 UG/ML
INJECTION, SOLUTION INTRAMUSCULAR; INTRAVENOUS
Status: COMPLETED
Start: 2024-12-05 | End: 2024-12-05

## 2024-12-05 RX ORDER — ETOMIDATE 2 MG/ML
20 INJECTION INTRAVENOUS ONCE
Status: COMPLETED | OUTPATIENT
Start: 2024-12-05 | End: 2024-12-05

## 2024-12-05 RX ORDER — IOPAMIDOL 755 MG/ML
75 INJECTION, SOLUTION INTRAVASCULAR
Status: COMPLETED | OUTPATIENT
Start: 2024-12-05 | End: 2024-12-05

## 2024-12-05 RX ORDER — INSULIN GLARGINE 100 [IU]/ML
15 INJECTION, SOLUTION SUBCUTANEOUS EVERY MORNING
Status: ON HOLD | COMMUNITY

## 2024-12-05 RX ORDER — INSULIN LISPRO 100 [IU]/ML
0-8 INJECTION, SOLUTION INTRAVENOUS; SUBCUTANEOUS NIGHTLY
Status: ON HOLD | COMMUNITY

## 2024-12-05 RX ORDER — MIDAZOLAM HYDROCHLORIDE 1 MG/ML
INJECTION, SOLUTION INTRAMUSCULAR; INTRAVENOUS
Status: COMPLETED
Start: 2024-12-05 | End: 2024-12-05

## 2024-12-05 RX ORDER — SODIUM CHLORIDE 9 MG/ML
INJECTION, SOLUTION INTRAVENOUS PRN
Status: DISCONTINUED | OUTPATIENT
Start: 2024-12-05 | End: 2024-12-06

## 2024-12-05 RX ORDER — PROCHLORPERAZINE MALEATE 10 MG
10 TABLET ORAL EVERY 8 HOURS PRN
Status: DISCONTINUED | OUTPATIENT
Start: 2024-12-05 | End: 2024-12-10 | Stop reason: HOSPADM

## 2024-12-05 RX ORDER — HEPARIN SODIUM 5000 [USP'U]/ML
3500 INJECTION, SOLUTION INTRAVENOUS; SUBCUTANEOUS
Status: ON HOLD | COMMUNITY
End: 2024-12-10 | Stop reason: HOSPADM

## 2024-12-05 RX ORDER — FENTANYL CITRATE-0.9 % NACL/PF 10 MCG/ML
25-200 PLASTIC BAG, INJECTION (ML) INTRAVENOUS CONTINUOUS
Status: DISCONTINUED | OUTPATIENT
Start: 2024-12-05 | End: 2024-12-06

## 2024-12-05 RX ORDER — GLUCAGON 1 MG/ML
1 KIT INJECTION PRN
Status: DISCONTINUED | OUTPATIENT
Start: 2024-12-05 | End: 2024-12-10 | Stop reason: HOSPADM

## 2024-12-05 RX ORDER — HEPARIN SODIUM 1000 [USP'U]/ML
4000 INJECTION, SOLUTION INTRAVENOUS; SUBCUTANEOUS PRN
Status: DISCONTINUED | OUTPATIENT
Start: 2024-12-05 | End: 2024-12-07

## 2024-12-05 RX ORDER — SODIUM CHLORIDE 9 MG/ML
INJECTION, SOLUTION INTRAVENOUS PRN
Status: DISCONTINUED | OUTPATIENT
Start: 2024-12-05 | End: 2024-12-05

## 2024-12-05 RX ORDER — HEPARIN SODIUM 1000 [USP'U]/ML
4000 INJECTION, SOLUTION INTRAVENOUS; SUBCUTANEOUS ONCE
Status: COMPLETED | OUTPATIENT
Start: 2024-12-05 | End: 2024-12-05

## 2024-12-05 RX ORDER — HEPARIN SODIUM 1000 [USP'U]/ML
2000 INJECTION, SOLUTION INTRAVENOUS; SUBCUTANEOUS PRN
Status: DISCONTINUED | OUTPATIENT
Start: 2024-12-05 | End: 2024-12-07

## 2024-12-05 RX ORDER — 0.9 % SODIUM CHLORIDE 0.9 %
1000 INTRAVENOUS SOLUTION INTRAVENOUS ONCE
Status: COMPLETED | OUTPATIENT
Start: 2024-12-05 | End: 2024-12-05

## 2024-12-05 RX ADMIN — Medication 200 MCG/HR: at 23:50

## 2024-12-05 RX ADMIN — ETOMIDATE 20 MG: 2 INJECTION, SOLUTION INTRAVENOUS at 07:15

## 2024-12-05 RX ADMIN — Medication 2 MG/HR: at 12:45

## 2024-12-05 RX ADMIN — SODIUM CHLORIDE 5 MCG/MIN: 9 INJECTION, SOLUTION INTRAVENOUS at 07:41

## 2024-12-05 RX ADMIN — Medication 200 MCG/HR: at 17:57

## 2024-12-05 RX ADMIN — FENTANYL CITRATE 100 MCG: 50 INJECTION, SOLUTION INTRAMUSCULAR; INTRAVENOUS at 07:16

## 2024-12-05 RX ADMIN — SODIUM CHLORIDE 40 MG: 9 INJECTION INTRAMUSCULAR; INTRAVENOUS; SUBCUTANEOUS at 14:53

## 2024-12-05 RX ADMIN — IOPAMIDOL 75 ML: 755 INJECTION, SOLUTION INTRAVENOUS at 08:35

## 2024-12-05 RX ADMIN — ROCURONIUM BROMIDE 100 MG: 10 INJECTION, SOLUTION INTRAVENOUS at 07:16

## 2024-12-05 RX ADMIN — SODIUM CHLORIDE, PRESERVATIVE FREE 5 ML: 5 INJECTION INTRAVENOUS at 14:41

## 2024-12-05 RX ADMIN — VANCOMYCIN HYDROCHLORIDE 2000 MG: 5 INJECTION, POWDER, LYOPHILIZED, FOR SOLUTION INTRAVENOUS at 09:52

## 2024-12-05 RX ADMIN — HEPARIN SODIUM 4000 UNITS: 1000 INJECTION INTRAVENOUS; SUBCUTANEOUS at 09:53

## 2024-12-05 RX ADMIN — SODIUM CHLORIDE, PRESERVATIVE FREE 10 ML: 5 INJECTION INTRAVENOUS at 19:56

## 2024-12-05 RX ADMIN — MIDAZOLAM HYDROCHLORIDE 2 MG: 2 INJECTION, SOLUTION INTRAMUSCULAR; INTRAVENOUS at 09:06

## 2024-12-05 RX ADMIN — HEPARIN SODIUM 10 UNITS/KG/HR: 10000 INJECTION, SOLUTION INTRAVENOUS at 09:58

## 2024-12-05 RX ADMIN — SODIUM CHLORIDE 1000 ML: 9 INJECTION, SOLUTION INTRAVENOUS at 10:21

## 2024-12-05 RX ADMIN — Medication 200 MCG/HR: at 12:44

## 2024-12-05 RX ADMIN — ACETAMINOPHEN 650 MG: 650 SUPPOSITORY RECTAL at 19:59

## 2024-12-05 RX ADMIN — Medication 50 MCG/HR: at 07:25

## 2024-12-05 RX ADMIN — 0.12% CHLORHEXIDINE GLUCONATE 15 ML: 1.2 RINSE ORAL at 20:02

## 2024-12-05 RX ADMIN — 0.12% CHLORHEXIDINE GLUCONATE 15 ML: 1.2 RINSE ORAL at 14:53

## 2024-12-05 RX ADMIN — CEFEPIME 2000 MG: 2 INJECTION, POWDER, FOR SOLUTION INTRAVENOUS at 12:47

## 2024-12-05 RX ADMIN — Medication 50 MCG: at 20:01

## 2024-12-05 RX ADMIN — MIDAZOLAM 2 MG: 1 INJECTION INTRAMUSCULAR; INTRAVENOUS at 09:06

## 2024-12-05 ASSESSMENT — PULMONARY FUNCTION TESTS
PIF_VALUE: 23
PIF_VALUE: 22
PIF_VALUE: 22
PIF_VALUE: 23
PIF_VALUE: 22
PIF_VALUE: 22
PIF_VALUE: 24
PIF_VALUE: 25
PIF_VALUE: 23
PIF_VALUE: 23
PIF_VALUE: 22
PIF_VALUE: 25
PIF_VALUE: 23
PIF_VALUE: 21
PIF_VALUE: 23
PIF_VALUE: 24
PIF_VALUE: 23
PIF_VALUE: 22
PIF_VALUE: 21
PIF_VALUE: 18
PIF_VALUE: 22
PIF_VALUE: 22
PIF_VALUE: 24
PIF_VALUE: 24
PIF_VALUE: 22
PIF_VALUE: 23
PIF_VALUE: 24
PIF_VALUE: 25
PIF_VALUE: 25
PIF_VALUE: 22

## 2024-12-05 ASSESSMENT — PAIN SCALES - GENERAL
PAINLEVEL_OUTOF10: 0
PAINLEVEL_OUTOF10: 0
PAINLEVEL_OUTOF10: 3
PAINLEVEL_OUTOF10: 0

## 2024-12-05 ASSESSMENT — PAIN - FUNCTIONAL ASSESSMENT: PAIN_FUNCTIONAL_ASSESSMENT: PREVENTS OR INTERFERES SOME ACTIVE ACTIVITIES AND ADLS

## 2024-12-05 ASSESSMENT — PAIN DESCRIPTION - ORIENTATION: ORIENTATION: RIGHT;LEFT

## 2024-12-05 ASSESSMENT — PAIN DESCRIPTION - LOCATION: LOCATION: GENERALIZED

## 2024-12-05 ASSESSMENT — PAIN DESCRIPTION - DESCRIPTORS: DESCRIPTORS: ACHING;DISCOMFORT;DULL

## 2024-12-05 NOTE — CONSULTS
Critical Care Admit/Consult Note         Patient - Himanshu Bravo   MRN -  [id number]   [id number]   - 1960      Date of Admission -  2024  7:09 AM  Date of evaluation -  2024   Hospital Day - 0            ADMIT/CONSULT DETAILS     Reason for Admit/Consult   Acute respiratory failure  Hypotension    Consulting Service/Physician   Consulting - Jone Tran MD  Primary Care Physician - Jose G Ricketts,          HPI   Patient unable to provide any history this history has been obtained but extensive review of his medical record chart and discussion with ER physician.      The patient is a 64 y.o. male with complicated past medical history significant diabetes, hyperlipidemia, coronary artery disease status post PTCA with stent placements in [facility] who had a recent prolonged complicated hospital stay.  Patient initially had been presented to [facility] emergency department with bilateral extremity weakness pain and numbness.  Further up showed acute occlusion of bilateral common femoral arteries.  He was emergently taken to the OR to t and under went bilateral femoral endarterectomy and fasciotomy at 3:48 AM.  Following fasciotomies he was found to have nonviable muscles.  He was taken back to the OR the same day in the afternoon and underwent a right leg amputation above the knee for right lower lobe ischemia, necrotic muscle and rhabdomyolysis.  He was later on taken back to the OR on  for left lower extremity wound debridement left medical calf closures.  During his hospital stay patient developed new onset A-fib.  He also developed ANTIONE in the setting of bilateral renal infarcts requiring tunneled dialysis catheter placement and hemodialysis patient eventually was discharged from [facility] on .    Patient was brought in from [facility] today because he was found to be confused and agitated.  Patient also was

## 2024-12-05 NOTE — PROGRESS NOTES
4 Eyes Skin Assessment     NAME:  Himanshu Bravo  YOB: 1960  MEDICAL RECORD NUMBER:  [id number]    The patient is being assessed for  Admission    I agree that at least one RN has performed a thorough Head to Toe Skin Assessment on the patient. ALL assessment sites listed below have been assessed.      Areas assessed by both nurses:    Head, Face, Ears, Shoulders, Back, Chest, Arms, Elbows, Hands, Sacrum. Buttock, Coccyx, Ischium, Legs. Feet and Heels, and Under Medical Devices         Does the Patient have a Wound? Yes wound(s) were present on assessment. LDA wound assessment was Initiated and completed by RN       Aramis Prevention initiated by RN: Yes  Wound Care Orders initiated by RN: Yes    Pressure Injury (Stage 3,4, Unstageable, DTI, NWPT, and Complex wounds) if present, place Wound referral order by RN under : Yes    New Ostomies, if present place, Ostomy referral order under : No     Nurse 1 eSignature: Electronically signed by Celina Marin RN on 12/5/24 at 1:15 PM EST    **SHARE this note so that the co-signing nurse can place an eSignature**    Nurse 2 eSignature: Electronically signed by Bryce Galloway RN on 12/5/24 at 3:47 PM EST

## 2024-12-05 NOTE — PROGRESS NOTES
Pt arrived with chronic vargas from facility.  Exchanged per protocol and urinalysis/urine culture recollected and resent

## 2024-12-05 NOTE — PLAN OF CARE
Problem: Safety - Medical Restraint  Goal: Remains free of injury from restraints (Restraint for Interference with Medical Device)  Description: INTERVENTIONS:  1. Determine that other, less restrictive measures have been tried or would not be effective before applying the restraint  2. Evaluate the patient's condition at the time of restraint application  3. Inform patient/family regarding the reason for restraint  4. Q2H: Monitor safety, psychosocial status, comfort, nutrition and hydration  Outcome: Progressing  Flowsheets (Taken 12/5/2024 1325)  Remains free of injury from restraints (restraint for interference with medical device): Every 2 hours: Monitor safety, psychosocial status, comfort, nutrition and hydration

## 2024-12-05 NOTE — CONSULTS
[facility]  MD Bryce Milligan, MD Supriya Loaiza, CODY Rios  Consultation  Patient's Name: Himanshu Bravo  2:23 PM  12/5/2024    Nephrologist: Bryce Goldsmith MD    Reason for Consult:  Acute kidney injury requiring hemodialysis    Requesting Physician:  Maris Jones MD    Reason for Admission/Consult:  Acute respiratory failure/hypotension      History of Present Ilness:        Himanshu Bravo is a 64 year old gentleman that was sent to ED from [facility] due to confusion and agitation for which she was administered IV Ativan.  He was also in atrial fibrillation with RVR.  Upon arrival to the ED he was unresponsive and required intubation.    He has a past medical history that includes diabetes mellitus, congenital heart disease, hyperlipidemia, coronary artery disease, status post PTCA with stent placements, tobacco abuse and alcohol abuse.         He was admitted to [facility] on 11/12/2024 with complaints of difficulty moving his left lower extremity and felt the limb was cold.  He underwent a CTA of the chest and abdomen that showed severe atherosclerotic disease with common femoral artery occlusions and bilateral moderate to large renal infarcts.  Subsequently was transferred to [facility] and taken emergently for bilateral femoral endarterectomy with bilateral lower extremity fasciotomies.  Following the fasciotomies he was found to have nonviable muscles and was taken back to the OR that same day in the afternoon for a right above-the-knee amputation.  He was taken back to the OR on November 21 for left lower extremity wound debridement and left medial calf closure.  Nephrology was consulted for bilateral renal infarcts causing acute kidney injury and requiring initiation of hemodialysis.  He was discharged from the hospital on 11/28/24 and sent to [facility] for  PLACEMENT GREATER THAN 5 YEARS 11/20/2024 Stephania Valle MD [facility] SPECIAL PROCEDURES    LEG SURGERY Right 11/12/2024    RIGHT LEG AMPUTATION ABOVE KNEE performed by Jasmyne Henriquez MD at [facility] OR    LEG SURGERY Left 11/21/2024    LEFT LOWER EXTREMITY DEBRIDEMENT, CLOSURE OF MEDIAL FASCIOTOMY AND APPLICATION OF KERETIS TO LATERAL FASCIOTOMY. performed by Siobhan Khanna MD at [facility] OR    PTCA      VASCULAR SURGERY Bilateral 11/12/2024    BILATERAL embolectomy, with bilateral 2 compartment fasciotomy performed by Jasmyne Henriquez MD at [facility] OR       Allergies:  Patient has no known allergies.    Current Medications:    norepinephrine (LEVOPHED) 16 mg in sodium chloride 0.9 % 250 mL infusion, Continuous  heparin (porcine) injection 4,000 Units, PRN  heparin (porcine) injection 2,000 Units, PRN  heparin 25,000 units in dextrose 5% 250 mL (premix) infusion, Continuous  midazolam (VERSED) 100mg/100mL in NS infusion, Continuous  fentaNYL (SUBLIMAZE) 1,000 mcg in sodium chloride 0.9% 100 mL infusion, Continuous  sulfur hexafluoride microspheres (LUMASON) 60.7-25 MG injection 2 mL, ONCE PRN  chlorhexidine (PERIDEX) 0.12 % solution 15 mL, BID  pantoprazole (PROTONIX) 40 mg in sodium chloride (PF) 0.9 % 10 mL injection, Daily  insulin lispro (HUMALOG,ADMELOG) injection vial 0-8 Units, Q6H  dextrose bolus 10% 125 mL, PRN   Or  dextrose bolus 10% 250 mL, PRN  glucagon injection 1 mg, PRN  dextrose 10 % infusion, Continuous PRN        Review of Systems:   Unable to be completed.     Physical exam:  General Appearance:  Intubated and sedated  Skin:  Skin color, texture, turgor normal. No rashes or lesions.  Eyes:  PERRL, EOMI, Sclera nonicteric, and Conjunctiva clear  Ears:  canals and TMs intact  Nose/Sinuses:  Nares normal. Septum midline. Mucosa normal. No drainage or sinus tenderness.  Mouth/Throat:  Mucosa moist.  No lesions.  Pharynx without erythema, edema or exudate.  Neck:  neck- supple, no mass,

## 2024-12-05 NOTE — CONSULTS
Himanshu Bravo  1960      I have discussed the case, including pertinent history and exam findings with the resident. I have seen and examined the patient and the key elements of the encounter have been performed by me.  I agree with the assessment, plan and orders as documented by the resident.        Incidental parotid mass, fna can be done inpatient or outpatient   Will follow up outpatient once patient medically improved       Remainder of medical problems as per resident note.      Electronically signed by Lexis Mcnulty DO on 12/5/24 at 9:33 PM EST       OTOLARYNGOLOGY  CONSULT NOTE  12/5/2024    Physician Consulted: Dr. Mcnulty  Reason for Consult: Parotid Mass      HPI  Himanshu Bravo is a 64 y.o. male who ENT was consulted for evaluation of right side Parotid mass. CT head without contrast resulted in incidental 3.3 x 1.9x 3.5 right parotid gland mass. Patient past medical history includes CHF, Afib, PVD, CAD, HLD, DM, and others. Patient is admitted to ICU following presentation from [facility] for episode of confusion and unresponsiveness following recent hospitalization for b/l femoral endarterectomy and fasciotomy and right leg amputation. Cardiology, Infectious disease, nephrology, pulmonology, and vascular surgery are following. History is limited secondary to patient condition including endotracheal intubation, vasopressor support, and sedation.     Review of Systems   Unable to perform ROS: Intubated       Past Medical History:   Diagnosis Date    Congenital heart disease     Diabetes mellitus (HCC)     H/O cardiovascular stress test 01/15/2018    lexiscan    Hyperlipidemia     Type 2 diabetes mellitus without complication (HCC)        Past Surgical History:   Procedure Laterality Date    CARDIAC CATHETERIZATION      2 stents    CARDIAC SURGERY      minimally invasive    CLAVICLE SURGERY Left     COLONOSCOPY N/A 01/19/2024    COLONOSCOPY WITH BIOPSY performed by Arnold Ross MD at [facility]  right sided parotid mass.     PLAN:  Recommend FNA of parotid mass   Patient may follow up outpatient with Dr Mcnulty to discuss further management options once medical condition improved   Please contact on call ENT resident with any questions.       Patient seen and examined by resident and attending on call, Dr. Mcnulty.     Electronically signed by Nita Galloway DO on 12/5/24 at 6:41 PM EST

## 2024-12-05 NOTE — CONSULTS
transcribed using voice recognition software.  Every effort was made to ensure accuracy; however, inadvertent computerized transcription errors may be present.

## 2024-12-05 NOTE — CONSULTS
[facility]  [facility]  Consultation Note     Admit Date: 12/5/2024  7:09 AM    Reason for Consult:   Sepsis.  Patient known to your service    Attending Physician:  Regla att. providers found    HISTORY OF PRESENT ILLNESS:             The history is obtained from extensive review of available past medical records. The patient is a 64 y.o. male who is previously known to the ID service.  The patient was being treated with Ciprofloxacin at [facility] for Serratia in the urine, although the patient did not have symptoms.  He was transferred to [facility] on 12/5/2024 because of confusion, agitation and atrial fibrillation with rapid ventricular response.  He was given Ativan and route to the ER.  He was intubated in the ER for airway protection.  On presentation he was afebrile, slightly tachypneic and tachycardic.  A respiratory panel was negative.  White count was 11.2.  CMP was remarkable for an elevated creatinine.  Ammonia level was normal.  proBNP was 2360.  Transaminases were normal.  CK was 380 and troponin was 4399.  He was given Cefepime and Vancomycin.  He was on vasopressors but these were discontinued.    Past Medical History:    November 2024.  Admitted to [facility] with leg pain and shortness of breath.  Underwent AKA 11/21/2024.  Seen by Dr. Amezcua for leukocytosis.  Treated empirically with Zosyn.  Urine culture grew Serratia marcescens.  He did not have urinary symptoms.  The patient was transferred to [facility].  Followed by ID.  Zosyn was changed to Ciprofloxacin.    January 2018.  Mated to [facility] with DKA.  Tested positive for influenza A.  Treated with Zosyn and Tamiflu.  Seen by Dr. Tracy.  Respiratory cultures grew MSSA and Candida albicans.  Antibiotics were changed to Cefazolin.  The patient was discharged only to be readmitted hours later.  CT of the chest suggested necrotizing pneumonia.  Cefazolin was  Denies     Verbal Abuse: Denies   Housing Stability: Low Risk  (11/29/2024)    Received from [facility]    Housing Stability Vital Sign     Unable to Pay for Housing in the Last Year: No     Number of Times Moved in the Last Year: 0     Homeless in the Last Year: No       Family History:       Problem Relation Age of Onset    Pancreatic Cancer Mother     Diabetes Mother     Melanoma Father     Cancer Father     Diabetes Brother    . Otherwise non-pertinent to the chief complaint.    REVIEW OF SYSTEMS:    A 10 point review of system could not be obtained from the patient given her status of intubation.    PHYSICAL EXAM:    Vitals:   BP (!) 92/56   Pulse 72   Temp 97.7 °F (36.5 °C) (Bladder)   Resp 20   Ht 1.702 m (5' 7\")   Wt 95.3 kg (210 lb)   SpO2 100%   BMI 32.89 kg/m²   Constitutional: The patient is lying in bed in the ICU.  He is intubated and sedated.  FiO2 40%.  PEEP 6.  Skin: Warm and dry. No rashes were noted.   HEENT: Eyes show round, and reactive pupils. No jaundice. Moist mucous membranes, no ulcerations, no thrush.  ET tube.  OG tube.  Neck: Supple to movements. No lymphadenopathy.   Chest: No use of accessory muscles to breathe. Symmetrical expansion. Auscultation reveals no wheezing, crackles, or rhonchi.   Cardiovascular: S1 and S2 are rhythmic and regular. No murmurs appreciated.   Abdomen: Positive bowel sounds to auscultation. Benign to palpation. No masses felt. No hepatosplenomegaly.  Extremities: Right BKA stump with staples.  No erythema or drainage.  Left leg surgical site with staples.  No erythema or drainage.  Lines: Peripheral.  Left IJ TLC 12/5/2024    Lab Results   Component Value Date    .0 (H) 11/26/2024      No results found for: \"CRPHS\"  Lab Results   Component Value Date    SEDRATE 110 (H) 11/26/2024       Radiology:  Reviewed    Microbiology:  Pending      Assessment:  Non-STEMI  Cardiogenic shock.  Septic shock is unlikely  Agitation.  Most likely

## 2024-12-05 NOTE — PROGRESS NOTES
Patient admitted from ER 20 to 214, with the following belongings; none, placed on monitor, patient oriented to room and unit visiting hours.  Patient guide at bedside, reviewed patient rights and responsibilities. MRSA nasal swab obtained.  Bed alarm on.  Call light within reach.

## 2024-12-05 NOTE — H&P
[facility] History and Physical      CHIEF COMPLAINT:  Altered Mental Status (Unresponsive, agonal breathing and elevated trops per [facility])      History of Present Illness:    Patient has PMHx DM, HTN, HLD, CAD, PVD, Afib, renal infarct.  Patient presented with AMS. Patient coming [facility], confused and agitated, he was given ativan en route, unresponsive upon arrival in the hospital, was intubated for airway protection and had hypercapnia. Patient was seen by cardiology who started Heparin drip due to elevated troponins in the 4000s. He was also hypotensive and had to be started on Levophed.    Informant(s) for H&P: EMR    REVIEW OF SYSTEMS:  Unable to obtain      PMH:  Past Medical History:   Diagnosis Date    Congenital heart disease     Diabetes mellitus (HCC)     H/O cardiovascular stress test 01/15/2018    lexiscan    Hyperlipidemia     Type 2 diabetes mellitus without complication (HCC)        Surgical History:  Past Surgical History:   Procedure Laterality Date    CARDIAC CATHETERIZATION      2 stents    CARDIAC SURGERY      minimally invasive    CLAVICLE SURGERY Left     COLONOSCOPY N/A 01/19/2024    COLONOSCOPY WITH BIOPSY performed by Arnold Ross MD at [facility] ENDOSCOPY    IR TUNNELED CATHETER PLACEMENT GREATER THAN 5 YEARS  11/20/2024    IR TUNNELED CATHETER PLACEMENT GREATER THAN 5 YEARS 11/20/2024 Stephania Valle MD [facility] SPECIAL PROCEDURES    LEG SURGERY Right 11/12/2024    RIGHT LEG AMPUTATION ABOVE KNEE performed by Jasmyne Henriquez MD at [facility] OR    LEG SURGERY Left 11/21/2024    LEFT LOWER EXTREMITY DEBRIDEMENT, CLOSURE OF MEDIAL FASCIOTOMY AND APPLICATION OF KERETIS TO LATERAL FASCIOTOMY. performed by Siobhan Khanna MD at [facility] OR    PTCA      VASCULAR SURGERY Bilateral 11/12/2024    BILATERAL embolectomy, with bilateral 2 compartment fasciotomy performed by Jasmyne Henriquez MD at [facility] OR       Medications Prior to Admission:    Prior to Admission medications

## 2024-12-05 NOTE — ED PROVIDER NOTES
HPI:  12/5/24, Time: 7:21 AM JULI Bravo is a 64 y.o. male presenting to the ED for respiratory distress from [facility]. I spoke with the on call physician at [facility] prior to patient's arrival.  He states that the patient has baseline confusion but was more agitated overnight and went into respiratory distress.  He states that the patient presented to [facility] with no baseline oxygen requirement however was placed on BiPAP overnight.  Patient also is a history of A-fib, and he follows with Dr. Dale.  Patient was in A-fib RVR overnight and received IV Lopressor as well as IV dig after consultation with cardiology.  Per EMS report, patient also received IV Ativan overnight at [facility].    I reviewed the patient's chart.  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  Patient was transferred to SICU and subsequently suffered from alcohol withdrawal.  Started on Precedex.  Developed new onset A-fib during admission.  Started on Eliquis and metoprolol.  Patient developed ANTIONE and bilateral renal infarcts, and he was initiated on hemodialysis.  He has a TDC placed.  Patient discharged on 7 days of Zosyn for cystitis.  Per H&P from [facility], patient is baseline alert and oriented.  Unknown last known well.    Patient is full code.    --------------------------------------------- PAST HISTORY ---------------------------------------------  Past Medical History:  has a past medical history of Congenital heart disease, Diabetes mellitus (HCC), H/O cardiovascular stress test, Hyperlipidemia, and Type 2 diabetes mellitus without complication (HCC).    Past Surgical History:  has a past surgical history that includes Clavicle surgery (Left); Cardiac catheterization; Cardiac surgery; Colonoscopy (N/A, 01/19/2024); Percutaneous Transluminal Coronary Angio; Leg Surgery (Right, 11/12/2024); vascular surgery (Bilateral, 11/12/2024); IR TUNNELED CVC PLACE WO SQ PORT/PUMP > 5 YEARS  Sinus  Interpretation: Normal sinus rhythm, normal CO interval, QTc prolonged at 515, left axis deviation, incomplete right bundle branch block, ST depressions in anterior and lateral leads appear new  Comparison: changes compared to previous EKG   [JA]   0839 Troponin returned at 4444.  Consult to cardiology has been placed [JA]   0843 Cardiology was consulted.  I spoke with Dr. Dale.  Recommends adding on a CPK to evaluate for rhabdomyolysis.  He has no plans for cardiac catheterization at this time so states okay for admission at [place].  Plan to heparinize patient assuming no active bleeding seen on scans which are currently still pending. [JA]   0844 Workup is still in process.  Suspect cardiogenic shock however will cover with broad-spectrum antibiotics pending cultures. [JA]   0929 PROCEDURE  12/5/24       Time: 0920    CENTRAL LINE INSERTION  Risks, benefits and alternatives (for applicable procedures below) described.   Performed By: Princess Amanda MD.    Indication: inability to gain peripheral access.  Informed consent: Consent unable to be obtained due to patient's condition..  Procedure: After routine sterile preparation. A left 3-Lumen 7F Central Venous Catheter was placed by internal jugular vein approach and secured by standard fashion.   Ultrasound Guidance:   used.  Number of Attempts: 1  Post-procedure Findings: A post procedural chest x-ray  was ordered and is still pending at this time.  Patient tolerated the procedure well.       [JA]   0933 Blood pressure stable on 5 of Levophed.  ICU has been consulted for admission.  Images negative for acute findings.  No intracranial hemorrhage.  Will initiate heparin drip. [JA]   0947 ICU was consulted. Dr. Jones accepted the patient to the ICU. [JA]   1004 I spoke with Dr. Ricketts regarding admission.  He states that he only cared for the patient at [facility] and request that we call hospitalist for admission [JA]   1029 I reevaluated the

## 2024-12-05 NOTE — ED NOTES
Radiology Procedure Waiver   Name: Himanshu Bravo  : 1960  MRN: [id number]    Date:  24    Time: 7:32 AM EST    Benefits of immediately proceeding with Radiology exam(s) without pre-testing outweigh the risks or are not indicated as specified below and therefore the following is/are being waived:    [] Pregnancy test   [] Patients LMP on-time and regular.   [] Patient had Tubal Ligation or has other Contraception Device.   [] Patient  is Menopausal or Premenarcheal.    [] Patient had Full or Partial Hysterectomy.    [] Protocol for Iodine allergy    [] MRI Questionnaire     [x] BUN/Creatinine   [] Patient age w/no hx of renal dysfunction.   [x] Patient on Dialysis.   [] Recent Normal Labs.  Electronically signed by Princess Amanda MD on 24 at 7:32 AM Princess Chung MD  24 1990

## 2024-12-06 ENCOUNTER — APPOINTMENT (OUTPATIENT)
Dept: CARDIOLOGY | Facility: CLINIC | Age: 64
End: 2024-12-06
Payer: COMMERCIAL

## 2024-12-06 ENCOUNTER — APPOINTMENT (OUTPATIENT)
Dept: GENERAL RADIOLOGY | Age: 64
DRG: 208 | End: 2024-12-06
Payer: COMMERCIAL

## 2024-12-06 LAB
ABO/RH: NORMAL
ALBUMIN SERPL-MCNC: 2.7 G/DL (ref 3.5–5.2)
ALP SERPL-CCNC: 98 U/L (ref 40–129)
ALT SERPL-CCNC: 22 U/L (ref 0–40)
ANION GAP SERPL CALCULATED.3IONS-SCNC: 15 MMOL/L (ref 7–16)
ANTIBODY SCREEN: NEGATIVE
ARM BAND NUMBER: NORMAL
AST SERPL-CCNC: 33 U/L (ref 0–39)
B.E.: -2.1 MMOL/L (ref -3–3)
BASOPHILS # BLD: 0.07 K/UL (ref 0–0.2)
BASOPHILS NFR BLD: 1 % (ref 0–2)
BILIRUB SERPL-MCNC: 0.4 MG/DL (ref 0–1.2)
BLOOD BANK BLOOD PRODUCT EXPIRATION DATE: NORMAL
BLOOD BANK DISPENSE STATUS: NORMAL
BLOOD BANK ISBT PRODUCT BLOOD TYPE: 6200
BLOOD BANK PRODUCT CODE: NORMAL
BLOOD BANK SAMPLE EXPIRATION: NORMAL
BLOOD BANK UNIT TYPE AND RH: NORMAL
BPU ID: NORMAL
BUN SERPL-MCNC: 47 MG/DL (ref 6–23)
CALCIUM SERPL-MCNC: 8.6 MG/DL (ref 8.6–10.2)
CHLORIDE SERPL-SCNC: 99 MMOL/L (ref 98–107)
CK SERPL-CCNC: 174 U/L (ref 20–200)
CO2 SERPL-SCNC: 22 MMOL/L (ref 22–29)
COHB: 1 % (ref 0–1.5)
COMPONENT: NORMAL
CREAT SERPL-MCNC: 6.6 MG/DL (ref 0.7–1.2)
CRITICAL: ABNORMAL
CROSSMATCH RESULT: NORMAL
DATE ANALYZED: ABNORMAL
DATE OF COLLECTION: ABNORMAL
EOSINOPHIL # BLD: 0.28 K/UL (ref 0.05–0.5)
EOSINOPHILS RELATIVE PERCENT: 2 % (ref 0–6)
ERYTHROCYTE [DISTWIDTH] IN BLOOD BY AUTOMATED COUNT: 15.5 % (ref 11.5–15)
FIO2: 40 %
GFR, ESTIMATED: 9 ML/MIN/1.73M2
GLUCOSE BLD-MCNC: 103 MG/DL (ref 74–99)
GLUCOSE BLD-MCNC: 136 MG/DL (ref 74–99)
GLUCOSE BLD-MCNC: 142 MG/DL (ref 74–99)
GLUCOSE BLD-MCNC: 195 MG/DL (ref 74–99)
GLUCOSE SERPL-MCNC: 121 MG/DL (ref 74–99)
HCO3: 21.2 MMOL/L (ref 22–26)
HCT VFR BLD AUTO: 25.4 % (ref 37–54)
HCT VFR BLD AUTO: 28.1 % (ref 37–54)
HGB BLD-MCNC: 7.9 G/DL (ref 12.5–16.5)
HGB BLD-MCNC: 8.8 G/DL (ref 12.5–16.5)
HHB: 1 % (ref 0–5)
IMM GRANULOCYTES # BLD AUTO: 0.04 K/UL (ref 0–0.58)
IMM GRANULOCYTES NFR BLD: 0 % (ref 0–5)
LAB: ABNORMAL
LACTATE BLDV-SCNC: 0.9 MMOL/L (ref 0.5–2.2)
LYMPHOCYTES NFR BLD: 1.59 K/UL (ref 1.5–4)
LYMPHOCYTES RELATIVE PERCENT: 13 % (ref 20–42)
Lab: 412
MAGNESIUM SERPL-MCNC: 2.1 MG/DL (ref 1.6–2.6)
MCH RBC QN AUTO: 29.6 PG (ref 26–35)
MCHC RBC AUTO-ENTMCNC: 31.1 G/DL (ref 32–34.5)
MCV RBC AUTO: 95.1 FL (ref 80–99.9)
METHB: 0.3 % (ref 0–1.5)
MICROORGANISM SPEC CULT: NO GROWTH
MICROORGANISM SPEC CULT: NO GROWTH
MODE: AC
MONOCYTES NFR BLD: 1 K/UL (ref 0.1–0.95)
MONOCYTES NFR BLD: 8 % (ref 2–12)
NEUTROPHILS NFR BLD: 76 % (ref 43–80)
NEUTS SEG NFR BLD: 9.59 K/UL (ref 1.8–7.3)
O2 CONTENT: 12.5 ML/DL
O2 SATURATION: 99 % (ref 92–98.5)
O2HB: 97.7 % (ref 94–97)
OPERATOR ID: ABNORMAL
PARTIAL THROMBOPLASTIN TIME: 50.6 SEC (ref 24.5–35.1)
PARTIAL THROMBOPLASTIN TIME: 55.6 SEC (ref 24.5–35.1)
PATIENT TEMP: 37 C
PCO2: 30.3 MMHG (ref 35–45)
PEEP/CPAP: 6 CMH2O
PFO2: 3.31 MMHG/%
PH BLOOD GAS: 7.46 (ref 7.35–7.45)
PHOSPHATE SERPL-MCNC: 4.9 MG/DL (ref 2.5–4.5)
PLATELET # BLD AUTO: 436 K/UL (ref 130–450)
PMV BLD AUTO: 8.9 FL (ref 7–12)
PO2: 132.3 MMHG (ref 75–100)
POTASSIUM SERPL-SCNC: 4.1 MMOL/L (ref 3.5–5)
PROT SERPL-MCNC: 6.2 G/DL (ref 6.4–8.3)
RBC # BLD AUTO: 2.67 M/UL (ref 3.8–5.8)
RI(T): 0.89
RR MECHANICAL: 20 B/MIN
SERVICE CMNT-IMP: NORMAL
SERVICE CMNT-IMP: NORMAL
SODIUM SERPL-SCNC: 136 MMOL/L (ref 132–146)
SOURCE, BLOOD GAS: ABNORMAL
SPECIMEN DESCRIPTION: NORMAL
SPECIMEN DESCRIPTION: NORMAL
THB: 8.9 G/DL (ref 11.5–16.5)
TIME ANALYZED: 421
TRANSFUSION STATUS: NORMAL
TROPONIN I SERPL HS-MCNC: 4078 NG/L (ref 0–11)
TROPONIN I SERPL HS-MCNC: 4301 NG/L (ref 0–11)
UNIT DIVISION: 0
UNIT ISSUE DATE/TIME: NORMAL
VT MECHANICAL: 400 ML
WBC OTHER # BLD: 12.6 K/UL (ref 4.5–11.5)

## 2024-12-06 PROCEDURE — 2580000003 HC RX 258: Performed by: INTERNAL MEDICINE

## 2024-12-06 PROCEDURE — 6370000000 HC RX 637 (ALT 250 FOR IP): Performed by: INTERNAL MEDICINE

## 2024-12-06 PROCEDURE — 94003 VENT MGMT INPAT SUBQ DAY: CPT

## 2024-12-06 PROCEDURE — 90935 HEMODIALYSIS ONE EVALUATION: CPT

## 2024-12-06 PROCEDURE — 84100 ASSAY OF PHOSPHORUS: CPT

## 2024-12-06 PROCEDURE — 93005 ELECTROCARDIOGRAM TRACING: CPT | Performed by: INTERNAL MEDICINE

## 2024-12-06 PROCEDURE — 84484 ASSAY OF TROPONIN QUANT: CPT

## 2024-12-06 PROCEDURE — 6360000002 HC RX W HCPCS: Performed by: EMERGENCY MEDICINE

## 2024-12-06 PROCEDURE — 82550 ASSAY OF CK (CPK): CPT

## 2024-12-06 PROCEDURE — 6370000000 HC RX 637 (ALT 250 FOR IP)

## 2024-12-06 PROCEDURE — 99232 SBSQ HOSP IP/OBS MODERATE 35: CPT | Performed by: INTERNAL MEDICINE

## 2024-12-06 PROCEDURE — 83735 ASSAY OF MAGNESIUM: CPT

## 2024-12-06 PROCEDURE — 83605 ASSAY OF LACTIC ACID: CPT

## 2024-12-06 PROCEDURE — 82805 BLOOD GASES W/O2 SATURATION: CPT

## 2024-12-06 PROCEDURE — 85014 HEMATOCRIT: CPT

## 2024-12-06 PROCEDURE — 2500000003 HC RX 250 WO HCPCS: Performed by: INTERNAL MEDICINE

## 2024-12-06 PROCEDURE — 85025 COMPLETE CBC W/AUTO DIFF WBC: CPT

## 2024-12-06 PROCEDURE — 6360000002 HC RX W HCPCS: Performed by: INTERNAL MEDICINE

## 2024-12-06 PROCEDURE — 85018 HEMOGLOBIN: CPT

## 2024-12-06 PROCEDURE — 85730 THROMBOPLASTIN TIME PARTIAL: CPT

## 2024-12-06 PROCEDURE — 2580000003 HC RX 258

## 2024-12-06 PROCEDURE — 2700000000 HC OXYGEN THERAPY PER DAY

## 2024-12-06 PROCEDURE — 71045 X-RAY EXAM CHEST 1 VIEW: CPT

## 2024-12-06 PROCEDURE — 82947 ASSAY GLUCOSE BLOOD QUANT: CPT

## 2024-12-06 PROCEDURE — 2000000000 HC ICU R&B

## 2024-12-06 PROCEDURE — 80053 COMPREHEN METABOLIC PANEL: CPT

## 2024-12-06 RX ORDER — TAMSULOSIN HYDROCHLORIDE 0.4 MG/1
0.4 CAPSULE ORAL DAILY
Status: DISCONTINUED | OUTPATIENT
Start: 2024-12-06 | End: 2024-12-10 | Stop reason: HOSPADM

## 2024-12-06 RX ORDER — FOLIC ACID 1 MG/1
1 TABLET ORAL DAILY
Status: DISCONTINUED | OUTPATIENT
Start: 2024-12-06 | End: 2024-12-10 | Stop reason: HOSPADM

## 2024-12-06 RX ORDER — AMIODARONE HYDROCHLORIDE 200 MG/1
200 TABLET ORAL DAILY
Status: DISCONTINUED | OUTPATIENT
Start: 2024-12-06 | End: 2024-12-10 | Stop reason: HOSPADM

## 2024-12-06 RX ORDER — INSULIN LISPRO 100 [IU]/ML
0-8 INJECTION, SOLUTION INTRAVENOUS; SUBCUTANEOUS
Status: DISCONTINUED | OUTPATIENT
Start: 2024-12-06 | End: 2024-12-10 | Stop reason: HOSPADM

## 2024-12-06 RX ORDER — METOPROLOL TARTRATE 1 MG/ML
2.5 INJECTION, SOLUTION INTRAVENOUS EVERY 6 HOURS PRN
Status: DISCONTINUED | OUTPATIENT
Start: 2024-12-06 | End: 2024-12-07

## 2024-12-06 RX ORDER — HEPARIN SODIUM 1000 [USP'U]/ML
3500 INJECTION, SOLUTION INTRAVENOUS; SUBCUTANEOUS PRN
Status: DISCONTINUED | OUTPATIENT
Start: 2024-12-06 | End: 2024-12-10 | Stop reason: HOSPADM

## 2024-12-06 RX ORDER — LANOLIN ALCOHOL/MO/W.PET/CERES
100 CREAM (GRAM) TOPICAL DAILY
Status: DISCONTINUED | OUTPATIENT
Start: 2024-12-06 | End: 2024-12-10 | Stop reason: HOSPADM

## 2024-12-06 RX ORDER — ATORVASTATIN CALCIUM 10 MG/1
10 TABLET, FILM COATED ORAL NIGHTLY
Status: DISCONTINUED | OUTPATIENT
Start: 2024-12-06 | End: 2024-12-10 | Stop reason: HOSPADM

## 2024-12-06 RX ORDER — ASPIRIN 81 MG/1
81 TABLET, CHEWABLE ORAL DAILY
Status: DISCONTINUED | OUTPATIENT
Start: 2024-12-06 | End: 2024-12-10 | Stop reason: HOSPADM

## 2024-12-06 RX ADMIN — INSULIN LISPRO 2 UNITS: 100 INJECTION, SOLUTION INTRAVENOUS; SUBCUTANEOUS at 21:56

## 2024-12-06 RX ADMIN — METOPROLOL TARTRATE 2.5 MG: 5 INJECTION INTRAVENOUS at 16:16

## 2024-12-06 RX ADMIN — TAMSULOSIN HYDROCHLORIDE 0.4 MG: 0.4 CAPSULE ORAL at 12:24

## 2024-12-06 RX ADMIN — Medication 50 MCG: at 04:18

## 2024-12-06 RX ADMIN — ATORVASTATIN CALCIUM 10 MG: 10 TABLET, FILM COATED ORAL at 20:34

## 2024-12-06 RX ADMIN — 0.12% CHLORHEXIDINE GLUCONATE 15 ML: 1.2 RINSE ORAL at 08:06

## 2024-12-06 RX ADMIN — Medication 200 MCG/HR: at 11:18

## 2024-12-06 RX ADMIN — SODIUM CHLORIDE, PRESERVATIVE FREE 10 ML: 5 INJECTION INTRAVENOUS at 21:58

## 2024-12-06 RX ADMIN — HEPARIN SODIUM 12 UNITS/KG/HR: 10000 INJECTION, SOLUTION INTRAVENOUS at 09:53

## 2024-12-06 RX ADMIN — HEPARIN SODIUM 3500 UNITS: 1000 INJECTION INTRAVENOUS; SUBCUTANEOUS at 12:43

## 2024-12-06 RX ADMIN — AMIODARONE HYDROCHLORIDE 200 MG: 200 TABLET ORAL at 12:24

## 2024-12-06 RX ADMIN — SODIUM CHLORIDE 40 MG: 9 INJECTION INTRAMUSCULAR; INTRAVENOUS; SUBCUTANEOUS at 17:10

## 2024-12-06 RX ADMIN — Medication 200 MCG/HR: at 05:31

## 2024-12-06 RX ADMIN — SODIUM CHLORIDE, PRESERVATIVE FREE 10 ML: 5 INJECTION INTRAVENOUS at 08:03

## 2024-12-06 RX ADMIN — SODIUM CHLORIDE 40 MG: 9 INJECTION INTRAMUSCULAR; INTRAVENOUS; SUBCUTANEOUS at 02:11

## 2024-12-06 RX ADMIN — DOCUSATE SODIUM LIQUID 100 MG: 100 LIQUID ORAL at 12:31

## 2024-12-06 RX ADMIN — FOLIC ACID 1 MG: 1 TABLET ORAL at 12:24

## 2024-12-06 RX ADMIN — ACETAMINOPHEN 650 MG: 325 TABLET ORAL at 13:55

## 2024-12-06 RX ADMIN — DOCUSATE SODIUM LIQUID 100 MG: 100 LIQUID ORAL at 21:58

## 2024-12-06 RX ADMIN — ASPIRIN 81 MG CHEWABLE TABLET 81 MG: 81 TABLET CHEWABLE at 12:24

## 2024-12-06 RX ADMIN — ACETAMINOPHEN 650 MG: 650 SUPPOSITORY RECTAL at 02:08

## 2024-12-06 RX ADMIN — ACETAMINOPHEN 650 MG: 325 TABLET ORAL at 21:58

## 2024-12-06 RX ADMIN — Medication 100 MG: at 12:24

## 2024-12-06 ASSESSMENT — PULMONARY FUNCTION TESTS
PIF_VALUE: 20
PIF_VALUE: 24
PIF_VALUE: 23
PIF_VALUE: 22
PIF_VALUE: 23
PIF_VALUE: 22
PIF_VALUE: 21
PIF_VALUE: 22
PIF_VALUE: 22
PIF_VALUE: 14
PIF_VALUE: 25
PIF_VALUE: 23
PIF_VALUE: 23
PIF_VALUE: 22
PIF_VALUE: 23
PIF_VALUE: 22
PIF_VALUE: 23
PIF_VALUE: 22
PIF_VALUE: 11
PIF_VALUE: 22
PIF_VALUE: 21
PIF_VALUE: 22
PIF_VALUE: 25
PIF_VALUE: 32
PIF_VALUE: 22
PIF_VALUE: 22
PIF_VALUE: 14
PIF_VALUE: 25
PIF_VALUE: 26
PIF_VALUE: 22
PIF_VALUE: 24
PIF_VALUE: 22
PIF_VALUE: 22
PIF_VALUE: 29
PIF_VALUE: 23
PIF_VALUE: 27
PIF_VALUE: 21

## 2024-12-06 ASSESSMENT — PAIN SCALES - GENERAL
PAINLEVEL_OUTOF10: 0
PAINLEVEL_OUTOF10: 3
PAINLEVEL_OUTOF10: 0

## 2024-12-06 ASSESSMENT — PAIN DESCRIPTION - ORIENTATION: ORIENTATION: RIGHT;LEFT

## 2024-12-06 ASSESSMENT — PAIN - FUNCTIONAL ASSESSMENT: PAIN_FUNCTIONAL_ASSESSMENT: PREVENTS OR INTERFERES SOME ACTIVE ACTIVITIES AND ADLS

## 2024-12-06 ASSESSMENT — PAIN DESCRIPTION - LOCATION: LOCATION: GENERALIZED

## 2024-12-06 ASSESSMENT — PAIN DESCRIPTION - DESCRIPTORS: DESCRIPTORS: ACHING;DISCOMFORT;DULL

## 2024-12-06 NOTE — PROGRESS NOTES
[facility]  [facility]  Progress Note    SUBJECTIVE:  Chief Complaint   Patient presents with    Altered Mental Status     Unresponsive, agonal breathing and elevated trops per [facility]     The patient is in the ICU.  He remains afebrile.  He is on a vasopressor.  Weaning off the ventilator.    Review of systems:  As stated above in the chief complaint, otherwise negative.    Medications:  Scheduled Meds:   chlorhexidine  15 mL Mouth/Throat BID    insulin lispro  0-8 Units SubCUTAneous Q6H    pantoprazole (PROTONIX) 40 mg in sodium chloride (PF) 0.9 % 10 mL injection  40 mg IntraVENous Q12H    sodium chloride flush  5-40 mL IntraVENous 2 times per day     Continuous Infusions:   norepinephrine 2 mcg/min (24)    heparin (PORCINE) Infusion 12 Units/kg/hr (24)    midazolam 4 mg/hr (24)    fentaNYL 200 mcg/hr (24)    dextrose      sodium chloride       PRN Meds:heparin (porcine), heparin (porcine), sulfur hexafluoride microspheres, dextrose bolus **OR** dextrose bolus, glucagon (rDNA), dextrose, fentaNYL, sodium chloride flush, sodium chloride, polyethylene glycol, acetaminophen **OR** acetaminophen, prochlorperazine **OR** prochlorperazine    OBJECTIVE:  BP (!) 98/56   Pulse 77   Temp 99.7 °F (37.6 °C) (Bladder)   Resp 18   Ht 1.702 m (5' 7\")   Wt 95.3 kg (210 lb 1.6 oz)   SpO2 100%   BMI 32.91 kg/m²   Temp  Av.9 °F (37.2 °C)  Min: 97.7 °F (36.5 °C)  Max: 99.9 °F (37.7 °C)  Constitutional: The patient is lying in bed in the ICU.  Intubated and sedated.  Seen during dialysis.  Skin: Warm and dry. No rashes were noted.   HEENT: Round and reactive pupils.  Moist mucous membranes.  No ulcerations or thrush.  ET tube.  OG tube.  Neck: Supple to movements.   Chest: No use of accessory muscles to breathe. Symmetrical expansion.  No wheezing, crackles or rhonchi.  Cardiovascular: S1 and S2 are rhythmic and regular. No murmurs appreciated.

## 2024-12-06 NOTE — PROGRESS NOTES
Spiritual Health History and Assessment/Progress Note  [facility]     Encounter, Rituals, Rites and Sacraments,  ,  ,      Name: Himanshu Bravo MRN: [id number]    Age: 64 y.o.     Sex: male   Language: English   Druze: Protestant   Acute hypercapnic respiratory failure     Date: 12/6/2024                           Spiritual Assessment began in [address] ICU        Referral/Consult From: Rounding   Encounter Overview/Reason:  Encounter, Rituals, Rites and Sacraments  Service Provided For: Patient    Jenny, Belief, Meaning:   Patient is connected with a jenny tradition or spiritual practice  Family/Friends No family/friends present      Importance and Influence:  Patient Other: Unable to assess because patient was under intubation and unconscious  Family/Friends No family/friends present    Community:  Patient Other: Unable to assess  Family/Friends No family/friends present    Assessment and Plan of Care:     Patient Interventions include: Provided sacramental/Jainism ritual  Family/Friends Interventions include: No family/friends present    Patient Plan of Care: Spiritual Care available upon further referral  Family/Friends Plan of Care: Spiritual Care available upon further referral    Electronically signed by Chaplain Debo on 12/6/2024 at 4:45 PM

## 2024-12-06 NOTE — FLOWSHEET NOTE
12/06/24 1310   Observations & Evaluations   FiO2  40 %   Vital Signs   /78   Temp 100 °F (37.8 °C)   Pulse (!) 108   Respirations 22   SpO2 95 %   Weight - Scale 93.6 kg (206 lb 5.6 oz)   Weight Method Stated   Percent Weight Change -1.78   Pain Assessment   Pain Assessment Critical Care Pain Observation Tool (CPOT)   Pain Level 0   Hemodialysis Central Access - Permanent/Tunneled Right Neck   Placement Date/Time: 11/20/24 1108   Present on Admission/Arrival: No  Inserted by: Dr. Valle  Insertion Practices: Betadine skin antisepsis;Hand hygiene;Maximal barrier precautions;Optimal catheter site selection;Sterile ultrasound technique  Orienta...   Continued need for line? Yes   Site Assessment Clean, dry & intact   Blue Lumen Status Brisk blood return;Flushed   Red Lumen Status Brisk blood return;Flushed   Line Care Cap changed;Connections checked and tightened;Line pulled back;Ports disinfected   Dressing Type Antimicrobial;Transparent   Date of Last Dressing Change 12/05/24   Dressing Status Clean, dry & intact   Dressing Intervention Other (Comment)   Post-Hemodialysis Assessment   Post-Treatment Procedures Blood returned;Catheter capped, clamped and heparinized x 2 ports   Machine Disinfection Process Exterior Machine Disinfection   Rinseback Volume (ml) 300 ml   Blood Volume Processed (Liters) 67.4 L   Dialyzer Clearance Moderately streaked   Duration of Treatment (minutes) 240 minutes   Heparin Amount Administered During Treatment (mL) 0 mL   Hemodialysis Intake (ml) 300 ml   Hemodialysis Output (ml) 2000 ml   NET Removed (ml) 1700   Tolerated Treatment Good   Patient Response to Treatment pt tolerated tx well 1700mL removed hemo lines secure clear guard caps applied nurse to nurse report given to floor RN pt/vitals stable upon d/c dialysis RN   Patient Disposition Remain in ICU/ED

## 2024-12-06 NOTE — PATIENT CARE CONFERENCE
Intensive Care Daily Quality Rounding Checklist      ICU Team Members: Dr. Jones, residents, charge nurse, bedside nurse, clinical pharmacist and respiratory therapy    ICU Day #: NUMBER: 2    SOFA Score:8    Intubation Date: December 5    Ventilator Day #: NUMBER: 2    Central Line Insertion Date: December 5        Day #: NUMBER: 2        Indication: CVCIndication: Hemodynamically unstable requiring volume resuscitation     Arterial Line Insertion Date:  n/a      Day #: n/a    Temporary Hemodialysis Catheter Insertion Date:  tessio not tremporary      Day #n/a    DVT Prophylaxis:heparin drip    GI Prophylaxis:protonix I.v.    [place] Catheter Insertion Date: December 5       Day #: 1      Indications: Urinary retention      Continued need (if yes, reason documented and discussed with physician): yes, retention    Skin Issues/ Wounds and ordered treatment discussed on rounds: y right stump staples/left tib sutures/fasciotomy lle    Goals/ Plans for the Day: Monitor labs and vitals. HD per nephro. Wean sedation and pressors as able. Start tube feeds and po medications     Reviewed plan and goals for day with patient and/or representative: Yes    **SHARE this note so that the rounding nurse may edit**

## 2024-12-06 NOTE — PROGRESS NOTES
Patient was extubated to 2 liters/min via nasal cannula. Breath Sounds post extubation were clear. Stridor was not present post extubation. SPO2 was 97%.      Performed by  Melanie Thomas RCP

## 2024-12-06 NOTE — PROGRESS NOTES
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  Had 2 episodes of nonsustained ventricular tachycardia otherwise remains in sinus rhythm on amiodarone therapy.  Troponins remain remarkably elevated.    REVIEW OF SYSTEMS:  General ROS: negative for - fatigue, malaise,  weight gain or weight loss  Psychological ROS: negative for - anxiety , depression  Ophthalmic ROS: negative for - decreased vision or visual distortion.  ENT ROS: negative  Allergy and Immunology ROS: negative  Hematological and Lymphatic ROS: negative  Endocrine: no heat or cold intolerance and no polyphagia, polydipsia, or polyuria  Respiratory ROS: positive for - shortness of breath  Cardiovascular ROS: positive for - irregular heartbeat and rapid heart rate.  Gastrointestinal ROS: no abdominal pain, change in bowel habits, or black or bloody stools  Genito-Urinary ROS: no nocturia, dysuria, trouble voiding, frequency or hematuria  Musculoskeletal ROS: negative for- myalgias, arthralgias, or claudication  Neurological ROS: no TIA or stroke symptoms otherwise no significant change in symptoms or problems since          EKG: See Report  Echo: See Report      IMPRESSIONS:  Patient Active Problem List   Diagnosis Code    Type 2 diabetes mellitus without complication ([facility]CC) E11.9    Mixed hyperlipidemia E78.2    Tobacco abuse Z72.0    Cervical pain (neck) M54.2    DKA, type 2, not at goal ([facility]) E11.10    Diabetic ketoacidosis ([facility]CC) E11.10    Neck pain M54.2    Pneumonia J18.9    Diarrhea R19.7    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 Bravo is quite alert with verbal stimulation and answers questions with shaking of his head.  Unfortunately his troponins remained significantly elevated yet his AST is normal and his CK is not significantly elevated thus must consider an alternative source for his markedly elevated troponin.  Continue amiodarone 200 mg daily and if no intervention planned would place him on apixaban for both DVT prophylaxis as well as systemic embolization associated with recurrent atrial fibrillation.  Likewise with low hemoglobin would rule out possible upper GI bleed as it is too will significantly affect troponin levels.  Carefully monitor heart rhythm and blood pressure and adjust medications accordingly.    I have spent more than 30 critical care minutes face to face with Himanshu Bravo and reviewing notes and laboratory data, with greater than 50% of this time instructing and counseling the patient face to face regarding my findings and recommendations and I have answered all questions as posed to me by Mr. Bravo.    Kwabena Dale, DO FACP,FACC,[facility]      NOTE:  This report was transcribed using voice recognition software.

## 2024-12-06 NOTE — CONSULTS
Comprehensive Nutrition Assessment    Type and Reason for Visit:  Initial, Consult (TF Ordering and Management)    Nutrition Recommendations/Plan:   Continue current TF as tolerated, while EN needed for nutrition support:    12/6 TF ORDER: Renal TF (Nepro) to goal rate 35 ml/hr with Protein Modular once daily and Wound Healing Modular BID (Shane in 180 ml water) with flushes at 30 ml Q 4 hr  This will provide: 840 ml/d, 1792 total cam, 99 g total protein, 1149 ml total free water  This regimen will meet 100% energy and protein needs    Continue inpatient monitoring     Malnutrition Assessment:  Malnutrition Status:  At risk for malnutrition (12/06/24 2502)    Context:  Chronic Illness     Findings of the 6 clinical characteristics of malnutrition:  Energy Intake:  75% or less of estimated energy requirements for 7 or more days  Weight Loss:  Unable to assess (fluid status fluctuation (HD) and AKA last month)     Body Fat Loss:  No body fat loss     Muscle Mass Loss:  No muscle mass loss    Fluid Accumulation:  Unable to assess     Strength:  Not Performed    Nutrition Assessment:    Pt admit from [facility] 2/2 cardiogenic shock, acute resp failure. Pt was at [facility] s/p inpt at [facility] s/p AKA, ANTIONE on IHD. Current incidental parotid mass also. Currently intubated/sedated, on pressor (MAP 61) and plan to continue IHD or CVVHD if not stable for HD. Will order Renal TF (Nepro) to start gradually d/t pressor needs. Also will order Protein Modular daily and Wound Healing Modular BID. PMHx=DM, ETOH abuse, tobacco abuse, congenital heart disease.    Nutrition Related Findings:    vent/sedated, OGT to LIS, MAP 61, hypo BS, trace edema, Rt AKA, I/O WNL Wound Type: Wound Consult Pending, Surgical Incision       Current Nutrition Intake & Therapies:    Average Meal Intake: NPO  Average Supplements Intake: NPO  Diet NPO    Anthropometric Measures:  Height: 170.2 cm (5' 7\")  Ideal Body Weight (IBW): 148 lbs (67 kg)    Admission

## 2024-12-06 NOTE — PROGRESS NOTES
Spiritual Health History and Assessment/Progress Note  [facility]    Attempted Encounter,  ,  ,      Name: Himanshu Bravo MRN: [id number]    Age: 64 y.o.     Sex: male   Language: English   Catholic: Alevism   Acute hypercapnic respiratory failure     Date: 12/6/2024                           Spiritual Assessment began in [facility] 2 ICU        Referral/Consult From: Rounding   Encounter Overview/Reason: Attempted Encounter  Service Provided For: Patient    Jenny, Belief, Meaning:   Patient unable to assess at this time  Family/Friends No family/friends present      Importance and Influence:  Patient unable to assess at this time  Family/Friends No family/friends present    Community:  Patient Other: unable to assess.   Family/Friends No family/friends present    Assessment and Plan of Care:     Patient Interventions include: Other: unable to assess.  Family/Friends Interventions include: No family/friends present    Patient Plan of Care: Spiritual Care available upon further referral  Family/Friends Plan of Care: No family/friends present    Electronically signed by Chaplain Liberty on 12/6/2024 at 12:00 PM

## 2024-12-06 NOTE — PROGRESS NOTES
CI HR MAP TPRI SVI TFC   Baseline 1.9 81 72      Test 1.7 77 73      % Change _1.6%        noninvasive -  start

## 2024-12-06 NOTE — PLAN OF CARE
Problem: Safety - Adult  Goal: Free from fall injury  Outcome: Progressing     Problem: Chronic Conditions and Co-morbidities  Goal: Patient's chronic conditions and co-morbidity symptoms are monitored and maintained or improved  Outcome: Progressing  Flowsheets (Taken 12/5/2024 2000)  Care Plan - Patient's Chronic Conditions and Co-Morbidity Symptoms are Monitored and Maintained or Improved:   Monitor and assess patient's chronic conditions and comorbid symptoms for stability, deterioration, or improvement   Collaborate with multidisciplinary team to address chronic and comorbid conditions and prevent exacerbation or deterioration     Problem: Discharge Planning  Goal: Discharge to home or other facility with appropriate resources  Outcome: Progressing  Flowsheets (Taken 12/5/2024 2000)  Discharge to home or other facility with appropriate resources:   Identify barriers to discharge with patient and caregiver   Arrange for needed discharge resources and transportation as appropriate     Problem: Pain  Goal: Verbalizes/displays adequate comfort level or baseline comfort level  Outcome: Progressing  Flowsheets  Taken 12/6/2024 0000 by Nayeli Sanchez RN  Verbalizes/displays adequate comfort level or baseline comfort level:   Encourage patient to monitor pain and request assistance   Assess pain using appropriate pain scale  Taken 12/5/2024 2200 by Nayeli Sanchez RN  Verbalizes/displays adequate comfort level or baseline comfort level:   Encourage patient to monitor pain and request assistance   Assess pain using appropriate pain scale  Taken 12/5/2024 1209 by Celina Marin RN  Verbalizes/displays adequate comfort level or baseline comfort level: Assess pain using appropriate pain scale     Problem: Safety - Medical Restraint  Goal: Remains free of injury from restraints (Restraint for Interference with Medical Device)  Description: INTERVENTIONS:  1. Determine that other, less restrictive measures have been

## 2024-12-06 NOTE — PROGRESS NOTES
(!) 98/56   Pulse 76   Temp 99.7 °F (37.6 °C) (Bladder)   Resp 20   Ht 1.702 m (5' 7\")   Wt 95.3 kg (210 lb 1.6 oz)   SpO2 100%   BMI 32.91 kg/m²   Tmax over 24 hours:  Temp (24hrs), Av.9 °F (37.2 °C), Min:97.7 °F (36.5 °C), Max:99.9 °F (37.7 °C)      Patient Vitals for the past 6 hrs:   BP Temp Temp src Pulse Resp SpO2   24 0700 (!) 98/56 -- -- 76 20 100 %   24 0645 92/74 -- -- 75 19 100 %   24 0630 119/63 -- -- 72 20 100 %   24 0615 101/60 -- -- 75 20 100 %   24 0600 (!) 93/53 -- -- 74 20 100 %   24 0545 (!) 99/53 -- -- 78 20 100 %   24 0530 (!) 95/57 -- -- 78 18 100 %   24 0515 (!) 92/55 -- -- 80 20 100 %   24 0500 (!) 90/53 -- -- 81 20 100 %   24 0448 -- -- -- -- 20 --   24 0445 (!) 102/55 -- -- 81 20 100 %   24 0430 (!) 129/93 -- -- 83 19 99 %   24 0415 118/67 -- -- 76 20 100 %   24 0400 112/60 99.7 °F (37.6 °C) Bladder 75 20 100 %   24 0345 105/65 -- -- 76 20 100 %   24 0340 -- -- -- 75 20 100 %   24 0330 102/65 -- -- 78 20 100 %   24 0316 (!) 88/52 -- -- 80 20 100 %   24 0315 (!) 88/52 -- -- 82 20 100 %   24 0300 (!) 84/56 -- -- 85 23 100 %   24 0245 (!) 83/58 -- -- 83 20 100 %   24 0230 (!) 92/56 -- -- 83 20 100 %   24 0215 (!) 85/52 -- -- 86 20 100 %   24 0208 -- -- -- 82 20 100 %   24 0200 (!) 93/59 99.9 °F (37.7 °C) Bladder 82 20 100 %   24 0145 91/67 -- -- 82 21 99 %   24 0130 95/60 -- -- 81 20 100 %   24 0115 (!) 95/55 -- -- 82 20 99 %         Intake/Output Summary (Last 24 hours) at 2024 0706  Last data filed at 2024 0603  Gross per 24 hour   Intake 1674.59 ml   Output 855 ml   Net 819.59 ml     Wt Readings from Last 2 Encounters:   24 95.3 kg (210 lb 1.6 oz)   24 100.5 kg (221 lb 9 oz)     Body mass index is 32.91 kg/m².        Physical Exam  Vitals reviewed.   Constitutional:       General: He is not in  labs, discussions with other team members and physicians is at least 36 Min so far today, excluding procedures.    Maris Jones MD

## 2024-12-06 NOTE — PROGRESS NOTES
-- -- 85 23 100 % -- --   12/06/24 0245 (!) 83/58 -- -- 83 20 100 % -- --   12/06/24 0230 (!) 92/56 -- -- 83 20 100 % -- --   12/06/24 0215 (!) 85/52 -- -- 86 20 100 % -- --   12/06/24 0208 -- -- -- 82 20 100 % -- --   12/06/24 0200 (!) 93/59 99.9 °F (37.7 °C) Bladder 82 20 100 % -- --   12/06/24 0145 91/67 -- -- 82 21 99 % -- --   12/06/24 0130 95/60 -- -- 81 20 100 % -- --   12/06/24 0115 (!) 95/55 -- -- 82 20 99 % -- --   12/06/24 0100 (!) 99/57 -- -- 81 20 99 % -- --   12/06/24 0045 (!) 99/55 -- -- 81 20 99 % -- --   12/06/24 0038 -- -- -- 81 20 98 % -- --   12/06/24 0030 (!) 91/58 -- -- 85 20 100 % -- --   12/06/24 0015 (!) 96/59 -- -- 81 20 99 % -- --   12/06/24 0000 112/64 99.7 °F (37.6 °C) Bladder 86 20 100 % 1.702 m (5' 7\") 95.3 kg (210 lb 1.6 oz)   12/05/24 2345 90/62 -- -- 77 20 100 % -- --   12/05/24 2330 (!) 87/46 -- -- 80 20 100 % -- --   12/05/24 2315 (!) 73/58 -- -- 81 20 100 % -- --   12/05/24 2300 (!) 91/57 -- -- 81 20 100 % -- --   12/05/24 2245 (!) 86/56 -- -- 80 20 100 % -- --   12/05/24 2230 (!) 96/53 -- -- 85 20 100 % -- --   12/05/24 2215 (!) 88/55 -- -- 85 20 100 % -- --   12/05/24 2200 (!) 99/57 -- -- 84 21 100 % -- --   12/05/24 2145 (!) 94/59 -- -- 82 20 100 % -- --   12/05/24 2130 (!) 100/58 -- -- 80 20 100 % -- --   12/05/24 2115 105/61 -- -- 77 20 92 % -- --   12/05/24 2100 (!) 87/56 -- -- 83 21 100 % -- --   12/05/24 2055 -- -- -- 79 20 100 % -- --   12/05/24 2045 (!) 93/54 -- -- 79 20 100 % -- --   12/05/24 2031 -- -- -- -- 20 -- -- --   12/05/24 2030 (!) 93/57 -- -- 80 20 100 % -- --   12/05/24 2015 (!) 78/63 -- -- 91 19 90 % -- --   12/05/24 2000 105/62 99.9 °F (37.7 °C) Bladder 84 20 100 % -- --   12/05/24 1945 97/64 -- -- 83 20 100 % -- --   12/05/24 1930 100/66 -- -- 83 20 100 % -- --   12/05/24 1915 105/68 -- -- 80 20 98 % -- --   12/05/24 1900 99/62 -- -- 78 20 100 % -- --   12/05/24 1800 96/74 -- -- 77 20 100 % -- --   12/05/24 1745 98/62 98.9 °F (37.2 °C) Bladder 75 20 100 %  failure  Anemia  Recommendations  Continue IHD for removal of solutes and volume.   Anticipate another dialysis treatment tomorrow for solute and volume clearance  Pressors to maintain MAP > 65  Monitor labs  Monitor I & O  Monitor BP  Avoid nephrotoxins  Continue intensive care support    Discussed with Dr. Jones    Electronically signed by Bryce Goldsmith MD on 12/6/2024 at 4:59 PM    NOTE: This report was transcribed using voice recognition software. Every effort was made to ensure accuracy; however, inadvertent transcription errors may be presen

## 2024-12-06 NOTE — PROGRESS NOTES
[facility] Hospitalist Progress Note    Admitting Date and Time: 12/5/2024  7:09 AM  Admit Dx: Shock [R57.9]  Hyperglycemia [R73.9]  NSTEMI (non-ST elevated myocardial infarction) (HCC) [I21.4]  Acute respiratory failure with hypoxia and hypercapnia [J96.01, J96.02]  Altered mental status, unspecified altered mental status type [R41.82]  Acute hypercapnic respiratory failure [J96.02]  Anemia, unspecified type [D64.9]    Subjective:  Patient is being followed for Shock [R57.9]  Hyperglycemia [R73.9]  NSTEMI (non-ST elevated myocardial infarction) (HCC) [I21.4]  Acute respiratory failure with hypoxia and hypercapnia [J96.01, J96.02]  Altered mental status, unspecified altered mental status type [R41.82]  Acute hypercapnic respiratory failure [J96.02]  Anemia, unspecified type [D64.9]       Intubated and sedated     ROS: unable to participate      chlorhexidine  15 mL Mouth/Throat BID    insulin lispro  0-8 Units SubCUTAneous Q6H    pantoprazole (PROTONIX) 40 mg in sodium chloride (PF) 0.9 % 10 mL injection  40 mg IntraVENous Q12H    sodium chloride flush  5-40 mL IntraVENous 2 times per day     heparin (porcine), 4,000 Units, PRN  heparin (porcine), 2,000 Units, PRN  sulfur hexafluoride microspheres, 2 mL, ONCE PRN  dextrose bolus, 125 mL, PRN   Or  dextrose bolus, 250 mL, PRN  glucagon (rDNA), 1 mg, PRN  dextrose, , Continuous PRN  sodium chloride, , PRN  fentaNYL, 50 mcg, Q30 Min PRN  sodium chloride flush, 5-40 mL, PRN  sodium chloride, , PRN  polyethylene glycol, 17 g, Daily PRN  acetaminophen, 650 mg, Q6H PRN   Or  acetaminophen, 650 mg, Q6H PRN  prochlorperazine, 10 mg, Q8H PRN   Or  prochlorperazine, 10 mg, Q6H PRN         Objective:    BP (!) 98/56   Pulse 76   Temp 99.7 °F (37.6 °C) (Bladder)   Resp 20   Ht 1.702 m (5' 7\")   Wt 95.3 kg (210 lb 1.6 oz)   SpO2 100%   BMI 32.91 kg/m²     General Appearance: intubated and sedated   Skin: warm and dry  Head: normocephalic and atraumatic  Eyes:  control   Pressor to keep MAP > 65. Monitor vitals   Appreciate ICU and all specialty team care      Code status: Full   DVT/ GI prophylaxis: heparin/PPI      Dispo; pending clinical course     NOTE: This report was transcribed using voice recognition software. Every effort was made to ensure accuracy; however, inadvertent computerized transcription errors may be present.  Electronically signed by Jaden Garcia MD on 12/6/2024 at 7:41 AM

## 2024-12-07 ENCOUNTER — APPOINTMENT (OUTPATIENT)
Dept: GENERAL RADIOLOGY | Age: 64
DRG: 208 | End: 2024-12-07
Payer: COMMERCIAL

## 2024-12-07 PROBLEM — Z98.890 HISTORY OF FASCIOTOMY: Status: ACTIVE | Noted: 2024-12-07

## 2024-12-07 LAB
ANION GAP SERPL CALCULATED.3IONS-SCNC: 15 MMOL/L (ref 7–16)
BASOPHILS # BLD: 0.04 K/UL (ref 0–0.2)
BASOPHILS NFR BLD: 0 % (ref 0–2)
BUN SERPL-MCNC: 28 MG/DL (ref 6–23)
CALCIUM SERPL-MCNC: 8.2 MG/DL (ref 8.6–10.2)
CHLORIDE SERPL-SCNC: 99 MMOL/L (ref 98–107)
CO2 SERPL-SCNC: 24 MMOL/L (ref 22–29)
CREAT SERPL-MCNC: 5.1 MG/DL (ref 0.7–1.2)
EOSINOPHIL # BLD: 0.23 K/UL (ref 0.05–0.5)
EOSINOPHILS RELATIVE PERCENT: 2 % (ref 0–6)
ERYTHROCYTE [DISTWIDTH] IN BLOOD BY AUTOMATED COUNT: 15 % (ref 11.5–15)
GFR, ESTIMATED: 12 ML/MIN/1.73M2
GLUCOSE BLD-MCNC: 181 MG/DL (ref 74–99)
GLUCOSE BLD-MCNC: 202 MG/DL (ref 74–99)
GLUCOSE BLD-MCNC: 228 MG/DL (ref 74–99)
GLUCOSE BLD-MCNC: 307 MG/DL (ref 74–99)
GLUCOSE SERPL-MCNC: 185 MG/DL (ref 74–99)
HCT VFR BLD AUTO: 25.7 % (ref 37–54)
HGB BLD-MCNC: 7.9 G/DL (ref 12.5–16.5)
IMM GRANULOCYTES # BLD AUTO: 0.06 K/UL (ref 0–0.58)
IMM GRANULOCYTES NFR BLD: 1 % (ref 0–5)
LYMPHOCYTES NFR BLD: 1.37 K/UL (ref 1.5–4)
LYMPHOCYTES RELATIVE PERCENT: 11 % (ref 20–42)
MAGNESIUM SERPL-MCNC: 2.3 MG/DL (ref 1.6–2.6)
MCH RBC QN AUTO: 29.7 PG (ref 26–35)
MCHC RBC AUTO-ENTMCNC: 30.7 G/DL (ref 32–34.5)
MCV RBC AUTO: 96.6 FL (ref 80–99.9)
MICROORGANISM SPEC CULT: NORMAL
MONOCYTES NFR BLD: 1.08 K/UL (ref 0.1–0.95)
MONOCYTES NFR BLD: 9 % (ref 2–12)
NEUTROPHILS NFR BLD: 77 % (ref 43–80)
NEUTS SEG NFR BLD: 9.24 K/UL (ref 1.8–7.3)
PARTIAL THROMBOPLASTIN TIME: 83.2 SEC (ref 24.5–35.1)
PARTIAL THROMBOPLASTIN TIME: 98.3 SEC (ref 24.5–35.1)
PHOSPHATE SERPL-MCNC: 5 MG/DL (ref 2.5–4.5)
PLATELET # BLD AUTO: 336 K/UL (ref 130–450)
PMV BLD AUTO: 8.9 FL (ref 7–12)
POTASSIUM SERPL-SCNC: 4.3 MMOL/L (ref 3.5–5)
RBC # BLD AUTO: 2.66 M/UL (ref 3.8–5.8)
SERVICE CMNT-IMP: NORMAL
SODIUM SERPL-SCNC: 138 MMOL/L (ref 132–146)
SPECIMEN DESCRIPTION: NORMAL
TROPONIN I SERPL HS-MCNC: 5231 NG/L (ref 0–11)
WBC OTHER # BLD: 12 K/UL (ref 4.5–11.5)

## 2024-12-07 PROCEDURE — 99233 SBSQ HOSP IP/OBS HIGH 50: CPT | Performed by: INTERNAL MEDICINE

## 2024-12-07 PROCEDURE — 2700000000 HC OXYGEN THERAPY PER DAY

## 2024-12-07 PROCEDURE — 88305 TISSUE EXAM BY PATHOLOGIST: CPT

## 2024-12-07 PROCEDURE — 6370000000 HC RX 637 (ALT 250 FOR IP)

## 2024-12-07 PROCEDURE — 6370000000 HC RX 637 (ALT 250 FOR IP): Performed by: INTERNAL MEDICINE

## 2024-12-07 PROCEDURE — 85025 COMPLETE CBC W/AUTO DIFF WBC: CPT

## 2024-12-07 PROCEDURE — 2580000003 HC RX 258

## 2024-12-07 PROCEDURE — 80048 BASIC METABOLIC PNL TOTAL CA: CPT

## 2024-12-07 PROCEDURE — 71045 X-RAY EXAM CHEST 1 VIEW: CPT

## 2024-12-07 PROCEDURE — 84484 ASSAY OF TROPONIN QUANT: CPT

## 2024-12-07 PROCEDURE — 99221 1ST HOSP IP/OBS SF/LOW 40: CPT | Performed by: SURGERY

## 2024-12-07 PROCEDURE — 85730 THROMBOPLASTIN TIME PARTIAL: CPT

## 2024-12-07 PROCEDURE — 2580000003 HC RX 258: Performed by: INTERNAL MEDICINE

## 2024-12-07 PROCEDURE — 88173 CYTOPATH EVAL FNA REPORT: CPT

## 2024-12-07 PROCEDURE — 84100 ASSAY OF PHOSPHORUS: CPT

## 2024-12-07 PROCEDURE — 6360000002 HC RX W HCPCS: Performed by: INTERNAL MEDICINE

## 2024-12-07 PROCEDURE — 82947 ASSAY GLUCOSE BLOOD QUANT: CPT

## 2024-12-07 PROCEDURE — 2060000000 HC ICU INTERMEDIATE R&B

## 2024-12-07 PROCEDURE — 83735 ASSAY OF MAGNESIUM: CPT

## 2024-12-07 PROCEDURE — 6360000002 HC RX W HCPCS: Performed by: EMERGENCY MEDICINE

## 2024-12-07 RX ORDER — METOPROLOL TARTRATE 25 MG/1
25 TABLET, FILM COATED ORAL 2 TIMES DAILY
Status: DISCONTINUED | OUTPATIENT
Start: 2024-12-07 | End: 2024-12-10 | Stop reason: HOSPADM

## 2024-12-07 RX ADMIN — SODIUM CHLORIDE 40 MG: 9 INJECTION INTRAMUSCULAR; INTRAVENOUS; SUBCUTANEOUS at 21:11

## 2024-12-07 RX ADMIN — ACETAMINOPHEN 650 MG: 325 TABLET ORAL at 21:12

## 2024-12-07 RX ADMIN — FOLIC ACID 1 MG: 1 TABLET ORAL at 09:53

## 2024-12-07 RX ADMIN — ASPIRIN 81 MG CHEWABLE TABLET 81 MG: 81 TABLET CHEWABLE at 09:53

## 2024-12-07 RX ADMIN — 0.12% CHLORHEXIDINE GLUCONATE 15 ML: 1.2 RINSE ORAL at 21:11

## 2024-12-07 RX ADMIN — APIXABAN 5 MG: 5 TABLET, FILM COATED ORAL at 21:12

## 2024-12-07 RX ADMIN — INSULIN LISPRO 2 UNITS: 100 INJECTION, SOLUTION INTRAVENOUS; SUBCUTANEOUS at 12:01

## 2024-12-07 RX ADMIN — BENZOCAINE AND MENTHOL 1 LOZENGE: 15; 3.6 LOZENGE ORAL at 21:12

## 2024-12-07 RX ADMIN — METOPROLOL TARTRATE 25 MG: 25 TABLET, FILM COATED ORAL at 11:18

## 2024-12-07 RX ADMIN — TAMSULOSIN HYDROCHLORIDE 0.4 MG: 0.4 CAPSULE ORAL at 09:53

## 2024-12-07 RX ADMIN — INSULIN LISPRO 6 UNITS: 100 INJECTION, SOLUTION INTRAVENOUS; SUBCUTANEOUS at 21:10

## 2024-12-07 RX ADMIN — AMIODARONE HYDROCHLORIDE 200 MG: 200 TABLET ORAL at 09:53

## 2024-12-07 RX ADMIN — DOCUSATE SODIUM LIQUID 100 MG: 100 LIQUID ORAL at 09:53

## 2024-12-07 RX ADMIN — SODIUM CHLORIDE 40 MG: 9 INJECTION INTRAMUSCULAR; INTRAVENOUS; SUBCUTANEOUS at 09:53

## 2024-12-07 RX ADMIN — SODIUM CHLORIDE, PRESERVATIVE FREE 10 ML: 5 INJECTION INTRAVENOUS at 21:11

## 2024-12-07 RX ADMIN — HEPARIN SODIUM 10 UNITS/KG/HR: 10000 INJECTION, SOLUTION INTRAVENOUS at 08:03

## 2024-12-07 RX ADMIN — DOCUSATE SODIUM LIQUID 100 MG: 100 LIQUID ORAL at 21:11

## 2024-12-07 RX ADMIN — INSULIN LISPRO 2 UNITS: 100 INJECTION, SOLUTION INTRAVENOUS; SUBCUTANEOUS at 17:59

## 2024-12-07 RX ADMIN — Medication 100 MG: at 09:53

## 2024-12-07 RX ADMIN — METOPROLOL TARTRATE 25 MG: 25 TABLET, FILM COATED ORAL at 21:12

## 2024-12-07 RX ADMIN — ATORVASTATIN CALCIUM 10 MG: 10 TABLET, FILM COATED ORAL at 21:12

## 2024-12-07 RX ADMIN — INSULIN LISPRO 2 UNITS: 100 INJECTION, SOLUTION INTRAVENOUS; SUBCUTANEOUS at 09:58

## 2024-12-07 ASSESSMENT — PAIN SCALES - GENERAL
PAINLEVEL_OUTOF10: 0
PAINLEVEL_OUTOF10: 7
PAINLEVEL_OUTOF10: 0
PAINLEVEL_OUTOF10: 0

## 2024-12-07 ASSESSMENT — PAIN DESCRIPTION - LOCATION: LOCATION: NECK;THROAT

## 2024-12-07 ASSESSMENT — PAIN - FUNCTIONAL ASSESSMENT: PAIN_FUNCTIONAL_ASSESSMENT: ACTIVITIES ARE NOT PREVENTED

## 2024-12-07 NOTE — PROGRESS NOTES
[facility] Hospitalist Progress Note    Admitting Date and Time: 12/5/2024  7:09 AM  Admit Dx: Shock [R57.9]  Hyperglycemia [R73.9]  NSTEMI (non-ST elevated myocardial infarction) (HCC) [I21.4]  Acute respiratory failure with hypoxia and hypercapnia [J96.01, J96.02]  Altered mental status, unspecified altered mental status type [R41.82]  Acute hypercapnic respiratory failure [J96.02]  Anemia, unspecified type [D64.9]    Subjective:  Patient is being followed for Shock [R57.9]  Hyperglycemia [R73.9]  NSTEMI (non-ST elevated myocardial infarction) (HCC) [I21.4]  Acute respiratory failure with hypoxia and hypercapnia [J96.01, J96.02]  Altered mental status, unspecified altered mental status type [R41.82]  Acute hypercapnic respiratory failure [J96.02]  Anemia, unspecified type [D64.9]     Patient seen and examined this morning  No acute events overnight  Denies any acute complaints    ROS: Denies any chest pain, shortness of breath, fevers or chills, nausea or vomiting     metoprolol tartrate  25 mg Oral BID    amiodarone  200 mg Orogastric Daily    aspirin  81 mg Orogastric Daily    docusate  100 mg Orogastric BID    tamsulosin  0.4 mg Oral Daily    atorvastatin  10 mg Orogastric Nightly    folic acid  1 mg Orogastric Daily    thiamine  100 mg Orogastric Daily    pantoprazole (PROTONIX) 40 mg in sodium chloride (PF) 0.9 % 10 mL injection  40 mg IntraVENous Q12H    insulin lispro  0-8 Units SubCUTAneous 4x Daily WC    chlorhexidine  15 mL Mouth/Throat BID    sodium chloride flush  5-40 mL IntraVENous 2 times per day     heparin (porcine), 3,500 Units, PRN  heparin (porcine), 4,000 Units, PRN  heparin (porcine), 2,000 Units, PRN  sulfur hexafluoride microspheres, 2 mL, ONCE PRN  dextrose bolus, 125 mL, PRN   Or  dextrose bolus, 250 mL, PRN  glucagon (rDNA), 1 mg, PRN  dextrose, , Continuous PRN  fentaNYL, 50 mcg, Q30 Min PRN  sodium chloride flush, 5-40 mL, PRN  sodium chloride, , PRN  polyethylene glycol, 17

## 2024-12-07 NOTE — PLAN OF CARE
Problem: Safety - Adult  Goal: Free from fall injury  Outcome: Progressing  Flowsheets (Taken 12/7/2024 0030)  Free From Fall Injury: Instruct family/caregiver on patient safety     Problem: Chronic Conditions and Co-morbidities  Goal: Patient's chronic conditions and co-morbidity symptoms are monitored and maintained or improved  Outcome: Progressing     Problem: Discharge Planning  Goal: Discharge to home or other facility with appropriate resources  Outcome: Progressing     Problem: Pain  Goal: Verbalizes/displays adequate comfort level or baseline comfort level  Outcome: Progressing  Flowsheets  Taken 12/7/2024 0000  Verbalizes/displays adequate comfort level or baseline comfort level: Encourage patient to monitor pain and request assistance  Taken 12/6/2024 2000  Verbalizes/displays adequate comfort level or baseline comfort level: Encourage patient to monitor pain and request assistance     Problem: Safety - Medical Restraint  Goal: Remains free of injury from restraints (Restraint for Interference with Medical Device)  Description: INTERVENTIONS:  1. Determine that other, less restrictive measures have been tried or would not be effective before applying the restraint  2. Evaluate the patient's condition at the time of restraint application  3. Inform patient/family regarding the reason for restraint  4. Q2H: Monitor safety, psychosocial status, comfort, nutrition and hydration  Outcome: Progressing  Flowsheets  Taken 12/6/2024 1400 by Quyen Finley RN  Remains free of injury from restraints (restraint for interference with medical device): Every 2 hours: Monitor safety, psychosocial status, comfort, nutrition and hydration  Taken 12/6/2024 1200 by Quyen Finley RN  Remains free of injury from restraints (restraint for interference with medical device): Every 2 hours: Monitor safety, psychosocial status, comfort, nutrition and hydration     Problem: Skin/Tissue Integrity  Goal: Absence of new skin

## 2024-12-07 NOTE — PLAN OF CARE
Problem: Safety - Adult  Goal: Free from fall injury  12/7/2024 1330 by Magnus Eric RN  Outcome: Progressing  12/7/2024 0032 by Em Martin RN  Outcome: Progressing  Flowsheets (Taken 12/7/2024 0030)  Free From Fall Injury: Instruct family/caregiver on patient safety     Problem: Chronic Conditions and Co-morbidities  Goal: Patient's chronic conditions and co-morbidity symptoms are monitored and maintained or improved  12/7/2024 1330 by Magnus Eric RN  Outcome: Progressing  12/7/2024 0032 by Em Martin RN  Outcome: Progressing     Problem: Discharge Planning  Goal: Discharge to home or other facility with appropriate resources  12/7/2024 1330 by Magnus Eric RN  Outcome: Progressing  12/7/2024 0032 by Em Martin RN  Outcome: Progressing

## 2024-12-07 NOTE — PROGRESS NOTES
[facility]  [facility]  Progress Note    SUBJECTIVE:  Chief Complaint   Patient presents with    Altered Mental Status     Unresponsive, agonal breathing and elevated trops per select     The patient is in the ICU.  He remains afebrile.  He is on a vasopressor.  Weaning off the ventilator.  Patient awake alert eating his lunch family at the bedside    Review of systems:  As stated above in the chief complaint, otherwise negative.    Medications:  Scheduled Meds:   metoprolol tartrate  25 mg Oral BID    amiodarone  200 mg Orogastric Daily    aspirin  81 mg Orogastric Daily    docusate  100 mg Orogastric BID    tamsulosin  0.4 mg Oral Daily    atorvastatin  10 mg Orogastric Nightly    folic acid  1 mg Orogastric Daily    thiamine  100 mg Orogastric Daily    pantoprazole (PROTONIX) 40 mg in sodium chloride (PF) 0.9 % 10 mL injection  40 mg IntraVENous Q12H    insulin lispro  0-8 Units SubCUTAneous 4x Daily WC    chlorhexidine  15 mL Mouth/Throat BID    sodium chloride flush  5-40 mL IntraVENous 2 times per day     Continuous Infusions:   heparin (PORCINE) Infusion 10 Units/kg/hr (24 0803)    dextrose      sodium chloride       PRN Meds:heparin (porcine), heparin (porcine), heparin (porcine), sulfur hexafluoride microspheres, dextrose bolus **OR** dextrose bolus, glucagon (rDNA), dextrose, fentaNYL, sodium chloride flush, sodium chloride, polyethylene glycol, acetaminophen **OR** acetaminophen, prochlorperazine **OR** prochlorperazine    OBJECTIVE:  BP (!) 140/72   Pulse 94   Temp 99.2 °F (37.3 °C) (Bladder)   Resp 17   Ht 1.702 m (5' 7\")   Wt 93.6 kg (206 lb 5.6 oz)   SpO2 97%   BMI 32.32 kg/m²   Temp  Av °F (37.8 °C)  Min: 98.2 °F (36.8 °C)  Max: 101.1 °F (38.4 °C)  Constitutional: The patient is lying in bed in the ICU.  Intubated and sedated.  Seen during dialysis.  Skin: Warm and dry. No rashes were noted.   HEENT: Round and reactive pupils.  Moist mucous

## 2024-12-07 NOTE — CONSULTS
Vascular Surgery    CC: Follow-up bilateral femoral embolectomy and right above-knee amputation.    HISTORY:  The patient is awake, alert, and oriented.    IMPRESSION: Status post bilateral femoral endarterectomies, right above-knee amputation, left lower leg fasciotomy incision and wound.    PLAN: Continue local wound care to groins and left lateral calf wound.  Right above-knee amputation staples and left leg sutures can be removed in 1 week 12/14/2024.    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    Acute lower limb ischemia I99.8    ANTIONE (acute kidney injury) ([facility]) N17.9    Non-traumatic rhabdomyolysis M62.82    Acute hypercapnic respiratory failure J96.02    Acute metabolic encephalopathy G93.41    Shock R57.9    Atrial fibrillation ([facility]) I48.91    Hemodialysis patient ([facility]) Z99.2    Anemia D64.9    Elevated troponin R79.89    Parotid mass K11.8       Current Medications:    heparin (PORCINE) Infusion Stopped (12/07/24 1258)    dextrose      sodium chloride        heparin (porcine), heparin (porcine), heparin (porcine), sulfur hexafluoride microspheres, dextrose bolus **OR** dextrose bolus, glucagon (rDNA), dextrose, fentaNYL, sodium chloride flush, sodium chloride, polyethylene glycol, acetaminophen **OR** acetaminophen, prochlorperazine **OR** prochlorperazine    metoprolol tartrate  25 mg Oral BID    amiodarone  200 mg Orogastric Daily    aspirin  81 mg Orogastric Daily    docusate  100 mg Orogastric BID    tamsulosin  0.4 mg Oral Daily    atorvastatin  10 mg Orogastric Nightly    folic acid  1 mg Orogastric Daily    thiamine  100 mg Orogastric Daily    pantoprazole (PROTONIX) 40 mg in

## 2024-12-07 NOTE — PROGRESS NOTES
Critical Care Team - Daily Progress Note      Date and time: 12/7/2024 6:57 AM  Patient's name:  Himanshu Bravo  Medical Record Number: [id number]  Patient's account/billing number: [id number]  Patient's YOB: 1960  Age: 64 y.o.  Date of Admission: 12/5/2024  7:09 AM  Length of stay during current admission: 2      Primary Care Physician: No primary care provider on file.  ICU Attending Physician: Dr. Jones    Code Status: Full Code    Reason for ICU admission:   Acute respiratory failure  Hypotension      SUBJECTIVE:     OVERNIGHT EVENTS:       Patient remains critically ill requiring ICU level care.  Patient was comfortably in bed.  Creatinine is improved consistent with having dialysis yesterday.  Glucose is 195 may need to adjust sliding scale.  Leukocytosis is improving hemoglobin still stable at 7.9.        Intake/Output:   No intake/output data recorded.  I/O last 3 completed shifts:  In: 1974.6 [I.V.:714.4; Blood:360; NG/GT:100; IV Piggyback:500.2]  Out: 2855 [Urine:355; Emesis/NG output:500]    Awake and following commands: Yes  Current Ventilation: No longer on ventilator  Secretions: Minimal  Sedation: None  Paralyzed: No  Vasopressors: None    ROS:  Unless stated above, ROS is otherwise negative.      Initial HPI + past overnight events:   Patient unable to provide any history this history has been obtained but extensive review of his medical record chart and discussion with ER physician.  Morning labs        The patient is a 64 y.o. male with complicated past medical history significant diabetes, hyperlipidemia, coronary artery disease status post PTCA with stent placements in [facility] who had a recent prolonged complicated hospital stay.  Patient initially had been presented to [facility] emergency department with bilateral extremity weakness pain and numbness.  Further up showed acute occlusion of bilateral common femoral arteries.  He was emergently taken to the  multidisciplinary team rounds the patient was seen, examined and discussed. This is confirmation that I have personally seen and examined the patient and that the key elements of the encounter were performed by me (> 85 % time).  The medications & laboratory data and imagery was discussed and adjusted where necessary. Key issues of the case were discussed among consultants.     This patient has a high probability of sudden clinically significant deterioration. I managed/supervised life or organ supporting interventions that required frequent physician assessment. I devoted my full attention to the direct care of this patient for the period of time indicated below. In addition to above, time was devoted to teaching and to any procedure.      NOTE: This report, in part or full, may have been transcribed using voice recognition software. Every effort was made to ensure accuracy; however, inadvertent computerized transcription errors may be present. Please excuse any transcriptional grammatical or spelling errors that may have escaped my editorial review.     Total critical care time caring for this patient with life threatening, unstable organ failure, including direct patient contact, management of life support systems, review of data including imaging and labs, discussions with other team members and physicians is at least 34 Min so far today, excluding procedures.    Maris Jones MD

## 2024-12-07 NOTE — PROGRESS NOTES
4 Eyes Skin Assessment     NAME:  Himanshu Bravo  YOB: 1960  MEDICAL RECORD NUMBER:  [id number]    The patient is being assessed for  Transfer to New Unit    I agree that at least one RN has performed a thorough Head to Toe Skin Assessment on the patient. ALL assessment sites listed below have been assessed.      Areas assessed by both nurses:    Head, Face, Ears, Shoulders, Back, Chest, Arms, Elbows, Hands, Sacrum. Buttock, Coccyx, Ischium, Legs. Feet and Heels, and Under Medical Devices         Does the Patient have a Wound? Yes wound(s) were present on assessment. LDA wound assessment was Initiated and completed by RN       Aramis Prevention initiated by RN: Yes  Wound Care Orders initiated by RN: No    Pressure Injury (Stage 3,4, Unstageable, DTI, NWPT, and Complex wounds) if present, place Wound referral order by RN under : No    New Ostomies, if present place, Ostomy referral order under : No     Nurse 1 eSignature: Electronically signed by Jimena Alfonso RN on 12/7/24 at 6:13 PM EST    **SHARE this note so that the co-signing nurse can place an eSignature**    Nurse 2 eSignature: {Esignature:[id number]}

## 2024-12-07 NOTE — PATIENT CARE CONFERENCE
Intensive Care Daily Quality Rounding Checklist        ICU Team Members: Dr. Jones, resident, charge nurse, bedside nurse     ICU Day #: 3     SOFA Score: 6     Intubation Date: EXTUBATED 12/6     Ventilator Day #:      Central Line Insertion Date: December 5                                                    Day #: 3                                                    Indication: CVCIndication: Hemodynamically unstable requiring volume resuscitation      Arterial Line Insertion Date:  n/a                             Day #: n/a     Temporary Hemodialysis Catheter Insertion Date:  tessio not tremporary                             Day #n/a     DVT Prophylaxis:heparin drip    GI Prophylaxis:protonix I.v.     Mckeon Catheter Insertion Date: December 5                                        Day #: 2                             Indications: Chronic urinary retention                             Continued need (if yes, reason documented and discussed with physician): yes, retention     Skin Issues/ Wounds and ordered treatment discussed on rounds: y right stump staples/left tib sutures/fasciotomy lle     Goals/ Plans for the Day: Monitor labs and vitals. HD per nephro. Case management to follow for return  to [facility], transfer out of ICU     Reviewed plan and goals for day with patient and/or representative: Yes     **SHARE this note so that the rounding nurse may edit**

## 2024-12-07 NOTE — PROGRESS NOTES
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  He does not appear to be in any respiratory distress.  His troponin remains greater than 5000 but his heart rate is in the 80s and blood pressure in the upper 90s.  He does not appear to be in any acute distress presently.    REVIEW OF SYSTEMS:  Unable to accurately obtain secondary to patient's depressed cognitive status presently.      SCHEDULED MEDICATIONS:   metoprolol tartrate  25 mg Oral BID    amiodarone  200 mg Orogastric Daily    aspirin  81 mg Orogastric Daily    docusate  100 mg Orogastric BID    tamsulosin  0.4 mg Oral Daily    atorvastatin  10 mg Orogastric Nightly    folic acid  1 mg Orogastric Daily    thiamine  100 mg Orogastric Daily    pantoprazole (PROTONIX) 40 mg in sodium chloride (PF) 0.9 % 10 mL injection  40 mg IntraVENous Q12H    insulin lispro  0-8 Units SubCUTAneous 4x Daily WC    chlorhexidine  15 mL Mouth/Throat BID    sodium chloride flush  5-40 mL IntraVENous 2 times per day       VITAL SIGNS:                                                                                                                          BP (!) 96/49    12/07/24  0439    137 135 136 138   K 4.7 4.4 4.2 4.1 4.3   CL 97* 98 101 99 99   CO2 24 26 24 22 24   BUN 36* 39* 43* 47* 28*   CREATININE 5.3* 5.6* 6.1* 6.6* 5.1*   LABGLOM 12* 11* 10* 9* 12*     ABGs:   Lab Results   Component Value Date/Time    PH 7.462 12/06/2024 04:12 AM    PO2 132.3 12/06/2024 04:12 AM    PCO2 30.3 12/06/2024 04:12 AM     INR:   Recent Labs     12/05/24  0718   INR 2.1     PRO-BNP:   Lab Results   Component Value Date    PROBNP 3,377 (H) 12/05/2024    PROBNP 3,360 (H) 12/05/2024      TSH:   Lab Results   Component Value Date    TSH 0.541 04/08/2017      Cardiac Injury Profile:   Recent Labs     12/06/24  0400 12/06/24  1343 12/07/24  0439   TROPHS 4,078* 4,301* 5,231*      Lipid Profile:   Lab Results   Component Value Date/Time    TRIG 199 11/19/2024 02:57 AM    HDL 34 11/19/2024 02:57 AM     11/19/2024 02:57 AM     04/08/2017 01:00 PM    CHOL 179 11/19/2024 02:57 AM      Hemoglobin A1C: No components found for: \"HGBA1C\"      RAD:   XR CHEST PORTABLE    Result Date: 12/7/2024  1. Interval removal of the endotracheal tube and nasogastric tube. 2. Cardiomegaly with chronic interstitial changes seen throughout the lung fields bilaterally.  No new focal parenchymal opacification present.     XR CHEST PORTABLE    Result Date: 12/6/2024  1. The position and alignment of the tubes and catheters are within the normal range. 2. No signs of an acute cardiopulmonary process.         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

## 2024-12-08 LAB
ANION GAP SERPL CALCULATED.3IONS-SCNC: 11 MMOL/L (ref 7–16)
BASOPHILS # BLD: 0.04 K/UL (ref 0–0.2)
BASOPHILS NFR BLD: 0 % (ref 0–2)
BUN SERPL-MCNC: 40 MG/DL (ref 6–23)
CALCIUM SERPL-MCNC: 8.4 MG/DL (ref 8.6–10.2)
CHLORIDE SERPL-SCNC: 101 MMOL/L (ref 98–107)
CO2 SERPL-SCNC: 24 MMOL/L (ref 22–29)
CREAT SERPL-MCNC: 6.1 MG/DL (ref 0.7–1.2)
EOSINOPHIL # BLD: 0.23 K/UL (ref 0.05–0.5)
EOSINOPHILS RELATIVE PERCENT: 2 % (ref 0–6)
ERYTHROCYTE [DISTWIDTH] IN BLOOD BY AUTOMATED COUNT: 14.8 % (ref 11.5–15)
GFR, ESTIMATED: 10 ML/MIN/1.73M2
GLUCOSE BLD-MCNC: 251 MG/DL (ref 74–99)
GLUCOSE BLD-MCNC: 268 MG/DL (ref 74–99)
GLUCOSE BLD-MCNC: 300 MG/DL (ref 74–99)
GLUCOSE BLD-MCNC: 341 MG/DL (ref 74–99)
GLUCOSE SERPL-MCNC: 245 MG/DL (ref 74–99)
HCT VFR BLD AUTO: 25.8 % (ref 37–54)
HGB BLD-MCNC: 7.8 G/DL (ref 12.5–16.5)
IMM GRANULOCYTES # BLD AUTO: 0.05 K/UL (ref 0–0.58)
IMM GRANULOCYTES NFR BLD: 1 % (ref 0–5)
LYMPHOCYTES NFR BLD: 0.96 K/UL (ref 1.5–4)
LYMPHOCYTES RELATIVE PERCENT: 9 % (ref 20–42)
MAGNESIUM SERPL-MCNC: 2.1 MG/DL (ref 1.6–2.6)
MCH RBC QN AUTO: 29.3 PG (ref 26–35)
MCHC RBC AUTO-ENTMCNC: 30.2 G/DL (ref 32–34.5)
MCV RBC AUTO: 97 FL (ref 80–99.9)
MONOCYTES NFR BLD: 0.94 K/UL (ref 0.1–0.95)
MONOCYTES NFR BLD: 9 % (ref 2–12)
NEUTROPHILS NFR BLD: 79 % (ref 43–80)
NEUTS SEG NFR BLD: 8.58 K/UL (ref 1.8–7.3)
PHOSPHATE SERPL-MCNC: 4.9 MG/DL (ref 2.5–4.5)
PLATELET # BLD AUTO: 354 K/UL (ref 130–450)
PMV BLD AUTO: 9.3 FL (ref 7–12)
POTASSIUM SERPL-SCNC: 4.1 MMOL/L (ref 3.5–5)
RBC # BLD AUTO: 2.66 M/UL (ref 3.8–5.8)
SODIUM SERPL-SCNC: 136 MMOL/L (ref 132–146)
TROPONIN I SERPL HS-MCNC: 3626 NG/L (ref 0–11)
WBC OTHER # BLD: 10.8 K/UL (ref 4.5–11.5)

## 2024-12-08 PROCEDURE — 6370000000 HC RX 637 (ALT 250 FOR IP): Performed by: INTERNAL MEDICINE

## 2024-12-08 PROCEDURE — 83735 ASSAY OF MAGNESIUM: CPT

## 2024-12-08 PROCEDURE — 6370000000 HC RX 637 (ALT 250 FOR IP)

## 2024-12-08 PROCEDURE — 85025 COMPLETE CBC W/AUTO DIFF WBC: CPT

## 2024-12-08 PROCEDURE — 2580000003 HC RX 258

## 2024-12-08 PROCEDURE — 6360000002 HC RX W HCPCS: Performed by: STUDENT IN AN ORGANIZED HEALTH CARE EDUCATION/TRAINING PROGRAM

## 2024-12-08 PROCEDURE — 2580000003 HC RX 258: Performed by: INTERNAL MEDICINE

## 2024-12-08 PROCEDURE — 84484 ASSAY OF TROPONIN QUANT: CPT

## 2024-12-08 PROCEDURE — 82947 ASSAY GLUCOSE BLOOD QUANT: CPT

## 2024-12-08 PROCEDURE — 80048 BASIC METABOLIC PNL TOTAL CA: CPT

## 2024-12-08 PROCEDURE — 6370000000 HC RX 637 (ALT 250 FOR IP): Performed by: STUDENT IN AN ORGANIZED HEALTH CARE EDUCATION/TRAINING PROGRAM

## 2024-12-08 PROCEDURE — 84100 ASSAY OF PHOSPHORUS: CPT

## 2024-12-08 PROCEDURE — 2700000000 HC OXYGEN THERAPY PER DAY

## 2024-12-08 PROCEDURE — 99232 SBSQ HOSP IP/OBS MODERATE 35: CPT | Performed by: STUDENT IN AN ORGANIZED HEALTH CARE EDUCATION/TRAINING PROGRAM

## 2024-12-08 PROCEDURE — 2060000000 HC ICU INTERMEDIATE R&B

## 2024-12-08 PROCEDURE — 6360000002 HC RX W HCPCS: Performed by: INTERNAL MEDICINE

## 2024-12-08 RX ORDER — OXYCODONE HYDROCHLORIDE 5 MG/1
5 TABLET ORAL EVERY 4 HOURS PRN
Status: DISCONTINUED | OUTPATIENT
Start: 2024-12-08 | End: 2024-12-10 | Stop reason: HOSPADM

## 2024-12-08 RX ADMIN — INSULIN LISPRO 6 UNITS: 100 INJECTION, SOLUTION INTRAVENOUS; SUBCUTANEOUS at 20:00

## 2024-12-08 RX ADMIN — APIXABAN 5 MG: 5 TABLET, FILM COATED ORAL at 08:12

## 2024-12-08 RX ADMIN — INSULIN LISPRO 6 UNITS: 100 INJECTION, SOLUTION INTRAVENOUS; SUBCUTANEOUS at 15:48

## 2024-12-08 RX ADMIN — SODIUM CHLORIDE, PRESERVATIVE FREE 10 ML: 5 INJECTION INTRAVENOUS at 08:12

## 2024-12-08 RX ADMIN — METOPROLOL TARTRATE 25 MG: 25 TABLET, FILM COATED ORAL at 08:12

## 2024-12-08 RX ADMIN — DOCUSATE SODIUM LIQUID 100 MG: 100 LIQUID ORAL at 08:12

## 2024-12-08 RX ADMIN — Medication 100 MG: at 08:12

## 2024-12-08 RX ADMIN — ASPIRIN 81 MG CHEWABLE TABLET 81 MG: 81 TABLET CHEWABLE at 08:12

## 2024-12-08 RX ADMIN — HYDROMORPHONE HYDROCHLORIDE 0.5 MG: 1 INJECTION, SOLUTION INTRAMUSCULAR; INTRAVENOUS; SUBCUTANEOUS at 14:41

## 2024-12-08 RX ADMIN — OXYCODONE 5 MG: 5 TABLET ORAL at 19:56

## 2024-12-08 RX ADMIN — FOLIC ACID 1 MG: 1 TABLET ORAL at 08:12

## 2024-12-08 RX ADMIN — SODIUM CHLORIDE 40 MG: 9 INJECTION INTRAMUSCULAR; INTRAVENOUS; SUBCUTANEOUS at 19:57

## 2024-12-08 RX ADMIN — METOPROLOL TARTRATE 25 MG: 25 TABLET, FILM COATED ORAL at 19:56

## 2024-12-08 RX ADMIN — AMIODARONE HYDROCHLORIDE 200 MG: 200 TABLET ORAL at 08:12

## 2024-12-08 RX ADMIN — APIXABAN 5 MG: 5 TABLET, FILM COATED ORAL at 19:56

## 2024-12-08 RX ADMIN — INSULIN LISPRO 4 UNITS: 100 INJECTION, SOLUTION INTRAVENOUS; SUBCUTANEOUS at 06:20

## 2024-12-08 RX ADMIN — ATORVASTATIN CALCIUM 10 MG: 10 TABLET, FILM COATED ORAL at 19:56

## 2024-12-08 RX ADMIN — TAMSULOSIN HYDROCHLORIDE 0.4 MG: 0.4 CAPSULE ORAL at 08:12

## 2024-12-08 RX ADMIN — OXYCODONE 5 MG: 5 TABLET ORAL at 11:29

## 2024-12-08 RX ADMIN — DOCUSATE SODIUM LIQUID 100 MG: 100 LIQUID ORAL at 19:57

## 2024-12-08 RX ADMIN — INSULIN LISPRO 4 UNITS: 100 INJECTION, SOLUTION INTRAVENOUS; SUBCUTANEOUS at 11:26

## 2024-12-08 RX ADMIN — HYDROMORPHONE HYDROCHLORIDE 0.5 MG: 1 INJECTION, SOLUTION INTRAMUSCULAR; INTRAVENOUS; SUBCUTANEOUS at 09:03

## 2024-12-08 RX ADMIN — SODIUM CHLORIDE 40 MG: 9 INJECTION INTRAMUSCULAR; INTRAVENOUS; SUBCUTANEOUS at 08:11

## 2024-12-08 RX ADMIN — SODIUM CHLORIDE, PRESERVATIVE FREE 10 ML: 5 INJECTION INTRAVENOUS at 19:58

## 2024-12-08 RX ADMIN — OXYCODONE 5 MG: 5 TABLET ORAL at 15:50

## 2024-12-08 ASSESSMENT — PAIN SCALES - GENERAL
PAINLEVEL_OUTOF10: 7
PAINLEVEL_OUTOF10: 7
PAINLEVEL_OUTOF10: 8
PAINLEVEL_OUTOF10: 7
PAINLEVEL_OUTOF10: 6

## 2024-12-08 ASSESSMENT — PAIN DESCRIPTION - DESCRIPTORS
DESCRIPTORS: SHOOTING;SHARP
DESCRIPTORS: SHARP
DESCRIPTORS: STABBING;SHARP
DESCRIPTORS: SQUEEZING

## 2024-12-08 ASSESSMENT — PAIN DESCRIPTION - LOCATION
LOCATION: LEG

## 2024-12-08 ASSESSMENT — PAIN - FUNCTIONAL ASSESSMENT
PAIN_FUNCTIONAL_ASSESSMENT: ACTIVITIES ARE NOT PREVENTED

## 2024-12-08 ASSESSMENT — PAIN DESCRIPTION - ORIENTATION
ORIENTATION: RIGHT;LEFT

## 2024-12-08 NOTE — PROGRESS NOTES
[facility] Hospitalist Progress Note    Admitting Date and Time: 12/5/2024  7:09 AM  Admit Dx: Shock [R57.9]  Hyperglycemia [R73.9]  NSTEMI (non-ST elevated myocardial infarction) (HCC) [I21.4]  Acute respiratory failure with hypoxia and hypercapnia [J96.01, J96.02]  Altered mental status, unspecified altered mental status type [R41.82]  Acute hypercapnic respiratory failure [J96.02]  Anemia, unspecified type [D64.9]    Subjective:  Patient is being followed for Shock [R57.9]  Hyperglycemia [R73.9]  NSTEMI (non-ST elevated myocardial infarction) (HCC) [I21.4]  Acute respiratory failure with hypoxia and hypercapnia [J96.01, J96.02]  Altered mental status, unspecified altered mental status type [R41.82]  Acute hypercapnic respiratory failure [J96.02]  Anemia, unspecified type [D64.9]   Pt feels okay  Per RN: no additional concerns    ROS: denies fever, chills, cp, sob, n/v, HA unless otherwise noted above     metoprolol tartrate  25 mg Oral BID    apixaban  5 mg Oral BID    amiodarone  200 mg Orogastric Daily    aspirin  81 mg Orogastric Daily    docusate  100 mg Orogastric BID    tamsulosin  0.4 mg Oral Daily    atorvastatin  10 mg Orogastric Nightly    folic acid  1 mg Orogastric Daily    thiamine  100 mg Orogastric Daily    pantoprazole (PROTONIX) 40 mg in sodium chloride (PF) 0.9 % 10 mL injection  40 mg IntraVENous Q12H    insulin lispro  0-8 Units SubCUTAneous 4x Daily WC    chlorhexidine  15 mL Mouth/Throat BID    sodium chloride flush  5-40 mL IntraVENous 2 times per day     oxyCODONE, 5 mg, Q4H PRN  HYDROmorphone, 0.5 mg, Q4H PRN  benzocaine-menthol, 1 lozenge, Q2H PRN  heparin (porcine), 3,500 Units, PRN  sulfur hexafluoride microspheres, 2 mL, ONCE PRN  dextrose bolus, 125 mL, PRN   Or  dextrose bolus, 250 mL, PRN  glucagon (rDNA), 1 mg, PRN  dextrose, , Continuous PRN  fentaNYL, 50 mcg, Q30 Min PRN  sodium chloride flush, 5-40 mL, PRN  sodium chloride, , PRN  polyethylene glycol, 17 g, Daily  Problem:    Acute hypercapnic respiratory failure  Active Problems:    Type 2 diabetes mellitus without complication (HCC)    Mixed hyperlipidemia    Acute metabolic encephalopathy    Shock    Atrial fibrillation (HCC)    Hemodialysis patient (HCC)    Anemia    Elevated troponin    Parotid mass    History of fasciotomy  Resolved Problems:    * No resolved hospital problems. *      Plan:  Shock. Cardiogenic vs septic. Troponins markedly elevated. Echo showed EF 52%. Shock now resolved, off pressors and out of ICU  NSTEMI.  Troponin peak 4444 on admission. Off hep gtt and back to Eliquis. Cards following, with ECHO showing low-normal EF and with normal CK and AST less suspicion uncertain that elevated Troponin representative of ACS  Bilateral renal infarcts, recent AKA and bilateral femoral enarterectomies. Vasc Surg following, transition hep gtt to Eliquis. Sutures for removal on 12/14  ANTIONE 2/2 above, Nephro following, on HD  Asymptomatic bacteriuria. Monitor off antibiotics per ID. Follow final Cxs  Acute hypoxic respiratory failure. S/p extubation 12/6, now to RA, resp failure resolved      Dispo: anticipate SNF    NOTE: Portions of this report may have been transcribed using voice recognition software. Every effort was made to ensure accuracy; however, inadvertent computerized transcription errors may be present.  Electronically signed by Karolina Maharaj MD on 12/8/2024 at 2:09 PM

## 2024-12-08 NOTE — PROGRESS NOTES
OTOLARYNGOLOGY  DAILY PROGRESS NOTE  2024    Subjective:     Patient remains in ICU and is extubated. Reports parotid mass has been there for several years, has had some change in size.     Objective:     BP (!) 118/53   Pulse 100   Temp 98.9 °F (37.2 °C) (Axillary)   Resp 20   Ht 1.702 m (5' 7\")   Wt 93.6 kg (206 lb 5.6 oz)   SpO2 99%   BMI 32.32 kg/m²   PULSE OXIMETRY RANGE: SpO2  Av.6 %  Min: 95 %  Max: 100 %  I/O last 3 completed shifts:  In: 880.7 [P.O.:240; I.V.:340.7]  Out: 2455 [Urine:455]    General Appearance:  Remains in ICU, extubated.   Head/face:  NC/AT, 3-4 cm mobile, hard, round parotid mass appreciated on exam  Eyes: PERRL  ENT: Bilateral external ears WNL, Nares patent, Septum midline  Neck: Supple, no adenopathy  Lungs:  Endotracheal intubated on ventilator  Heart:  RR  Neuro: Unable to be tested secondary to patient condition      Procedure: FNA  Pre op dx- incidental right parotid mass  Post op dx- same  Procedure in Detail: Consent for a fna for the indication of a mass was obtained. Local anesthesia was injected superficially. 25 gauge needle was used to make multiple passes to collect cells that were placed into cytolyte and sent for pathology. Pt tolerated well. Minimal bleeding.      Assessment/Plan:     64 y.o. male  with incidentally found right sided parotid mass.      FNA right parotid mass completed.   Pathology Cytology of FNA sample ordered and sent.  Patient may follow up outpatient with Dr Mcnulty to discuss further management options once medical condition improved   Please contact on call ENT resident with any questions.    Electronically signed by Nita Galloway DO on 2024 at 11:48 PM

## 2024-12-08 NOTE — PROGRESS NOTES
[facility]  [facility]  Progress Note    SUBJECTIVE:  Chief Complaint   Patient presents with    Altered Mental Status     Unresponsive, agonal breathing and elevated trops per [facility]     Patient transferred out of the ICU   He has been afebrile  He is eating lunch     Review of systems:  As stated above in the chief complaint, otherwise negative.    Medications:  Scheduled Meds:   metoprolol tartrate  25 mg Oral BID    apixaban  5 mg Oral BID    amiodarone  200 mg Orogastric Daily    aspirin  81 mg Orogastric Daily    docusate  100 mg Orogastric BID    tamsulosin  0.4 mg Oral Daily    atorvastatin  10 mg Orogastric Nightly    folic acid  1 mg Orogastric Daily    thiamine  100 mg Orogastric Daily    pantoprazole (PROTONIX) 40 mg in sodium chloride (PF) 0.9 % 10 mL injection  40 mg IntraVENous Q12H    insulin lispro  0-8 Units SubCUTAneous 4x Daily WC    chlorhexidine  15 mL Mouth/Throat BID    sodium chloride flush  5-40 mL IntraVENous 2 times per day     Continuous Infusions:   dextrose      sodium chloride       PRN Meds:oxyCODONE, HYDROmorphone, benzocaine-menthol, heparin (porcine), sulfur hexafluoride microspheres, dextrose bolus **OR** dextrose bolus, glucagon (rDNA), dextrose, fentaNYL, sodium chloride flush, sodium chloride, polyethylene glycol, acetaminophen **OR** acetaminophen, prochlorperazine **OR** prochlorperazine    OBJECTIVE:  /62   Pulse 86   Temp 98 °F (36.7 °C) (Oral)   Resp 18   Ht 1.702 m (5' 7\")   Wt 93.6 kg (206 lb 5.6 oz)   SpO2 97%   BMI 32.32 kg/m²   Temp  Av.6 °F (37 °C)  Min: 98 °F (36.7 °C)  Max: 99.4 °F (37.4 °C)  Constitutional: The patient is sitting up in bed, in no distress. He is awake and alert.   Skin: Warm and dry. No rashes were noted.   HEENT: Round and reactive pupils.  Moist mucous membranes.  No ulcerations or thrush  Neck: Supple to movements.   Chest: No use of accessory muscles to breathe. Symmetrical expansion.  No

## 2024-12-09 LAB
ANION GAP SERPL CALCULATED.3IONS-SCNC: 13 MMOL/L (ref 7–16)
BUN SERPL-MCNC: 43 MG/DL (ref 6–23)
CALCIUM SERPL-MCNC: 9 MG/DL (ref 8.6–10.2)
CHLORIDE SERPL-SCNC: 100 MMOL/L (ref 98–107)
CO2 SERPL-SCNC: 24 MMOL/L (ref 22–29)
CREAT SERPL-MCNC: 6.5 MG/DL (ref 0.7–1.2)
EKG ATRIAL RATE: 108 BPM
EKG P AXIS: 56 DEGREES
EKG P-R INTERVAL: 136 MS
EKG Q-T INTERVAL: 388 MS
EKG QRS DURATION: 110 MS
EKG QTC CALCULATION (BAZETT): 519 MS
EKG R AXIS: -51 DEGREES
EKG T AXIS: 79 DEGREES
EKG VENTRICULAR RATE: 108 BPM
FERRITIN SERPL-MCNC: 418 NG/ML
GFR, ESTIMATED: 9 ML/MIN/1.73M2
GLUCOSE BLD-MCNC: 134 MG/DL (ref 74–99)
GLUCOSE BLD-MCNC: 249 MG/DL (ref 74–99)
GLUCOSE BLD-MCNC: 321 MG/DL (ref 74–99)
GLUCOSE BLD-MCNC: 345 MG/DL (ref 74–99)
GLUCOSE SERPL-MCNC: 209 MG/DL (ref 74–99)
IRON SATN MFR SERPL: 16 % (ref 20–55)
IRON SERPL-MCNC: 30 UG/DL (ref 59–158)
MAGNESIUM SERPL-MCNC: 2 MG/DL (ref 1.6–2.6)
PHOSPHATE SERPL-MCNC: 4.6 MG/DL (ref 2.5–4.5)
POTASSIUM SERPL-SCNC: 4.6 MMOL/L (ref 3.5–5)
SODIUM SERPL-SCNC: 137 MMOL/L (ref 132–146)
TIBC SERPL-MCNC: 190 UG/DL (ref 250–450)
TROPONIN I SERPL HS-MCNC: 2782 NG/L (ref 0–11)

## 2024-12-09 PROCEDURE — 2580000003 HC RX 258

## 2024-12-09 PROCEDURE — 36415 COLL VENOUS BLD VENIPUNCTURE: CPT

## 2024-12-09 PROCEDURE — 84100 ASSAY OF PHOSPHORUS: CPT

## 2024-12-09 PROCEDURE — 6370000000 HC RX 637 (ALT 250 FOR IP): Performed by: INTERNAL MEDICINE

## 2024-12-09 PROCEDURE — 6370000000 HC RX 637 (ALT 250 FOR IP): Performed by: STUDENT IN AN ORGANIZED HEALTH CARE EDUCATION/TRAINING PROGRAM

## 2024-12-09 PROCEDURE — 6370000000 HC RX 637 (ALT 250 FOR IP)

## 2024-12-09 PROCEDURE — 6360000002 HC RX W HCPCS

## 2024-12-09 PROCEDURE — 82728 ASSAY OF FERRITIN: CPT

## 2024-12-09 PROCEDURE — 6360000002 HC RX W HCPCS: Performed by: INTERNAL MEDICINE

## 2024-12-09 PROCEDURE — 80048 BASIC METABOLIC PNL TOTAL CA: CPT

## 2024-12-09 PROCEDURE — 90935 HEMODIALYSIS ONE EVALUATION: CPT

## 2024-12-09 PROCEDURE — 2060000000 HC ICU INTERMEDIATE R&B

## 2024-12-09 PROCEDURE — 99232 SBSQ HOSP IP/OBS MODERATE 35: CPT | Performed by: STUDENT IN AN ORGANIZED HEALTH CARE EDUCATION/TRAINING PROGRAM

## 2024-12-09 PROCEDURE — 83550 IRON BINDING TEST: CPT

## 2024-12-09 PROCEDURE — 83735 ASSAY OF MAGNESIUM: CPT

## 2024-12-09 PROCEDURE — 83540 ASSAY OF IRON: CPT

## 2024-12-09 PROCEDURE — 84484 ASSAY OF TROPONIN QUANT: CPT

## 2024-12-09 PROCEDURE — 82947 ASSAY GLUCOSE BLOOD QUANT: CPT

## 2024-12-09 RX ORDER — ANTICOAGULANT SODIUM CITRATE SOLUTION 4 G/100ML
SOLUTION INTRAVENOUS
Status: DISPENSED
Start: 2024-12-09 | End: 2024-12-10

## 2024-12-09 RX ORDER — PANTOPRAZOLE SODIUM 40 MG/1
40 TABLET, DELAYED RELEASE ORAL
Status: DISCONTINUED | OUTPATIENT
Start: 2024-12-09 | End: 2024-12-10 | Stop reason: HOSPADM

## 2024-12-09 RX ADMIN — APIXABAN 5 MG: 5 TABLET, FILM COATED ORAL at 08:45

## 2024-12-09 RX ADMIN — INSULIN LISPRO 6 UNITS: 100 INJECTION, SOLUTION INTRAVENOUS; SUBCUTANEOUS at 21:13

## 2024-12-09 RX ADMIN — OXYCODONE 5 MG: 5 TABLET ORAL at 17:01

## 2024-12-09 RX ADMIN — METOPROLOL TARTRATE 25 MG: 25 TABLET, FILM COATED ORAL at 21:00

## 2024-12-09 RX ADMIN — INSULIN LISPRO 2 UNITS: 100 INJECTION, SOLUTION INTRAVENOUS; SUBCUTANEOUS at 06:52

## 2024-12-09 RX ADMIN — SODIUM CHLORIDE, PRESERVATIVE FREE 10 ML: 5 INJECTION INTRAVENOUS at 21:13

## 2024-12-09 RX ADMIN — ATORVASTATIN CALCIUM 10 MG: 10 TABLET, FILM COATED ORAL at 21:00

## 2024-12-09 RX ADMIN — SODIUM CHLORIDE, PRESERVATIVE FREE 10 ML: 5 INJECTION INTRAVENOUS at 08:45

## 2024-12-09 RX ADMIN — Medication 100 MG: at 08:45

## 2024-12-09 RX ADMIN — DOCUSATE SODIUM LIQUID 100 MG: 100 LIQUID ORAL at 21:01

## 2024-12-09 RX ADMIN — OXYCODONE 5 MG: 5 TABLET ORAL at 05:39

## 2024-12-09 RX ADMIN — DOCUSATE SODIUM LIQUID 100 MG: 100 LIQUID ORAL at 08:44

## 2024-12-09 RX ADMIN — OXYCODONE 5 MG: 5 TABLET ORAL at 21:18

## 2024-12-09 RX ADMIN — EPOETIN ALFA-EPBX 3000 UNITS: 3000 INJECTION, SOLUTION INTRAVENOUS; SUBCUTANEOUS at 17:01

## 2024-12-09 RX ADMIN — FOLIC ACID 1 MG: 1 TABLET ORAL at 08:45

## 2024-12-09 RX ADMIN — APIXABAN 5 MG: 5 TABLET, FILM COATED ORAL at 21:00

## 2024-12-09 RX ADMIN — SODIUM CHLORIDE 125 MG: 900 INJECTION INTRAVENOUS at 17:08

## 2024-12-09 RX ADMIN — INSULIN LISPRO 6 UNITS: 100 INJECTION, SOLUTION INTRAVENOUS; SUBCUTANEOUS at 12:12

## 2024-12-09 RX ADMIN — PANTOPRAZOLE SODIUM 40 MG: 40 TABLET, DELAYED RELEASE ORAL at 06:53

## 2024-12-09 RX ADMIN — TAMSULOSIN HYDROCHLORIDE 0.4 MG: 0.4 CAPSULE ORAL at 08:45

## 2024-12-09 RX ADMIN — METOPROLOL TARTRATE 25 MG: 25 TABLET, FILM COATED ORAL at 08:45

## 2024-12-09 RX ADMIN — OXYCODONE 5 MG: 5 TABLET ORAL at 09:45

## 2024-12-09 RX ADMIN — AMIODARONE HYDROCHLORIDE 200 MG: 200 TABLET ORAL at 08:45

## 2024-12-09 RX ADMIN — ASPIRIN 81 MG CHEWABLE TABLET 81 MG: 81 TABLET CHEWABLE at 08:45

## 2024-12-09 RX ADMIN — PANTOPRAZOLE SODIUM 40 MG: 40 TABLET, DELAYED RELEASE ORAL at 17:01

## 2024-12-09 RX ADMIN — 0.12% CHLORHEXIDINE GLUCONATE 15 ML: 1.2 RINSE ORAL at 08:45

## 2024-12-09 RX ADMIN — 0.12% CHLORHEXIDINE GLUCONATE 15 ML: 1.2 RINSE ORAL at 21:01

## 2024-12-09 ASSESSMENT — PAIN DESCRIPTION - DESCRIPTORS
DESCRIPTORS: SQUEEZING
DESCRIPTORS: ACHING;THROBBING
DESCRIPTORS: ACHING;CRAMPING;DISCOMFORT

## 2024-12-09 ASSESSMENT — PAIN DESCRIPTION - LOCATION
LOCATION: LEG

## 2024-12-09 ASSESSMENT — PAIN - FUNCTIONAL ASSESSMENT
PAIN_FUNCTIONAL_ASSESSMENT: ACTIVITIES ARE NOT PREVENTED

## 2024-12-09 ASSESSMENT — PAIN DESCRIPTION - ORIENTATION
ORIENTATION: LEFT
ORIENTATION: RIGHT;LEFT
ORIENTATION: LEFT;RIGHT

## 2024-12-09 ASSESSMENT — PAIN SCALES - GENERAL
PAINLEVEL_OUTOF10: 2
PAINLEVEL_OUTOF10: 6
PAINLEVEL_OUTOF10: 10
PAINLEVEL_OUTOF10: 0
PAINLEVEL_OUTOF10: 0
PAINLEVEL_OUTOF10: 7
PAINLEVEL_OUTOF10: 8

## 2024-12-09 ASSESSMENT — PAIN DESCRIPTION - PAIN TYPE: TYPE: ACUTE PAIN

## 2024-12-09 NOTE — PROGRESS NOTES
Occupational Therapy  Date:12/9/2024  Patient Name: Himanshu Bravo  Room: [facility]-A     Occupational Therapy (OT) order received, patient's medical record reviewed, and OT evaluation attempted this date; patient unavailable due to being off of unit. OT evaluation to be re-attempted at later time/date, as able/appropriate.    Rebecca Hernandez, OTR/L  License Number: [id number]

## 2024-12-09 NOTE — ACP (ADVANCE CARE PLANNING)
Advance Care Planning   Healthcare Decision Maker:    Primary Decision Maker: Schwab,Lisa Marie \"Supriya\" - Brother/Sister - [phone]    Secondary Decision Maker: Sanford Bravo - Brother/Sister - [phone]    Click here to complete Healthcare Decision Makers including selection of the Healthcare Decision Maker Relationship (ie \"Primary\").

## 2024-12-09 NOTE — PROGRESS NOTES
sodium chloride         MEDS (prn):  oxyCODONE, benzocaine-menthol, heparin (porcine), sulfur hexafluoride microspheres, dextrose bolus **OR** dextrose bolus, glucagon (rDNA), dextrose, fentaNYL, sodium chloride flush, sodium chloride, polyethylene glycol, acetaminophen **OR** acetaminophen, prochlorperazine **OR** prochlorperazine    PHYSICAL EXAM:     Patient Vitals for the past 24 hrs:   BP Temp Temp src Pulse Resp SpO2   12/09/24 1156 133/72 98.3 °F (36.8 °C) Oral 97 18 93 %   12/09/24 1144 125/75 98.5 °F (36.9 °C) -- -- 18 92 %   12/09/24 0945 -- -- -- -- 18 --   12/09/24 0840 103/69 97.4 °F (36.3 °C) Temporal (!) 104 18 96 %   12/09/24 0529 (!) 145/78 98.4 °F (36.9 °C) Temporal 100 18 97 %   12/08/24 2257 126/74 97.7 °F (36.5 °C) Temporal 88 18 97 %   12/08/24 2026 -- -- -- -- 18 --   12/08/24 1956 -- -- -- -- 18 --   12/08/24 1930 (!) 124/90 98.2 °F (36.8 °C) Temporal (!) 102 18 96 %   12/08/24 1430 (!) 110/56 98.7 °F (37.1 °C) Oral 96 18 96 %   @      Intake/Output Summary (Last 24 hours) at 12/9/2024 1240  Last data filed at 12/9/2024 1156  Gross per 24 hour   Intake 10 ml   Output 1000 ml   Net -990 ml         Wt Readings from Last 3 Encounters:   12/06/24 93.6 kg (206 lb 5.6 oz)   11/27/24 100.5 kg (221 lb 9 oz)   11/11/24 108.9 kg (240 lb)       Constitutional: Awake and alert   HEENT: NC/AT, EOMI, sclera and conjunctiva are clear and anicteric, mucus membranes moist  Neck: Trachea midline, no JVD  Cardiovascular: S1, S2 regular rhythm, no murmur,or rub  Respiratory:  CTAb, diminished in the bases. No crackles, no wheeze  Gastrointestinal:  Soft, nontender, nondistended, NABS  Ext: 1+ dependent  edema, feet warm.  Status post right AKA  Skin: dry, no rash  Neuro: awake and alert       DATA:    Recent Labs     12/06/24  1343 12/07/24  0439 12/08/24  0355   WBC  --  12.0* 10.8   HGB 8.8* 7.9* 7.8*   HCT 28.1* 25.7* 25.8*   MCV  --  96.6 97.0   PLT  --  336 354     Recent Labs     12/07/24  0439  12/08/24  0355 12/09/24  0540    136 137   K 4.3 4.1 4.6   CL 99 101 100   CO2 24 24 24   MG 2.3 2.1 2.0   PHOS 5.0* 4.9* 4.6*   BUN 28* 40* 43*   CREATININE 5.1* 6.1* 6.5*       No results found for: \"LABPROT\"    Assessment  Acute kidney injury caused by bilateral renal infarcts and bilateral common femoral artery occlusions, hypotension//shock  Hypotension  Altered mental status  Acute respiratory failure  Anemia    His urine output has picked up over the last 24 hours (1.7 liters in total)  Creatinine continues to rise in between treatments, so no current signs of recovery. Will continue to follow.   Iron 30, sat 16 %     Recommendations  Continue IHD thrice weekly for removal of solutes and volume.   IV ferrlecit x 3 doses   Continue Retacrit 3000 subcutaneously Monday Wednesday Friday  Follow labs/UO  Continue supportive care    Electronically signed by NUZHAT Ray CNP on 12/9/2024 at 12:40 PM

## 2024-12-09 NOTE — PROGRESS NOTES
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  Likewise he denies any significant shortness of breath palpitations nor chest discomfort presently.  He is looking forward to being transferred back to the [facility].  REVIEW OF SYSTEMS:  General ROS: negative for - chills, fatigue or fever  Psychological ROS: negative for - anxiety, depression or sleep disturbances  Ophthalmic ROS: negative for - blurry vision, decreased vision or eye pain  ENT ROS: negative for - headaches, nasal congestion or nasal discharge  Allergy and Immunology ROS: negative for - hives, itchy/watery eyes or postnasal drip  Hematological and Lymphatic ROS: negative for - bleeding problems, bruising or jaundice  Endocrine ROS: negative for - hair pattern changes, temperature intolerance or unexpected weight changes  Respiratory ROS: no cough, shortness of breath, or wheezing  Cardiovascular ROS: no chest pain or dyspnea on exertion  Gastrointestinal ROS: no abdominal pain, change in bowel habits, or black or bloody stools  Genito-Urinary ROS:  parenchymal opacification present.     XR CHEST PORTABLE    Result Date: 12/6/2024  1. The position and alignment of the tubes and catheters are within the normal range. 2. No signs of an acute cardiopulmonary process.         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    Acute lower limb ischemia I99.8    ANTIONE (acute kidney injury) ([facility]) N17.9    Non-traumatic rhabdomyolysis M62.82    Acute hypercapnic respiratory failure J96.02    Acute metabolic encephalopathy G93.41    Shock R57.9    Atrial fibrillation ([facility]) I48.91    Hemodialysis patient ([facility]) Z99.2    Anemia D64.9    Elevated troponin R79.89    Parotid mass K11.8    History of fasciotomy Z98.890       RECOMMENDATIONS:  At this point I see no reason why [facility] as I suspect his troponins are elevated secondary to his advanced underlying renal dysfunction and with intravascular volume changes increases relative troponin result.    I have spent more than 25 minutes face to face with Himanshu Bravo and reviewing notes and laboratory data, with greater than 50% of this time instructing and counseling the patient face to face regarding my findings and recommendations and I have answered all questions as posed to me by  Grovermaximilian.    Kwabena Dale, DO FACP,FACC,[facility]      NOTE:  This report was transcribed using voice recognition software.  Every effort was made to ensure accuracy; however, inadvertent computerized transcription errors may be present

## 2024-12-09 NOTE — PROGRESS NOTES
Roosevelt Villatoro M.D.,[facility]  Leroy Martinez D.O., F.BREANN., [facility]  Kiel Jones M.D.  Smitha Hui M.D.   Magnus Platt D.O.  Himanshu Remy M.D.         Daily Pulmonary Progress Note    Patient:  Himanshu Bravo 64 y.o. male MRN: [id number]            Synopsis     We are following patient for acute respiratory failure with hypoxia, out of ICU management    \"CC\" hypoxia, shortness of breath    Code status: Full      Subjective      Patient was seen and examined.  Weaned off oxygen no complaints with breathing.  Completed antibiotics.      Review of Systems:  Constitutional: Denies fever, weight loss, night sweats, and fatigue  Skin: Denies pigmentation, dark lesions, and rashes   HEENT: Denies hearing loss, tinnitus, ear drainage, epistaxis, sore throat, and hoarseness.  Cardiovascular: Denies palpitations, chest pain, and chest pressure.  Respiratory: Denies cough, dyspnea at rest, hemoptysis, apnea, and choking.  Gastrointestinal: Denies nausea, vomiting, poor appetite, diarrhea, heartburn or reflux  Genitourinary: Denies dysuria, frequency, urgency or hematuria  Musculoskeletal: Denies myalgias, muscle weakness, and bone pain  Neurological: Denies dizziness, vertigo, headache, and focal weakness  Psychological: Denies anxiety and depression  Endocrine: Denies heat intolerance and cold intolerance  Hematopoietic/Lymphatic: Denies bleeding problems and blood transfusions    24-hour events:  Transfer out of ICU    Objective   OBJECTIVE:   /72   Pulse 97   Temp 98.3 °F (36.8 °C) (Oral)   Resp 18   Ht 1.702 m (5' 7\")   Wt 93.6 kg (206 lb 5.6 oz)   SpO2 93%   BMI 32.32 kg/m²   SpO2 Readings from Last 1 Encounters:   12/09/24 93%        I/O:    Intake/Output Summary (Last 24 hours) at 12/9/2024 1427  Last data filed at 12/9/2024 1156  Gross per 24 hour   Intake 10 ml   Output 1000 ml   Net -990 ml     Vent Information  Ventilator Day(s): 2  Ventilator Initiate: Yes  Ventilator Discontinue:  or  pneumothorax.  Degenerative changes seen within the spine.  Pulmonary  vascularity is within normal limits.     IMPRESSION:  1. Interval removal of the endotracheal tube and nasogastric tube.  2. Cardiomegaly with chronic interstitial changes seen throughout the lung  fields bilaterally.  No new focal parenchymal opacification present.         Echo:    Left Ventricle: Low normal left ventricular systolic function. EF by visual approximation is 52%. Left ventricle size is normal. Normal wall thickness. Normal wall motion.    Aortic Valve: Trileaflet valve. Mildly calcified noncoronary cusp.    Mitral Valve: Mildly calcified subvalvular apparatus.  (Chordae tendonae)    Extracardiac: Large left pleural effusion.    Image quality is technically difficult. Contrast used: Definity.       Labs:  Lab Results   Component Value Date/Time    WBC 10.8 12/08/2024 03:55 AM    RBC 2.66 12/08/2024 03:55 AM    HGB 7.8 12/08/2024 03:55 AM    HCT 25.8 12/08/2024 03:55 AM    MCV 97.0 12/08/2024 03:55 AM    MCH 29.3 12/08/2024 03:55 AM    MCHC 30.2 12/08/2024 03:55 AM    RDW 14.8 12/08/2024 03:55 AM     12/08/2024 03:55 AM    MPV 9.3 12/08/2024 03:55 AM     Lab Results   Component Value Date/Time     12/09/2024 05:40 AM    K 4.6 12/09/2024 05:40 AM    K 3.9 01/09/2018 03:15 AM     12/09/2024 05:40 AM    CO2 24 12/09/2024 05:40 AM    BUN 43 12/09/2024 05:40 AM    CREATININE 6.5 12/09/2024 05:40 AM    CALCIUM 9.0 12/09/2024 05:40 AM    GFRAA >60 01/21/2018 06:00 AM    LABGLOM 9 12/09/2024 05:40 AM     Lab Results   Component Value Date/Time    PROTIME 22.7 12/05/2024 07:18 AM    INR 2.1 12/05/2024 07:18 AM      Assessment:    Acute respiratory failure with hypoxia and hypercapnia-resolved  Extubated 12/6/2024  Cardiogenic vs septic shock-weaned off pressors  Bilateral femoral endarterectomies, fasciotomy, right AKA 11/12/2024  ANTIONE, renal infarcts  Old small infarct right posterior parietal right occipital

## 2024-12-09 NOTE — DISCHARGE INSTR - COC
Continuity of Care Form    Patient Name: Himanshu Bravo   :  1960  MRN:  [id number]    Admit date:  2024  Discharge date:  12/10/24    Code Status Order: Full Code   Advance Directives:   Advance Care Flowsheet Documentation             Admitting Physician:  Jone Tran MD  PCP: No primary care provider on file.    Discharging Nurse: Silvia RN  Discharging Hospital Unit/Room#: 0414/0414-A  Discharging Unit Phone Number: [phone]    Emergency Contact:   Extended Emergency Contact Information  Primary Emergency Contact: Sanford Bravo  Address: [address] [place] [address]  Home Phone: [phone]  Mobile Phone: [phone]  Relation: Brother/Sister  Preferred language: English   needed? No  Secondary Emergency Contact: Schwab,Lisa Marie \"Supriya\"  Address: [address] [place] [address]  Work Phone: [phone]  Mobile Phone: [phone]  Relation: Brother/Sister  Preferred language: English   needed? No    Past Surgical History:  Past Surgical History:   Procedure Laterality Date    CARDIAC CATHETERIZATION      2 stents    CARDIAC SURGERY      minimally invasive    CLAVICLE SURGERY Left     COLONOSCOPY N/A 2024    COLONOSCOPY WITH BIOPSY performed by Arnold Ross MD at [facility] ENDOSCOPY    IR TUNNELED CATHETER PLACEMENT GREATER THAN 5 YEARS  2024    IR TUNNELED CATHETER PLACEMENT GREATER THAN 5 YEARS 2024 Stephania Valle MD [facility] SPECIAL PROCEDURES    LEG SURGERY Right 2024    RIGHT LEG AMPUTATION ABOVE KNEE performed by Jasmyne Henriqeuz MD at [facility] OR    LEG SURGERY Left 2024    LEFT LOWER EXTREMITY DEBRIDEMENT, CLOSURE OF MEDIAL FASCIOTOMY AND APPLICATION OF KERETIS TO LATERAL FASCIOTOMY. performed by Siobhan Khanna MD at [facility] OR    PTCA      VASCULAR SURGERY Bilateral 2024    BILATERAL embolectomy, with bilateral 2 compartment fasciotomy  11/28/24 0745   Number of days: 27        Elimination:  Continence:   Bowel: Yes  Bladder: No  Urinary Catheter: Last Change Date 12/5/24    Colostomy/Ileostomy/Ileal Conduit: No       Date of Last BM: ***DENISA    Intake/Output Summary (Last 24 hours) at 12/9/2024 1321  Last data filed at 12/9/2024 1156  Gross per 24 hour   Intake 10 ml   Output 1000 ml   Net -990 ml     I/O last 3 completed shifts:  In: -   Out: 700 [Urine:700]    Safety Concerns:     At Risk for Falls    Impairments/Disabilities:      Amputation - Rt AKA    Nutrition Therapy:  Current Nutrition Therapy:   - Oral Diet:  General    Routes of Feeding: Oral  Liquids: No Restrictions  Daily Fluid Restriction: no  Last Modified Barium Swallow with Video (Video Swallowing Test): not done    Treatments at the Time of Hospital Discharge:   Respiratory Treatments: ***  Oxygen Therapy:  is not on home oxygen therapy.  Ventilator:    - No ventilator support    Rehab Therapies: Physical Therapy  Weight Bearing Status/Restrictions: No weight bearing restrictions  Other Medical Equipment (for information only, NOT a DME order):  wheelchair  Other Treatments: ***    Patient's personal belongings (please select all that are sent with patient):  ***    RN SIGNATURE:  Electronically signed by Silvia Vlilar RN on 12/10/24 at 12:01 PM EST    CASE MANAGEMENT/SOCIAL WORK SECTION    Inpatient Status Date: ***    Readmission Risk Assessment Score:  Readmission Risk              Risk of Unplanned Readmission:  36           Discharging to Facility/ Agency   Name: [facility]  Address:  Phone:[phone]  Fax:[phone]    Dialysis Facility (if applicable)   Name:  Address:  Dialysis Schedule:  Phone:  Fax:    / signature: Electronically signed by JENN Jackson on 12/9/2024 at 1:22 PM    PHYSICIAN SECTION    Prognosis: {Prognosis:[id number]}    Condition at Discharge: { Patient Condition:[id number]}    Rehab Potential (if transferring

## 2024-12-09 NOTE — PROGRESS NOTES
[facility]  MD Bryce Milligan MD Ali Hassan, MD Lisa Kniska, CNP   Natalia Rey, CODY Harris, DEMETRIUS  Progress Note    12/8/2024    SUBJECTIVE:   12/6: Seen this morning on dialysis.  Tolerating therapy.  BP stable.  BFR as prescribed.  Has had intermittent periods of tachycardia.  Remains ventilator dependent, though weaning.  Sedation is also weaning.   Still on levo, though that is weaning as well.    12/8: Seen in his room on room air alert oriented watching TV tunneled line appears intact with no redness no erythema  Vascular note reviewed  Infectious disease note reviewed    PROBLEM LIST:    Principal Problem:    Acute hypercapnic respiratory failure  Active Problems:    Type 2 diabetes mellitus without complication (HCC)    Mixed hyperlipidemia    Acute metabolic encephalopathy    Shock    Atrial fibrillation (HCC)    Hemodialysis patient ([facility])    Anemia    Elevated troponin    Parotid mass    History of fasciotomy  Resolved Problems:    * No resolved hospital problems. *         DIET:    ADULT DIET; Regular     MEDS (scheduled):    metoprolol tartrate  25 mg Oral BID    apixaban  5 mg Oral BID    amiodarone  200 mg Orogastric Daily    aspirin  81 mg Orogastric Daily    docusate  100 mg Orogastric BID    tamsulosin  0.4 mg Oral Daily    atorvastatin  10 mg Orogastric Nightly    folic acid  1 mg Orogastric Daily    thiamine  100 mg Orogastric Daily    pantoprazole (PROTONIX) 40 mg in sodium chloride (PF) 0.9 % 10 mL injection  40 mg IntraVENous Q12H    insulin lispro  0-8 Units SubCUTAneous 4x Daily WC    chlorhexidine  15 mL Mouth/Throat BID    sodium chloride flush  5-40 mL IntraVENous 2 times per day       MEDS (infusions):   dextrose      sodium chloride         MEDS (prn):  oxyCODONE, HYDROmorphone, benzocaine-menthol, heparin (porcine), sulfur hexafluoride microspheres, dextrose bolus **OR** dextrose bolus, glucagon (rDNA), dextrose, fentaNYL, sodium    ALKPHOS 98  --   --        No results found for: \"LABPROT\"    Assessment  Acute kidney injury caused by bilateral renal infarcts and bilateral common femoral artery occlusions, hypotension//shock  Hypotension  Altered mental status  Acute respiratory failure  Anemia    Recommendations  Continue IHD thrice weekly for removal of solutes and volume.   Check iron studies in the morning  Start Retacrit 3000 subcutaneously Monday Wednesday Friday  Check phosphorus in the morning  Continue supportive care    Electronically signed by Cr Garcia MD on 12/8/2024 at 7:02 PM    NOTE: This report was transcribed using voice recognition software. Every effort was made to ensure accuracy; however, inadvertent transcription errors may be presen

## 2024-12-09 NOTE — PROGRESS NOTES
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    Acute lower limb ischemia I99.8    ANTIONE (acute kidney injury) ([facility]) N17.9    Non-traumatic rhabdomyolysis M62.82    Acute hypercapnic respiratory failure J96.02    Acute metabolic encephalopathy G93.41    Shock R57.9    Atrial fibrillation ([facility]) I48.91    Hemodialysis patient ([facility]) Z99.2    Anemia D64.9    Elevated troponin R79.89    Parotid mass K11.8    History of fasciotomy Z98.890       SUBJECTIVE:  Himanshu Bravo is now extubated and resting quietly in bed without any voiced complaints.  He does not appear to be in any respiratory distress.  His troponin remains greater than 5000 but his heart rate is in the 80s and blood pressure in the upper 90s.  He does not appear to be in any acute distress presently.    REVIEW OF SYSTEMS:  Unable to accurately obtain secondary to patient's depressed cognitive status presently.      SCHEDULED MEDICATIONS:   pantoprazole  40 mg Oral BID AC    epoetin noe-epbx  3,000 Units SubCUTAneous Once per day on Monday Wednesday Friday    metoprolol tartrate  25 mg Oral BID    apixaban  5 mg Oral BID    amiodarone  200 mg Orogastric Daily    aspirin  81 mg Orogastric Daily    docusate  100 mg Orogastric BID    tamsulosin  0.4 mg Oral Daily    atorvastatin  10 mg Orogastric Nightly    folic acid  1 mg Orogastric Daily    thiamine  100 mg Orogastric Daily    insulin lispro  0-8 Units SubCUTAneous 4x Daily WC    chlorhexidine  15 mL Mouth/Throat BID    sodium chloride flush  5-40 mL IntraVENous 2 times per day       VITAL SIGNS:                                                            30.3 12/06/2024 04:12 AM     INR:   No results for input(s): \"INR\" in the last 72 hours.    PRO-BNP:   Lab Results   Component Value Date    PROBNP 3,377 (H) 12/05/2024    PROBNP 3,360 (H) 12/05/2024      TSH:   Lab Results   Component Value Date    TSH 0.541 04/08/2017      Cardiac Injury Profile:   Recent Labs     12/07/24  0439 12/08/24  0355 12/09/24  0540   TROPHS 5,231* 3,626* 2,782*      Lipid Profile:   Lab Results   Component Value Date/Time    TRIG 199 11/19/2024 02:57 AM    HDL 34 11/19/2024 02:57 AM     11/19/2024 02:57 AM     04/08/2017 01:00 PM    CHOL 179 11/19/2024 02:57 AM      Hemoglobin A1C: No components found for: \"HGBA1C\"      RAD:   XR CHEST PORTABLE    Result Date: 12/7/2024  1. Interval removal of the endotracheal tube and nasogastric tube. 2. Cardiomegaly with chronic interstitial changes seen throughout the lung fields bilaterally.  No new focal parenchymal opacification present.     XR CHEST PORTABLE    Result Date: 12/6/2024  1. The position and alignment of the tubes and catheters are within the normal range. 2. No signs of an acute cardiopulmonary process.         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

## 2024-12-09 NOTE — CARE COORDINATION
Social Work:    Chart reviewed. Himanshu readmitted  from [facility] due to respiratory distress, NSTEMI, AMS, hyperglycemia, anemia.   Himanshu was intubated upon initial admission to [facility] ICU where he transferred from. Himanshu was recently hospitalized at [facility] where he was treated for bilateral femoral endarterectomies & fasciotomies, right AKA, rhabdo,left medial calf closure, LLE wound debridement, & started on hemodialysis. Social service met with Himanshu, advised him about social service role and discussed discharge planning. Himanshu resides alone in his home. He advises that he was working 60-70 hrs a week as a  at [employer] prior to hospitalization in November. Himanshu expects to return to [facility] for continued care prior to return home. Social work spoke with Dr. Maharaj who requested [facility] start the authorization for readmission. Social work spoke with dylan Monte at [facility] and  he is checking bedhold and return status. Await call back.    Electronically signed by JENN Jackson on 12/9/2024 at 1:11 PM

## 2024-12-09 NOTE — PLAN OF CARE
Problem: Safety - Adult  Goal: Free from fall injury  Outcome: Progressing     Problem: Chronic Conditions and Co-morbidities  Goal: Patient's chronic conditions and co-morbidity symptoms are monitored and maintained or improved  Outcome: Progressing     Problem: Discharge Planning  Goal: Discharge to home or other facility with appropriate resources  Outcome: Progressing     Problem: Pain  Goal: Verbalizes/displays adequate comfort level or baseline comfort level  Outcome: Progressing     Problem: Safety - Medical Restraint  Goal: Remains free of injury from restraints (Restraint for Interference with Medical Device)  Description: INTERVENTIONS:  1. Determine that other, less restrictive measures have been tried or would not be effective before applying the restraint  2. Evaluate the patient's condition at the time of restraint application  3. Inform patient/family regarding the reason for restraint  4. Q2H: Monitor safety, psychosocial status, comfort, nutrition and hydration  Outcome: Progressing     Problem: Skin/Tissue Integrity  Goal: Absence of new skin breakdown  Description: 1.  Monitor for areas of redness and/or skin breakdown  2.  Assess vascular access sites hourly  3.  Every 4-6 hours minimum:  Change oxygen saturation probe site  4.  Every 4-6 hours:  If on nasal continuous positive airway pressure, respiratory therapy assess nares and determine need for appliance change or resting period.  Outcome: Progressing     Problem: ABCDS Injury Assessment  Goal: Absence of physical injury  Outcome: Progressing     Problem: Nutrition Deficit:  Goal: Optimize nutritional status  Outcome: Progressing

## 2024-12-09 NOTE — PROGRESS NOTES
[facility] Hospitalist Progress Note    Admitting Date and Time: 12/5/2024  7:09 AM  Admit Dx: Shock [R57.9]  Hyperglycemia [R73.9]  NSTEMI (non-ST elevated myocardial infarction) (HCC) [I21.4]  Acute respiratory failure with hypoxia and hypercapnia [J96.01, J96.02]  Altered mental status, unspecified altered mental status type [R41.82]  Acute hypercapnic respiratory failure [J96.02]  Anemia, unspecified type [D64.9]    Subjective:  Patient is being followed for Shock [R57.9]  Hyperglycemia [R73.9]  NSTEMI (non-ST elevated myocardial infarction) (HCC) [I21.4]  Acute respiratory failure with hypoxia and hypercapnia [J96.01, J96.02]  Altered mental status, unspecified altered mental status type [R41.82]  Acute hypercapnic respiratory failure [J96.02]  Anemia, unspecified type [D64.9]   Pt feels okay  Per RN: no additional concerns    ROS: denies fever, chills, cp, sob, n/v, HA unless otherwise noted above     pantoprazole  40 mg Oral BID AC    ferric gluconate (FERRLECIT) 125 mg in sodium chloride 0.9 % 100 mL IVPB  125 mg IntraVENous Daily    epoetin noe-epbx  3,000 Units SubCUTAneous Once per day on Monday Wednesday Friday    metoprolol tartrate  25 mg Oral BID    apixaban  5 mg Oral BID    amiodarone  200 mg Orogastric Daily    aspirin  81 mg Orogastric Daily    docusate  100 mg Orogastric BID    tamsulosin  0.4 mg Oral Daily    atorvastatin  10 mg Orogastric Nightly    folic acid  1 mg Orogastric Daily    thiamine  100 mg Orogastric Daily    insulin lispro  0-8 Units SubCUTAneous 4x Daily WC    chlorhexidine  15 mL Mouth/Throat BID    sodium chloride flush  5-40 mL IntraVENous 2 times per day     oxyCODONE, 5 mg, Q4H PRN  benzocaine-menthol, 1 lozenge, Q2H PRN  heparin (porcine), 3,500 Units, PRN  sulfur hexafluoride microspheres, 2 mL, ONCE PRN  dextrose bolus, 125 mL, PRN   Or  dextrose bolus, 250 mL, PRN  glucagon (rDNA), 1 mg, PRN  dextrose, , Continuous PRN  fentaNYL, 50 mcg, Q30 Min PRN  sodium

## 2024-12-09 NOTE — PROGRESS NOTES
Physical Therapy  Facility/Department: [address] INTERNAL MEDICINE 2      Name: Himanshu Bravo  : 1960  MRN: [id number]  Date of Service: 2024    Attempted to see pt for PT evaluation, pt off unit for dialysis.   Zhane Navarro PT [id number]

## 2024-12-09 NOTE — FLOWSHEET NOTE
Pt completed 4 hrs of HD on a 2K bath with 1.8L of UF removed safely. Post report to Silvia YOUNG   12/09/24 1630   Vital Signs   /86   Temp 98.7 °F (37.1 °C)   Pulse 91   Respirations 18   Weight - Scale 94.6 kg (208 lb 8.9 oz)   Weight Method Bed scale   Percent Weight Change 1.07   Pain Assessment   Pain Assessment None - Denies Pain   Pain Level 0   Post-Hemodialysis Assessment   Machine Disinfection Process Acid/Vinegar Clean;Heat Disinfect;Exterior Machine Disinfection   Rinseback Volume (ml) 300 ml   Blood Volume Processed (Liters) 67.5 L   Dialyzer Clearance Moderately streaked   Duration of Treatment (minutes) 240 minutes   Hemodialysis Intake (ml) 300 ml   Hemodialysis Output (ml) 2100 ml   NET Removed (ml) 1800   Tolerated Treatment Good   Patient Response to Treatment tolerated well   Bilateral Breath Sounds Diminished   Edema None   Time Off 1626   Patient Disposition Return to room   Observations & Evaluations   Level of Consciousness 0   Oriented X 3   Heart Rhythm Regular   Respiratory Quality/Effort Unlabored   O2 Device None (Room air)   Skin Color Pink   Skin Condition/Temp Dry;Warm   Abdomen Inspection Soft   Bowel Sounds (All Quadrants) Active

## 2024-12-09 NOTE — PROGRESS NOTES
IV to PO Conversion Policy     Notification of IV to PO conversion:    This patient's order for pantoprazole IV has been changed to pantoprazole PO as approved by the [facility] INTRAVENOUS TO ORAL Policy.    If the patient should become strict NPO while on this therapy, contact the prescriber for further orders.    tracy jackson RPH  12/9/2024  6:31 AM

## 2024-12-09 NOTE — PROGRESS NOTES
[facility]  [facility]  Progress Note    SUBJECTIVE:  Chief Complaint   Patient presents with    Altered Mental Status     Unresponsive, agonal breathing and elevated trops per [facility]     No new problems reported by nursing.    Review of systems:  As stated above in the chief complaint, otherwise negative.    Medications:  Scheduled Meds:   pantoprazole  40 mg Oral BID AC    epoetin noe-epbx  3,000 Units SubCUTAneous Once per day on     metoprolol tartrate  25 mg Oral BID    apixaban  5 mg Oral BID    amiodarone  200 mg Orogastric Daily    aspirin  81 mg Orogastric Daily    docusate  100 mg Orogastric BID    tamsulosin  0.4 mg Oral Daily    atorvastatin  10 mg Orogastric Nightly    folic acid  1 mg Orogastric Daily    thiamine  100 mg Orogastric Daily    insulin lispro  0-8 Units SubCUTAneous 4x Daily WC    chlorhexidine  15 mL Mouth/Throat BID    sodium chloride flush  5-40 mL IntraVENous 2 times per day     Continuous Infusions:   dextrose      sodium chloride       PRN Meds:oxyCODONE, benzocaine-menthol, heparin (porcine), sulfur hexafluoride microspheres, dextrose bolus **OR** dextrose bolus, glucagon (rDNA), dextrose, fentaNYL, sodium chloride flush, sodium chloride, polyethylene glycol, acetaminophen **OR** acetaminophen, prochlorperazine **OR** prochlorperazine    OBJECTIVE:  /69   Pulse (!) 104   Temp 97.4 °F (36.3 °C)   Resp 18   Ht 1.702 m (5' 7\")   Wt 93.6 kg (206 lb 5.6 oz)   SpO2 96%   BMI 32.32 kg/m²   Temp  Av.1 °F (36.7 °C)  Min: 97.4 °F (36.3 °C)  Max: 98.7 °F (37.1 °C)  Patient went to dialysis    Laboratory and Tests:  Lab Results   Component Value Date    .0 (H) 2024     Lab Results   Component Value Date    SEDRATE 110 (H) 2024       Radiology:      Microbiology:   Rapid SARS-CoV-2 2024: Negative  Blood cultures 2024: Negative x 2  Respiratory panel 2024: Negative  Blood cultures

## 2024-12-10 ENCOUNTER — HOSPITAL ENCOUNTER (OUTPATIENT)
Dept: INPATIENT UNIT | Age: 64
Discharge: NURSING, REHAB OR LONG-TERM CARE | End: 2024-12-24
Attending: INTERNAL MEDICINE | Admitting: INTERNAL MEDICINE
Payer: COMMERCIAL

## 2024-12-10 VITALS
TEMPERATURE: 98.1 F | BODY MASS INDEX: 32.73 KG/M2 | SYSTOLIC BLOOD PRESSURE: 134 MMHG | OXYGEN SATURATION: 98 % | HEIGHT: 67 IN | RESPIRATION RATE: 18 BRPM | DIASTOLIC BLOOD PRESSURE: 78 MMHG | HEART RATE: 83 BPM | WEIGHT: 208.56 LBS

## 2024-12-10 LAB
ANION GAP SERPL CALCULATED.3IONS-SCNC: 9 MMOL/L (ref 7–16)
BASOPHILS # BLD: 0.04 K/UL (ref 0–0.2)
BASOPHILS NFR BLD: 0 % (ref 0–2)
BUN SERPL-MCNC: 19 MG/DL (ref 6–23)
CALCIUM SERPL-MCNC: 8.8 MG/DL (ref 8.6–10.2)
CHLORIDE SERPL-SCNC: 103 MMOL/L (ref 98–107)
CO2 SERPL-SCNC: 27 MMOL/L (ref 22–29)
CREAT SERPL-MCNC: 3.9 MG/DL (ref 0.7–1.2)
EOSINOPHIL # BLD: 0.37 K/UL (ref 0.05–0.5)
EOSINOPHILS RELATIVE PERCENT: 4 % (ref 0–6)
ERYTHROCYTE [DISTWIDTH] IN BLOOD BY AUTOMATED COUNT: 14.6 % (ref 11.5–15)
GFR, ESTIMATED: 16 ML/MIN/1.73M2
GLUCOSE BLD-MCNC: 242 MG/DL (ref 74–99)
GLUCOSE BLD-MCNC: 272 MG/DL (ref 74–99)
GLUCOSE SERPL-MCNC: 199 MG/DL (ref 74–99)
HCT VFR BLD AUTO: 27.2 % (ref 37–54)
HGB BLD-MCNC: 8.2 G/DL (ref 12.5–16.5)
IMM GRANULOCYTES # BLD AUTO: 0.03 K/UL (ref 0–0.58)
IMM GRANULOCYTES NFR BLD: 0 % (ref 0–5)
LYMPHOCYTES NFR BLD: 1.41 K/UL (ref 1.5–4)
LYMPHOCYTES RELATIVE PERCENT: 16 % (ref 20–42)
MCH RBC QN AUTO: 29.2 PG (ref 26–35)
MCHC RBC AUTO-ENTMCNC: 30.1 G/DL (ref 32–34.5)
MCV RBC AUTO: 96.8 FL (ref 80–99.9)
MICROORGANISM SPEC CULT: NORMAL
MICROORGANISM SPEC CULT: NORMAL
MONOCYTES NFR BLD: 0.74 K/UL (ref 0.1–0.95)
MONOCYTES NFR BLD: 8 % (ref 2–12)
NEUTROPHILS NFR BLD: 71 % (ref 43–80)
NEUTS SEG NFR BLD: 6.47 K/UL (ref 1.8–7.3)
NON-GYN CYTOLOGY REPORT: NORMAL
PHOSPHATE SERPL-MCNC: 2.9 MG/DL (ref 2.5–4.5)
PLATELET # BLD AUTO: 390 K/UL (ref 130–450)
PMV BLD AUTO: 9.2 FL (ref 7–12)
POTASSIUM SERPL-SCNC: 3.7 MMOL/L (ref 3.5–5)
RBC # BLD AUTO: 2.81 M/UL (ref 3.8–5.8)
SERVICE CMNT-IMP: NORMAL
SERVICE CMNT-IMP: NORMAL
SODIUM SERPL-SCNC: 139 MMOL/L (ref 132–146)
SPECIMEN DESCRIPTION: NORMAL
SPECIMEN DESCRIPTION: NORMAL
TROPONIN I SERPL HS-MCNC: 2941 NG/L (ref 0–11)
WBC OTHER # BLD: 9.1 K/UL (ref 4.5–11.5)

## 2024-12-10 PROCEDURE — 85025 COMPLETE CBC W/AUTO DIFF WBC: CPT

## 2024-12-10 PROCEDURE — 6370000000 HC RX 637 (ALT 250 FOR IP): Performed by: STUDENT IN AN ORGANIZED HEALTH CARE EDUCATION/TRAINING PROGRAM

## 2024-12-10 PROCEDURE — 6360000002 HC RX W HCPCS

## 2024-12-10 PROCEDURE — 84484 ASSAY OF TROPONIN QUANT: CPT

## 2024-12-10 PROCEDURE — 2580000003 HC RX 258

## 2024-12-10 PROCEDURE — 6370000000 HC RX 637 (ALT 250 FOR IP): Performed by: INTERNAL MEDICINE

## 2024-12-10 PROCEDURE — 99239 HOSP IP/OBS DSCHRG MGMT >30: CPT | Performed by: STUDENT IN AN ORGANIZED HEALTH CARE EDUCATION/TRAINING PROGRAM

## 2024-12-10 PROCEDURE — 6370000000 HC RX 637 (ALT 250 FOR IP)

## 2024-12-10 PROCEDURE — 80048 BASIC METABOLIC PNL TOTAL CA: CPT

## 2024-12-10 PROCEDURE — 82947 ASSAY GLUCOSE BLOOD QUANT: CPT

## 2024-12-10 PROCEDURE — 84100 ASSAY OF PHOSPHORUS: CPT

## 2024-12-10 PROCEDURE — 36415 COLL VENOUS BLD VENIPUNCTURE: CPT

## 2024-12-10 RX ORDER — METOPROLOL TARTRATE 50 MG
25 TABLET ORAL 2 TIMES DAILY
Qty: 60 TABLET | Refills: 3 | Status: ON HOLD | OUTPATIENT
Start: 2024-12-10

## 2024-12-10 RX ORDER — PANTOPRAZOLE SODIUM 40 MG/1
40 TABLET, DELAYED RELEASE ORAL
Qty: 30 TABLET | Refills: 3 | Status: ON HOLD | OUTPATIENT
Start: 2024-12-10

## 2024-12-10 RX ADMIN — DOCUSATE SODIUM LIQUID 100 MG: 100 LIQUID ORAL at 09:16

## 2024-12-10 RX ADMIN — Medication 100 MG: at 09:16

## 2024-12-10 RX ADMIN — INSULIN LISPRO 2 UNITS: 100 INJECTION, SOLUTION INTRAVENOUS; SUBCUTANEOUS at 11:50

## 2024-12-10 RX ADMIN — OXYCODONE 5 MG: 5 TABLET ORAL at 09:30

## 2024-12-10 RX ADMIN — SODIUM CHLORIDE 125 MG: 900 INJECTION INTRAVENOUS at 09:28

## 2024-12-10 RX ADMIN — OXYCODONE 5 MG: 5 TABLET ORAL at 05:31

## 2024-12-10 RX ADMIN — TAMSULOSIN HYDROCHLORIDE 0.4 MG: 0.4 CAPSULE ORAL at 09:16

## 2024-12-10 RX ADMIN — APIXABAN 5 MG: 5 TABLET, FILM COATED ORAL at 09:16

## 2024-12-10 RX ADMIN — METOPROLOL TARTRATE 25 MG: 25 TABLET, FILM COATED ORAL at 09:16

## 2024-12-10 RX ADMIN — ASPIRIN 81 MG CHEWABLE TABLET 81 MG: 81 TABLET CHEWABLE at 09:16

## 2024-12-10 RX ADMIN — PANTOPRAZOLE SODIUM 40 MG: 40 TABLET, DELAYED RELEASE ORAL at 05:23

## 2024-12-10 RX ADMIN — OXYCODONE 5 MG: 5 TABLET ORAL at 13:13

## 2024-12-10 RX ADMIN — FOLIC ACID 1 MG: 1 TABLET ORAL at 09:16

## 2024-12-10 RX ADMIN — SODIUM CHLORIDE, PRESERVATIVE FREE 10 ML: 5 INJECTION INTRAVENOUS at 09:16

## 2024-12-10 RX ADMIN — AMIODARONE HYDROCHLORIDE 200 MG: 200 TABLET ORAL at 09:16

## 2024-12-10 RX ADMIN — INSULIN LISPRO 4 UNITS: 100 INJECTION, SOLUTION INTRAVENOUS; SUBCUTANEOUS at 07:13

## 2024-12-10 ASSESSMENT — PAIN DESCRIPTION - DESCRIPTORS
DESCRIPTORS: ACHING
DESCRIPTORS: ACHING;CRAMPING;DISCOMFORT
DESCRIPTORS: ACHING;CRAMPING;DISCOMFORT

## 2024-12-10 ASSESSMENT — PAIN DESCRIPTION - ORIENTATION
ORIENTATION: LEFT
ORIENTATION: RIGHT;LEFT
ORIENTATION: RIGHT;LEFT

## 2024-12-10 ASSESSMENT — PAIN SCALES - GENERAL
PAINLEVEL_OUTOF10: 0
PAINLEVEL_OUTOF10: 8
PAINLEVEL_OUTOF10: 10
PAINLEVEL_OUTOF10: 7

## 2024-12-10 ASSESSMENT — PAIN DESCRIPTION - LOCATION
LOCATION: LEG

## 2024-12-10 NOTE — PLAN OF CARE
Problem: Safety - Adult  Goal: Free from fall injury  12/10/2024 0025 by Adry Ha RN  Outcome: Progressing  12/9/2024 1627 by Arnold Morrell RN  Outcome: Progressing     Problem: Chronic Conditions and Co-morbidities  Goal: Patient's chronic conditions and co-morbidity symptoms are monitored and maintained or improved  12/10/2024 0025 by Adry Ha RN  Outcome: Progressing  12/9/2024 1627 by Arnold Morrell RN  Outcome: Progressing     Problem: Discharge Planning  Goal: Discharge to home or other facility with appropriate resources  12/10/2024 0025 by Adry Ha RN  Outcome: Progressing  12/9/2024 1627 by Arnold Morrell RN  Outcome: Progressing     Problem: Pain  Goal: Verbalizes/displays adequate comfort level or baseline comfort level  12/10/2024 0025 by Adry Ha RN  Outcome: Progressing  12/9/2024 1627 by Arnold Morrell RN  Outcome: Progressing     Problem: Safety - Medical Restraint  Goal: Remains free of injury from restraints (Restraint for Interference with Medical Device)  Description: INTERVENTIONS:  1. Determine that other, less restrictive measures have been tried or would not be effective before applying the restraint  2. Evaluate the patient's condition at the time of restraint application  3. Inform patient/family regarding the reason for restraint  4. Q2H: Monitor safety, psychosocial status, comfort, nutrition and hydration  12/10/2024 0025 by Adry Ha RN  Outcome: Progressing  12/9/2024 1627 by Arnold Morrell RN  Outcome: Progressing     Problem: Skin/Tissue Integrity  Goal: Absence of new skin breakdown  Description: 1.  Monitor for areas of redness and/or skin breakdown  2.  Assess vascular access sites hourly  3.  Every 4-6 hours minimum:  Change oxygen saturation probe site  4.  Every 4-6 hours:  If on nasal continuous positive airway pressure, respiratory therapy assess nares and determine need for appliance change or resting

## 2024-12-10 NOTE — PLAN OF CARE
Problem: Safety - Adult  Goal: Free from fall injury  12/10/2024 1112 by iSlvia Villar RN  Outcome: Progressing  12/10/2024 0025 by Adry Ha RN  Outcome: Progressing     Problem: Chronic Conditions and Co-morbidities  Goal: Patient's chronic conditions and co-morbidity symptoms are monitored and maintained or improved  12/10/2024 1112 by Silvia Villar RN  Outcome: Progressing  12/10/2024 0025 by Adry Ha RN  Outcome: Progressing     Problem: Discharge Planning  Goal: Discharge to home or other facility with appropriate resources  12/10/2024 0025 by Adry Ha RN  Outcome: Progressing     Problem: Pain  Goal: Verbalizes/displays adequate comfort level or baseline comfort level  12/10/2024 0025 by Adry Ha RN  Outcome: Progressing     Problem: Safety - Medical Restraint  Goal: Remains free of injury from restraints (Restraint for Interference with Medical Device)  Description: INTERVENTIONS:  1. Determine that other, less restrictive measures have been tried or would not be effective before applying the restraint  2. Evaluate the patient's condition at the time of restraint application  3. Inform patient/family regarding the reason for restraint  4. Q2H: Monitor safety, psychosocial status, comfort, nutrition and hydration  12/10/2024 0025 by Adry Ha RN  Outcome: Progressing     Problem: Skin/Tissue Integrity  Goal: Absence of new skin breakdown  Description: 1.  Monitor for areas of redness and/or skin breakdown  2.  Assess vascular access sites hourly  3.  Every 4-6 hours minimum:  Change oxygen saturation probe site  4.  Every 4-6 hours:  If on nasal continuous positive airway pressure, respiratory therapy assess nares and determine need for appliance change or resting period.  12/10/2024 0025 by Adry Ha RN  Outcome: Progressing     Problem: ABCDS Injury Assessment  Goal: Absence of physical injury  12/10/2024 0025  by Adry Ha, RN  Outcome: Progressing     Problem: Nutrition Deficit:  Goal: Optimize nutritional status  12/10/2024 0025 by Adry Ha, RN  Outcome: Progressing

## 2024-12-10 NOTE — DISCHARGE SUMMARY
[facility] Hospitalist Physician Discharge Summary       [facility]  [address] [place] [address]  [phone]          Activity level: as tolerated    Dispo:  [facility]AC       Condition on discharge: stable    Patient ID:  Himanshu Bravo  [id number]  64 y.o.  1960    Admit date: 12/5/2024    Discharge date and time:  12/10/2024  4:36 PM    Admission Diagnoses: Principal Problem:    Acute hypercapnic respiratory failure  Active Problems:    Type 2 diabetes mellitus without complication (HCC)    Mixed hyperlipidemia    Acute metabolic encephalopathy    Shock    Atrial fibrillation (HCC)    Hemodialysis patient (HCC)    Anemia    Elevated troponin    Parotid mass    History of fasciotomy  Resolved Problems:    * No resolved hospital problems. *      Discharge Diagnoses: Principal Problem:    Acute hypercapnic respiratory failure  Active Problems:    Type 2 diabetes mellitus without complication (HCC)    Mixed hyperlipidemia    Acute metabolic encephalopathy    Shock    Atrial fibrillation (HCC)    Hemodialysis patient (HCC)    Anemia    Elevated troponin    Parotid mass    History of fasciotomy  Resolved Problems:    * No resolved hospital problems. *      Consults:  IP CONSULT TO CARDIOLOGY  IP CONSULT TO CRITICAL CARE  IP CONSULT TO INTERNAL MEDICINE  IP CONSULT TO NEPHROLOGY  IP CONSULT TO OTOLARYNGOLOGY  IP CONSULT TO INFECTIOUS DISEASES  IP CONSULT TO VASCULAR SURGERY  IP CONSULT TO NEPHROLOGY  IP CONSULT TO DIETITIAN  IP CONSULT TO CASE MANAGEMENT    Procedures:   FNA     Hospital Course:   Patient Himanshu Bravo is a 64 y.o. presented with Shock [R57.9]  Hyperglycemia [R73.9]  NSTEMI (non-ST elevated myocardial infarction) (HCC) [I21.4]  Acute respiratory failure with hypoxia and hypercapnia [J96.01, J96.02]  Altered mental status, unspecified altered mental status type [R41.82]  Acute hypercapnic respiratory failure [J96.02]  Anemia, unspecified type  transition hep gtt to Eliquis. RLE sutures for removal on 12/14  ANTIONE 2/2 above (renal infarcts, shock), Nephro following, on HD MWF. Cr 3.9, K 3.7, cont to monitor  Asymptomatic bacteriuria. Monitor off antibiotics per ID. Follow final Cxs. ID s/o  Parotid gland mass- s/p FNA per ENT, await final bx results  _________________________  **Most recent hospitalist plan**    Discharge Exam:  General Appearance: alert and oriented to person, place and time and in NAD, sitting in bed  Skin: warm and dry, TDC in right chest  Head: normocephalic and atraumatic  Eyes: PERRL, EOMI, conjunctivae normal  Neck: neck supple, trachea midline   Pulmonary/Chest: CTAB, no w/r/r, normal air movement, no respiratory distress, RA  Cardiovascular: RRR, no murmurs  Abdomen: soft, non-tender, non-distended, vargas in place with clear yellow urine in tubing  Extremities: no cyanosis, no clubbing and no edema  Neurologic: no cranial nerve deficit and speech normal    I/O last 3 completed shifts:  In: 428.8 [I.V.:10; IV Piggyback:118.8]  Out: 3600 [Urine:1500]  No intake/output data recorded.      LABS:  Recent Labs     12/08/24  0355 12/09/24  0540 12/10/24  0631    137 139   K 4.1 4.6 3.7    100 103   CO2 24 24 27   BUN 40* 43* 19   CREATININE 6.1* 6.5* 3.9*   GLUCOSE 245* 209* 199*   CALCIUM 8.4* 9.0 8.8       Recent Labs     12/08/24  0355 12/10/24  0631   WBC 10.8 9.1   RBC 2.66* 2.81*   HGB 7.8* 8.2*   HCT 25.8* 27.2*   MCV 97.0 96.8   MCH 29.3 29.2   MCHC 30.2* 30.1*   RDW 14.8 14.6    390   MPV 9.3 9.2       Recent Labs     12/09/24  1647 12/09/24  2110 12/10/24  0521 12/10/24  1117   POCGLU 134* 345* 272* 242*       Imaging:  XR CHEST PORTABLE    Result Date: 12/6/2024  EXAMINATION: ONE XRAY VIEW OF THE CHEST 12/6/2024 6:58 am COMPARISON: 12/05/2024 HISTORY: ORDERING SYSTEM PROVIDED HISTORY: intubated TECHNOLOGIST PROVIDED HISTORY: Reason for exam:->intubated FINDINGS: The tip of the endotracheal tube is 3.2 cm above

## 2024-12-10 NOTE — PROGRESS NOTES
Report called to [facility]. All questions answered. Pt to leave with vargas and peripheral IV.

## 2024-12-10 NOTE — PROGRESS NOTES
[facility]  MD Bryce Milligan MD Ali Hassan, MD Lisa Kniska, CNP   Natalia Rye, CODY Harris, DEMETRIUS  Progress Note    12/10/2024    SUBJECTIVE:   12/6: Seen this morning on dialysis.  Tolerating therapy.  BP stable.  BFR as prescribed.  Has had intermittent periods of tachycardia.  Remains ventilator dependent, though weaning.  Sedation is also weaning.   Still on levo, though that is weaning as well.    12/8: Seen in his room on room air alert oriented watching TV tunneled line appears intact with no redness no erythema  Vascular note reviewed  Infectious disease note reviewed    12/9: Seen while sitting up in bed, no acute distress. Vital signs are stable. He denies dyspnea, chest pain, or palpitations. Appetite has improved. Stable urine output. For dialysis this afternoon.     12/10: Seen while sitting up in bed, in no acute distress.  He denies any shortness of breath, chest pain, or palpitations.  He tolerated hemodialysis yesterday without issues.  1.8 L of fluid were removed.  Urine output continues to .    PROBLEM LIST:    Principal Problem:    Acute hypercapnic respiratory failure  Active Problems:    Type 2 diabetes mellitus without complication (HCC)    Mixed hyperlipidemia    Acute metabolic encephalopathy    Shock    Atrial fibrillation (HCC)    Hemodialysis patient (HCC)    Anemia    Elevated troponin    Parotid mass    History of fasciotomy  Resolved Problems:    * No resolved hospital problems. *         DIET:    ADULT DIET; Regular     MEDS (scheduled):    pantoprazole  40 mg Oral BID AC    ferric gluconate (FERRLECIT) 125 mg in sodium chloride 0.9 % 100 mL IVPB  125 mg IntraVENous Daily    epoetin noe-epbx  3,000 Units SubCUTAneous Once per day on Monday Wednesday Friday    metoprolol tartrate  25 mg Oral BID    apixaban  5 mg Oral BID    amiodarone  200 mg Orogastric Daily    aspirin  81 mg Orogastric Daily    docusate  100 mg

## 2024-12-10 NOTE — CARE COORDINATION
Social Work:    [facility]hu's return. Social service arranged Physician ambulance to transport Himanshu at 12:00 p.m.  Social service updated Himanshu and his brother Christian.  Mireya is aware of transport time.    Electronically signed by JENN Jackson on 12/10/2024 at 10:55 AM

## 2024-12-11 LAB
ANION GAP SERPL CALCULATED.3IONS-SCNC: 11 MMOL/L (ref 7–16)
BUN SERPL-MCNC: 25 MG/DL (ref 6–23)
CALCIUM SERPL-MCNC: 9.2 MG/DL (ref 8.6–10.2)
CHLORIDE SERPL-SCNC: 100 MMOL/L (ref 98–107)
CO2 SERPL-SCNC: 26 MMOL/L (ref 22–29)
CREAT SERPL-MCNC: 4.3 MG/DL (ref 0.7–1.2)
ERYTHROCYTE [DISTWIDTH] IN BLOOD BY AUTOMATED COUNT: 14.4 % (ref 11.5–15)
GFR, ESTIMATED: 15 ML/MIN/1.73M2
GLUCOSE SERPL-MCNC: 200 MG/DL (ref 74–99)
HCT VFR BLD AUTO: 28.9 % (ref 37–54)
HGB BLD-MCNC: 8.7 G/DL (ref 12.5–16.5)
MCH RBC QN AUTO: 29.3 PG (ref 26–35)
MCHC RBC AUTO-ENTMCNC: 30.1 G/DL (ref 32–34.5)
MCV RBC AUTO: 97.3 FL (ref 80–99.9)
PLATELET # BLD AUTO: 400 K/UL (ref 130–450)
PMV BLD AUTO: 9.7 FL (ref 7–12)
POTASSIUM SERPL-SCNC: 4.2 MMOL/L (ref 3.5–5)
RBC # BLD AUTO: 2.97 M/UL (ref 3.8–5.8)
SODIUM SERPL-SCNC: 137 MMOL/L (ref 132–146)
WBC OTHER # BLD: 8.7 K/UL (ref 4.5–11.5)

## 2024-12-11 PROCEDURE — 85027 COMPLETE CBC AUTOMATED: CPT

## 2024-12-11 PROCEDURE — 86317 IMMUNOASSAY INFECTIOUS AGENT: CPT

## 2024-12-11 PROCEDURE — 80048 BASIC METABOLIC PNL TOTAL CA: CPT

## 2024-12-11 PROCEDURE — 87340 HEPATITIS B SURFACE AG IA: CPT

## 2024-12-12 LAB
ANION GAP SERPL CALCULATED.3IONS-SCNC: 13 MMOL/L (ref 7–16)
BUN SERPL-MCNC: 18 MG/DL (ref 6–23)
CALCIUM SERPL-MCNC: 9.1 MG/DL (ref 8.6–10.2)
CHLORIDE SERPL-SCNC: 93 MMOL/L (ref 98–107)
CO2 SERPL-SCNC: 26 MMOL/L (ref 22–29)
CREAT SERPL-MCNC: 2.8 MG/DL (ref 0.7–1.2)
ERYTHROCYTE [DISTWIDTH] IN BLOOD BY AUTOMATED COUNT: 14 % (ref 11.5–15)
FERRITIN SERPL-MCNC: 686 NG/ML
GFR, ESTIMATED: 25 ML/MIN/1.73M2
GLUCOSE SERPL-MCNC: 236 MG/DL (ref 74–99)
HBV SURFACE AB SERPL IA-ACNC: <3.1 MIU/ML (ref 0–9.99)
HBV SURFACE AG SERPL QL IA: NONREACTIVE
HCT VFR BLD AUTO: 27.9 % (ref 37–54)
HGB BLD-MCNC: 8.7 G/DL (ref 12.5–16.5)
IRON SATN MFR SERPL: 24 % (ref 20–55)
IRON SERPL-MCNC: 42 UG/DL (ref 59–158)
MAGNESIUM SERPL-MCNC: 1.6 MG/DL (ref 1.6–2.6)
MCH RBC QN AUTO: 29.8 PG (ref 26–35)
MCHC RBC AUTO-ENTMCNC: 31.2 G/DL (ref 32–34.5)
MCV RBC AUTO: 95.5 FL (ref 80–99.9)
PHOSPHATE SERPL-MCNC: 2.7 MG/DL (ref 2.5–4.5)
PLATELET # BLD AUTO: 362 K/UL (ref 130–450)
PMV BLD AUTO: 9.8 FL (ref 7–12)
POTASSIUM SERPL-SCNC: 4.1 MMOL/L (ref 3.5–5)
RBC # BLD AUTO: 2.92 M/UL (ref 3.8–5.8)
SODIUM SERPL-SCNC: 132 MMOL/L (ref 132–146)
TIBC SERPL-MCNC: 178 UG/DL (ref 250–450)
WBC OTHER # BLD: 8.3 K/UL (ref 4.5–11.5)

## 2024-12-12 PROCEDURE — 83540 ASSAY OF IRON: CPT

## 2024-12-12 PROCEDURE — 84100 ASSAY OF PHOSPHORUS: CPT

## 2024-12-12 PROCEDURE — 85027 COMPLETE CBC AUTOMATED: CPT

## 2024-12-12 PROCEDURE — 80048 BASIC METABOLIC PNL TOTAL CA: CPT

## 2024-12-12 PROCEDURE — 83735 ASSAY OF MAGNESIUM: CPT

## 2024-12-12 PROCEDURE — 82728 ASSAY OF FERRITIN: CPT

## 2024-12-12 PROCEDURE — 83550 IRON BINDING TEST: CPT

## 2024-12-13 LAB
ANION GAP SERPL CALCULATED.3IONS-SCNC: 13 MMOL/L (ref 7–16)
BUN SERPL-MCNC: 37 MG/DL (ref 6–23)
CALCIUM SERPL-MCNC: 9.5 MG/DL (ref 8.6–10.2)
CHLORIDE SERPL-SCNC: 99 MMOL/L (ref 98–107)
CO2 SERPL-SCNC: 27 MMOL/L (ref 22–29)
CREAT SERPL-MCNC: 3.6 MG/DL (ref 0.7–1.2)
ERYTHROCYTE [DISTWIDTH] IN BLOOD BY AUTOMATED COUNT: 14 % (ref 11.5–15)
GFR, ESTIMATED: 18 ML/MIN/1.73M2
GLUCOSE SERPL-MCNC: 255 MG/DL (ref 74–99)
HCT VFR BLD AUTO: 29.2 % (ref 37–54)
HGB BLD-MCNC: 9 G/DL (ref 12.5–16.5)
MCH RBC QN AUTO: 29.6 PG (ref 26–35)
MCHC RBC AUTO-ENTMCNC: 30.8 G/DL (ref 32–34.5)
MCV RBC AUTO: 96.1 FL (ref 80–99.9)
PLATELET # BLD AUTO: 389 K/UL (ref 130–450)
PMV BLD AUTO: 9.7 FL (ref 7–12)
POTASSIUM SERPL-SCNC: 4.4 MMOL/L (ref 3.5–5)
RBC # BLD AUTO: 3.04 M/UL (ref 3.8–5.8)
SODIUM SERPL-SCNC: 139 MMOL/L (ref 132–146)
WBC OTHER # BLD: 9 K/UL (ref 4.5–11.5)

## 2024-12-13 PROCEDURE — 80048 BASIC METABOLIC PNL TOTAL CA: CPT

## 2024-12-13 PROCEDURE — 85027 COMPLETE CBC AUTOMATED: CPT

## 2024-12-16 LAB
ANION GAP SERPL CALCULATED.3IONS-SCNC: 12 MMOL/L (ref 7–16)
BUN SERPL-MCNC: 54 MG/DL (ref 6–23)
CALCIUM SERPL-MCNC: 9.9 MG/DL (ref 8.6–10.2)
CHLORIDE SERPL-SCNC: 100 MMOL/L (ref 98–107)
CO2 SERPL-SCNC: 28 MMOL/L (ref 22–29)
CREAT SERPL-MCNC: 3.4 MG/DL (ref 0.7–1.2)
ERYTHROCYTE [DISTWIDTH] IN BLOOD BY AUTOMATED COUNT: 14.3 % (ref 11.5–15)
GFR, ESTIMATED: 20 ML/MIN/1.73M2
GLUCOSE SERPL-MCNC: 243 MG/DL (ref 74–99)
HCT VFR BLD AUTO: 28.1 % (ref 37–54)
HGB BLD-MCNC: 8.7 G/DL (ref 12.5–16.5)
MAGNESIUM SERPL-MCNC: 1.7 MG/DL (ref 1.6–2.6)
MCH RBC QN AUTO: 29.1 PG (ref 26–35)
MCHC RBC AUTO-ENTMCNC: 31 G/DL (ref 32–34.5)
MCV RBC AUTO: 94 FL (ref 80–99.9)
PHOSPHATE SERPL-MCNC: 3.2 MG/DL (ref 2.5–4.5)
PLATELET # BLD AUTO: 415 K/UL (ref 130–450)
PMV BLD AUTO: 9.8 FL (ref 7–12)
POTASSIUM SERPL-SCNC: 4.2 MMOL/L (ref 3.5–5)
RBC # BLD AUTO: 2.99 M/UL (ref 3.8–5.8)
SODIUM SERPL-SCNC: 140 MMOL/L (ref 132–146)
WBC OTHER # BLD: 9.2 K/UL (ref 4.5–11.5)

## 2024-12-16 PROCEDURE — 84100 ASSAY OF PHOSPHORUS: CPT

## 2024-12-16 PROCEDURE — 80048 BASIC METABOLIC PNL TOTAL CA: CPT

## 2024-12-16 PROCEDURE — 85027 COMPLETE CBC AUTOMATED: CPT

## 2024-12-16 PROCEDURE — 83735 ASSAY OF MAGNESIUM: CPT

## 2024-12-18 LAB
ANION GAP SERPL CALCULATED.3IONS-SCNC: 7 MMOL/L (ref 7–16)
BUN SERPL-MCNC: 55 MG/DL (ref 6–23)
CALCIUM SERPL-MCNC: 10.3 MG/DL (ref 8.6–10.2)
CHLORIDE SERPL-SCNC: 96 MMOL/L (ref 98–107)
CO2 SERPL-SCNC: 32 MMOL/L (ref 22–29)
CREAT SERPL-MCNC: 3.1 MG/DL (ref 0.7–1.2)
ERYTHROCYTE [DISTWIDTH] IN BLOOD BY AUTOMATED COUNT: 14.6 % (ref 11.5–15)
GFR, ESTIMATED: 22 ML/MIN/1.73M2
GLUCOSE SERPL-MCNC: 259 MG/DL (ref 74–99)
HCT VFR BLD AUTO: 30.4 % (ref 37–54)
HGB BLD-MCNC: 9.3 G/DL (ref 12.5–16.5)
MCH RBC QN AUTO: 29.2 PG (ref 26–35)
MCHC RBC AUTO-ENTMCNC: 30.6 G/DL (ref 32–34.5)
MCV RBC AUTO: 95.3 FL (ref 80–99.9)
PLATELET # BLD AUTO: 378 K/UL (ref 130–450)
PMV BLD AUTO: 10.1 FL (ref 7–12)
POTASSIUM SERPL-SCNC: 5.4 MMOL/L (ref 3.5–5)
RBC # BLD AUTO: 3.19 M/UL (ref 3.8–5.8)
SODIUM SERPL-SCNC: 135 MMOL/L (ref 132–146)
WBC OTHER # BLD: 9.6 K/UL (ref 4.5–11.5)

## 2024-12-18 PROCEDURE — 80048 BASIC METABOLIC PNL TOTAL CA: CPT

## 2024-12-18 PROCEDURE — 85027 COMPLETE CBC AUTOMATED: CPT

## 2024-12-19 NOTE — PROGRESS NOTES
Physician Progress Note      PATIENT:               SAIMA BRENNAN  CSN #:                  [id number]  :                       1960  ADMIT DATE:       2024 7:09 AM  DISCH DATE:        12/10/2024 1:33 PM  RESPONDING  PROVIDER #:        Karolina Maharaj MD          QUERY TEXT:    Patient admitted with AMS. NSTEMI noted in DS. If possible, please document in   the progress notes and discharge summary if you are evaluating and/or   treating any of the following:    The medical record reflects the following:  Risk Factors: RF, ANTIONE  Clinical Indicators: Per DS: Seen by Cards for elevated trop/NSTEMI, with   reassuring ECHO in setting of ANTIONE/renal infarcts requiring dialysis felt less   likely to represent primary cardiac etiology, will need close f/u with Cards   as outpt. Per cardiology: Would also check him for potential underlying upper   gastrointestinal bleeding as I suspect that his troponins do not reflect an   acute cardiac injury by itself but rather in combination with other   degenerative processes. I suspect his troponins are elevated secondary to his   advanced underlying renal dysfunction and with intra  Treatment: Cardiology consult, ECHO, serial troponins  Options provided:  -- NSTEMI  -- Type 2 MI  -- NSTEMI ruled out  -- Other - I will add my own diagnosis  -- Disagree - Not applicable / Not valid  -- Disagree - Clinically unable to determine / Unknown  -- Refer to Clinical Documentation Reviewer    PROVIDER RESPONSE TEXT:    NSTEMI ruled out.    Query created by: Melanie Lewis on 2024 10:20 AM      Electronically signed by:  Karolina Maharaj MD 2024 6:49 AM

## 2024-12-20 LAB
ANION GAP SERPL CALCULATED.3IONS-SCNC: 11 MMOL/L (ref 7–16)
BUN SERPL-MCNC: 66 MG/DL (ref 6–23)
CALCIUM SERPL-MCNC: 10 MG/DL (ref 8.6–10.2)
CHLORIDE SERPL-SCNC: 96 MMOL/L (ref 98–107)
CO2 SERPL-SCNC: 30 MMOL/L (ref 22–29)
CREAT SERPL-MCNC: 3.1 MG/DL (ref 0.7–1.2)
ERYTHROCYTE [DISTWIDTH] IN BLOOD BY AUTOMATED COUNT: 14.4 % (ref 11.5–15)
GFR, ESTIMATED: 22 ML/MIN/1.73M2
GLUCOSE SERPL-MCNC: 235 MG/DL (ref 74–99)
HCT VFR BLD AUTO: 28.6 % (ref 37–54)
HGB BLD-MCNC: 9 G/DL (ref 12.5–16.5)
MCH RBC QN AUTO: 29.7 PG (ref 26–35)
MCHC RBC AUTO-ENTMCNC: 31.5 G/DL (ref 32–34.5)
MCV RBC AUTO: 94.4 FL (ref 80–99.9)
PLATELET # BLD AUTO: 359 K/UL (ref 130–450)
PMV BLD AUTO: 10.1 FL (ref 7–12)
POTASSIUM SERPL-SCNC: 4.6 MMOL/L (ref 3.5–5)
RBC # BLD AUTO: 3.03 M/UL (ref 3.8–5.8)
SODIUM SERPL-SCNC: 137 MMOL/L (ref 132–146)
WBC OTHER # BLD: 10.3 K/UL (ref 4.5–11.5)

## 2024-12-20 PROCEDURE — 80048 BASIC METABOLIC PNL TOTAL CA: CPT

## 2024-12-20 PROCEDURE — 85027 COMPLETE CBC AUTOMATED: CPT

## 2024-12-22 LAB
ALBUMIN SERPL-MCNC: 3.5 G/DL (ref 3.5–5.2)
ALP SERPL-CCNC: 110 U/L (ref 40–129)
ALT SERPL-CCNC: 10 U/L (ref 0–40)
ANION GAP SERPL CALCULATED.3IONS-SCNC: 15 MMOL/L (ref 7–16)
AST SERPL-CCNC: 18 U/L (ref 0–39)
BILIRUB SERPL-MCNC: 0.2 MG/DL (ref 0–1.2)
BUN SERPL-MCNC: 81 MG/DL (ref 6–23)
CALCIUM SERPL-MCNC: 9.8 MG/DL (ref 8.6–10.2)
CHLORIDE SERPL-SCNC: 94 MMOL/L (ref 98–107)
CO2 SERPL-SCNC: 24 MMOL/L (ref 22–29)
CREAT SERPL-MCNC: 2.8 MG/DL (ref 0.7–1.2)
ERYTHROCYTE [DISTWIDTH] IN BLOOD BY AUTOMATED COUNT: 14.5 % (ref 11.5–15)
GFR, ESTIMATED: 25 ML/MIN/1.73M2
GLUCOSE SERPL-MCNC: 171 MG/DL (ref 74–99)
HCT VFR BLD AUTO: 35.1 % (ref 37–54)
HGB BLD-MCNC: 10.8 G/DL (ref 12.5–16.5)
MAGNESIUM SERPL-MCNC: 2.2 MG/DL (ref 1.6–2.6)
MCH RBC QN AUTO: 29.4 PG (ref 26–35)
MCHC RBC AUTO-ENTMCNC: 30.8 G/DL (ref 32–34.5)
MCV RBC AUTO: 95.6 FL (ref 80–99.9)
PHOSPHATE SERPL-MCNC: 3.1 MG/DL (ref 2.5–4.5)
PLATELET # BLD AUTO: 363 K/UL (ref 130–450)
PMV BLD AUTO: 9.8 FL (ref 7–12)
POTASSIUM SERPL-SCNC: 4.5 MMOL/L (ref 3.5–5)
PROT SERPL-MCNC: 7.9 G/DL (ref 6.4–8.3)
RBC # BLD AUTO: 3.67 M/UL (ref 3.8–5.8)
SODIUM SERPL-SCNC: 133 MMOL/L (ref 132–146)
WBC OTHER # BLD: 9.5 K/UL (ref 4.5–11.5)

## 2024-12-22 PROCEDURE — 84100 ASSAY OF PHOSPHORUS: CPT

## 2024-12-22 PROCEDURE — 85027 COMPLETE CBC AUTOMATED: CPT

## 2024-12-22 PROCEDURE — 82570 ASSAY OF URINE CREATININE: CPT

## 2024-12-22 PROCEDURE — 80053 COMPREHEN METABOLIC PANEL: CPT

## 2024-12-22 PROCEDURE — 83735 ASSAY OF MAGNESIUM: CPT

## 2024-12-23 LAB
ANION GAP SERPL CALCULATED.3IONS-SCNC: 11 MMOL/L (ref 7–16)
BUN SERPL-MCNC: 92 MG/DL (ref 6–23)
CALCIUM SERPL-MCNC: 10.2 MG/DL (ref 8.6–10.2)
CHLORIDE SERPL-SCNC: 95 MMOL/L (ref 98–107)
CO2 SERPL-SCNC: 29 MMOL/L (ref 22–29)
COLLECT DURATION TIME SPEC: 24 H
CREAT SERPL-MCNC: 2.9 MG/DL (ref 0.7–1.2)
CREATINE 24H UR-MRATE: 1000 MG/24 H (ref 980–2200)
ERYTHROCYTE [DISTWIDTH] IN BLOOD BY AUTOMATED COUNT: 14.8 % (ref 11.5–15)
GFR, ESTIMATED: 24 ML/MIN/1.73M2
GLUCOSE SERPL-MCNC: 163 MG/DL (ref 74–99)
HCT VFR BLD AUTO: 31.3 % (ref 37–54)
HGB BLD-MCNC: 9.8 G/DL (ref 12.5–16.5)
MAGNESIUM SERPL-MCNC: 2.1 MG/DL (ref 1.6–2.6)
MCH RBC QN AUTO: 29.3 PG (ref 26–35)
MCHC RBC AUTO-ENTMCNC: 31.3 G/DL (ref 32–34.5)
MCV RBC AUTO: 93.7 FL (ref 80–99.9)
PHOSPHATE SERPL-MCNC: 3.7 MG/DL (ref 2.5–4.5)
PLATELET # BLD AUTO: 384 K/UL (ref 130–450)
PMV BLD AUTO: 10.1 FL (ref 7–12)
POTASSIUM SERPL-SCNC: 4.7 MMOL/L (ref 3.5–5)
RBC # BLD AUTO: 3.34 M/UL (ref 3.8–5.8)
SODIUM SERPL-SCNC: 135 MMOL/L (ref 132–146)
SPECIMEN VOL UR: 1825 ML
WBC OTHER # BLD: 9.8 K/UL (ref 4.5–11.5)

## 2024-12-23 PROCEDURE — 84100 ASSAY OF PHOSPHORUS: CPT

## 2024-12-23 PROCEDURE — 80048 BASIC METABOLIC PNL TOTAL CA: CPT

## 2024-12-23 PROCEDURE — 83735 ASSAY OF MAGNESIUM: CPT

## 2024-12-23 PROCEDURE — 85027 COMPLETE CBC AUTOMATED: CPT

## 2024-12-24 LAB
ALBUMIN SERPL-MCNC: 3.7 G/DL (ref 3.5–5.2)
ALP SERPL-CCNC: 107 U/L (ref 40–129)
ALT SERPL-CCNC: 12 U/L (ref 0–40)
ANION GAP SERPL CALCULATED.3IONS-SCNC: 11 MMOL/L (ref 7–16)
AST SERPL-CCNC: 29 U/L (ref 0–39)
BASOPHILS # BLD: 0.07 K/UL (ref 0–0.2)
BASOPHILS NFR BLD: 1 % (ref 0–2)
BILIRUB SERPL-MCNC: 0.2 MG/DL (ref 0–1.2)
BUN SERPL-MCNC: 45 MG/DL (ref 6–23)
CALCIUM SERPL-MCNC: 9.6 MG/DL (ref 8.6–10.2)
CHLORIDE SERPL-SCNC: 96 MMOL/L (ref 98–107)
CO2 SERPL-SCNC: 27 MMOL/L (ref 22–29)
CREAT SERPL-MCNC: 2.1 MG/DL (ref 0.7–1.2)
EOSINOPHIL # BLD: 0.41 K/UL (ref 0.05–0.5)
EOSINOPHILS RELATIVE PERCENT: 4 % (ref 0–6)
ERYTHROCYTE [DISTWIDTH] IN BLOOD BY AUTOMATED COUNT: 14.9 % (ref 11.5–15)
GFR, ESTIMATED: 36 ML/MIN/1.73M2
GLUCOSE SERPL-MCNC: 216 MG/DL (ref 74–99)
HCT VFR BLD AUTO: 31.7 % (ref 37–54)
HGB BLD-MCNC: 9.8 G/DL (ref 12.5–16.5)
IMM GRANULOCYTES # BLD AUTO: 0.04 K/UL (ref 0–0.58)
IMM GRANULOCYTES NFR BLD: 0 % (ref 0–5)
LYMPHOCYTES NFR BLD: 1.75 K/UL (ref 1.5–4)
LYMPHOCYTES RELATIVE PERCENT: 19 % (ref 20–42)
MAGNESIUM SERPL-MCNC: 2.1 MG/DL (ref 1.6–2.6)
MCH RBC QN AUTO: 29.3 PG (ref 26–35)
MCHC RBC AUTO-ENTMCNC: 30.9 G/DL (ref 32–34.5)
MCV RBC AUTO: 94.9 FL (ref 80–99.9)
MONOCYTES NFR BLD: 0.74 K/UL (ref 0.1–0.95)
MONOCYTES NFR BLD: 8 % (ref 2–12)
NEUTROPHILS NFR BLD: 68 % (ref 43–80)
NEUTS SEG NFR BLD: 6.3 K/UL (ref 1.8–7.3)
PHOSPHATE SERPL-MCNC: 3 MG/DL (ref 2.5–4.5)
PLATELET # BLD AUTO: 328 K/UL (ref 130–450)
PMV BLD AUTO: 9.9 FL (ref 7–12)
POTASSIUM SERPL-SCNC: 4.5 MMOL/L (ref 3.5–5)
PROT SERPL-MCNC: 7.4 G/DL (ref 6.4–8.3)
RBC # BLD AUTO: 3.34 M/UL (ref 3.8–5.8)
SODIUM SERPL-SCNC: 134 MMOL/L (ref 132–146)
WBC OTHER # BLD: 9.3 K/UL (ref 4.5–11.5)

## 2024-12-24 PROCEDURE — 84100 ASSAY OF PHOSPHORUS: CPT

## 2024-12-24 PROCEDURE — 83735 ASSAY OF MAGNESIUM: CPT

## 2024-12-24 PROCEDURE — 80053 COMPREHEN METABOLIC PANEL: CPT

## 2024-12-24 PROCEDURE — 85025 COMPLETE CBC W/AUTO DIFF WBC: CPT

## 2024-12-27 LAB
25(OH)D3 SERPL-MCNC: 14.7 NG/ML (ref 30–100)
ALBUMIN SERPL-MCNC: 2.9 G/DL (ref 3.5–5.2)
ALBUMIN: NORMAL G/DL (ref 3.5–5.2)
ALP BLD-CCNC: NORMAL U/L (ref 40–129)
ALP SERPL-CCNC: 137 U/L (ref 40–129)
ALT SERPL-CCNC: 8 U/L (ref 0–40)
ALT SERPL-CCNC: NORMAL U/L (ref 5–41)
ANION GAP SERPL CALCULATED.3IONS-SCNC: 21 MMOL/L (ref 7–16)
ANION GAP SERPL CALCULATED.3IONS-SCNC: NORMAL MMOL/L (ref 9–17)
AST SERPL-CCNC: 31 U/L (ref 0–39)
AST SERPL-CCNC: NORMAL U/L
BASOPHILS # BLD: 0.05 K/UL (ref 0–0.2)
BASOPHILS ABSOLUTE: NORMAL K/UL (ref 0–0.2)
BASOPHILS NFR BLD: 0 % (ref 0–2)
BASOPHILS RELATIVE PERCENT: NORMAL % (ref 0–2)
BILIRUB DIRECT SERPL-MCNC: <0.2 MG/DL (ref 0–0.3)
BILIRUB INDIRECT SERPL-MCNC: ABNORMAL MG/DL (ref 0–1)
BILIRUB SERPL-MCNC: 0.4 MG/DL (ref 0–1.2)
BILIRUB SERPL-MCNC: NORMAL MG/DL (ref 0.3–1.2)
BILIRUBIN DIRECT: NORMAL MG/DL
BILIRUBIN, INDIRECT: NORMAL MG/DL (ref 0–1)
BUN BLDV-MCNC: NORMAL MG/DL (ref 8–23)
BUN SERPL-MCNC: 66 MG/DL (ref 6–23)
CALCIUM SERPL-MCNC: 9.1 MG/DL (ref 8.6–10.2)
CALCIUM SERPL-MCNC: NORMAL MG/DL (ref 8.6–10.4)
CHLORIDE BLD-SCNC: NORMAL MMOL/L (ref 98–107)
CHLORIDE SERPL-SCNC: 91 MMOL/L (ref 98–107)
CO2 SERPL-SCNC: 20 MMOL/L (ref 22–29)
CO2: NORMAL MMOL/L (ref 20–31)
CREAT SERPL-MCNC: 7.9 MG/DL (ref 0.7–1.2)
CREAT SERPL-MCNC: NORMAL MG/DL (ref 0.7–1.2)
EOSINOPHIL # BLD: 0.23 K/UL (ref 0.05–0.5)
EOSINOPHILS ABSOLUTE: NORMAL K/UL (ref 0–0.4)
EOSINOPHILS RELATIVE PERCENT: 2 % (ref 0–6)
EOSINOPHILS RELATIVE PERCENT: NORMAL % (ref 1–4)
ERYTHROCYTE [DISTWIDTH] IN BLOOD BY AUTOMATED COUNT: 14.3 % (ref 11.5–15)
GFR, ESTIMATED: 7 ML/MIN/1.73M2
GFR, ESTIMATED: NORMAL ML/MIN/1.73M2
GLUCOSE BLD-MCNC: NORMAL MG/DL (ref 70–99)
GLUCOSE SERPL-MCNC: 253 MG/DL (ref 74–99)
HCT VFR BLD AUTO: 25 % (ref 37–54)
HCT VFR BLD CALC: NORMAL % (ref 40.7–50.3)
HEMOGLOBIN: NORMAL G/DL (ref 13–17)
HGB BLD-MCNC: 7.7 G/DL (ref 12.5–16.5)
IMM GRANULOCYTES # BLD AUTO: 0.12 K/UL (ref 0–0.58)
IMM GRANULOCYTES NFR BLD: 1 % (ref 0–5)
IMMATURE GRANULOCYTES %: NORMAL %
IMMATURE GRANULOCYTES ABSOLUTE: NORMAL K/UL (ref 0–0.3)
LYMPHOCYTES ABSOLUTE: NORMAL K/UL (ref 1–4.8)
LYMPHOCYTES NFR BLD: 1.15 K/UL (ref 1.5–4)
LYMPHOCYTES RELATIVE PERCENT: 8 % (ref 20–42)
LYMPHOCYTES RELATIVE PERCENT: NORMAL % (ref 24–44)
MAGNESIUM SERPL-MCNC: 2.4 MG/DL (ref 1.6–2.6)
MAGNESIUM: NORMAL MG/DL (ref 1.6–2.6)
MCH RBC QN AUTO: 30.6 PG (ref 26–35)
MCH RBC QN AUTO: NORMAL PG (ref 25.2–33.5)
MCHC RBC AUTO-ENTMCNC: 30.8 G/DL (ref 32–34.5)
MCHC RBC AUTO-ENTMCNC: NORMAL G/DL (ref 28.4–34.8)
MCV RBC AUTO: 99.2 FL (ref 80–99.9)
MCV RBC AUTO: NORMAL FL (ref 82.6–102.9)
MONOCYTES ABSOLUTE: NORMAL K/UL (ref 0.1–0.8)
MONOCYTES NFR BLD: 0.99 K/UL (ref 0.1–0.95)
MONOCYTES NFR BLD: 7 % (ref 2–12)
MONOCYTES RELATIVE PERCENT: NORMAL % (ref 1–7)
NEUTROPHILS ABSOLUTE: NORMAL K/UL (ref 1.8–7.7)
NEUTROPHILS NFR BLD: 83 % (ref 43–80)
NEUTROPHILS RELATIVE PERCENT: NORMAL % (ref 36–66)
NEUTS SEG NFR BLD: 12.61 K/UL (ref 1.8–7.3)
PDW BLD-RTO: NORMAL % (ref 11.8–14.4)
PHOSPHATE SERPL-MCNC: 6.4 MG/DL (ref 2.5–4.5)
PHOSPHORUS: NORMAL MG/DL (ref 2.5–4.5)
PLATELET # BLD AUTO: 653 K/UL (ref 130–450)
PLATELET # BLD: NORMAL K/UL (ref 138–453)
PMV BLD AUTO: 9.3 FL (ref 7–12)
PMV BLD AUTO: NORMAL FL (ref 8.1–13.5)
POTASSIUM SERPL-SCNC: 5.2 MMOL/L (ref 3.5–5)
POTASSIUM SERPL-SCNC: NORMAL MMOL/L (ref 3.7–5.3)
PROCALCITONIN SERPL-MCNC: 0.41 NG/ML (ref 0–0.08)
PROCALCITONIN: NORMAL NG/ML
PROT SERPL-MCNC: 6.7 G/DL (ref 6.4–8.3)
PTH INTACT: NORMAL PG/ML (ref 14–72)
PTH-INTACT SERPL-MCNC: 126.2 PG/ML (ref 15–65)
RBC # BLD AUTO: 2.52 M/UL (ref 3.8–5.8)
RBC # BLD: NORMAL M/UL (ref 4.21–5.77)
SODIUM BLD-SCNC: NORMAL MMOL/L (ref 135–144)
SODIUM SERPL-SCNC: 132 MMOL/L (ref 132–146)
TOTAL PROTEIN: NORMAL G/DL (ref 6.4–8.3)
VITAMIN D 25-HYDROXY: NORMAL NG/ML
WBC # BLD: NORMAL K/UL (ref 3.5–11.3)
WBC OTHER # BLD: 15.2 K/UL (ref 4.5–11.5)

## 2025-01-03 ENCOUNTER — TELEPHONE (OUTPATIENT)
Dept: ENT CLINIC | Age: 65
End: 2025-01-03

## 2025-01-03 NOTE — TELEPHONE ENCOUNTER
Called patient LM on  to return call to schedule appointment.        Patient returned call and is scheduled in February per patients request.

## 2025-01-04 PROBLEM — R79.89 ELEVATED TROPONIN: Status: RESOLVED | Noted: 2024-12-05 | Resolved: 2025-01-04

## 2025-01-07 ENCOUNTER — APPOINTMENT (OUTPATIENT)
Dept: VASCULAR MEDICINE | Facility: HOSPITAL | Age: 65
End: 2025-01-07
Payer: COMMERCIAL

## 2025-01-07 ENCOUNTER — APPOINTMENT (OUTPATIENT)
Dept: CARDIOLOGY | Facility: HOSPITAL | Age: 65
End: 2025-01-07
Payer: COMMERCIAL

## 2025-01-16 ENCOUNTER — HOSPITAL ENCOUNTER (EMERGENCY)
Age: 65
Discharge: OTHER INSTITUTION | End: 2025-01-17
Attending: EMERGENCY MEDICINE
Payer: COMMERCIAL

## 2025-01-16 DIAGNOSIS — N18.9 CHRONIC KIDNEY DISEASE, UNSPECIFIED CKD STAGE: ICD-10-CM

## 2025-01-16 DIAGNOSIS — Z46.6 ENCOUNTER FOR FOLEY CATHETER REPLACEMENT: Primary | ICD-10-CM

## 2025-01-16 DIAGNOSIS — N30.01 ACUTE CYSTITIS WITH HEMATURIA: ICD-10-CM

## 2025-01-16 LAB
ANION GAP SERPL CALCULATED.3IONS-SCNC: 10 MMOL/L (ref 7–16)
BACTERIA URNS QL MICRO: ABNORMAL
BASOPHILS # BLD: 0.06 K/UL (ref 0–0.2)
BASOPHILS NFR BLD: 1 % (ref 0–2)
BILIRUB UR QL STRIP: NEGATIVE
BUN SERPL-MCNC: 26 MG/DL (ref 6–23)
CALCIUM SERPL-MCNC: 9.6 MG/DL (ref 8.6–10.2)
CHLORIDE SERPL-SCNC: 92 MMOL/L (ref 98–107)
CLARITY UR: CLEAR
CO2 SERPL-SCNC: 31 MMOL/L (ref 22–29)
COLOR UR: YELLOW
CREAT SERPL-MCNC: 1.9 MG/DL (ref 0.7–1.2)
EOSINOPHIL # BLD: 0.31 K/UL (ref 0.05–0.5)
EOSINOPHILS RELATIVE PERCENT: 3 % (ref 0–6)
EPI CELLS #/AREA URNS HPF: ABNORMAL /HPF
ERYTHROCYTE [DISTWIDTH] IN BLOOD BY AUTOMATED COUNT: 14.8 % (ref 11.5–15)
GFR, ESTIMATED: 40 ML/MIN/1.73M2
GLUCOSE SERPL-MCNC: 297 MG/DL (ref 74–99)
GLUCOSE UR STRIP-MCNC: 100 MG/DL
HCT VFR BLD AUTO: 31.2 % (ref 37–54)
HGB BLD-MCNC: 9.6 G/DL (ref 12.5–16.5)
HGB UR QL STRIP.AUTO: ABNORMAL
IMM GRANULOCYTES # BLD AUTO: <0.03 K/UL (ref 0–0.58)
IMM GRANULOCYTES NFR BLD: 0 % (ref 0–5)
KETONES UR STRIP-MCNC: ABNORMAL MG/DL
LEUKOCYTE ESTERASE UR QL STRIP: ABNORMAL
LYMPHOCYTES NFR BLD: 1.36 K/UL (ref 1.5–4)
LYMPHOCYTES RELATIVE PERCENT: 13 % (ref 20–42)
MCH RBC QN AUTO: 29.9 PG (ref 26–35)
MCHC RBC AUTO-ENTMCNC: 30.8 G/DL (ref 32–34.5)
MCV RBC AUTO: 97.2 FL (ref 80–99.9)
MONOCYTES NFR BLD: 0.79 K/UL (ref 0.1–0.95)
MONOCYTES NFR BLD: 8 % (ref 2–12)
NEUTROPHILS NFR BLD: 76 % (ref 43–80)
NEUTS SEG NFR BLD: 7.87 K/UL (ref 1.8–7.3)
NITRITE UR QL STRIP: NEGATIVE
PH UR STRIP: 6 [PH] (ref 5–9)
PLATELET # BLD AUTO: 337 K/UL (ref 130–450)
PMV BLD AUTO: 9.2 FL (ref 7–12)
POTASSIUM SERPL-SCNC: 4.6 MMOL/L (ref 3.5–5)
PROT UR STRIP-MCNC: 30 MG/DL
RBC # BLD AUTO: 3.21 M/UL (ref 3.8–5.8)
RBC #/AREA URNS HPF: ABNORMAL /HPF
SODIUM SERPL-SCNC: 133 MMOL/L (ref 132–146)
SP GR UR STRIP: 1.02 (ref 1–1.03)
UROBILINOGEN UR STRIP-ACNC: 0.2 EU/DL (ref 0–1)
WBC #/AREA URNS HPF: ABNORMAL /HPF
WBC OTHER # BLD: 10.4 K/UL (ref 4.5–11.5)

## 2025-01-16 PROCEDURE — 6370000000 HC RX 637 (ALT 250 FOR IP): Performed by: EMERGENCY MEDICINE

## 2025-01-16 PROCEDURE — 81001 URINALYSIS AUTO W/SCOPE: CPT

## 2025-01-16 PROCEDURE — 85025 COMPLETE CBC W/AUTO DIFF WBC: CPT

## 2025-01-16 PROCEDURE — 87077 CULTURE AEROBIC IDENTIFY: CPT

## 2025-01-16 PROCEDURE — 87086 URINE CULTURE/COLONY COUNT: CPT

## 2025-01-16 PROCEDURE — 96374 THER/PROPH/DIAG INJ IV PUSH: CPT

## 2025-01-16 PROCEDURE — 96375 TX/PRO/DX INJ NEW DRUG ADDON: CPT

## 2025-01-16 PROCEDURE — 2500000003 HC RX 250 WO HCPCS: Performed by: EMERGENCY MEDICINE

## 2025-01-16 PROCEDURE — 6360000002 HC RX W HCPCS: Performed by: EMERGENCY MEDICINE

## 2025-01-16 PROCEDURE — 51702 INSERT TEMP BLADDER CATH: CPT

## 2025-01-16 PROCEDURE — 80048 BASIC METABOLIC PNL TOTAL CA: CPT

## 2025-01-16 PROCEDURE — 99284 EMERGENCY DEPT VISIT MOD MDM: CPT

## 2025-01-16 RX ORDER — OXYCODONE AND ACETAMINOPHEN 5; 325 MG/1; MG/1
2 TABLET ORAL ONCE
Status: COMPLETED | OUTPATIENT
Start: 2025-01-16 | End: 2025-01-16

## 2025-01-16 RX ORDER — CEFDINIR 300 MG/1
300 CAPSULE ORAL 2 TIMES DAILY
Qty: 14 CAPSULE | Refills: 0 | Status: SHIPPED | OUTPATIENT
Start: 2025-01-16 | End: 2025-01-23

## 2025-01-16 RX ORDER — MAGNESIUM SULFATE IN WATER 40 MG/ML
2000 INJECTION, SOLUTION INTRAVENOUS ONCE
Status: COMPLETED | OUTPATIENT
Start: 2025-01-16 | End: 2025-01-16

## 2025-01-16 RX ADMIN — CEFTRIAXONE 1000 MG: 1 INJECTION, POWDER, FOR SOLUTION INTRAMUSCULAR; INTRAVENOUS at 18:56

## 2025-01-16 RX ADMIN — OXYCODONE HYDROCHLORIDE AND ACETAMINOPHEN 2 TABLET: 5; 325 TABLET ORAL at 20:25

## 2025-01-16 RX ADMIN — MAGNESIUM SULFATE HEPTAHYDRATE 2000 MG: 40 INJECTION, SOLUTION INTRAVENOUS at 20:07

## 2025-01-16 RX ADMIN — Medication 4 UNITS: at 19:00

## 2025-01-16 ASSESSMENT — PAIN DESCRIPTION - LOCATION
LOCATION: LEG
LOCATION: LEG

## 2025-01-16 ASSESSMENT — PAIN SCALES - GENERAL
PAINLEVEL_OUTOF10: 7
PAINLEVEL_OUTOF10: 8

## 2025-01-16 ASSESSMENT — PAIN DESCRIPTION - ORIENTATION
ORIENTATION: RIGHT
ORIENTATION: RIGHT

## 2025-01-16 ASSESSMENT — PAIN DESCRIPTION - DESCRIPTORS
DESCRIPTORS: SHOOTING
DESCRIPTORS: SHOOTING

## 2025-01-16 NOTE — ED PROVIDER NOTES
[facility] EMERGENCY DEPARTMENT  EMERGENCY DEPARTMENT ENCOUNTER        Pt Name: Himanshu Bravo  MRN: [id number]  Birthdate 1960  Date of evaluation: 1/16/2025  Provider: Jessica Nieves MD  PCP: Franky Montana DO  Note Started: 4:58 PM EST 1/16/25    CHIEF COMPLAINT       Chief Complaint   Patient presents with    Hematuria     Pt had a vargas exchanged this am at 0630 and during dialysis the patient was having pain with hematuria. Cath was again removed and cath is now unable to be replaced at the facility.        HISTORY OF PRESENT ILLNESS: 1 or more Elements   History From: Patient    Limitations to history : None    Himanshu Bravo is a 64 y.o. male who presents to the emergency department from rehab facility.  They attempted to exchange his Vargas catheter today and were unable to do so. Sent to ED for vargas placement. Patient states that he has had indwelling Vargas catheter for the past 6 weeks after surgery for right above-the-knee amputation.  He had some issues with urinary retention.  He states that has not been changed since that time.  Vragas exchanged at this morning and then afterwards he had some pain he has symptoms are exchanged again after his dialysis session today.  When they attempted to exchange it they had a hard time reinserting it there was some bleeding after attempted reinsertion.  Patient is chronically on Eliquis anticoagulation for atrial fibrillation..  Denies any dysuria urgency or frequency no fevers or chills no abdominal pain.no fever or chills. No vomiting.  Patient states he is only been on dialysis for 2 weeks he makes a regular amount of urine.     Nursing Notes were all reviewed and agreed with or any disagreements were addressed in the HPI.      REVIEW OF EXTERNAL NOTE :       Echocardiogram from December 5, 2024 shows an EF of 52%    REVIEW OF SYSTEMS :           Positives and Pertinent negatives as per HPI.     SURGICAL HISTORY     Past

## 2025-01-17 ENCOUNTER — TELEPHONE (OUTPATIENT)
Dept: CARDIOLOGY | Facility: HOSPITAL | Age: 65
End: 2025-01-17
Payer: COMMERCIAL

## 2025-01-17 VITALS
SYSTOLIC BLOOD PRESSURE: 125 MMHG | TEMPERATURE: 98.5 F | DIASTOLIC BLOOD PRESSURE: 73 MMHG | RESPIRATION RATE: 14 BRPM | HEART RATE: 92 BPM | BODY MASS INDEX: 32.58 KG/M2 | WEIGHT: 208 LBS | OXYGEN SATURATION: 99 %

## 2025-01-17 LAB
EKG ATRIAL RATE: 75 BPM
EKG ATRIAL RATE: 78 BPM
EKG P AXIS: 58 DEGREES
EKG P-R INTERVAL: 160 MS
EKG Q-T INTERVAL: 436 MS
EKG Q-T INTERVAL: 468 MS
EKG QRS DURATION: 124 MS
EKG QRS DURATION: 126 MS
EKG QTC CALCULATION (BAZETT): 497 MS
EKG QTC CALCULATION (BAZETT): 533 MS
EKG R AXIS: -50 DEGREES
EKG R AXIS: -50 DEGREES
EKG T AXIS: 48 DEGREES
EKG T AXIS: 60 DEGREES
EKG VENTRICULAR RATE: 78 BPM
EKG VENTRICULAR RATE: 78 BPM

## 2025-01-17 PROCEDURE — 6370000000 HC RX 637 (ALT 250 FOR IP): Performed by: STUDENT IN AN ORGANIZED HEALTH CARE EDUCATION/TRAINING PROGRAM

## 2025-01-17 RX ORDER — OXYCODONE AND ACETAMINOPHEN 10; 325 MG/1; MG/1
1 TABLET ORAL ONCE
Status: COMPLETED | OUTPATIENT
Start: 2025-01-17 | End: 2025-01-17

## 2025-01-17 RX ADMIN — OXYCODONE AND ACETAMINOPHEN 1 TABLET: 10; 325 TABLET ORAL at 02:16

## 2025-01-17 ASSESSMENT — PAIN DESCRIPTION - DESCRIPTORS: DESCRIPTORS: SHOOTING

## 2025-01-17 ASSESSMENT — PAIN SCALES - GENERAL: PAINLEVEL_OUTOF10: 7

## 2025-01-17 ASSESSMENT — PAIN DESCRIPTION - ORIENTATION: ORIENTATION: RIGHT;LOWER

## 2025-01-17 ASSESSMENT — PAIN DESCRIPTION - LOCATION: LOCATION: LEG;ABDOMEN

## 2025-01-17 NOTE — TELEPHONE ENCOUNTER
Patient left a message providing a health update.  He states he is stuck in the hospital in [place], [place] due to a fib, blood clots, renal failure and a resulting leg amputation.

## 2025-01-18 LAB
MICROORGANISM SPEC CULT: ABNORMAL
SERVICE CMNT-IMP: ABNORMAL
SPECIMEN DESCRIPTION: ABNORMAL

## 2025-04-28 ENCOUNTER — HOSPITAL ENCOUNTER (EMERGENCY)
Age: 65
Discharge: HOME | End: 2025-04-29
Attending: EMERGENCY MEDICINE
Payer: COMMERCIAL

## 2025-04-28 DIAGNOSIS — E86.0 DEHYDRATION: ICD-10-CM

## 2025-04-28 DIAGNOSIS — R41.0 CONFUSION: Primary | ICD-10-CM

## 2025-04-28 LAB
ALBUMIN SERPL-MCNC: 3.7 G/DL (ref 3.5–5.2)
ALP SERPL-CCNC: 99 U/L (ref 40–129)
ALT SERPL-CCNC: 10 U/L (ref 0–40)
ANION GAP SERPL CALCULATED.3IONS-SCNC: 10 MMOL/L (ref 7–16)
AST SERPL-CCNC: 10 U/L (ref 0–39)
BILIRUB SERPL-MCNC: 0.4 MG/DL (ref 0–1.2)
BILIRUB UR QL STRIP: NEGATIVE
BUN SERPL-MCNC: 32 MG/DL (ref 6–23)
CALCIUM SERPL-MCNC: 9.5 MG/DL (ref 8.6–10.2)
CHLORIDE SERPL-SCNC: 94 MMOL/L (ref 98–107)
CHP ED QC CHECK: NORMAL
CLARITY UR: CLEAR
CO2 SERPL-SCNC: 25 MMOL/L (ref 22–29)
COLOR UR: YELLOW
COMMENT: ABNORMAL
CREAT SERPL-MCNC: 1.5 MG/DL (ref 0.7–1.2)
EKG ATRIAL RATE: 81 BPM
EKG P AXIS: 54 DEGREES
EKG P-R INTERVAL: 178 MS
EKG Q-T INTERVAL: 436 MS
EKG QRS DURATION: 128 MS
EKG QTC CALCULATION (BAZETT): 506 MS
EKG R AXIS: -55 DEGREES
EKG T AXIS: 21 DEGREES
EKG VENTRICULAR RATE: 81 BPM
ERYTHROCYTE [DISTWIDTH] IN BLOOD BY AUTOMATED COUNT: 14.6 % (ref 11.5–15)
GFR, ESTIMATED: 51 ML/MIN/1.73M2
GLUCOSE BLD-MCNC: 293 MG/DL
GLUCOSE SERPL-MCNC: 293 MG/DL (ref 74–99)
GLUCOSE UR STRIP-MCNC: 100 MG/DL
HCT VFR BLD AUTO: 33.1 % (ref 37–54)
HGB BLD-MCNC: 10.2 G/DL (ref 12.5–16.5)
HGB UR QL STRIP.AUTO: NEGATIVE
KETONES UR STRIP-MCNC: NEGATIVE MG/DL
LEUKOCYTE ESTERASE UR QL STRIP: NEGATIVE
MCH RBC QN AUTO: 29.5 PG (ref 26–35)
MCHC RBC AUTO-ENTMCNC: 30.8 G/DL (ref 32–34.5)
MCV RBC AUTO: 95.7 FL (ref 80–99.9)
NITRITE UR QL STRIP: NEGATIVE
PH UR STRIP: 6 [PH] (ref 5–8)
PLATELET # BLD AUTO: 289 K/UL (ref 130–450)
PMV BLD AUTO: 9.2 FL (ref 7–12)
POTASSIUM SERPL-SCNC: 4.8 MMOL/L (ref 3.5–5)
PROT SERPL-MCNC: 7 G/DL (ref 6.4–8.3)
PROT UR STRIP-MCNC: NEGATIVE MG/DL
RBC # BLD AUTO: 3.46 M/UL (ref 3.8–5.8)
SODIUM SERPL-SCNC: 129 MMOL/L (ref 132–146)
SP GR UR STRIP: <1.005 (ref 1–1.03)
UROBILINOGEN UR STRIP-ACNC: 0.2 EU/DL (ref 0–1)
WBC OTHER # BLD: 10.9 K/UL (ref 4.5–11.5)

## 2025-04-28 PROCEDURE — 85027 COMPLETE CBC AUTOMATED: CPT

## 2025-04-28 PROCEDURE — 2580000003 HC RX 258: Performed by: EMERGENCY MEDICINE

## 2025-04-28 PROCEDURE — 80053 COMPREHEN METABOLIC PANEL: CPT

## 2025-04-28 PROCEDURE — 99284 EMERGENCY DEPT VISIT MOD MDM: CPT

## 2025-04-28 PROCEDURE — 93005 ELECTROCARDIOGRAM TRACING: CPT | Performed by: EMERGENCY MEDICINE

## 2025-04-28 PROCEDURE — 81003 URINALYSIS AUTO W/O SCOPE: CPT

## 2025-04-28 PROCEDURE — 96360 HYDRATION IV INFUSION INIT: CPT

## 2025-04-28 PROCEDURE — 93010 ELECTROCARDIOGRAM REPORT: CPT | Performed by: INTERNAL MEDICINE

## 2025-04-28 PROCEDURE — 96361 HYDRATE IV INFUSION ADD-ON: CPT

## 2025-04-28 RX ORDER — 0.9 % SODIUM CHLORIDE 0.9 %
500 INTRAVENOUS SOLUTION INTRAVENOUS ONCE
Status: COMPLETED | OUTPATIENT
Start: 2025-04-28 | End: 2025-04-28

## 2025-04-28 RX ADMIN — SODIUM CHLORIDE 500 ML: 0.9 INJECTION, SOLUTION INTRAVENOUS at 16:31

## 2025-04-28 RX ADMIN — SODIUM CHLORIDE 500 ML: 0.9 INJECTION, SOLUTION INTRAVENOUS at 15:20

## 2025-04-28 ASSESSMENT — PAIN - FUNCTIONAL ASSESSMENT: PAIN_FUNCTIONAL_ASSESSMENT: NONE - DENIES PAIN

## 2025-04-28 NOTE — ED TRIAGE NOTES
AMS- from [facility]- acute confusion with hallucinations- patient aware he is having hallucinations.  Had fall over weekend out of bed, and pt reports trying to get into room in w/c had fall. Denies loc, hitting head- on thinners  Urine @facility was sent to outside lab- no result- per patient no urinary symptoms  BKA recent November 2024

## 2025-04-28 NOTE — ED PROVIDER NOTES
HPI:  4/28/25,   Time: 1:55 PM EDT         Himanshu Bravo is a 65 y.o. male presenting to the ED for evaluation of confusion at home today.  It was noted the patient was having hallucinations.  He acknowledges this.  He states he was seeing puppies sitting underneath his desk which he knew were not there and he was seeing swirly symbols around his clock.  He states this has happened to him before.  At that point he was found to have atrial fibrillation and vascular disease in his leg and he resulted in a right AKA.  Denies fevers or chills recently.  States this just started today.  Had been eating normally up until today.  He does not had loss of appetite or nausea or vomiting.  Denies shortness of breath or cough or chest pain.  He did have a fall today but he states he did not get hurt and caught himself on the way down.  There is no head injury or injury otherwise.    ROS:   Pertinent positives and negatives are stated within HPI, all other systems reviewed and are negative.  --------------------------------------------- PAST HISTORY ---------------------------------------------  Past Medical History:  has a past medical history of Congenital heart disease, Diabetes mellitus (HCC), H/O cardiovascular stress test, Hyperlipidemia, and Type 2 diabetes mellitus without complication (HCC).    Past Surgical History:  has a past surgical history that includes Clavicle surgery (Left); Cardiac catheterization; Cardiac surgery; Colonoscopy (N/A, 01/19/2024); Percutaneous Transluminal Coronary Angio; Leg Surgery (Right, 11/12/2024); vascular surgery (Bilateral, 11/12/2024); IR TUNNELED CVC PLACE WO SQ PORT/PUMP > 5 YEARS (11/20/2024); and Leg Surgery (Left, 11/21/2024).    Social History:  reports that he has been smoking cigarettes. He started smoking about 53 years ago. He has a 53.3 pack-year smoking history. He has never used smokeless tobacco. He reports that he does not drink alcohol and does not use drugs.    Family

## 2025-04-29 VITALS
RESPIRATION RATE: 16 BRPM | BODY MASS INDEX: 32.44 KG/M2 | SYSTOLIC BLOOD PRESSURE: 125 MMHG | TEMPERATURE: 98.9 F | WEIGHT: 207.1 LBS | DIASTOLIC BLOOD PRESSURE: 80 MMHG | HEART RATE: 85 BPM | OXYGEN SATURATION: 95 %

## 2025-04-29 NOTE — DISCHARGE INSTR - COC
History:   Immunization History   Administered Date(s) Administered    COVID-19, PFIZER PURPLE top, DILUTE for use, (age 12 y+), 30mcg/0.3mL 02/01/2021, 03/01/2021, 04/16/2021, 05/07/2021    Influenza, FLUCELVAX, (age 6 mo+), MDCK, Quadv PF, 0.5mL 12/16/2022, 10/02/2023    Pneumococcal, PCV20, PREVNAR 20, (age 6w+), IM, 0.5mL 03/01/2022    Pneumococcal, PPSV23, PNEUMOVAX 23, (age 2y+), SC/IM, 0.5mL 02/22/2022       Active Problems:  Patient Active Problem List   Diagnosis Code    Type 2 diabetes mellitus without complication (HCC) E11.9    Mixed hyperlipidemia E78.2    Tobacco abuse Z72.0    Cervical pain (neck) M54.2    DKA, type 2, not at goal (HCC) E11.10    Diabetic ketoacidosis (HCC) E11.10    Neck pain M54.2    Pneumonia J18.9    Diarrhea R19.7    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    Acute lower limb ischemia I99.8    ANTIONE (acute kidney injury) N17.9    Non-traumatic rhabdomyolysis M62.82    Acute hypercapnic respiratory failure (HCC) J96.02    Acute metabolic encephalopathy G93.41    Shock (HCC) R57.9    Atrial fibrillation (HCC) I48.91    Hemodialysis patient Z99.2    Anemia D64.9    Parotid mass K11.8    History of fasciotomy Z98.890       Isolation/Infection:   Isolation            No Isolation          Patient Infection Status    None to display              Nurse Assessment:  Last Vital Signs: /80   Pulse 85   Temp 98.9 °F (37.2 °C) (Oral)   Resp 16   Wt 93.9 kg (207 lb 1.6 oz)   SpO2 95%   BMI 32.44 kg/m²     Last documented pain score (0-10 scale):    Last Weight:   Wt Readings from Last 1 Encounters:   04/28/25 93.9 kg (207 lb 1.6 oz)     Mental Status:  {IP PT MENTAL STATUS:20030}    IV Access:  { [facility] IV ACCESS:119305668}    Nursing Mobility/ADLs:  Walking   {[facility] DME ADLs:863091403}  Transfer  {[facility] DME ADLs:222747129}  Bathing  {[facility] DME ADLs:326392994}  Dressing  {[facility] DME ADLs:641716070}  Toileting  {[facility] DME ADLs:305481472}  Feeding  {[facility] DME ADLs:361256302}  Med Admin  {[facility] DME

## 2025-04-29 NOTE — ED NOTES
[facility] has pushed  time back several times and this RN sets up transport with [facility] for  time of 0330.

## 2025-05-08 NOTE — CARE COORDINATION
Social Work:    iMreya at [facility] advised that they will start the authorization for readmission today. (N-17, ambulance completed)    Electronically signed by JENN Jackson on 12/9/2024 at 2:41 PM   Aklief counseling:  Patient advised to apply a pea-sized amount only at bedtime and wait 30 minutes after washing their face before applying.  If too drying, patient may add a non-comedogenic moisturizer.  The most commonly reported side effects including irritation, redness, scaling, dryness, stinging, burning, itching, and increased risk of sunburn.  The patient verbalized understanding of the proper use and possible adverse effects of retinoids.  All of the patient's questions and concerns were addressed. Hydroxychloroquine Counseling:  I discussed with the patient that a baseline ophthalmologic exam is needed at the start of therapy and every year thereafter while on therapy. A CBC may also be warranted for monitoring.  The side effects of this medication were discussed with the patient, including but not limited to agranulocytosis, aplastic anemia, seizures, rashes, retinopathy, and liver toxicity. Patient instructed to call the office should any adverse effect occur.  The patient verbalized understanding of the proper use and possible adverse effects of Plaquenil.  All the patient's questions and concerns were addressed. Tetracycline Counseling: Patient counseled regarding possible photosensitivity and increased risk for sunburn.  Patient instructed to avoid sunlight, if possible.  When exposed to sunlight, patients should wear protective clothing, sunglasses, and sunscreen.  The patient was instructed to call the office immediately if the following severe adverse effects occur:  hearing changes, easy bruising/bleeding, severe headache, or vision changes.  The patient verbalized understanding of the proper use and possible adverse effects of tetracycline.  All of the patient's questions and concerns were addressed. Patient understands to avoid pregnancy while on therapy due to potential birth defects. Qbrexza Counseling:  I discussed with the patient the risks of Qbrexza including but not limited to headache, mydriasis, blurred vision, dry eyes, nasal dryness, dry mouth, dry throat, dry skin, urinary hesitation, and constipation.  Local skin reactions including erythema, burning, stinging, and itching can also occur. Tremfya Counseling: I discussed with the patient the risks of guselkumab including but not limited to immunosuppression, serious infections, worsening of inflammatory bowel disease and drug reactions.  The patient understands that monitoring is required including a PPD at baseline and must alert us or the primary physician if symptoms of infection or other concerning signs are noted. Ketoconazole Counseling:   Patient counseled regarding improving absorption with orange juice.  Adverse effects include but are not limited to breast enlargement, headache, diarrhea, nausea, upset stomach, liver function test abnormalities, taste disturbance, and stomach pain.  There is a rare possibility of liver failure that can occur when taking ketoconazole. The patient understands that monitoring of LFTs may be required, especially at baseline. The patient verbalized understanding of the proper use and possible adverse effects of ketoconazole.  All of the patient's questions and concerns were addressed. Zoryve Pregnancy And Lactation Text: It is unknown if this medication can cause problems during pregnancy and breastfeeding. Cibinqo Counseling: I discussed with the patient the risks of Cibinqo therapy including but not limited to common cold, nausea, headache, cold sores, increased blood CPK levels, dizziness, UTIs, fatigue, acne, and vomitting. Live vaccines should be avoided.  This medication has been linked to serious infections; higher rate of mortality; malignancy and lymphoproliferative disorders; major adverse cardiovascular events; thrombosis; thrombocytopenia and lymphopenia; lipid elevations; and retinal detachment. Topical Clindamycin Counseling: Patient counseled that this medication may cause skin irritation or allergic reactions.  In the event of skin irritation, the patient was advised to reduce the amount of the drug applied or use it less frequently.   The patient verbalized understanding of the proper use and possible adverse effects of clindamycin.  All of the patient's questions and concerns were addressed. Odomzo Pregnancy And Lactation Text: This medication is Pregnancy Category X and is absolutely contraindicated during pregnancy. It is unknown if it is excreted in breast milk. Nemluvio Pregnancy And Lactation Text: It is not known if Nemluvio causes fetal harm or is present in breast milk. Please proceed with caution if patients who are pregnant or breastfeeding. Enbrel Counseling:  I discussed with the patient the risks of etanercept including but not limited to myelosuppression, immunosuppression, autoimmune hepatitis, demyelinating diseases, lymphoma, and infections.  The patient understands that monitoring is required including a PPD at baseline and must alert us or the primary physician if symptoms of infection or other concerning signs are noted. Ketoconazole Pregnancy And Lactation Text: This medication is Pregnancy Category C and it isn't know if it is safe during pregnancy. It is also excreted in breast milk and breast feeding isn't recommended. Qbrexza Pregnancy And Lactation Text: There is no available data on Qbrexza use in pregnant women.  There is no available data on Qbrexza use in lactation. SSKI Counseling:  I discussed with the patient the risks of SSKI including but not limited to thyroid abnormalities, metallic taste, GI upset, fever, headache, acne, arthralgias, paraesthesias, lymphadenopathy, easy bleeding, arrhythmias, and allergic reaction. Eucrisa Pregnancy And Lactation Text: This medication has not been assigned a Pregnancy Risk Category but animal studies failed to show danger with the topical medication. It is unknown if the medication is excreted in breast milk. Hydroxyzine Pregnancy And Lactation Text: This medication is not safe during pregnancy and should not be taken. It is also excreted in breast milk and breast feeding isn't recommended. Tetracycline Pregnancy And Lactation Text: This medication is Pregnancy Category D and not consider safe during pregnancy. It is also excreted in breast milk. Sski Pregnancy And Lactation Text: This medication is Pregnancy Category D and isn't considered safe during pregnancy. It is excreted in breast milk. Hydroquinone Counseling:  Patient advised that medication may result in skin irritation, lightening (hypopigmentation), dryness, and burning.  In the event of skin irritation, the patient was advised to reduce the amount of the drug applied or use it less frequently.  Rarely, spots that are treated with hydroquinone can become darker (pseudoochronosis).  Should this occur, patient instructed to stop medication and call the office. The patient verbalized understanding of the proper use and possible adverse effects of hydroquinone.  All of the patient's questions and concerns were addressed. Zyclara Counseling:  I discussed with the patient the risks of imiquimod including but not limited to erythema, scaling, itching, weeping, crusting, and pain.  Patient understands that the inflammatory response to imiquimod is variable from person to person and was educated regarded proper titration schedule.  If flu-like symptoms develop, patient knows to discontinue the medication and contact us. Hydroxychloroquine Pregnancy And Lactation Text: This medication has been shown to cause fetal harm but it isn't assigned a Pregnancy Risk Category. There are small amounts excreted in breast milk. Valtrex Pregnancy And Lactation Text: this medication is Pregnancy Category B and is considered safe during pregnancy. This medication is not directly found in breast milk but it's metabolite acyclovir is present. Tremfya Pregnancy And Lactation Text: The risk during pregnancy and breastfeeding is uncertain with this medication. Cephalexin Pregnancy And Lactation Text: This medication is Pregnancy Category B and considered safe during pregnancy.  It is also excreted in breast milk but can be used safely for shorter doses. Aklief Pregnancy And Lactation Text: It is unknown if this medication is safe to use during pregnancy.  It is unknown if this medication is excreted in breast milk.  Breastfeeding women should use the topical cream on the smallest area of the skin for the shortest time needed while breastfeeding.  Do not apply to nipple and areola. Use Enhanced Medication Counseling?: No Topical Clindamycin Pregnancy And Lactation Text: This medication is Pregnancy Category B and is considered safe during pregnancy. It is unknown if it is excreted in breast milk. Enbrel Pregnancy And Lactation Text: This medication is Pregnancy Category B and is considered safe during pregnancy. It is unknown if this medication is excreted in breast milk. Cantharidin Pregnancy And Lactation Text: This medication has not been proven safe during pregnancy. It is unknown if this medication is excreted in breast milk. Terbinafine Counseling: Patient counseling regarding adverse effects of terbinafine including but not limited to headache, diarrhea, rash, upset stomach, liver function test abnormalities, itching, taste/smell disturbance, nausea, abdominal pain, and flatulence.  There is a rare possibility of liver failure that can occur when taking terbinafine.  The patient understands that a baseline LFT and kidney function test may be required. The patient verbalized understanding of the proper use and possible adverse effects of terbinafine.  All of the patient's questions and concerns were addressed. Xolair Counseling:  Patient informed of potential adverse effects including but not limited to fever, muscle aches, rash and allergic reactions.  The patient verbalized understanding of the proper use and possible adverse effects of Xolair.  All of the patient's questions and concerns were addressed. Birth Control Pills Pregnancy And Lactation Text: This medication should be avoided if pregnant and for the first 30 days post-partum. Rhofade Counseling: Rhofade is a topical medication which can decrease superficial blood flow where applied. Side effects are uncommon and include stinging, redness and allergic reactions. Cibinqo Pregnancy And Lactation Text: It is unknown if this medication will adversely affect pregnancy or breast feeding.  You should not take this medication if you are currently pregnant or planning a pregnancy or while breastfeeding. Rituxan Counseling:  I discussed with the patient the risks of Rituxan infusions. Side effects can include infusion reactions, severe drug rashes including mucocutaneous reactions, reactivation of latent hepatitis and other infections and rarely progressive multifocal leukoencephalopathy.  All of the patient's questions and concerns were addressed. Sotyktu Counseling:  I discussed the most common side effects of Sotyktu including: common cold, sore throat, sinus infections, cold sores, canker sores, folliculitis, and acne.? I also discussed more serious side effects of Sotyktu including but not limited to: serious allergic reactions; increased risk for infections such as TB; cancers such as lymphomas; rhabdomyolysis and elevated CPK; and elevated triglycerides and liver enzymes.? Spironolactone Counseling: Patient advised regarding risks of diarrhea, abdominal pain, hyperkalemia, birth defects (for female patients), liver toxicity and renal toxicity. The patient may need blood work to monitor liver and kidney function and potassium levels while on therapy. The patient verbalized understanding of the proper use and possible adverse effects of spironolactone.  All of the patient's questions and concerns were addressed. Thalidomide Counseling: I discussed with the patient the risks of thalidomide including but not limited to birth defects, anxiety, weakness, chest pain, dizziness, cough and severe allergy. Clindamycin Counseling: I counseled the patient regarding use of clindamycin as an antibiotic for prophylactic and/or therapeutic purposes. Clindamycin is active against numerous classes of bacteria, including skin bacteria. Side effects may include nausea, diarrhea, gastrointestinal upset, rash, hives, yeast infections, and in rare cases, colitis. Zyclara Pregnancy And Lactation Text: This medication is Pregnancy Category C. It is unknown if this medication is excreted in breast milk. Low Dose Naltrexone Counseling- I discussed with the patient the potential risks and side effects of low dose naltrexone including but not limited to: more vivid dreams, headaches, nausea, vomiting, abdominal pain, fatigue, dizziness, and anxiety. Azelaic Acid Counseling: Patient counseled that medicine may cause skin irritation and to avoid applying near the eyes.  In the event of skin irritation, the patient was advised to reduce the amount of the drug applied or use it less frequently.   The patient verbalized understanding of the proper use and possible adverse effects of azelaic acid.  All of the patient's questions and concerns were addressed. Clindamycin Pregnancy And Lactation Text: This medication can be used in pregnancy if certain situations. Clindamycin is also present in breast milk. Xolair Pregnancy And Lactation Text: This medication is Pregnancy Category B and is considered safe during pregnancy. This medication is excreted in breast milk. Low Dose Naltrexone Pregnancy And Lactation Text: Naltrexone is pregnancy category C.  There have been no adequate and well-controlled studies in pregnant women.  It should be used in pregnancy only if the potential benefit justifies the potential risk to the fetus.   Limited data indicates that naltrexone is minimally excreted into breastmilk. Rituxan Pregnancy And Lactation Text: This medication is Pregnancy Category C and it isn't know if it is safe during pregnancy. It is unknown if this medication is excreted in breast milk but similar antibodies are known to be excreted. Azithromycin Counseling:  I discussed with the patient the risks of azithromycin including but not limited to GI upset, allergic reaction, drug rash, diarrhea, and yeast infections. Topical Ketoconazole Counseling: Patient counseled that this medication may cause skin irritation or allergic reactions.  In the event of skin irritation, the patient was advised to reduce the amount of the drug applied or use it less frequently.   The patient verbalized understanding of the proper use and possible adverse effects of ketoconazole.  All of the patient's questions and concerns were addressed. Litfulo Counseling: I discussed with the patient the risks of Litfulo therapy including but not limited to upper respiratory tract infections, shingles, cold sores, and nausea. Live vaccines should be avoided.  This medication has been linked to serious infections; higher rate of mortality; malignancy and lymphoproliferative disorders; major adverse cardiovascular events; thrombosis; gastrointestinal perforations; neutropenia; lymphopenia; anemia; liver enzyme elevations; and lipid elevations. Rhofade Pregnancy And Lactation Text: This medication has not been assigned a Pregnancy Risk Category. It is unknown if the medication is excreted in breast milk. Terbinafine Pregnancy And Lactation Text: This medication is Pregnancy Category B and is considered safe during pregnancy. It is also excreted in breast milk and breast feeding isn't recommended. Humira Counseling:  I discussed with the patient the risks of adalimumab including but not limited to myelosuppression, immunosuppression, autoimmune hepatitis, demyelinating diseases, lymphoma, and serious infections.  The patient understands that monitoring is required including a PPD at baseline and must alert us or the primary physician if symptoms of infection or other concerning signs are noted. Imiquimod Counseling:  I discussed with the patient the risks of imiquimod including but not limited to erythema, scaling, itching, weeping, crusting, and pain.  Patient understands that the inflammatory response to imiquimod is variable from person to person and was educated regarded proper titration schedule.  If flu-like symptoms develop, patient knows to discontinue the medication and contact us. Litfulo Pregnancy And Lactation Text: Based on animal studies, Lifulo may cause embryo-fetal harm when administered to pregnant women.  The medication should not be used in pregnancy.  Breastfeeding is not recommended during treatment. Azithromycin Pregnancy And Lactation Text: This medication is considered safe during pregnancy and is also secreted in breast milk. Azelaic Acid Pregnancy And Lactation Text: This medication is considered safe during pregnancy and breast feeding. Spironolactone Pregnancy And Lactation Text: This medication can cause feminization of the male fetus and should be avoided during pregnancy. The active metabolite is also found in breast milk. Benzoyl Peroxide Counseling: Patient counseled that medicine may cause skin irritation and bleach clothing.  In the event of skin irritation, the patient was advised to reduce the amount of the drug applied or use it less frequently.   The patient verbalized understanding of the proper use and possible adverse effects of benzoyl peroxide.  All of the patient's questions and concerns were addressed. Niacinamide Counseling: I recommended taking niacin or niacinamide, also know as vitamin B3, twice daily. Recent evidence suggests that taking vitamin B3 (500 mg twice daily) can reduce the risk of actinic keratoses and non-melanoma skin cancers. Side effects of vitamin B3 include flushing and headache. Doxycycline Counseling:  Patient counseled regarding possible photosensitivity and increased risk for sunburn.  Patient instructed to avoid sunlight, if possible.  When exposed to sunlight, patients should wear protective clothing, sunglasses, and sunscreen.  The patient was instructed to call the office immediately if the following severe adverse effects occur:  hearing changes, easy bruising/bleeding, severe headache, or vision changes.  The patient verbalized understanding of the proper use and possible adverse effects of doxycycline.  All of the patient's questions and concerns were addressed. Siliq Counseling:  I discussed with the patient the risks of Siliq including but not limited to new or worsening depression, suicidal thoughts and behavior, immunosuppression, malignancy, posterior leukoencephalopathy syndrome, and serious infections.  The patient understands that monitoring is required including a PPD at baseline and must alert us or the primary physician if symptoms of infection or other concerning signs are noted. There is also a special program designed to monitor depression which is required with Siliq. Solaraze Counseling:  I discussed with the patient the risks of Solaraze including but not limited to erythema, scaling, itching, weeping, crusting, and pain. Picato Counseling:  I discussed with the patient the risks of Picato including but not limited to erythema, scaling, itching, weeping, crusting, and pain. Methotrexate Counseling:  Patient counseled regarding adverse effects of methotrexate including but not limited to nausea, vomiting, abnormalities in liver function tests. Patients may develop mouth sores, rash, diarrhea, and abnormalities in blood counts. The patient understands that monitoring is required including LFT's and blood counts.  There is a rare possibility of scarring of the liver and lung problems that can occur when taking methotrexate. Persistent nausea, loss of appetite, pale stools, dark urine, cough, and shortness of breath should be reported immediately. Patient advised to discontinue methotrexate treatment at least three months before attempting to become pregnant.  I discussed the need for folate supplements while taking methotrexate.  These supplements can decrease side effects during methotrexate treatment. The patient verbalized understanding of the proper use and possible adverse effects of methotrexate.  All of the patient's questions and concerns were addressed. Griseofulvin Counseling:  I discussed with the patient the risks of griseofulvin including but not limited to photosensitivity, cytopenia, liver damage, nausea/vomiting and severe allergy.  The patient understands that this medication is best absorbed when taken with a fatty meal (e.g., ice cream or french fries). Opioid Pregnancy And Lactation Text: These medications can lead to premature delivery and should be avoided during pregnancy. These medications are also present in breast milk in small amounts. Dutasteride Pregnancy And Lactation Text: This medication is absolutely contraindicated in women, especially during pregnancy and breast feeding. Feminization of male fetuses is possible if taking while pregnant. Otezla Pregnancy And Lactation Text: This medication is Pregnancy Category C and it isn't known if it is safe during pregnancy. It is unknown if it is excreted in breast milk. Drysol Counseling:  I discussed with the patient the risks of drysol/aluminum chloride including but not limited to skin rash, itching, irritation, burning. Erivedge Counseling- I discussed with the patient the risks of Erivedge including but not limited to nausea, vomiting, diarrhea, constipation, weight loss, changes in the sense of taste, decreased appetite, muscle spasms, and hair loss.  The patient verbalized understanding of the proper use and possible adverse effects of Erivedge.  All of the patient's questions and concerns were addressed. Cimetidine Counseling:  I discussed with the patient the risks of Cimetidine including but not limited to gynecomastia, headache, diarrhea, nausea, drowsiness, arrhythmias, pancreatitis, skin rashes, psychosis, bone marrow suppression and kidney toxicity. High Dose Vitamin A Counseling: Side effects reviewed, pt to contact office should one occur. Gabapentin Counseling: I discussed with the patient the risks of gabapentin including but not limited to dizziness, somnolence, fatigue and ataxia. Ivermectin Pregnancy And Lactation Text: This medication is Pregnancy Category C and it isn't known if it is safe during pregnancy. It is also excreted in breast milk. Oxybutynin Counseling:  I discussed with the patient the risks of oxybutynin including but not limited to skin rash, drowsiness, dry mouth, difficulty urinating, and blurred vision. Finasteride Male Counseling: Finasteride Counseling:  I discussed with the patient the risks of use of finasteride including but not limited to decreased libido, decreased ejaculate volume, gynecomastia, and depression. Women should not handle medication.  All of the patient's questions and concerns were addressed. Winlevi Pregnancy And Lactation Text: This medication is considered safe during pregnancy and breastfeeding. Stelara Counseling:  I discussed with the patient the risks of ustekinumab including but not limited to immunosuppression, malignancy, posterior leukoencephalopathy syndrome, and serious infections.  The patient understands that monitoring is required including a PPD at baseline and must alert us or the primary physician if symptoms of infection or other concerning signs are noted. Rifampin Counseling: I discussed with the patient the risks of rifampin including but not limited to liver damage, kidney damage, red-orange body fluids, nausea/vomiting and severe allergy. Azathioprine Counseling:  I discussed with the patient the risks of azathioprine including but not limited to myelosuppression, immunosuppression, hepatotoxicity, lymphoma, and infections.  The patient understands that monitoring is required including baseline LFTs, Creatinine, possible TPMP genotyping and weekly CBCs for the first month and then every 2 weeks thereafter.  The patient verbalized understanding of the proper use and possible adverse effects of azathioprine.  All of the patient's questions and concerns were addressed. Wegovy Pregnancy And Lactation Text: The fetal risk of this medication is unknown and taking while pregnant is not recommended. It is unknown if this medication is present in breast milk. Topical Retinoid counseling:  Patient advised to apply a pea-sized amount only at bedtime and wait 30 minutes after washing their face before applying.  If too drying, patient may add a non-comedogenic moisturizer. The patient verbalized understanding of the proper use and possible adverse effects of retinoids.  All of the patient's questions and concerns were addressed. Griseofulvin Pregnancy And Lactation Text: This medication is Pregnancy Category X and is known to cause serious birth defects. It is unknown if this medication is excreted in breast milk but breast feeding should be avoided. Dupixent Counseling: I discussed with the patient the risks of dupilumab including but not limited to eye infection and irritation, cold sores, injection site reactions, worsening of asthma, allergic reactions and increased risk of parasitic infection.  Live vaccines should be avoided while taking dupilumab. Dupilumab will also interact with certain medications such as warfarin and cyclosporine. The patient understands that monitoring is required and they must alert us or the primary physician if symptoms of infection or other concerning signs are noted. Sotyktu Pregnancy And Lactation Text: There is insufficient data to evaluate whether or not Sotyktu is safe to use during pregnancy.? ?It is not known if Sotyktu passes into breast milk and whether or not it is safe to use when breastfeeding.?? High Dose Vitamin A Pregnancy And Lactation Text: High dose vitamin A therapy is contraindicated during pregnancy and breast feeding. Methotrexate Pregnancy And Lactation Text: This medication is Pregnancy Category X and is known to cause fetal harm. This medication is excreted in breast milk. Finasteride Female Counseling: Finasteride Counseling:  I discussed with the patient the risks of use of finasteride including but not limited to decreased libido and sexual dysfunction. Explained the teratogenic nature of the medication and stressed the importance of not getting pregnant during treatment. All of the patient's questions and concerns were addressed. Elidel Counseling: Patient may experience a mild burning sensation during topical application. Elidel is not approved in children less than 2 years of age. There have been case reports of hematologic and skin malignancies in patients using topical calcineurin inhibitors although causality is questionable. VTAMA Counseling: I discussed with the patient that VTAMA is not for use in the eyes, mouth or mouth. They should call the office if they develop any signs of allergic reactions to VTAMA. The patient verbalized understanding of the proper use and possible adverse effects of VTAMA.  All of the patient's questions and concerns were addressed. Gabapentin Pregnancy And Lactation Text: This medication is Pregnancy Category C and isn't considered safe during pregnancy. It is excreted in breast milk. Rifampin Pregnancy And Lactation Text: This medication is Pregnancy Category C and it isn't know if it is safe during pregnancy. It is also excreted in breast milk and should not be used if you are breast feeding. Zepbound Counseling: I reviewed the possible side effects including: thyroid tumors, kidney disease, gallbladder disease, abdominal pain, constipation, diarrhea, nausea, vomiting and pancreatitis. Do not take this medication if you have a history or family history of multiple endocrine neoplasia syndrome type 2. Side effects reviewed, pt to contact office should one occur. Taltz Counseling: I discussed with the patient the risks of ixekizumab including but not limited to immunosuppression, serious infections, worsening of inflammatory bowel disease and drug reactions.  The patient understands that monitoring is required including a PPD at baseline and must alert us or the primary physician if symptoms of infection or other concerning signs are noted. Dupixent Pregnancy And Lactation Text: This medication likely crosses the placenta but the risk for the fetus is uncertain. This medication is excreted in breast milk. Doxepin Counseling:  Patient advised that the medication is sedating and not to drive a car after taking this medication. Patient informed of potential adverse effects including but not limited to dry mouth, urinary retention, and blurry vision.  The patient verbalized understanding of the proper use and possible adverse effects of doxepin.  All of the patient's questions and concerns were addressed. Libtayo Counseling- I discussed with the patient the risks of Libtayo including but not limited to nausea, vomiting, diarrhea, and bone or muscle pain.  The patient verbalized understanding of the proper use and possible adverse effects of Libtayo.  All of the patient's questions and concerns were addressed. Itraconazole Counseling:  I discussed with the patient the risks of itraconazole including but not limited to liver damage, nausea/vomiting, neuropathy, and severe allergy.  The patient understands that this medication is best absorbed when taken with acidic beverages such as non-diet cola or ginger ale.  The patient understands that monitoring is required including baseline LFTs and repeat LFTs at intervals.  The patient understands that they are to contact us or the primary physician if concerning signs are noted. Prednisone Counseling:  I discussed with the patient the risks of prolonged use of prednisone including but not limited to weight gain, insomnia, osteoporosis, mood changes, diabetes, susceptibility to infection, glaucoma and high blood pressure.  In cases where prednisone use is prolonged, patients should be monitored with blood pressure checks, serum glucose levels and an eye exam.  Additionally, the patient may need to be placed on GI prophylaxis, PCP prophylaxis, and calcium and vitamin D supplementation and/or a bisphosphonate.  The patient verbalized understanding of the proper use and the possible adverse effects of prednisone.  All of the patient's questions and concerns were addressed. Protopic Counseling: Patient may experience a mild burning sensation during topical application. Protopic is not approved in children less than 2 years of age. There have been case reports of hematologic and skin malignancies in patients using topical calcineurin inhibitors although causality is questionable. Doxepin Pregnancy And Lactation Text: This medication is Pregnancy Category C and it isn't known if it is safe during pregnancy. It is also excreted in breast milk and breast feeding isn't recommended. Detail Level: Simple Libtayo Pregnancy And Lactation Text: This medication is contraindicated in pregnancy and when breast feeding. Tazorac Counseling:  Patient advised that medication is irritating and drying.  Patient may need to apply sparingly and wash off after an hour before eventually leaving it on overnight.  The patient verbalized understanding of the proper use and possible adverse effects of tazorac.  All of the patient's questions and concerns were addressed. Prednisone Pregnancy And Lactation Text: This medication is Pregnancy Category C and it isn't know if it is safe during pregnancy. This medication is excreted in breast milk. Sarecycline Counseling: Patient advised regarding possible photosensitivity and discoloration of the teeth, skin, lips, tongue and gums.  Patient instructed to avoid sunlight, if possible.  When exposed to sunlight, patients should wear protective clothing, sunglasses, and sunscreen.  The patient was instructed to call the office immediately if the following severe adverse effects occur:  hearing changes, easy bruising/bleeding, severe headache, or vision changes.  The patient verbalized understanding of the proper use and possible adverse effects of sarecycline.  All of the patient's questions and concerns were addressed. Propranolol Counseling:  I discussed with the patient the risks of propranolol including but not limited to low heart rate, low blood pressure, low blood sugar, restlessness and increased cold sensitivity. They should call the office if they experience any of these side effects. Glycopyrrolate Counseling:  I discussed with the patient the risks of glycopyrrolate including but not limited to skin rash, drowsiness, dry mouth, difficulty urinating, and blurred vision. Finasteride Pregnancy And Lactation Text: This medication is absolutely contraindicated during pregnancy. It is unknown if it is excreted in breast milk. Glycopyrrolate Pregnancy And Lactation Text: This medication is Pregnancy Category B and is considered safe during pregnancy. It is unknown if it is excreted breast milk. Ebglyss Counseling: I discussed with the patient the risks of lebrikizumab including but not limited to eye inflammation and irritation, cold sores, injection site reactions, allergic reactions and increased risk of parasitic infection. The patient understands that monitoring is required and they must alert us or the primary physician if symptoms of infection or other concerning signs are noted. Protopic Pregnancy And Lactation Text: This medication is Pregnancy Category C. It is unknown if this medication is excreted in breast milk when applied topically. Itraconazole Pregnancy And Lactation Text: This medication is Pregnancy Category C and it isn't know if it is safe during pregnancy. It is also excreted in breast milk. Zoryve Counseling:  I discussed with the patient that Zoryve is not for use in the eyes, mouth or vagina. The most commonly reported side effects include diarrhea, headache, insomnia, application site pain, upper respiratory tract infections, and urinary tract infections.  All of the patient's questions and concerns were addressed. Eucrisa Counseling: Patient may experience a mild burning sensation during topical application. Eucrisa is not approved in children less than 2 years of age. Odomzo Counseling- I discussed with the patient the risks of Odomzo including but not limited to nausea, vomiting, diarrhea, constipation, weight loss, changes in the sense of taste, decreased appetite, muscle spasms, and hair loss.  The patient verbalized understanding of the proper use and possible adverse effects of Odomzo.  All of the patient's questions and concerns were addressed. Ebglyss Pregnancy And Lactation Text: This medication likely crosses the placenta but the risk for the fetus is uncertain. It is unknown if this medication is excreted in breast milk. Tazorac Pregnancy And Lactation Text: This medication is not safe during pregnancy. It is unknown if this medication is excreted in breast milk. Birth Control Pills Counseling: Birth Control Pill Counseling: I discussed with the patient the potential side effects of OCPs including but not limited to increased risk of stroke, heart attack, thrombophlebitis, deep venous thrombosis, hepatic adenomas, breast changes, GI upset, headaches, and depression.  The patient verbalized understanding of the proper use and possible adverse effects of OCPs. All of the patient's questions and concerns were addressed. Propranolol Pregnancy And Lactation Text: This medication is Pregnancy Category C and it isn't known if it is safe during pregnancy. It is excreted in breast milk. Hydroxyzine Counseling: Patient advised that the medication is sedating and not to drive a car after taking this medication.  Patient informed of potential adverse effects including but not limited to dry mouth, urinary retention, and blurry vision.  The patient verbalized understanding of the proper use and possible adverse effects of hydroxyzine.  All of the patient's questions and concerns were addressed. Include Pregnancy/Lactation Warning?: Add Automatically Based on Childbearing Potential and Patient Age Calcipotriene Pregnancy And Lactation Text: The use of this medication during pregnancy or lactation is not recommended as there is insufficient data. Olanzapine Pregnancy And Lactation Text: This medication is pregnancy category C.   There are no adequate and well controlled trials with olanzapine in pregnant females.  Olanzapine should be used during pregnancy only if the potential benefit justifies the potential risk to the fetus.   In a study in lactating healthy women, olanzapine was excreted in breast milk.  It is recommended that women taking olanzapine should not breast feed. Bexarotene Counseling:  I discussed with the patient the risks of bexarotene including but not limited to hair loss, dry lips/skin/eyes, liver abnormalities, hyperlipidemia, pancreatitis, depression/suicidal ideation, photosensitivity, drug rash/allergic reactions, hypothyroidism, anemia, leukopenia, infection, cataracts, and teratogenicity.  Patient understands that they will need regular blood tests to check lipid profile, liver function tests, white blood cell count, thyroid function tests and pregnancy test if applicable. Saxenda Counseling: I reviewed the possible side effects including: thyroid tumors, kidney disease, gallbladder disease, abdominal pain, constipation, diarrhea, nausea, vomiting and pancreatitis. Do not take this medication if you have a history or family history of multiple endocrine neoplasia syndrome type 2. Side effects reviewed, pt to contact office should one occur. Xeljanz Counseling: I discussed with the patient the risks of Xeljanz therapy including increased risk of infection, liver issues, headache, diarrhea, or cold symptoms. Live vaccines should be avoided. They were instructed to call if they have any problems. Metronidazole Pregnancy And Lactation Text: This medication is Pregnancy Category B and considered safe during pregnancy.  It is also excreted in breast milk. Infliximab Counseling:  I discussed with the patient the risks of infliximab including but not limited to myelosuppression, immunosuppression, autoimmune hepatitis, demyelinating diseases, lymphoma, and serious infections.  The patient understands that monitoring is required including a PPD at baseline and must alert us or the primary physician if symptoms of infection or other concerning signs are noted. Cyclophosphamide Counseling:  I discussed with the patient the risks of cyclophosphamide including but not limited to hair loss, hormonal abnormalities, decreased fertility, abdominal pain, diarrhea, nausea and vomiting, bone marrow suppression and infection. The patient understands that monitoring is required while taking this medication. Xelbenjiz Pregnancy And Lactation Text: This medication is Pregnancy Category D and is not considered safe during pregnancy.  The risk during breast feeding is also uncertain. Mirvaso Counseling: Mirvaso is a topical medication which can decrease superficial blood flow where applied. Side effects are uncommon and include stinging, redness and allergic reactions. Skyrizi Counseling: I discussed with the patient the risks of risankizumab-rzaa including but not limited to immunosuppression, and serious infections.  The patient understands that monitoring is required including a PPD at baseline and must alert us or the primary physician if symptoms of infection or other concerning signs are noted. Bimzelx Pregnancy And Lactation Text: This medication crosses the placenta and the safety is uncertain during pregnancy. It is unknown if this medication is present in breast milk. Colchicine Counseling:  Patient counseled regarding adverse effects including but not limited to stomach upset (nausea, vomiting, stomach pain, or diarrhea).  Patient instructed to limit alcohol consumption while taking this medication.  Colchicine may reduce blood counts especially with prolonged use.  The patient understands that monitoring of kidney function and blood counts may be required, especially at baseline. The patient verbalized understanding of the proper use and possible adverse effects of colchicine.  All of the patient's questions and concerns were addressed. Oral Minoxidil Counseling- I discussed with the patient the risks of oral minoxidil including but not limited to shortness of breath, swelling of the feet or ankles, dizziness, lightheadedness, unwanted hair growth and allergic reaction.  The patient verbalized understanding of the proper use and possible adverse effects of oral minoxidil.  All of the patient's questions and concerns were addressed. Bexarotene Pregnancy And Lactation Text: This medication is Pregnancy Category X and should not be given to women who are pregnant or may become pregnant. This medication should not be used if you are breast feeding. Cantharidin Counseling:  I discussed with the patient the risks of Cantharidin including but not limited to pain, redness, burning, itching, and blistering. Topical Sulfur Applications Pregnancy And Lactation Text: This medication is Pregnancy Category C and has an unknown safety profile during pregnancy. It is unknown if this topical medication is excreted in breast milk. Minocycline Counseling: Patient advised regarding possible photosensitivity and discoloration of the teeth, skin, lips, tongue and gums.  Patient instructed to avoid sunlight, if possible.  When exposed to sunlight, patients should wear protective clothing, sunglasses, and sunscreen.  The patient was instructed to call the office immediately if the following severe adverse effects occur:  hearing changes, easy bruising/bleeding, severe headache, or vision changes.  The patient verbalized understanding of the proper use and possible adverse effects of minocycline.  All of the patient's questions and concerns were addressed. Nemluvio Counseling: I discussed with the patient the risks of nemolizumab including but not limited to headache, gastrointestinal complaints, nasopharyngitis, musculoskeletal complaints, injection site reactions, and allergic reactions. The patient understands that monitoring is required and they must alert us or the primary physician if any side effects are noted. Cimzia Counseling:  I discussed with the patient the risks of Cimzia including but not limited to immunosuppression, allergic reactions and infections.  The patient understands that monitoring is required including a PPD at baseline and must alert us or the primary physician if symptoms of infection or other concerning signs are noted. Wartpeel Counseling:  I discussed with the patient the risks of Wartpeel including but not limited to erythema, scaling, itching, weeping, crusting, and pain. Semaglutide Counseling: I reviewed the possible side effects including: thyroid tumors, kidney disease, gallbladder disease, abdominal pain, constipation, diarrhea, nausea, vomiting and pancreatitis. Do not take this medication if you have a history or family history of multiple endocrine neoplasia syndrome type 2. Side effects reviewed, pt to contact office should one occur. Albendazole Counseling:  I discussed with the patient the risks of albendazole including but not limited to cytopenia, kidney damage, nausea/vomiting and severe allergy.  The patient understands that this medication is being used in an off-label manner. Cyclophosphamide Pregnancy And Lactation Text: This medication is Pregnancy Category D and it isn't considered safe during pregnancy. This medication is excreted in breast milk. Isotretinoin Counseling: Patient should get monthly blood tests, not donate blood, not drive at night if vision affected, not share medication, and not undergo elective surgery for 6 months after tx completed. Side effects reviewed, pt to contact office should one occur. Cyclosporine Counseling:  I discussed with the patient the risks of cyclosporine including but not limited to hypertension, gingival hyperplasia,myelosuppression, immunosuppression, liver damage, kidney damage, neurotoxicity, lymphoma, and serious infections. The patient understands that monitoring is required including baseline blood pressure, CBC, CMP, lipid panel and uric acid, and then 1-2 times monthly CMP and blood pressure. Opzelura Counseling:  I discussed with the patient the risks of Opzelura including but not limited to nasopharngitis, bronchitis, ear infection, eosinophila, hives, diarrhea, folliculitis, tonsillitis, and rhinorrhea.  Taken orally, this medication has been linked to serious infections; higher rate of mortality; malignancy and lymphoproliferative disorders; major adverse cardiovascular events; thrombosis; thrombocytopenia, anemia, and neutropenia; and lipid elevations. 5-Fu Counseling: 5-Fluorouracil Counseling:  I discussed with the patient the risks of 5-fluorouracil including but not limited to erythema, scaling, itching, weeping, crusting, and pain. Oral Minoxidil Pregnancy And Lactation Text: This medication should only be used when clearly needed if you are pregnant, attempting to become pregnant or breast feeding. Dutasteride Male Counseling: Dustasteride Counseling:  I discussed with the patient the risks of use of dutasteride including but not limited to decreased libido, decreased ejaculate volume, and gynecomastia. Women who can become pregnant should not handle medication.  All of the patient's questions and concerns were addressed. Spevigo Counseling: I discussed with the patient the risks of Spevigo including but not limited to fatigue, nasuea, vomiting, headache, pruritus, urinary tract infection, an infusion related reactions.  The patient understands that monitoring is required including screening for tuberculosis at baseline and yearly screening thereafter while continuing Spevigo therapy. They should contact us if symptoms of infection or other concerning signs are noted. Wartpeel Pregnancy And Lactation Text: This medication is Pregnancy Category X and contraindicated in pregnancy and in women who may become pregnant. It is unknown if this medication is excreted in breast milk. Cimzia Pregnancy And Lactation Text: This medication crosses the placenta but can be considered safe in certain situations. Cimzia may be excreted in breast milk. Fluconazole Counseling:  Patient counseled regarding adverse effects of fluconazole including but not limited to headache, diarrhea, nausea, upset stomach, liver function test abnormalities, taste disturbance, and stomach pain.  There is a rare possibility of liver failure that can occur when taking fluconazole.  The patient understands that monitoring of LFTs and kidney function test may be required, especially at baseline. The patient verbalized understanding of the proper use and possible adverse effects of fluconazole.  All of the patient's questions and concerns were addressed. Opioid Counseling: I discussed with the patient the potential side effects of opioids including but not limited to addiction, altered mental status, and depression. I stressed avoiding alcohol, benzodiazepines, muscle relaxants and sleep aids unless specifically okayed by a physician. The patient verbalized understanding of the proper use and possible adverse effects of opioids. All of the patient's questions and concerns were addressed. They were instructed to flush the remaining pills down the toilet if they did not need them for pain. Cosentyx Counseling:  I discussed with the patient the risks of Cosentyx including but not limited to worsening of Crohn's disease, immunosuppression, allergic reactions and infections.  The patient understands that monitoring is required including a PPD at baseline and must alert us or the primary physician if symptoms of infection or other concerning signs are noted. Quinolones Counseling:  I discussed with the patient the risks of fluoroquinolones including but not limited to GI upset, allergic reaction, drug rash, diarrhea, dizziness, photosensitivity, yeast infections, liver function test abnormalities, tendonitis/tendon rupture. Dutasteride Female Counseling: Dutasteride Counseling:  I discussed with the patient the risks of use of dutasteride including but not limited to decreased libido and sexual dysfunction. Explained the teratogenic nature of the medication and stressed the importance of not getting pregnant during treatment. All of the patient's questions and concerns were addressed. Otezla Counseling: The side effects of Otezla were discussed with the patient, including but not limited to worsening or new depression, weight loss, diarrhea, nausea, upper respiratory tract infection, and headache. Patient instructed to call the office should any adverse effect occur.  The patient verbalized understanding of the proper use and possible adverse effects of Otezla.  All the patient's questions and concerns were addressed. Dapsone Counseling: I discussed with the patient the risks of dapsone including but not limited to hemolytic anemia, agranulocytosis, rashes, methemoglobinemia, kidney failure, peripheral neuropathy, headaches, GI upset, and liver toxicity.  Patients who start dapsone require monitoring including baseline LFTs and weekly CBCs for the first month, then every month thereafter.  The patient verbalized understanding of the proper use and possible adverse effects of dapsone.  All of the patient's questions and concerns were addressed. Isotretinoin Pregnancy And Lactation Text: This medication is Pregnancy Category X and is considered extremely dangerous during pregnancy. It is unknown if it is excreted in breast milk. Ivermectin Counseling:  Patient instructed to take medication on an empty stomach with a full glass of water.  Patient informed of potential adverse effects including but not limited to nausea, diarrhea, dizziness, itching, and swelling of the extremities or lymph nodes.  The patient verbalized understanding of the proper use and possible adverse effects of ivermectin.  All of the patient's questions and concerns were addressed. Wegovy Counseling: I reviewed the possible side effects including: thyroid tumors, kidney disease, gallbladder disease, abdominal pain, constipation, diarrhea, nausea, vomiting and pancreatitis. Do not take this medication if you have a history or family history of multiple endocrine neoplasia syndrome type 2. Side effects reviewed, pt to contact office should one occur. Dapsone Pregnancy And Lactation Text: This medication is Pregnancy Category C and is not considered safe during pregnancy or breast feeding. Spevigo Pregnancy And Lactation Text: The risk during pregnancy and breastfeeding is uncertain with this medication. This medication does cross the placenta. It is unknown if this medication is found in breast milk. Opzelura Pregnancy And Lactation Text: There is insufficient data to evaluate drug-associated risk for major birth defects, miscarriage, or other adverse maternal or fetal outcomes.  There is a pregnancy registry that monitors pregnancy outcomes in pregnant persons exposed to the medication during pregnancy.  It is unknown if this medication is excreted in breast milk.  Do not breastfeed during treatment and for about 4 weeks after the last dose. Winlevi Counseling:  I discussed with the patient the risks of topical clascoterone including but not limited to erythema, scaling, itching, and stinging. Patient voiced their understanding. Hyrimoz Counseling:  I discussed with the patient the risks of adalimumab including but not limited to myelosuppression, immunosuppression, autoimmune hepatitis, demyelinating diseases, lymphoma, and serious infections.  The patient understands that monitoring is required including a PPD at baseline and must alert us or the primary physician if symptoms of infection or other concerning signs are noted. Solaraze Pregnancy And Lactation Text: This medication is Pregnancy Category B and is considered safe. There is some data to suggest avoiding during the third trimester. It is unknown if this medication is excreted in breast milk. Topical Metronidazole Counseling: Metronidazole is a topical antibiotic medication. You may experience burning, stinging, redness, or allergic reactions.  Please call our office if you develop any problems from using this medication. Tranexamic Acid Counseling:  Patient advised of the small risk of bleeding problems with tranexamic acid. They were also instructed to call if they developed any nausea, vomiting or diarrhea. All of the patient's questions and concerns were addressed. Bactrim Counseling:  I discussed with the patient the risks of sulfa antibiotics including but not limited to GI upset, allergic reaction, drug rash, diarrhea, dizziness, photosensitivity, and yeast infections.  Rarely, more serious reactions can occur including but not limited to aplastic anemia, agranulocytosis, methemoglobinemia, blood dyscrasias, liver or kidney failure, lung infiltrates or desquamative/blistering drug rashes. Benzoyl Peroxide Pregnancy And Lactation Text: This medication is Pregnancy Category C. It is unknown if benzoyl peroxide is excreted in breast milk. Niacinamide Pregnancy And Lactation Text: These medications are considered safe during pregnancy. Olumiant Counseling: I discussed with the patient the risks of Olumiant therapy including but not limited to upper respiratory tract infections, shingles, cold sores, and nausea. Live vaccines should be avoided.  This medication has been linked to serious infections; higher rate of mortality; malignancy and lymphoproliferative disorders; major adverse cardiovascular events; thrombosis; gastrointestinal perforations; neutropenia; lymphopenia; anemia; liver enzyme elevations; and lipid elevations. Doxycycline Pregnancy And Lactation Text: This medication is Pregnancy Category D and not consider safe during pregnancy. It is also excreted in breast milk but is considered safe for shorter treatment courses. Olumiant Pregnancy And Lactation Text: Based on animal studies, Olumiant may cause embryo-fetal harm when administered to pregnant women.  The medication should not be used in pregnancy.  Breastfeeding is not recommended during treatment. Topical Metronidazole Pregnancy And Lactation Text: This medication is Pregnancy Category B and considered safe during pregnancy.  It is also considered safe to use while breastfeeding. Bactrim Pregnancy And Lactation Text: This medication is Pregnancy Category D and is known to cause fetal risk.  It is also excreted in breast milk. Simlandi Counseling:  I discussed with the patient the risks of adalimumab including but not limited to myelosuppression, immunosuppression, autoimmune hepatitis, demyelinating diseases, lymphoma, and serious infections.  The patient understands that monitoring is required including a PPD at baseline and must alert us or the primary physician if symptoms of infection or other concerning signs are noted. Soolantra Counseling: I discussed with the patients the risks of topial Soolantra. This is a medicine which decreases the number of mites and inflammation in the skin. You experience burning, stinging, eye irritation or allergic reactions.  Please call our office if you develop any problems from using this medication. Tranexamic Acid Pregnancy And Lactation Text: It is unknown if this medication is safe during pregnancy or breast feeding. Klisyri Counseling:  I discussed with the patient the risks of Klisyri including but not limited to erythema, scaling, itching, weeping, crusting, and pain. Azathioprine Pregnancy And Lactation Text: This medication is Pregnancy Category D and isn't considered safe during pregnancy. It is unknown if this medication is excreted in breast milk. Klisyri Pregnancy And Lactation Text: It is unknown if this medication can harm a developing fetus or if it is excreted in breast milk. Valtrex Counseling: I discussed with the patient the risks of valacyclovir including but not limited to kidney damage, nausea, vomiting and severe allergy.  The patient understands that if the infection seems to be worsening or is not improving, they are to call. Erythromycin Counseling:  I discussed with the patient the risks of erythromycin including but not limited to GI upset, allergic reaction, drug rash, diarrhea, increase in liver enzymes, and yeast infections. Arava Counseling:  Patient counseled regarding adverse effects of Arava including but not limited to nausea, vomiting, abnormalities in liver function tests. Patients may develop mouth sores, rash, diarrhea, and abnormalities in blood counts. The patient understands that monitoring is required including LFTs and blood counts.  There is a rare possibility of scarring of the liver and lung problems that can occur when taking methotrexate. Persistent nausea, loss of appetite, pale stools, dark urine, cough, and shortness of breath should be reported immediately. Patient advised to discontinue Arava treatment and consult with a physician prior to attempting conception. The patient will have to undergo a treatment to eliminate Arava from the body prior to conception. Carac Counseling:  I discussed with the patient the risks of Carac including but not limited to erythema, scaling, itching, weeping, crusting, and pain. Nsaids Counseling: NSAID Counseling: I discussed with the patient that NSAIDs should be taken with food. Prolonged use of NSAIDs can result in the development of stomach ulcers.  Patient advised to stop taking NSAIDs if abdominal pain occurs.  The patient verbalized understanding of the proper use and possible adverse effects of NSAIDs.  All of the patient's questions and concerns were addressed. Ilumya Counseling: I discussed with the patient the risks of tildrakizumab including but not limited to immunosuppression, malignancy, posterior leukoencephalopathy syndrome, and serious infections.  The patient understands that monitoring is required including a PPD at baseline and must alert us or the primary physician if symptoms of infection or other concerning signs are noted. Topical Steroids Counseling: I discussed with the patient that prolonged use of topical steroids can result in the increased appearance of superficial blood vessels (telangiectasias), lightening (hypopigmentation) and thinning of the skin (atrophy).  Patient understands to avoid using high potency steroids in skin folds, the groin or the face.  The patient verbalized understanding of the proper use and possible adverse effects of topical steroids.  All of the patient's questions and concerns were addressed. Ozempic Counseling: I reviewed the possible side effects including: thyroid tumors, kidney disease, gallbladder disease, abdominal pain, constipation, diarrhea, nausea, vomiting and pancreatitis. Do not take this medication if you have a history or family history of multiple endocrine neoplasia syndrome type 2. Side effects reviewed, pt to contact office should one occur. Nsaids Pregnancy And Lactation Text: These medications are considered safe up to 30 weeks gestation. It is excreted in breast milk. Cephalexin Counseling: I counseled the patient regarding use of cephalexin as an antibiotic for prophylactic and/or therapeutic purposes. Cephalexin (commonly prescribed under brand name Keflex) is a cephalosporin antibiotic which is active against numerous classes of bacteria, including most skin bacteria. Side effects may include nausea, diarrhea, gastrointestinal upset, rash, hives, yeast infections, and in rare cases, hepatitis, kidney disease, seizures, fever, confusion, neurologic symptoms, and others. Patients with severe allergies to penicillin medications are cautioned that there is about a 10% incidence of cross-reactivity with cephalosporins. When possible, patients with penicillin allergies should use alternatives to cephalosporins for antibiotic therapy. Adbry Counseling: I discussed with the patient the risks of tralokinumab including but not limited to eye infection and irritation, cold sores, injection site reactions, worsening of asthma, allergic reactions and increased risk of parasitic infection.  Live vaccines should be avoided while taking tralokinumab. The patient understands that monitoring is required and they must alert us or the primary physician if symptoms of infection or other concerning signs are noted. Soolantra Pregnancy And Lactation Text: This medication is Pregnancy Category C. This medication is considered safe during breast feeding. Rinvoq Counseling: I discussed with the patient the risks of Rinvoq therapy including but not limited to upper respiratory tract infections, shingles, cold sores, bronchitis, nausea, cough, fever, acne, and headache. Live vaccines should be avoided.  This medication has been linked to serious infections; higher rate of mortality; malignancy and lymphoproliferative disorders; major adverse cardiovascular events; thrombosis; thrombocytopenia, anemia, and neutropenia; lipid elevations; liver enzyme elevations; and gastrointestinal perforations. Cellcept Counseling:  I discussed with the patient the risks of mycophenolate mofetil including but not limited to infection/immunosuppression, GI upset, hypokalemia, hypercholesterolemia, bone marrow suppression, lymphoproliferative disorders, malignancy, GI ulceration/bleed/perforation, colitis, interstitial lung disease, kidney failure, progressive multifocal leukoencephalopathy, and birth defects.  The patient understands that monitoring is required including a baseline creatinine and regular CBC testing. In addition, patient must alert us immediately if symptoms of infection or other concerning signs are noted. Minoxidil Counseling: Minoxidil is a topical medication which can increase blood flow where it is applied. It is uncertain how this medication increases hair growth. Side effects are uncommon and include stinging and allergic reactions. Adbry Pregnancy And Lactation Text: It is unknown if this medication will adversely affect pregnancy or breast feeding. Acitretin Counseling:  I discussed with the patient the risks of acitretin including but not limited to hair loss, dry lips/skin/eyes, liver damage, hyperlipidemia, depression/suicidal ideation, photosensitivity.  Serious rare side effects can include but are not limited to pancreatitis, pseudotumor cerebri, bony changes, clot formation/stroke/heart attack.  Patient understands that alcohol is contraindicated since it can result in liver toxicity and significantly prolong the elimination of the drug by many years. Erythromycin Pregnancy And Lactation Text: This medication is Pregnancy Category B and is considered safe during pregnancy. It is also excreted in breast milk. Metronidazole Counseling:  I discussed with the patient the risks of metronidazole including but not limited to seizures, nausea/vomiting, a metallic taste in the mouth, nausea/vomiting and severe allergy. Clofazimine Counseling:  I discussed with the patient the risks of clofazimine including but not limited to skin and eye pigmentation, liver damage, nausea/vomiting, gastrointestinal bleeding and allergy. Simponi Counseling:  I discussed with the patient the risks of golimumab including but not limited to myelosuppression, immunosuppression, autoimmune hepatitis, demyelinating diseases, lymphoma, and serious infections.  The patient understands that monitoring is required including a PPD at baseline and must alert us or the primary physician if symptoms of infection or other concerning signs are noted. Rinvoq Pregnancy And Lactation Text: Based on animal studies, Rinvoq may cause embryo-fetal harm when administered to pregnant women.  The medication should not be used in pregnancy.  Breastfeeding is not recommended during treatment and for 6 days after the last dose. Topical Steroids Applications Pregnancy And Lactation Text: Most topical steroids are considered safe to use during pregnancy and lactation.  Any topical steroid applied to the breast or nipple should be washed off before breastfeeding. Bimzelx Counseling:  I discussed with the patient the risks of Bimzelx including but not limited to depression, immunosuppression, allergic reactions and infections.  The patient understands that monitoring is required including a PPD at baseline and must alert us or the primary physician if symptoms of infection or other concerning signs are noted. Topical Sulfur Applications Counseling: Topical Sulfur Counseling: Patient counseled that this medication may cause skin irritation or allergic reactions.  In the event of skin irritation, the patient was advised to reduce the amount of the drug applied or use it less frequently.   The patient verbalized understanding of the proper use and possible adverse effects of topical sulfur application.  All of the patient's questions and concerns were addressed. Olanzapine Counseling- I discussed with the patient the common side effects of olanzapine including but are not limited to: lack of energy, dry mouth, increased appetite, sleepiness, tremor, constipation, dizziness, changes in behavior, or restlessness.  Explained that teenagers are more likely to experience headaches, abdominal pain, pain in the arms or legs, tiredness, and sleepiness.  Serious side effects include but are not limited: increased risk of death in elderly patients who are confused, have memory loss, or dementia-related psychosis; hyperglycemia; increased cholesterol and triglycerides; and weight gain. Calcipotriene Counseling:  I discussed with the patient the risks of calcipotriene including but not limited to erythema, scaling, itching, and irritation. Acitretin Pregnancy And Lactation Text: This medication is Pregnancy Category X and should not be given to women who are pregnant or may become pregnant in the future. This medication is excreted in breast milk.

## 2025-05-09 ENCOUNTER — TELEPHONE (OUTPATIENT)
Dept: FAMILY MEDICINE CLINIC | Age: 65
End: 2025-05-09

## 2025-05-09 NOTE — TELEPHONE ENCOUNTER
Jourdan with [facility] called to see if PCP will follow for Skilled Nursing, PT and OT. She advised the pt will be discharging [facility] 5/13/25.     Last seen 10/1/2024  Next appt Visit date not found

## 2025-05-15 ENCOUNTER — TELEPHONE (OUTPATIENT)
Dept: FAMILY MEDICINE CLINIC | Age: 65
End: 2025-05-15

## 2025-05-15 NOTE — TELEPHONE ENCOUNTER
Alisa from [facility] called to report pt had a fall at home. Pt was not injured and did not hit his head. Alisa also stated pt had a high BP of 200/101. Pt took Metoprolol and BP decreased to 170/98.

## 2025-05-15 NOTE — TELEPHONE ENCOUNTER
Called and scheduled appt 5/22/25.    ----- Message from Donavan LEIVA sent at 5/14/2025  4:32 PM EDT -----  Regarding: [facility] Appointment Request  [facility] Appointment Request    Patient needs appointment for [facility] Appointment Type: Hospital Follow Up.    Patient Requested Dates(s): ASAP as long as it is not Wednesday   Patient Requested Time:afternoon  Provider Name:Franky Montana DO    Reason for Appointment Request: Other call   --------------------------------------------------------------------------------------------------------------------------    Relationship to Patient: Sister  Supriya     Call Back Information: OK to leave message on voicemail  Preferred Call Back Number: Phone [phone]

## 2025-05-22 ENCOUNTER — OFFICE VISIT (OUTPATIENT)
Dept: FAMILY MEDICINE CLINIC | Age: 65
End: 2025-05-22

## 2025-05-22 VITALS
OXYGEN SATURATION: 96 % | DIASTOLIC BLOOD PRESSURE: 74 MMHG | SYSTOLIC BLOOD PRESSURE: 124 MMHG | HEART RATE: 69 BPM | RESPIRATION RATE: 16 BRPM

## 2025-05-22 DIAGNOSIS — E61.1 IRON DEFICIENCY: ICD-10-CM

## 2025-05-22 DIAGNOSIS — Z79.4 TYPE 2 DIABETES MELLITUS WITH HYPERGLYCEMIA, WITH LONG-TERM CURRENT USE OF INSULIN (HCC): ICD-10-CM

## 2025-05-22 DIAGNOSIS — E11.65 TYPE 2 DIABETES MELLITUS WITH HYPERGLYCEMIA, WITH LONG-TERM CURRENT USE OF INSULIN (HCC): ICD-10-CM

## 2025-05-22 DIAGNOSIS — R35.0 BENIGN PROSTATIC HYPERPLASIA WITH URINARY FREQUENCY: ICD-10-CM

## 2025-05-22 DIAGNOSIS — J41.0 SIMPLE CHRONIC BRONCHITIS (HCC): ICD-10-CM

## 2025-05-22 DIAGNOSIS — I48.21 PERMANENT ATRIAL FIBRILLATION (HCC): ICD-10-CM

## 2025-05-22 DIAGNOSIS — N17.9 ACUTE RENAL FAILURE, UNSPECIFIED ACUTE RENAL FAILURE TYPE: ICD-10-CM

## 2025-05-22 DIAGNOSIS — M54.2 CERVICAL PAIN (NECK): ICD-10-CM

## 2025-05-22 DIAGNOSIS — I10 PRIMARY HYPERTENSION: ICD-10-CM

## 2025-05-22 DIAGNOSIS — N40.1 BENIGN PROSTATIC HYPERPLASIA WITH URINARY FREQUENCY: ICD-10-CM

## 2025-05-22 DIAGNOSIS — G54.6 PHANTOM LIMB PAIN (HCC): ICD-10-CM

## 2025-05-22 DIAGNOSIS — R53.82 CHRONIC FATIGUE: ICD-10-CM

## 2025-05-22 DIAGNOSIS — R53.82 CHRONIC FATIGUE: Primary | ICD-10-CM

## 2025-05-22 DIAGNOSIS — E78.2 MIXED HYPERLIPIDEMIA: ICD-10-CM

## 2025-05-22 LAB
BASOPHILS ABSOLUTE: 0.06 K/UL (ref 0–0.2)
BASOPHILS RELATIVE PERCENT: 1 % (ref 0–2)
EOSINOPHILS ABSOLUTE: 0.24 K/UL (ref 0.05–0.5)
EOSINOPHILS RELATIVE PERCENT: 3 % (ref 0–6)
HBA1C MFR BLD: 9.5 % (ref 4–5.6)
HCT VFR BLD CALC: 38.8 % (ref 37–54)
HEMOGLOBIN: 12.6 G/DL (ref 12.5–16.5)
IMMATURE GRANULOCYTES %: 0 % (ref 0–5)
IMMATURE GRANULOCYTES ABSOLUTE: <0.03 K/UL (ref 0–0.58)
LYMPHOCYTES ABSOLUTE: 2.78 K/UL (ref 1.5–4)
LYMPHOCYTES RELATIVE PERCENT: 33 % (ref 20–42)
MCH RBC QN AUTO: 30.3 PG (ref 26–35)
MCHC RBC AUTO-ENTMCNC: 32.5 G/DL (ref 32–34.5)
MCV RBC AUTO: 93.3 FL (ref 80–99.9)
MONOCYTES ABSOLUTE: 0.71 K/UL (ref 0.1–0.95)
MONOCYTES RELATIVE PERCENT: 9 % (ref 2–12)
NEUTROPHILS ABSOLUTE: 4.59 K/UL (ref 1.8–7.3)
NEUTROPHILS RELATIVE PERCENT: 55 % (ref 43–80)
PDW BLD-RTO: 13.8 % (ref 11.5–15)
PLATELET # BLD: 320 K/UL (ref 130–450)
PMV BLD AUTO: 9.3 FL (ref 7–12)
RBC # BLD: 4.16 M/UL (ref 3.8–5.8)
WBC # BLD: 8.4 K/UL (ref 4.5–11.5)

## 2025-05-22 RX ORDER — GABAPENTIN 100 MG/1
100 CAPSULE ORAL 3 TIMES DAILY
Qty: 90 CAPSULE | Refills: 3 | Status: SHIPPED | OUTPATIENT
Start: 2025-05-22 | End: 2026-05-22

## 2025-05-22 RX ORDER — METOPROLOL TARTRATE 50 MG
25 TABLET ORAL 2 TIMES DAILY
Qty: 60 TABLET | Refills: 3 | Status: SHIPPED | OUTPATIENT
Start: 2025-05-22

## 2025-05-22 RX ORDER — MAGNESIUM OXIDE 400 MG/1
400 TABLET ORAL DAILY
COMMUNITY
Start: 2024-12-18

## 2025-05-22 RX ORDER — INSULIN GLARGINE 100 [IU]/ML
28 INJECTION, SOLUTION SUBCUTANEOUS NIGHTLY
Qty: 5 ADJUSTABLE DOSE PRE-FILLED PEN SYRINGE | Refills: 3 | Status: SHIPPED | OUTPATIENT
Start: 2025-05-22 | End: 2025-05-23

## 2025-05-22 RX ORDER — CYCLOBENZAPRINE HCL 10 MG
TABLET ORAL
COMMUNITY
Start: 2025-05-13

## 2025-05-22 RX ORDER — HYDROCHLOROTHIAZIDE 12.5 MG/1
CAPSULE ORAL
Qty: 6 EACH | Refills: 3 | Status: SHIPPED | OUTPATIENT
Start: 2025-05-22

## 2025-05-22 RX ORDER — GABAPENTIN 100 MG/1
100 CAPSULE ORAL 3 TIMES DAILY
COMMUNITY
End: 2025-05-22 | Stop reason: SDUPTHER

## 2025-05-22 RX ORDER — ATORVASTATIN CALCIUM 10 MG/1
10 TABLET, FILM COATED ORAL NIGHTLY
Qty: 90 TABLET | Refills: 3 | Status: SHIPPED | OUTPATIENT
Start: 2025-05-22

## 2025-05-22 RX ORDER — FOLIC ACID 1 MG/1
1 TABLET ORAL DAILY
Qty: 30 TABLET | Refills: 3 | Status: SHIPPED | OUTPATIENT
Start: 2025-05-22

## 2025-05-22 RX ORDER — TAMSULOSIN HYDROCHLORIDE 0.4 MG/1
0.4 CAPSULE ORAL NIGHTLY
Qty: 30 CAPSULE | Refills: 3 | Status: SHIPPED | OUTPATIENT
Start: 2025-05-22

## 2025-05-22 RX ORDER — INSULIN LISPRO 100 [IU]/ML
12 INJECTION, SOLUTION INTRAVENOUS; SUBCUTANEOUS
Qty: 5 ADJUSTABLE DOSE PRE-FILLED PEN SYRINGE | Refills: 3 | Status: SHIPPED | OUTPATIENT
Start: 2025-05-22

## 2025-05-22 RX ORDER — AMIODARONE HYDROCHLORIDE 200 MG/1
200 TABLET ORAL DAILY
Qty: 30 TABLET | Refills: 3 | Status: SHIPPED | OUTPATIENT
Start: 2025-05-22

## 2025-05-22 RX ORDER — DILTIAZEM HYDROCHLORIDE 60 MG/1
2 TABLET, FILM COATED ORAL 2 TIMES DAILY
Qty: 10.2 G | Refills: 3 | Status: SHIPPED | OUTPATIENT
Start: 2025-05-22 | End: 2025-05-23 | Stop reason: SDUPTHER

## 2025-05-22 RX ORDER — CYCLOBENZAPRINE HCL 10 MG
10 TABLET ORAL 3 TIMES DAILY PRN
Qty: 90 TABLET | Refills: 3 | Status: SHIPPED | OUTPATIENT
Start: 2025-05-22 | End: 2026-05-22

## 2025-05-22 RX ORDER — PANTOPRAZOLE SODIUM 40 MG/1
40 TABLET, DELAYED RELEASE ORAL
Qty: 60 TABLET | Refills: 3 | Status: SHIPPED | OUTPATIENT
Start: 2025-05-22

## 2025-05-22 RX ORDER — LANOLIN ALCOHOL/MO/W.PET/CERES
100 CREAM (GRAM) TOPICAL DAILY
Qty: 30 TABLET | Refills: 3 | Status: SHIPPED | OUTPATIENT
Start: 2025-05-22

## 2025-05-22 ASSESSMENT — PATIENT HEALTH QUESTIONNAIRE - PHQ9
SUM OF ALL RESPONSES TO PHQ QUESTIONS 1-9: 0
1. LITTLE INTEREST OR PLEASURE IN DOING THINGS: NOT AT ALL
2. FEELING DOWN, DEPRESSED OR HOPELESS: NOT AT ALL

## 2025-05-23 ENCOUNTER — TELEPHONE (OUTPATIENT)
Dept: FAMILY MEDICINE CLINIC | Age: 65
End: 2025-05-23

## 2025-05-23 LAB
ALBUMIN: 4.1 G/DL (ref 3.5–5.2)
ALP BLD-CCNC: 84 U/L (ref 40–129)
ALT SERPL-CCNC: 16 U/L (ref 0–50)
ANION GAP SERPL CALCULATED.3IONS-SCNC: 11 MMOL/L (ref 7–16)
AST SERPL-CCNC: 19 U/L (ref 0–50)
BILIRUB SERPL-MCNC: 0.2 MG/DL (ref 0–1.2)
BUN BLDV-MCNC: 24 MG/DL (ref 8–23)
CALCIUM SERPL-MCNC: 10 MG/DL (ref 8.8–10.2)
CHLORIDE BLD-SCNC: 105 MMOL/L (ref 98–107)
CHOLESTEROL, TOTAL: 186 MG/DL
CO2: 24 MMOL/L (ref 22–29)
CREAT SERPL-MCNC: 1.4 MG/DL (ref 0.7–1.2)
FERRITIN: 1149 NG/ML
GFR, ESTIMATED: 56 ML/MIN/1.73M2
GLUCOSE BLD-MCNC: 156 MG/DL (ref 74–99)
HDLC SERPL-MCNC: 41 MG/DL
IRON % SATURATION: 29 % (ref 20–55)
IRON: 66 UG/DL (ref 61–157)
LDL CHOLESTEROL: 122 MG/DL
MAGNESIUM: 1.7 MG/DL (ref 1.6–2.4)
PHOSPHORUS: 3.4 MG/DL (ref 2.5–4.5)
POTASSIUM SERPL-SCNC: 4.1 MMOL/L (ref 3.5–5.1)
SODIUM BLD-SCNC: 140 MMOL/L (ref 136–145)
T4 FREE: 1.5 NG/DL (ref 0.9–1.7)
TOTAL IRON BINDING CAPACITY: 226 UG/DL (ref 250–450)
TOTAL PROTEIN: 7.2 G/DL (ref 6.4–8.3)
TRIGL SERPL-MCNC: 112 MG/DL
TSH SERPL DL<=0.05 MIU/L-ACNC: 2.25 UIU/ML (ref 0.27–4.2)
VLDLC SERPL CALC-MCNC: 22 MG/DL

## 2025-05-23 RX ORDER — BUDESONIDE AND FORMOTEROL FUMARATE DIHYDRATE 80; 4.5 UG/1; UG/1
2 AEROSOL RESPIRATORY (INHALATION) 2 TIMES DAILY
Qty: 10.2 G | Refills: 3 | Status: SHIPPED | OUTPATIENT
Start: 2025-05-23

## 2025-05-23 RX ORDER — INSULIN GLARGINE 300 U/ML
28 INJECTION, SOLUTION SUBCUTANEOUS NIGHTLY
Qty: 5 ADJUSTABLE DOSE PRE-FILLED PEN SYRINGE | Refills: 3 | Status: SHIPPED | OUTPATIENT
Start: 2025-05-23

## 2025-05-23 NOTE — TELEPHONE ENCOUNTER
Paige from pt insurance company called stating the Rx for the Symbicort  comes in generic and the pt would have to try that for 30 days and fail before they will pay for the name brand.    Also the Lantus is not covered and the alternatives are   Semglee, toujou or the tresiba.    Last seen 5/22/2025  Next appt 8/26/2025    [facility]/ [address]

## 2025-06-10 DIAGNOSIS — N17.9 AKI (ACUTE KIDNEY INJURY): Primary | ICD-10-CM

## 2025-06-12 ENCOUNTER — OFFICE VISIT (OUTPATIENT)
Dept: ENDOCRINOLOGY | Age: 65
End: 2025-06-12

## 2025-06-12 VITALS
TEMPERATURE: 98.3 F | OXYGEN SATURATION: 100 % | HEIGHT: 68 IN | BODY MASS INDEX: 30.92 KG/M2 | HEART RATE: 72 BPM | SYSTOLIC BLOOD PRESSURE: 113 MMHG | WEIGHT: 204 LBS | DIASTOLIC BLOOD PRESSURE: 71 MMHG

## 2025-06-12 DIAGNOSIS — E11.42 TYPE 2 DIABETES MELLITUS WITH DIABETIC POLYNEUROPATHY, WITH LONG-TERM CURRENT USE OF INSULIN (HCC): ICD-10-CM

## 2025-06-12 DIAGNOSIS — E66.9 OBESITY (BMI 30-39.9): ICD-10-CM

## 2025-06-12 DIAGNOSIS — E78.2 MIXED HYPERLIPIDEMIA: ICD-10-CM

## 2025-06-12 DIAGNOSIS — Z79.4 TYPE 2 DIABETES MELLITUS WITH HYPERGLYCEMIA, WITH LONG-TERM CURRENT USE OF INSULIN (HCC): Primary | ICD-10-CM

## 2025-06-12 DIAGNOSIS — E11.65 TYPE 2 DIABETES MELLITUS WITH HYPERGLYCEMIA, WITH LONG-TERM CURRENT USE OF INSULIN (HCC): Primary | ICD-10-CM

## 2025-06-12 DIAGNOSIS — Z79.4 TYPE 2 DIABETES MELLITUS WITH DIABETIC POLYNEUROPATHY, WITH LONG-TERM CURRENT USE OF INSULIN (HCC): ICD-10-CM

## 2025-06-12 DIAGNOSIS — Z91.119 DIETARY NONCOMPLIANCE: ICD-10-CM

## 2025-06-12 NOTE — PROGRESS NOTES
Neuropathy  Counseled on foot care  Advised optimal glucose control   On gabapentin-managed by PCP    Dietary noncompliance  Discussed with patient the importance of eating consistent carbohydrate meals, avoiding high glycemic index food. Also, discussed with patient the risk and negative consequences of dietary noncompliance on blood glucose control, blood pressure and weight      Obesity  Discussed lifestyle changes including diet and exercise with patient in depth. Also discussed with patient cardiovascular risk associated with obesity    I personally spent  60 minutes reviewing  external notes from PCP and other patient's care team providers, and personally interpreted labs associated with the above diagnosis. I also ordered labs to further assess and manage the above addressed medical conditions.        Return in about 3 months (around 9/12/2025) for Type 2 DM.    The above issues were reviewed with the patient who understood and agreed with the plan.    Thank you for allowing us to participate in the care of this patient. Please do not hesitate to contact us with any additional questions.     NUZHAT Hartmann CNP    [facility]   [address] [place] [address].  [address]  [place] [address]  Phone: [phone]  Fax: [phone]  --------------------------------------------  An electronic signature was used to authenticate this note. NUZHAT Hartmann CNP on 6/12/2025 at 8:55 AM

## 2025-07-01 DIAGNOSIS — E11.65 TYPE 2 DIABETES MELLITUS WITH HYPERGLYCEMIA, WITH LONG-TERM CURRENT USE OF INSULIN (HCC): Primary | ICD-10-CM

## 2025-07-01 DIAGNOSIS — Z79.4 TYPE 2 DIABETES MELLITUS WITH HYPERGLYCEMIA, WITH LONG-TERM CURRENT USE OF INSULIN (HCC): Primary | ICD-10-CM

## 2025-07-01 RX ORDER — INSULIN LISPRO 100 [IU]/ML
INJECTION, SOLUTION INTRAVENOUS; SUBCUTANEOUS
Qty: 90 ML | Refills: 3 | Status: SHIPPED | OUTPATIENT
Start: 2025-07-01

## 2025-07-06 ENCOUNTER — PATIENT MESSAGE (OUTPATIENT)
Dept: FAMILY MEDICINE CLINIC | Age: 65
End: 2025-07-06

## 2025-08-13 DIAGNOSIS — I48.21 PERMANENT ATRIAL FIBRILLATION (HCC): ICD-10-CM

## 2025-08-25 DIAGNOSIS — N17.9 ACUTE RENAL FAILURE, UNSPECIFIED ACUTE RENAL FAILURE TYPE: ICD-10-CM

## 2025-08-25 DIAGNOSIS — Z79.4 TYPE 2 DIABETES MELLITUS WITH HYPERGLYCEMIA, WITH LONG-TERM CURRENT USE OF INSULIN (HCC): Primary | ICD-10-CM

## 2025-08-25 DIAGNOSIS — E11.65 TYPE 2 DIABETES MELLITUS WITH HYPERGLYCEMIA, WITH LONG-TERM CURRENT USE OF INSULIN (HCC): Primary | ICD-10-CM

## 2025-08-25 DIAGNOSIS — I70.209 FEMORAL ARTERY OCCLUSION: ICD-10-CM

## 2025-08-25 DIAGNOSIS — G54.6 PHANTOM LIMB PAIN (HCC): ICD-10-CM

## 2025-08-26 ENCOUNTER — OFFICE VISIT (OUTPATIENT)
Dept: FAMILY MEDICINE CLINIC | Age: 65
End: 2025-08-26
Payer: COMMERCIAL

## 2025-08-26 VITALS
HEART RATE: 97 BPM | DIASTOLIC BLOOD PRESSURE: 68 MMHG | BODY MASS INDEX: 28.85 KG/M2 | WEIGHT: 190.4 LBS | OXYGEN SATURATION: 98 % | SYSTOLIC BLOOD PRESSURE: 120 MMHG | HEIGHT: 68 IN | RESPIRATION RATE: 18 BRPM

## 2025-08-26 DIAGNOSIS — R53.82 CHRONIC FATIGUE: ICD-10-CM

## 2025-08-26 DIAGNOSIS — I10 PRIMARY HYPERTENSION: ICD-10-CM

## 2025-08-26 DIAGNOSIS — E78.2 MIXED HYPERLIPIDEMIA: ICD-10-CM

## 2025-08-26 DIAGNOSIS — R97.20 ELEVATED PSA: ICD-10-CM

## 2025-08-26 DIAGNOSIS — N28.9 RENAL INSUFFICIENCY: ICD-10-CM

## 2025-08-26 DIAGNOSIS — Z72.0 TOBACCO ABUSE: ICD-10-CM

## 2025-08-26 DIAGNOSIS — E11.65 TYPE 2 DIABETES MELLITUS WITH HYPERGLYCEMIA, WITH LONG-TERM CURRENT USE OF INSULIN (HCC): Primary | ICD-10-CM

## 2025-08-26 DIAGNOSIS — G54.6 PHANTOM LIMB PAIN (HCC): ICD-10-CM

## 2025-08-26 DIAGNOSIS — I10 HYPERTENSION, UNSPECIFIED TYPE: ICD-10-CM

## 2025-08-26 DIAGNOSIS — S78.111D: ICD-10-CM

## 2025-08-26 DIAGNOSIS — I48.21 PERMANENT ATRIAL FIBRILLATION (HCC): ICD-10-CM

## 2025-08-26 DIAGNOSIS — Z79.4 TYPE 2 DIABETES MELLITUS WITH HYPERGLYCEMIA, WITH LONG-TERM CURRENT USE OF INSULIN (HCC): Primary | ICD-10-CM

## 2025-08-26 DIAGNOSIS — R41.3 MEMORY LOSS: ICD-10-CM

## 2025-08-26 PROCEDURE — 99214 OFFICE O/P EST MOD 30 MIN: CPT | Performed by: FAMILY MEDICINE

## 2025-08-26 PROCEDURE — 3046F HEMOGLOBIN A1C LEVEL >9.0%: CPT | Performed by: FAMILY MEDICINE

## 2025-08-26 PROCEDURE — 3078F DIAST BP <80 MM HG: CPT | Performed by: FAMILY MEDICINE

## 2025-08-26 PROCEDURE — 3074F SYST BP LT 130 MM HG: CPT | Performed by: FAMILY MEDICINE

## 2025-08-28 ASSESSMENT — ENCOUNTER SYMPTOMS
RHINORRHEA: 0
COUGH: 0
VOICE CHANGE: 0
EYE DISCHARGE: 0
ANAL BLEEDING: 0
EYE PAIN: 0
BLOOD IN STOOL: 0
APNEA: 0
CHOKING: 0
BACK PAIN: 0
SHORTNESS OF BREATH: 0
VOMITING: 0
CHEST TIGHTNESS: 0
SINUS PRESSURE: 0
DIARRHEA: 0
FACIAL SWELLING: 0
ABDOMINAL DISTENTION: 0
STRIDOR: 0
COLOR CHANGE: 0
WHEEZING: 0
ABDOMINAL PAIN: 0
SORE THROAT: 0
CONSTIPATION: 0
PHOTOPHOBIA: 0
NAUSEA: 0
EYE REDNESS: 0
EYE ITCHING: 0
RECTAL PAIN: 0
TROUBLE SWALLOWING: 0

## 2025-09-03 ENCOUNTER — HOSPITAL ENCOUNTER (OUTPATIENT)
Dept: LAB | Age: 65
Discharge: HOME | End: 2025-09-03
Payer: COMMERCIAL

## 2025-09-03 LAB
25(OH)D3 SERPL-MCNC: 21.1 NG/ML (ref 30–100)
ALBUMIN SERPL-MCNC: 4.2 G/DL (ref 3.5–5.2)
ALP SERPL-CCNC: 87 U/L (ref 40–129)
ALT SERPL-CCNC: 14 U/L (ref 0–50)
ANION GAP SERPL CALCULATED.3IONS-SCNC: 13 MMOL/L (ref 7–16)
AST SERPL-CCNC: 25 U/L (ref 0–50)
BILIRUB SERPL-MCNC: 0.4 MG/DL (ref 0–1.2)
BUN SERPL-MCNC: 16 MG/DL (ref 8–23)
CALCIUM SERPL-MCNC: 10.2 MG/DL (ref 8.8–10.2)
CHLORIDE SERPL-SCNC: 103 MMOL/L (ref 98–107)
CO2 SERPL-SCNC: 23 MMOL/L (ref 22–29)
CREAT SERPL-MCNC: 1.2 MG/DL (ref 0.7–1.2)
ERYTHROCYTE [DISTWIDTH] IN BLOOD BY AUTOMATED COUNT: 14.1 % (ref 11.5–15)
GFR, ESTIMATED: 69 ML/MIN/1.73M2
GLUCOSE SERPL-MCNC: 168 MG/DL (ref 74–99)
HCT VFR BLD AUTO: 50.4 % (ref 37–54)
HGB BLD-MCNC: 16.1 G/DL (ref 12.5–16.5)
MCH RBC QN AUTO: 29.8 PG (ref 26–35)
MCHC RBC AUTO-ENTMCNC: 31.9 G/DL (ref 32–34.5)
MCV RBC AUTO: 93.2 FL (ref 80–99.9)
PHOSPHATE SERPL-MCNC: 2.4 MG/DL (ref 2.5–4.5)
PLATELET # BLD AUTO: 306 K/UL (ref 130–450)
PMV BLD AUTO: 9.9 FL (ref 7–12)
POTASSIUM SERPL-SCNC: 4.6 MMOL/L (ref 3.5–5.1)
PROT SERPL-MCNC: 7.8 G/DL (ref 6.4–8.3)
PTH-INTACT SERPL-MCNC: 33 PG/ML (ref 15–65)
RBC # BLD AUTO: 5.41 M/UL (ref 3.8–5.8)
SODIUM SERPL-SCNC: 138 MMOL/L (ref 136–145)
WBC OTHER # BLD: 12.8 K/UL (ref 4.5–11.5)

## 2025-09-03 PROCEDURE — 36415 COLL VENOUS BLD VENIPUNCTURE: CPT

## 2025-09-03 PROCEDURE — 83970 ASSAY OF PARATHORMONE: CPT

## 2025-09-03 PROCEDURE — 85027 COMPLETE CBC AUTOMATED: CPT

## 2025-09-03 PROCEDURE — 80053 COMPREHEN METABOLIC PANEL: CPT

## 2025-09-03 PROCEDURE — 84100 ASSAY OF PHOSPHORUS: CPT

## 2025-09-03 PROCEDURE — 82306 VITAMIN D 25 HYDROXY: CPT

## 2025-09-05 ENCOUNTER — HOSPITAL ENCOUNTER (OUTPATIENT)
Dept: LAB | Age: 65
Setting detail: SPECIMEN
Discharge: HOME | End: 2025-09-05
Payer: COMMERCIAL

## 2025-09-05 LAB
BACTERIA URNS QL MICRO: ABNORMAL
BILIRUB UR QL STRIP: NEGATIVE
BILIRUB UR QL STRIP: NEGATIVE
CLARITY UR: ABNORMAL
CLARITY UR: CLEAR
COLOR UR: YELLOW
COLOR UR: YELLOW
CREAT UR-MCNC: 106 MG/DL (ref 40–278)
CREAT UR-MCNC: 114 MG/DL (ref 40–278)
GLUCOSE UR STRIP-MCNC: NEGATIVE MG/DL
GLUCOSE UR STRIP-MCNC: NEGATIVE MG/DL
HGB UR QL STRIP.AUTO: ABNORMAL
HGB UR QL STRIP.AUTO: ABNORMAL
KETONES UR STRIP-MCNC: NEGATIVE MG/DL
KETONES UR STRIP-MCNC: NEGATIVE MG/DL
LEUKOCYTE ESTERASE UR QL STRIP: ABNORMAL
LEUKOCYTE ESTERASE UR QL STRIP: ABNORMAL
MICROALBUMIN UR-MCNC: 133 MG/L (ref 0–20)
MICROALBUMIN/CREAT UR-RTO: 117 MCG/MG CREAT (ref 0–30)
NITRITE UR QL STRIP: POSITIVE
NITRITE UR QL STRIP: POSITIVE
PH UR STRIP: 6 [PH] (ref 5–8)
PH UR STRIP: 6 [PH] (ref 5–8)
PROT UR STRIP-MCNC: 30 MG/DL
PROT UR STRIP-MCNC: 30 MG/DL
RBC #/AREA URNS HPF: ABNORMAL /HPF
SP GR UR STRIP: 1.01 (ref 1–1.03)
SP GR UR STRIP: 1.01 (ref 1–1.03)
TOTAL PROTEIN, URINE: 28 MG/DL (ref 0–12)
URINE TOTAL PROTEIN CREATININE RATIO: 0.26 (ref 0–0.2)
UROBILINOGEN UR STRIP-ACNC: 0.2 EU/DL (ref 0–1)
UROBILINOGEN UR STRIP-ACNC: 0.2 EU/DL (ref 0–1)
WBC #/AREA URNS HPF: ABNORMAL /HPF

## 2025-09-05 PROCEDURE — 82570 ASSAY OF URINE CREATININE: CPT

## 2025-09-05 PROCEDURE — 82043 UR ALBUMIN QUANTITATIVE: CPT

## 2025-09-05 PROCEDURE — 81001 URINALYSIS AUTO W/SCOPE: CPT

## 2025-09-05 PROCEDURE — 84156 ASSAY OF PROTEIN URINE: CPT

## (undated) PROCEDURE — 5A1D70Z PERFORMANCE OF URINARY FILTRATION, INTERMITTENT, LESS THAN 6 HOURS PER DAY: ICD-10-PCS

## (undated) DEVICE — STRAP POS MP 30X3 IN HK LOOP CLOSURE FOAM DISP

## (undated) DEVICE — SHEET, T, LAPAROTOMY, STERILE: Brand: MEDLINE

## (undated) DEVICE — SYRINGE 20ML LL S/C 50

## (undated) DEVICE — DRESSING HEMSTAT W12XL12IN 3 PLY QUIKCLOT CONTROL+

## (undated) DEVICE — BANDAGE,GAUZE,4.5"X4.1YD,STERILE,LF: Brand: MEDLINE

## (undated) DEVICE — 1LYRTR 16FR10ML100%SIL UMS SNP: Brand: MEDLINE INDUSTRIES, INC.

## (undated) DEVICE — SYRINGE IRRIG 60ML SFT PLIABLE BLB EZ TO GRP 1 HND USE W/

## (undated) DEVICE — GOWN,SIRUS,FABRNF,XL,20/CS: Brand: MEDLINE

## (undated) DEVICE — LIQUIBAND RAPID ADHESIVE 36/CS 0.8ML: Brand: MEDLINE

## (undated) DEVICE — STOCKINETTE,SINGLE PLY,4X48,STERILE: Brand: MEDLINE

## (undated) DEVICE — SYRINGE MED 5ML STD CLR PLAS LUERLOCK TIP N CTRL DISP

## (undated) DEVICE — NEPTUNE E-SEP SMOKE EVACUATION PENCIL, COATED, 70MM BLADE, PUSH BUTTON SWITCH: Brand: NEPTUNE E-SEP

## (undated) DEVICE — DEFENDO AIR WATER SUCTION AND BIOPSY VALVE KIT FOR  OLYMPUS: Brand: DEFENDO AIR/WATER/SUCTION AND BIOPSY VALVE

## (undated) DEVICE — GAUZE,SPONGE,4"X4",8PLY,STRL,LF,10/TRAY: Brand: MEDLINE

## (undated) DEVICE — SYSTEM CATH 20GA L1IN OD1.0668-1.143MM ID0.7874-0.8636MM 383516

## (undated) DEVICE — SYRINGE MED 10ML LUERLOCK TIP W/O SFTY DISP

## (undated) DEVICE — FOGARTY ARTERIAL EMBOLECTOMY CATHETER 4F 80CM: Brand: FOGARTY

## (undated) DEVICE — 3M™ IOBAN™ 2 ANTIMICROBIAL INCISE DRAPE 6640EZ: Brand: IOBAN™ 2

## (undated) DEVICE — LOWER EXT KNEE DRAPE: Brand: MEDLINE INDUSTRIES, INC.

## (undated) DEVICE — GLOVE SURG SZ 6 THK91MIL LTX FREE SYN POLYISOPRENE ANTI

## (undated) DEVICE — SOLUTION IRRIG 500ML 0.9% SOD CHLO USP POUR PLAS BTL

## (undated) DEVICE — SYRINGE MEDICAL 3ML CLEAR PLASTIC STANDARD NON CONTROL LUERLOCK TIP DISPOSABLE

## (undated) DEVICE — RECIPROCATING BLADE HEAVY DUTY (52.6 X 0.64MM)

## (undated) DEVICE — PACK,HEAVY DRAINAGE,STERILE: Brand: MEDLINE INDUSTRIES, INC.

## (undated) DEVICE — CONTAINER SPEC 60ML PH 7NEUTRAL BUFF FRMLN RDY TO USE

## (undated) DEVICE — MAJOR VASCULAR: Brand: MEDLINE INDUSTRIES, INC.

## (undated) DEVICE — DRESSING,GAUZE,XEROFORM,CURAD,5"X9",ST: Brand: CURAD

## (undated) DEVICE — BLANKET WRM W35.4XL86.6IN FULL UNDERBODY + FORC AIR

## (undated) DEVICE — ELECTRODE PT RET AD L9FT HI MOIST COND ADH HYDRGEL CORDED

## (undated) DEVICE — FOGARTY ARTERIAL EMBOLECTOMY CATHETER 3F 80CM: Brand: FOGARTY

## (undated) DEVICE — SYRINGE MED 1ML POLYCARB LUERLOCK HUB

## (undated) DEVICE — SPECIMEN CUP W/LID: Brand: DEROYAL

## (undated) DEVICE — 3M™ IOBAN™ 2 ANTIMICROBIAL INCISE DRAPE 6650EZ: Brand: IOBAN™ 2

## (undated) DEVICE — BANDAGE GAUZE 4.5INX4.1YD STERILE LF PRM25865P

## (undated) DEVICE — CONNECTOR IRRIGATION AUXILIARY H2O JET W/ PRT MTL THRD HYDR

## (undated) DEVICE — SYRINGE MED 10ML TRNSLUC BRL PLUNG BLK MRK POLYPR CTRL

## (undated) DEVICE — Device

## (undated) DEVICE — FORCEPS BX L240CM JAW DIA2.4MM ORNG L CAP W/ NDL DISP RAD

## (undated) DEVICE — MINOR VASCULAR: Brand: MEDLINE INDUSTRIES, INC.

## (undated) DEVICE — TOWEL,OR,DSP,ST,BLUE,STD,6/PK,12PK/CS: Brand: MEDLINE